# Patient Record
Sex: MALE | Race: WHITE | NOT HISPANIC OR LATINO | Employment: OTHER | URBAN - METROPOLITAN AREA
[De-identification: names, ages, dates, MRNs, and addresses within clinical notes are randomized per-mention and may not be internally consistent; named-entity substitution may affect disease eponyms.]

---

## 2023-03-06 DIAGNOSIS — F32.A DEPRESSION, UNSPECIFIED DEPRESSION TYPE: Primary | ICD-10-CM

## 2023-03-06 RX ORDER — SERTRALINE HYDROCHLORIDE 25 MG/1
25 TABLET, FILM COATED ORAL DAILY
Qty: 90 TABLET | Refills: 0 | Status: SHIPPED | OUTPATIENT
Start: 2023-03-06 | End: 2023-03-17 | Stop reason: SDUPTHER

## 2023-03-12 PROBLEM — I25.10 CAD (CORONARY ARTERY DISEASE): Status: ACTIVE | Noted: 2023-03-12

## 2023-03-12 PROBLEM — I50.9 CONGESTIVE HEART FAILURE (MULTI): Status: ACTIVE | Noted: 2023-03-12

## 2023-03-12 PROBLEM — C09.9: Status: ACTIVE | Noted: 2023-03-12

## 2023-03-12 PROBLEM — E87.1 HYPONATREMIA: Status: ACTIVE | Noted: 2023-03-12

## 2023-03-12 PROBLEM — J43.9 EMPHYSEMA, UNSPECIFIED (MULTI): Status: ACTIVE | Noted: 2023-03-12

## 2023-03-12 PROBLEM — E66.9 CLASS 1 OBESITY WITH BODY MASS INDEX (BMI) OF 30.0 TO 30.9 IN ADULT: Status: ACTIVE | Noted: 2023-03-12

## 2023-03-12 PROBLEM — H90.3 ASYMMETRIC SNHL (SENSORINEURAL HEARING LOSS): Status: ACTIVE | Noted: 2023-03-12

## 2023-03-12 PROBLEM — H61.23 BILATERAL IMPACTED CERUMEN: Status: ACTIVE | Noted: 2023-03-12

## 2023-03-12 PROBLEM — R04.0 EPISTAXIS: Status: ACTIVE | Noted: 2023-03-12

## 2023-03-12 PROBLEM — G47.30 APNEA, SLEEP: Status: ACTIVE | Noted: 2023-03-12

## 2023-03-12 PROBLEM — Z95.2 S/P TAVR (TRANSCATHETER AORTIC VALVE REPLACEMENT): Status: ACTIVE | Noted: 2023-03-12

## 2023-03-12 PROBLEM — Z95.0 CARDIAC PACEMAKER IN SITU: Status: ACTIVE | Noted: 2023-03-12

## 2023-03-12 PROBLEM — T50.905A MEDICATION INDUCED COAGULOPATHY (MULTI): Status: ACTIVE | Noted: 2023-03-12

## 2023-03-12 PROBLEM — C77.0 METASTASIS TO HEAD AND NECK LYMPH NODE (MULTI): Status: ACTIVE | Noted: 2023-03-12

## 2023-03-12 PROBLEM — E66.3 OVERWEIGHT WITH BODY MASS INDEX (BMI) OF 29 TO 29.9 IN ADULT: Status: ACTIVE | Noted: 2023-03-12

## 2023-03-12 PROBLEM — E66.811 CLASS 1 OBESITY WITH BODY MASS INDEX (BMI) OF 30.0 TO 30.9 IN ADULT: Status: ACTIVE | Noted: 2023-03-12

## 2023-03-12 PROBLEM — D68.9 MEDICATION INDUCED COAGULOPATHY (MULTI): Status: ACTIVE | Noted: 2023-03-12

## 2023-03-12 PROBLEM — R35.0 URINARY FREQUENCY: Status: ACTIVE | Noted: 2023-03-12

## 2023-03-12 PROBLEM — H90.3 SENSORINEURAL HEARING LOSS (SNHL) OF BOTH EARS: Status: ACTIVE | Noted: 2023-03-12

## 2023-03-12 PROBLEM — F41.9 ANXIETY: Status: ACTIVE | Noted: 2023-03-12

## 2023-03-12 PROBLEM — I10 HYPERTENSION: Status: ACTIVE | Noted: 2023-03-12

## 2023-03-12 PROBLEM — R06.02 SOB (SHORTNESS OF BREATH) ON EXERTION: Status: ACTIVE | Noted: 2023-03-12

## 2023-03-12 PROBLEM — D13.2 ADENOMA OF DUODENUM: Status: ACTIVE | Noted: 2023-03-12

## 2023-03-12 PROBLEM — R60.0 BILATERAL LEG EDEMA: Status: ACTIVE | Noted: 2023-03-12

## 2023-03-12 PROBLEM — R42 DIZZINESS AND GIDDINESS: Status: ACTIVE | Noted: 2023-03-12

## 2023-03-12 PROBLEM — E03.9 HYPOTHYROIDISM: Status: ACTIVE | Noted: 2023-03-12

## 2023-03-12 PROBLEM — D64.9 ANEMIA: Status: ACTIVE | Noted: 2023-03-12

## 2023-03-12 PROBLEM — I10 BENIGN ESSENTIAL HTN: Status: ACTIVE | Noted: 2023-03-12

## 2023-03-12 PROBLEM — I48.20 CHRONIC ATRIAL FIBRILLATION (MULTI): Status: ACTIVE | Noted: 2023-03-12

## 2023-03-12 RX ORDER — LOSARTAN POTASSIUM 25 MG/1
12.5 TABLET ORAL DAILY
COMMUNITY
End: 2023-10-19 | Stop reason: SDUPTHER

## 2023-03-12 RX ORDER — METOPROLOL SUCCINATE 25 MG/1
1 TABLET, EXTENDED RELEASE ORAL DAILY
COMMUNITY
Start: 2022-09-13 | End: 2023-10-09 | Stop reason: SDUPTHER

## 2023-03-12 RX ORDER — WARFARIN 7.5 MG/1
1 TABLET ORAL 2 TIMES WEEKLY
COMMUNITY
Start: 2022-02-14 | End: 2024-02-07

## 2023-03-12 RX ORDER — DAPAGLIFLOZIN 10 MG/1
10 TABLET, FILM COATED ORAL EVERY MORNING
COMMUNITY
End: 2024-01-13 | Stop reason: ALTCHOICE

## 2023-03-12 RX ORDER — ASPIRIN 81 MG/1
TABLET ORAL
COMMUNITY
End: 2024-02-08

## 2023-03-12 RX ORDER — WARFARIN SODIUM 5 MG/1
5 TABLET ORAL
COMMUNITY
Start: 2022-09-13 | End: 2024-01-13 | Stop reason: SDUPTHER

## 2023-03-12 RX ORDER — MECLIZINE HCL 12.5 MG 12.5 MG/1
12.5 TABLET ORAL 2 TIMES DAILY
COMMUNITY
End: 2023-10-19 | Stop reason: ALTCHOICE

## 2023-03-12 RX ORDER — LEVOTHYROXINE SODIUM 50 UG/1
50 TABLET ORAL DAILY
COMMUNITY
Start: 2022-07-26 | End: 2023-03-21 | Stop reason: SDUPTHER

## 2023-03-12 RX ORDER — ROSUVASTATIN CALCIUM 5 MG/1
1 TABLET, COATED ORAL NIGHTLY
COMMUNITY
Start: 2022-01-15 | End: 2023-11-14 | Stop reason: SDUPTHER

## 2023-03-15 LAB
ALANINE AMINOTRANSFERASE (SGPT) (U/L) IN SER/PLAS: 15 U/L (ref 10–52)
ALBUMIN (G/DL) IN SER/PLAS: 4.4 G/DL (ref 3.4–5)
ALKALINE PHOSPHATASE (U/L) IN SER/PLAS: 117 U/L (ref 33–136)
ANION GAP IN SER/PLAS: 14 MMOL/L (ref 10–20)
ASPARTATE AMINOTRANSFERASE (SGOT) (U/L) IN SER/PLAS: 25 U/L (ref 9–39)
BILIRUBIN TOTAL (MG/DL) IN SER/PLAS: 1 MG/DL (ref 0–1.2)
CALCIUM (MG/DL) IN SER/PLAS: 9.8 MG/DL (ref 8.6–10.6)
CARBON DIOXIDE, TOTAL (MMOL/L) IN SER/PLAS: 26 MMOL/L (ref 21–32)
CHLORIDE (MMOL/L) IN SER/PLAS: 101 MMOL/L (ref 98–107)
CHOLESTEROL (MG/DL) IN SER/PLAS: 136 MG/DL (ref 0–199)
CHOLESTEROL IN HDL (MG/DL) IN SER/PLAS: 56.6 MG/DL
CHOLESTEROL/HDL RATIO: 2.4
CREATININE (MG/DL) IN SER/PLAS: 1.24 MG/DL (ref 0.5–1.3)
ERYTHROCYTE DISTRIBUTION WIDTH (RATIO) BY AUTOMATED COUNT: 15.9 % (ref 11.5–14.5)
ERYTHROCYTE MEAN CORPUSCULAR HEMOGLOBIN CONCENTRATION (G/DL) BY AUTOMATED: 31.1 G/DL (ref 32–36)
ERYTHROCYTE MEAN CORPUSCULAR VOLUME (FL) BY AUTOMATED COUNT: 93 FL (ref 80–100)
ERYTHROCYTES (10*6/UL) IN BLOOD BY AUTOMATED COUNT: 4.83 X10E12/L (ref 4.5–5.9)
GFR MALE: 57 ML/MIN/1.73M2
GLUCOSE (MG/DL) IN SER/PLAS: 94 MG/DL (ref 74–99)
HEMATOCRIT (%) IN BLOOD BY AUTOMATED COUNT: 45 % (ref 41–52)
HEMOGLOBIN (G/DL) IN BLOOD: 14 G/DL (ref 13.5–17.5)
LDL: 62 MG/DL (ref 0–99)
LEUKOCYTES (10*3/UL) IN BLOOD BY AUTOMATED COUNT: 5.6 X10E9/L (ref 4.4–11.3)
NRBC (PER 100 WBCS) BY AUTOMATED COUNT: 0 /100 WBC (ref 0–0)
PLATELETS (10*3/UL) IN BLOOD AUTOMATED COUNT: 193 X10E9/L (ref 150–450)
POTASSIUM (MMOL/L) IN SER/PLAS: 4.4 MMOL/L (ref 3.5–5.3)
PROTEIN TOTAL: 7.7 G/DL (ref 6.4–8.2)
SODIUM (MMOL/L) IN SER/PLAS: 137 MMOL/L (ref 136–145)
THYROTROPIN (MIU/L) IN SER/PLAS BY DETECTION LIMIT <= 0.05 MIU/L: 5.49 MIU/L (ref 0.44–3.98)
TRIGLYCERIDE (MG/DL) IN SER/PLAS: 89 MG/DL (ref 0–149)
UREA NITROGEN (MG/DL) IN SER/PLAS: 27 MG/DL (ref 6–23)
VLDL: 18 MG/DL (ref 0–40)

## 2023-03-17 ENCOUNTER — OFFICE VISIT (OUTPATIENT)
Dept: PRIMARY CARE | Facility: CLINIC | Age: 88
End: 2023-03-17
Payer: MEDICARE

## 2023-03-17 VITALS
HEIGHT: 69 IN | DIASTOLIC BLOOD PRESSURE: 62 MMHG | BODY MASS INDEX: 28.88 KG/M2 | SYSTOLIC BLOOD PRESSURE: 134 MMHG | WEIGHT: 195 LBS

## 2023-03-17 DIAGNOSIS — E03.9 HYPOTHYROIDISM, UNSPECIFIED TYPE: ICD-10-CM

## 2023-03-17 DIAGNOSIS — I10 HYPERTENSION, UNSPECIFIED TYPE: ICD-10-CM

## 2023-03-17 DIAGNOSIS — F32.A DEPRESSION, UNSPECIFIED DEPRESSION TYPE: ICD-10-CM

## 2023-03-17 DIAGNOSIS — Z79.01 CURRENT USE OF LONG TERM ANTICOAGULATION: ICD-10-CM

## 2023-03-17 LAB
POC INR: 2.5 (ref 0.9–1.1)
POC PROTHROMBIN TIME: 30.2 (ref 9.3–12.5)

## 2023-03-17 PROCEDURE — 99214 OFFICE O/P EST MOD 30 MIN: CPT | Performed by: INTERNAL MEDICINE

## 2023-03-17 PROCEDURE — 85610 PROTHROMBIN TIME: CPT | Performed by: INTERNAL MEDICINE

## 2023-03-17 PROCEDURE — 3078F DIAST BP <80 MM HG: CPT | Performed by: INTERNAL MEDICINE

## 2023-03-17 PROCEDURE — 3075F SYST BP GE 130 - 139MM HG: CPT | Performed by: INTERNAL MEDICINE

## 2023-03-17 RX ORDER — LEVOTHYROXINE SODIUM 75 UG/1
75 TABLET ORAL DAILY
Qty: 90 TABLET | Refills: 0 | Status: CANCELLED | OUTPATIENT
Start: 2023-03-17

## 2023-03-17 RX ORDER — SERTRALINE HYDROCHLORIDE 25 MG/1
25 TABLET, FILM COATED ORAL DAILY
Qty: 90 TABLET | Refills: 0 | Status: SHIPPED | OUTPATIENT
Start: 2023-03-17 | End: 2023-11-14 | Stop reason: SDUPTHER

## 2023-03-17 NOTE — PROGRESS NOTES
Subjective   Patient ID: Gorge Larsen is a 87 y.o. male who presents for Follow-up and Med Refill.    HPI   Patient is here accompanied with daughter.  Daughter is concerned that patient is very anxious.  Anxiety was the cause for his drinking heavily.  He was taken off the Zoloft because of low sodium.  His sodium is now normal which probably was related to his heart condition.  She wants to give it a try again.  She wants his Coumadin checked.  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  He sits a lot and does not do much around    past recap  Patient complaining of chest congestion coughing up whitish-yellow phlegm   patient here for follow-up on blood work  He is hard of hearing  Overall feeling much better  Leg swelling is not there he is using compression stockings  He had 2D echo done    Patient is here for follow-up on blood work and ultrasound kidneys  He is feeling much better  He is not taking spironolactone  Taking losartan only every other day  His legs are not swelling up  He has more energy and not too lethargic lethargic    Patient here for follow-up on blood work   He urinates a lot goes frequently  He has history of prostate cancer s/p surgery  He does not drink water but drinks iced tea    last visit when I spoke to the daughter she took him to [place]. He was hospitalized for hyponatremia for few days then he was sent to [facility] rehab. There he had syncopal episode and transferred to [facility] emergency room  Patient was found to have reduced ejection fraction from 60 to 30%, severe aortic valve stenosis. He was transferred to [facility] where he had TAVR. Patient is doing much better since the  procedure leg swelling has gone down his more alert but still remains quite withdrawn. The daughter thinks that he is still very depressed  He is also hard of hearing and his hearing aids are not working well  He is also not able to see very well because of the cataracts      Patient is here for follow-up on lab  Daughter is still concerned as patient is very lethargic, not taking the medications as prescribed    here for flollow up   Patient is not taking some medications which made him feel bad  He quit taking Synthroid  He quit taking Cardizem  He is not taking Coreg  he feels better without those medications.   But daughter says that he is more tired he is sleeping all the time  He says he feels dizzy  He sleeps in a chair  His swelling in the legs is getting worse  He does not take Lasix every day  He is getting more short of breath recently      Patient is here for follow-up  He saw Dr. Mcdonald  He did proBNP which came back 1111  He had his Lasix 20 mg and potassium 10 mEq he is  He did blood work  He has colon polyps positive for tubular adenoma  He has history of GI bleed and required blood transfusions  For last few weeks he is noticing dark stools off and on and concern about GI bleed again      past recap  To establish PCP  Follow-up on hypertension high cholesterol coronary artery disease  Needs refills on medication  He was hospitalized in December 2 sodium was 116 his hydrochlorothiazide was discontinued he was very weak and confused and he went to the nursing home at [facility] now he is home  Now he is having a lot of swelling in the legs  He is sleeping and it recliner and not keeping his legs elevated    Past medical history: A. 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   Constitutional:       Appearance: Normal appearance.   HENT:      Head: Normocephalic and atraumatic.      Right Ear: Tympanic membrane, ear canal and external ear normal.      Left Ear: Tympanic membrane, ear canal and external ear normal.      Nose: Nose normal.      Mouth/Throat:      Pharynx: Oropharynx is clear.   Eyes:      Extraocular Movements: Extraocular movements intact.      Conjunctiva/sclera: Conjunctivae normal.      Pupils: Pupils are equal, round, and reactive to light.   Cardiovascular:      Rate and Rhythm: Normal rate and regular rhythm.      Pulses: Normal pulses.      Heart sounds: Normal heart sounds.   Pulmonary:      Effort: Pulmonary effort is normal.      Breath sounds: Normal breath sounds.   Abdominal:      General: Abdomen is flat. Bowel sounds are normal.      Palpations: Abdomen is soft.   Musculoskeletal:      Cervical back: Normal range of motion and neck supple.   Skin:     General: Skin is warm and dry.   Neurological:      General: No focal deficit present.      Mental Status: He is alert and oriented to person, place, and time.   Psychiatric:         Mood and Affect: Mood normal.         Assessment/Plan            10/7/22  Patient's hospital chart reviewed  Blood pressure stable  CBC CMP fasting with TSH ordered before next follow-up in a month  Syringing curettage done in right ear with BX removal with some improvement in hearing  Treat with neomycin eardrops  Advised to use mineral oil  Get ears cleaned  Advised to see the ENT to get new hearing aids  Encourage patient to go for cataract surgery  Poor vision " poor hearing could be contributing to his anxiety and depression  Had a long discussion with her wife but and what kind of liquids he can have  Patient has a follow-up with cardiologist later today  May need echocardiogram done sooner  Clinically is looking better  We will follow-up in a month      12/3./22  Patient clinically looks much better  He has acute bronchitis  Treat with Augmentin  Venous insufficiency doing much better  Blood work reviewed  Sodium normal  Anemia improving  Follow-up blood work in 3 months  Echocardiogram shows improved ejection fraction from 30 to 35% to 50 to 55%    1/12/23  Had a long discussion with the family and the patient regarding his anxiety and depression and choice of medication  Explained all SSRIs are risk for causing hyponatremia  Family does not want anything to make urine sodium go down as he gets very confused with low sodium  Patient is hard of hearing and has poor vision he does not move around much  May be he needs to change his lifestyle which will make him more happy and less anxious  Discussed BuSpar dizziness most likely related to his ear  Try meclizine as needed  Family does not want to use buspirone at this time as there is a small chance of hyponatremia related to it  That when I work with lifestyle modification to see if patient feels better if not then we will follow-up  Routine follow-up     3/17/23  INR 2.5 PTT 30.2  Continue current Coumadin  We will start Zoloft 25 mg a day  Blood pressure stable  Cholesterol very well controlled  Blood work looks pretty good  Congestive heart failure compensated no swelling of the leg  Hyponatremia resolved  He got new hearing aids  Follow-up blood work in 3 months

## 2023-03-21 DIAGNOSIS — E03.9 HYPOTHYROIDISM, UNSPECIFIED TYPE: ICD-10-CM

## 2023-03-22 RX ORDER — LEVOTHYROXINE SODIUM 50 UG/1
50 TABLET ORAL DAILY
Qty: 90 TABLET | Refills: 0 | Status: SHIPPED | OUTPATIENT
Start: 2023-03-22 | End: 2023-07-17 | Stop reason: SDUPTHER

## 2023-06-29 DIAGNOSIS — I10 BENIGN ESSENTIAL HTN: ICD-10-CM

## 2023-06-29 DIAGNOSIS — I10 HYPERTENSION, UNSPECIFIED TYPE: ICD-10-CM

## 2023-06-29 DIAGNOSIS — E03.9 HYPOTHYROIDISM, UNSPECIFIED TYPE: ICD-10-CM

## 2023-06-30 ENCOUNTER — LAB (OUTPATIENT)
Dept: LAB | Facility: LAB | Age: 88
End: 2023-06-30
Payer: MEDICARE

## 2023-06-30 DIAGNOSIS — I10 BENIGN ESSENTIAL HTN: ICD-10-CM

## 2023-06-30 DIAGNOSIS — I10 HYPERTENSION, UNSPECIFIED TYPE: ICD-10-CM

## 2023-06-30 DIAGNOSIS — E03.9 HYPOTHYROIDISM, UNSPECIFIED TYPE: ICD-10-CM

## 2023-06-30 LAB
ALANINE AMINOTRANSFERASE (SGPT) (U/L) IN SER/PLAS: 14 U/L (ref 10–52)
ALBUMIN (G/DL) IN SER/PLAS: 4.3 G/DL (ref 3.4–5)
ALKALINE PHOSPHATASE (U/L) IN SER/PLAS: 108 U/L (ref 33–136)
ANION GAP IN SER/PLAS: 14 MMOL/L (ref 10–20)
ASPARTATE AMINOTRANSFERASE (SGOT) (U/L) IN SER/PLAS: 23 U/L (ref 9–39)
BILIRUBIN TOTAL (MG/DL) IN SER/PLAS: 0.7 MG/DL (ref 0–1.2)
CALCIUM (MG/DL) IN SER/PLAS: 9.5 MG/DL (ref 8.6–10.6)
CARBON DIOXIDE, TOTAL (MMOL/L) IN SER/PLAS: 26 MMOL/L (ref 21–32)
CHLORIDE (MMOL/L) IN SER/PLAS: 100 MMOL/L (ref 98–107)
CHOLESTEROL (MG/DL) IN SER/PLAS: 131 MG/DL (ref 0–199)
CHOLESTEROL IN HDL (MG/DL) IN SER/PLAS: 53.1 MG/DL
CHOLESTEROL/HDL RATIO: 2.5
CREATININE (MG/DL) IN SER/PLAS: 1.16 MG/DL (ref 0.5–1.3)
ERYTHROCYTE DISTRIBUTION WIDTH (RATIO) BY AUTOMATED COUNT: 15.5 % (ref 11.5–14.5)
ERYTHROCYTE MEAN CORPUSCULAR HEMOGLOBIN CONCENTRATION (G/DL) BY AUTOMATED: 31.4 G/DL (ref 32–36)
ERYTHROCYTE MEAN CORPUSCULAR VOLUME (FL) BY AUTOMATED COUNT: 94 FL (ref 80–100)
ERYTHROCYTES (10*6/UL) IN BLOOD BY AUTOMATED COUNT: 4.4 X10E12/L (ref 4.5–5.9)
GFR MALE: 62 ML/MIN/1.73M2
GLUCOSE (MG/DL) IN SER/PLAS: 100 MG/DL (ref 74–99)
HEMATOCRIT (%) IN BLOOD BY AUTOMATED COUNT: 41.4 % (ref 41–52)
HEMOGLOBIN (G/DL) IN BLOOD: 13 G/DL (ref 13.5–17.5)
LDL: 63 MG/DL (ref 0–99)
LEUKOCYTES (10*3/UL) IN BLOOD BY AUTOMATED COUNT: 5.5 X10E9/L (ref 4.4–11.3)
NRBC (PER 100 WBCS) BY AUTOMATED COUNT: 0 /100 WBC (ref 0–0)
PLATELETS (10*3/UL) IN BLOOD AUTOMATED COUNT: 233 X10E9/L (ref 150–450)
POTASSIUM (MMOL/L) IN SER/PLAS: 4.3 MMOL/L (ref 3.5–5.3)
PROTEIN TOTAL: 7.6 G/DL (ref 6.4–8.2)
SODIUM (MMOL/L) IN SER/PLAS: 136 MMOL/L (ref 136–145)
THYROTROPIN (MIU/L) IN SER/PLAS BY DETECTION LIMIT <= 0.05 MIU/L: 3.86 MIU/L (ref 0.44–3.98)
TRIGLYCERIDE (MG/DL) IN SER/PLAS: 74 MG/DL (ref 0–149)
UREA NITROGEN (MG/DL) IN SER/PLAS: 27 MG/DL (ref 6–23)
VLDL: 15 MG/DL (ref 0–40)

## 2023-06-30 PROCEDURE — 84443 ASSAY THYROID STIM HORMONE: CPT

## 2023-06-30 PROCEDURE — 36415 COLL VENOUS BLD VENIPUNCTURE: CPT

## 2023-06-30 PROCEDURE — 80053 COMPREHEN METABOLIC PANEL: CPT

## 2023-06-30 PROCEDURE — 80061 LIPID PANEL: CPT

## 2023-06-30 PROCEDURE — 85027 COMPLETE CBC AUTOMATED: CPT

## 2023-07-15 ENCOUNTER — OFFICE VISIT (OUTPATIENT)
Dept: PRIMARY CARE | Facility: CLINIC | Age: 88
End: 2023-07-15
Payer: MEDICARE

## 2023-07-15 VITALS
HEIGHT: 68 IN | DIASTOLIC BLOOD PRESSURE: 64 MMHG | BODY MASS INDEX: 30.77 KG/M2 | WEIGHT: 203 LBS | SYSTOLIC BLOOD PRESSURE: 132 MMHG

## 2023-07-15 DIAGNOSIS — E03.8 OTHER SPECIFIED HYPOTHYROIDISM: ICD-10-CM

## 2023-07-15 DIAGNOSIS — E78.5 DYSLIPIDEMIA: ICD-10-CM

## 2023-07-15 DIAGNOSIS — I10 BENIGN ESSENTIAL HTN: ICD-10-CM

## 2023-07-15 DIAGNOSIS — I48.20 CHRONIC ATRIAL FIBRILLATION (MULTI): Primary | ICD-10-CM

## 2023-07-15 DIAGNOSIS — F41.9 ANXIETY: ICD-10-CM

## 2023-07-15 PROBLEM — R42 DIZZINESS: Status: ACTIVE | Noted: 2021-11-26

## 2023-07-15 PROBLEM — I35.0 AORTIC VALVE STENOSIS: Status: ACTIVE | Noted: 2021-11-26

## 2023-07-15 PROBLEM — C77.0: Status: ACTIVE | Noted: 2021-11-26

## 2023-07-15 PROBLEM — E66.9 OBESITY, CLASS II, BMI 35-39.9: Status: ACTIVE | Noted: 2021-11-27

## 2023-07-15 PROBLEM — I48.92 ATRIAL FLUTTER (MULTI): Status: ACTIVE | Noted: 2021-11-26

## 2023-07-15 PROBLEM — Z86.79 HISTORY OF HYPERTENSION: Status: ACTIVE | Noted: 2021-11-26

## 2023-07-15 PROBLEM — N17.9 ACUTE RENAL FAILURE (CMS-HCC): Status: ACTIVE | Noted: 2023-07-15

## 2023-07-15 PROBLEM — J20.9 ACUTE BRONCHITIS: Status: ACTIVE | Noted: 2023-07-15

## 2023-07-15 PROBLEM — Q24.5 CORONARY ARTERY ABNORMALITY (HHS-HCC): Status: ACTIVE | Noted: 2021-11-26

## 2023-07-15 PROBLEM — H90.3 SENSORINEURAL HEARING LOSS, BILATERAL: Status: ACTIVE | Noted: 2023-07-15

## 2023-07-15 PROBLEM — N18.30 STAGE 3 CHRONIC KIDNEY DISEASE (MULTI): Status: ACTIVE | Noted: 2021-11-26

## 2023-07-15 PROBLEM — E44.1 MALNUTRITION OF MILD DEGREE (MULTI): Status: ACTIVE | Noted: 2022-08-24

## 2023-07-15 PROBLEM — E66.812 OBESITY, CLASS II, BMI 35-39.9: Status: ACTIVE | Noted: 2021-11-27

## 2023-07-15 PROBLEM — C18.9 CARCINOMA OF COLON (MULTI): Status: ACTIVE | Noted: 2021-11-26

## 2023-07-15 PROBLEM — I44.2 COMPLETE ATRIOVENTRICULAR BLOCK (MULTI): Status: ACTIVE | Noted: 2021-11-26

## 2023-07-15 PROBLEM — Z85.818 HISTORY OF MALIGNANT NEOPLASM OF TONSIL: Status: ACTIVE | Noted: 2021-11-26

## 2023-07-15 PROCEDURE — 1126F AMNT PAIN NOTED NONE PRSNT: CPT | Performed by: INTERNAL MEDICINE

## 2023-07-15 PROCEDURE — 1036F TOBACCO NON-USER: CPT | Performed by: INTERNAL MEDICINE

## 2023-07-15 PROCEDURE — 3075F SYST BP GE 130 - 139MM HG: CPT | Performed by: INTERNAL MEDICINE

## 2023-07-15 PROCEDURE — 1159F MED LIST DOCD IN RCRD: CPT | Performed by: INTERNAL MEDICINE

## 2023-07-15 PROCEDURE — 99214 OFFICE O/P EST MOD 30 MIN: CPT | Performed by: INTERNAL MEDICINE

## 2023-07-15 PROCEDURE — 3078F DIAST BP <80 MM HG: CPT | Performed by: INTERNAL MEDICINE

## 2023-07-15 NOTE — PROGRESS NOTES
Subjective   Patient ID: Gorge Larsen is a 87 y.o. male who presents for Follow-up.    HPI   Patient is accompanied with the daughter and wife  He is doing much better on Zoloft  Did blood work  Patient checks Coumadin at home but does not know who is monitoring his Coumadin  He has gained some weight  Follow-up on hypertension high cholesterol congestive heart failure     patient is here accompanied with daughter.  Daughter is concerned that patient is very anxious.  Anxiety was the cause for his drinking heavily.  He was taken off the Zoloft because of low sodium.  His sodium is now normal which probably was related to his heart condition.  She wants to give it a try again.  She wants his Coumadin checked.  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  He sits a lot and does not do much around     past recap  Patient complaining of chest congestion coughing up whitish-yellow phlegm   patient here for follow-up on blood work  He is hard of hearing  Overall feeling much better  Leg swelling is not there he is using compression stockings  He had 2D echo done     Patient is here for follow-up on blood work and ultrasound kidneys  He is feeling much better  He is not taking spironolactone  Taking losartan only every other day  His legs are not swelling up  He has more energy and not too lethargic lethargic     Patient here for follow-up on blood work   He urinates a lot goes frequently  He has history of prostate cancer s/p surgery  He does not drink water but drinks iced tea     last visit when I spoke to the daughter she took him to [place]. He was hospitalized for hyponatremia for few days then he was sent to [facility]  rehab. There he had syncopal episode and transferred to [facility] emergency room  Patient was found to have reduced ejection fraction from 60 to 30%, severe aortic valve stenosis. He was transferred to [facility] where he had TAVR. Patient is doing much better since the procedure leg swelling has gone down his more alert but still remains quite withdrawn. The daughter thinks that he is still very depressed  He is also hard of hearing and his hearing aids are not working well  He is also not able to see very well because of the cataracts        Patient is here for follow-up on lab  Daughter is still concerned as patient is very lethargic, not taking the medications as prescribed     here for flollow up   Patient is not taking some medications which made him feel bad  He quit taking Synthroid  He quit taking Cardizem  He is not taking Coreg  he feels better without those medications.   But daughter says that he is more tired he is sleeping all the time  He says he feels dizzy  He sleeps in a chair  His swelling in the legs is getting worse  He does not take Lasix every day  He is getting more short of breath recently        Patient is here for follow-up  He saw Dr. Mcdonald  He did proBNP which came back 1111  He had his Lasix 20 mg and potassium 10 mEq he is  He did blood work  He has colon polyps positive for tubular adenoma  He has history of GI bleed and required blood transfusions  For last few weeks he is noticing dark stools off and on and concern about GI bleed again        past recap  To establish PCP  Follow-up on hypertension high cholesterol coronary artery disease  Needs refills on medication  He was hospitalized in December 2 sodium was 116 his hydrochlorothiazide was discontinued he was very weak and confused and he went to the nursing home at [facility] now he is home  Now he is having a lot of swelling in the legs  He is sleeping and it recliner and not keeping his legs elevated     Past medical  "history: A. 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   Constitutional:       Appearance: Normal appearance.   HENT:      Head: Normocephalic and atraumatic.      Right Ear: Tympanic membrane, ear canal and external ear normal.      Left Ear: Tympanic membrane, ear canal and external ear normal.      Nose: Nose normal.      Mouth/Throat:      Pharynx: Oropharynx is clear.   Eyes:      Extraocular Movements: Extraocular movements intact.      Conjunctiva/sclera: Conjunctivae normal.      Pupils: Pupils are equal, round, and reactive to light.   Cardiovascular:      Rate and Rhythm: Normal rate and regular rhythm.      Pulses: Normal pulses.      Heart sounds: Normal heart sounds.   Pulmonary:      Effort: Pulmonary effort is normal.      Breath sounds: Normal breath sounds.   Abdominal:      General: Abdomen is flat. Bowel sounds are normal.      Palpations: Abdomen is soft.   Musculoskeletal:      Cervical back: Normal range of motion and neck supple.   Skin:     General: Skin is warm and dry.   Neurological:      General: No focal deficit present.      Mental Status: He is alert and oriented to person, place, and time.   Psychiatric:         Mood and Affect: Mood normal.       Assessment/Plan   Problem List Items Addressed This Visit          Cardiac and Vasculature    Benign essential HTN    Relevant Orders    CBC    " Comprehensive Metabolic Panel    Lipid Panel    Thyroid Stimulating Hormone    Chronic atrial fibrillation (CMS/HCC) - Primary    Dyslipidemia       Endocrine/Metabolic    Hypothyroidism    Relevant Orders    CBC    Comprehensive Metabolic Panel    Lipid Panel    Thyroid Stimulating Hormone       Mental Health    Anxiety     10/7/22  Patient's hospital chart reviewed  Blood pressure stable  CBC CMP fasting with TSH ordered before next follow-up in a month  Syringing curettage done in right ear with BX removal with some improvement in hearing  Treat with neomycin eardrops  Advised to use mineral oil  Get ears cleaned  Advised to see the ENT to get new hearing aids  Encourage patient to go for cataract surgery  Poor vision poor hearing could be contributing to his anxiety and depression  Had a long discussion with her wife but and what kind of liquids he can have  Patient has a follow-up with cardiologist later today  May need echocardiogram done sooner  Clinically is looking better  We will follow-up in a month        12/3./22  Patient clinically looks much better  He has acute bronchitis  Treat with Augmentin  Venous insufficiency doing much better  Blood work reviewed  Sodium normal  Anemia improving  Follow-up blood work in 3 months  Echocardiogram shows improved ejection fraction from 30 to 35% to 50 to 55%     1/12/23  Had a long discussion with the family and the patient regarding his anxiety and depression and choice of medication  Explained all SSRIs are risk for causing hyponatremia  Family does not want anything to make urine sodium go down as he gets very confused with low sodium  Patient is hard of hearing and has poor vision he does not move around much  May be he needs to change his lifestyle which will make him more happy and less anxious  Discussed BuSpar dizziness most likely related to his ear  Try meclizine as needed  Family does not want to use buspirone at this time as there is a small chance of  hyponatremia related to it  That when I work with lifestyle modification to see if patient feels better if not then we will follow-up  Routine follow-up      3/17/23  INR 2.5 PTT 30.2  Continue current Coumadin  We will start Zoloft 25 mg a day  Blood pressure stable  Cholesterol very well controlled  Blood work looks pretty good  Congestive heart failure compensated no swelling of the leg  Hyponatremia resolved  He got new hearing aids  Follow-up blood work in 3 months    7/15/2023  Advised weight call Dr. Mcdonald office to make sure they are monitoring the Coumadin  Continue Zoloft patient is tolerating  Sodium is in range  Kidney function stable  Cholesterol well controlled  CHF compensated  Follow-up blood work in 6 months

## 2023-07-17 DIAGNOSIS — E03.9 HYPOTHYROIDISM, UNSPECIFIED TYPE: ICD-10-CM

## 2023-07-17 RX ORDER — LEVOTHYROXINE SODIUM 50 UG/1
50 TABLET ORAL DAILY
Qty: 90 TABLET | Refills: 0 | Status: SHIPPED | OUTPATIENT
Start: 2023-07-17 | End: 2023-11-06 | Stop reason: SDUPTHER

## 2023-09-03 PROBLEM — R01.1 HEART MURMUR: Status: ACTIVE | Noted: 2023-09-03

## 2023-09-03 PROBLEM — I47.29 NONSUSTAINED PAROXYSMAL VENTRICULAR TACHYCARDIA (MULTI): Status: ACTIVE | Noted: 2023-09-03

## 2023-09-03 PROBLEM — E78.5 HYPERLIPIDEMIA, ACQUIRED: Status: ACTIVE | Noted: 2023-09-03

## 2023-09-03 PROBLEM — I35.9 AORTIC VALVE DISORDER: Status: ACTIVE | Noted: 2023-09-03

## 2023-09-03 PROBLEM — I42.9 CARDIOMYOPATHY (MULTI): Status: ACTIVE | Noted: 2023-09-03

## 2023-09-03 PROBLEM — K62.5 RECTAL HEMORRHAGE: Status: ACTIVE | Noted: 2023-09-03

## 2023-09-03 PROBLEM — E87.6 HYPOKALEMIA: Status: ACTIVE | Noted: 2023-09-03

## 2023-09-03 PROBLEM — K92.2 GASTROINTESTINAL HEMORRHAGE: Status: ACTIVE | Noted: 2023-09-03

## 2023-09-03 PROBLEM — D50.9 IRON DEFICIENCY ANEMIA: Status: ACTIVE | Noted: 2023-09-03

## 2023-09-03 PROBLEM — G92.8 TOXIC METABOLIC ENCEPHALOPATHY: Status: ACTIVE | Noted: 2023-09-03

## 2023-09-03 PROBLEM — R47.1 DYSARTHRIA: Status: ACTIVE | Noted: 2023-09-03

## 2023-09-03 RX ORDER — OMEPRAZOLE 20 MG/1
20 CAPSULE, DELAYED RELEASE ORAL DAILY
COMMUNITY
End: 2023-10-19 | Stop reason: WASHOUT

## 2023-09-15 ENCOUNTER — HOSPITAL ENCOUNTER (OUTPATIENT)
Dept: DATA CONVERSION | Facility: HOSPITAL | Age: 88
Discharge: HOME | End: 2023-09-15
Payer: MEDICARE

## 2023-09-15 DIAGNOSIS — S00.512A ABRASION OF ORAL CAVITY, INITIAL ENCOUNTER: ICD-10-CM

## 2023-09-15 DIAGNOSIS — S09.90XA UNSPECIFIED INJURY OF HEAD, INITIAL ENCOUNTER: ICD-10-CM

## 2023-09-15 DIAGNOSIS — Z85.21 PERSONAL HISTORY OF MALIGNANT NEOPLASM OF LARYNX: ICD-10-CM

## 2023-09-15 DIAGNOSIS — R04.2 HEMOPTYSIS: ICD-10-CM

## 2023-09-15 DIAGNOSIS — K13.70 UNSPECIFIED LESIONS OF ORAL MUCOSA: ICD-10-CM

## 2023-09-15 DIAGNOSIS — R53.1 WEAKNESS: ICD-10-CM
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
## 2023-09-15 DIAGNOSIS — Z91.040 LATEX ALLERGY STATUS: ICD-10-CM

## 2023-09-15 DIAGNOSIS — Z79.01 LONG TERM (CURRENT) USE OF ANTICOAGULANTS: ICD-10-CM

## 2023-09-15 DIAGNOSIS — Z87.891 PERSONAL HISTORY OF NICOTINE DEPENDENCE: ICD-10-CM

## 2023-09-15 DIAGNOSIS — R42 DIZZINESS AND GIDDINESS: ICD-10-CM

## 2023-09-15 LAB
ABO + RH BLD: NORMAL
ALBUMIN SERPL-MCNC: 4.2 GM/DL (ref 3.5–5)
ALBUMIN/GLOB SERPL: 1.2 RATIO (ref 1.5–3)
ALP BLD-CCNC: 119 U/L (ref 35–125)
ALT SERPL-CCNC: 18 U/L (ref 5–40)
ANION GAP SERPL CALCULATED.3IONS-SCNC: 12 MMOL/L (ref 0–19)
ANTICOAGULANT: ABNORMAL
ANTICOAGULANT: ABNORMAL
APTT PPP: 37.2 SEC (ref 22–32.5)
AST SERPL-CCNC: 28 U/L (ref 5–40)
BASOPHILS # BLD AUTO: 0.14 K/UL (ref 0–0.22)
BASOPHILS NFR BLD AUTO: 2.3 % (ref 0–1)
BILIRUB SERPL-MCNC: 0.4 MG/DL (ref 0.1–1.2)
BLD GP AB SCN SERPL QL: NEGATIVE
BLD PROD TYP BPU: NORMAL
BLOOD BANK COMMENT: NORMAL
BUN SERPL-MCNC: 30 MG/DL (ref 8–25)
BUN/CREAT SERPL: 25 RATIO (ref 8–21)
CALCIUM SERPL-MCNC: 9.5 MG/DL (ref 8.5–10.4)
CHLORIDE SERPL-SCNC: 100 MMOL/L (ref 97–107)
CO2 SERPL-SCNC: 23 MMOL/L (ref 24–31)
CREAT SERPL-MCNC: 1.2 MG/DL (ref 0.4–1.6)
DEPRECATED RDW RBC AUTO: 51.6 FL (ref 37–54)
DIFFERENTIAL METHOD BLD: ABNORMAL
EOSINOPHIL # BLD AUTO: 0.64 K/UL (ref 0–0.45)
EOSINOPHIL NFR BLD: 10.7 % (ref 0–3)
ERYTHROCYTE [DISTWIDTH] IN BLOOD BY AUTOMATED COUNT: 15.9 % (ref 11.7–15)
GFR SERPL CREATININE-BSD FRML MDRD: 59 ML/MIN/1.73 M2
GLOBULIN SER-MCNC: 3.4 G/DL (ref 1.9–3.7)
GLUCOSE SERPL-MCNC: 121 MG/DL (ref 65–99)
HCT VFR BLD AUTO: 41.6 % (ref 41–50)
HGB BLD-MCNC: 13.7 GM/DL (ref 13.5–16.5)
HX OF BLOOD TRANSFUSION: NORMAL
IMM GRANULOCYTES # BLD AUTO: 0.02 K/UL (ref 0–0.1)
INR PPP: 2.2 (ref 0.86–1.16)
LYMPHOCYTES # BLD AUTO: 0.88 K/UL (ref 1.2–3.2)
LYMPHOCYTES NFR BLD MANUAL: 14.8 % (ref 20–40)
MCH RBC QN AUTO: 29.6 PG (ref 26–34)
MCHC RBC AUTO-ENTMCNC: 32.9 % (ref 31–37)
MCV RBC AUTO: 89.8 FL (ref 80–100)
MONOCYTES # BLD AUTO: 0.68 K/UL (ref 0–0.8)
MONOCYTES NFR BLD MANUAL: 11.4 % (ref 0–8)
NEUTROPHILS # BLD AUTO: 3.6 K/UL
NEUTROPHILS # BLD AUTO: 3.6 K/UL (ref 1.8–7.7)
NEUTROPHILS.IMMATURE NFR BLD: 0.3 % (ref 0–1)
NEUTS SEG NFR BLD: 60.5 % (ref 50–70)
NRBC BLD-RTO: 0 /100 WBC
NUM BPU REQUESTED: 0
PLATELET # BLD AUTO: 195 K/UL (ref 150–450)
PMV BLD AUTO: 9.8 CU (ref 7–12.6)
POTASSIUM SERPL-SCNC: 3.9 MMOL/L (ref 3.4–5.1)
PROT SERPL-MCNC: 7.6 G/DL (ref 5.9–7.9)
PROTHROMBIN TIME: 22.5 SEC (ref 9.3–12.7)
RBC # BLD AUTO: 4.63 M/UL (ref 4.5–5.5)
SODIUM SERPL-SCNC: 134 MMOL/L (ref 133–145)
SPECIMEN EXP DATE BLD: NORMAL
TEST ORDERED: NORMAL
TRANSF BAND NUM PATIENT: NORMAL
WBC # BLD AUTO: 6 K/UL (ref 4.5–11)

## 2023-09-19 PROBLEM — E66.2 CLASS 1 OBESITY WITH ALVEOLAR HYPOVENTILATION AND BODY MASS INDEX (BMI) OF 30.0 TO 30.9 IN ADULT (MULTI): Status: ACTIVE | Noted: 2023-09-19

## 2023-09-19 PROBLEM — Z79.01 ANTICOAGULATED ON WARFARIN: Status: ACTIVE | Noted: 2023-09-19

## 2023-09-19 PROBLEM — E66.811 CLASS 1 OBESITY WITH ALVEOLAR HYPOVENTILATION AND BODY MASS INDEX (BMI) OF 30.0 TO 30.9 IN ADULT: Status: ACTIVE | Noted: 2023-09-19

## 2023-09-19 PROBLEM — C77.0 METASTASIS TO HEAD AND NECK LYMPH NODE (MULTI): Status: ACTIVE | Noted: 2023-09-19

## 2023-09-19 RX ORDER — SODIUM FLUORIDE 6.1 MG/ML
GEL, DENTIFRICE DENTAL
COMMUNITY
Start: 2023-08-05 | End: 2023-10-19 | Stop reason: WASHOUT

## 2023-10-09 ENCOUNTER — TELEPHONE (OUTPATIENT)
Dept: CARDIOLOGY | Facility: CLINIC | Age: 88
End: 2023-10-09
Payer: MEDICARE

## 2023-10-09 DIAGNOSIS — I10 ESSENTIAL HYPERTENSION: Primary | ICD-10-CM

## 2023-10-09 RX ORDER — METOPROLOL SUCCINATE 25 MG/1
25 TABLET, EXTENDED RELEASE ORAL DAILY
Qty: 90 TABLET | Refills: 0 | Status: SHIPPED | OUTPATIENT
Start: 2023-10-09 | End: 2024-01-13 | Stop reason: SDUPTHER

## 2023-10-19 ENCOUNTER — HOSPITAL ENCOUNTER (OUTPATIENT)
Dept: CARDIOLOGY | Facility: CLINIC | Age: 88
Discharge: HOME | End: 2023-10-19
Payer: MEDICARE

## 2023-10-19 ENCOUNTER — OFFICE VISIT (OUTPATIENT)
Dept: CARDIOLOGY | Facility: CLINIC | Age: 88
End: 2023-10-19
Payer: MEDICARE

## 2023-10-19 VITALS
BODY MASS INDEX: 30.66 KG/M2 | DIASTOLIC BLOOD PRESSURE: 72 MMHG | HEART RATE: 74 BPM | HEIGHT: 69 IN | WEIGHT: 207 LBS | OXYGEN SATURATION: 98 % | RESPIRATION RATE: 16 BRPM | SYSTOLIC BLOOD PRESSURE: 148 MMHG

## 2023-10-19 DIAGNOSIS — I10 HYPERTENSION, UNSPECIFIED TYPE: Primary | ICD-10-CM

## 2023-10-19 DIAGNOSIS — I25.10 CORONARY ARTERY DISEASE DUE TO CALCIFIED CORONARY LESION: ICD-10-CM

## 2023-10-19 DIAGNOSIS — I25.84 CORONARY ARTERY DISEASE DUE TO CALCIFIED CORONARY LESION: ICD-10-CM

## 2023-10-19 DIAGNOSIS — I25.10 ATHEROSCLEROTIC HEART DISEASE OF NATIVE CORONARY ARTERY WITHOUT ANGINA PECTORIS: ICD-10-CM

## 2023-10-19 PROCEDURE — 2500000004 HC RX 250 GENERAL PHARMACY W/ HCPCS (ALT 636 FOR OP/ED): Performed by: INTERNAL MEDICINE

## 2023-10-19 PROCEDURE — 3077F SYST BP >= 140 MM HG: CPT | Performed by: INTERNAL MEDICINE

## 2023-10-19 PROCEDURE — 93306 TTE W/DOPPLER COMPLETE: CPT | Performed by: INTERNAL MEDICINE

## 2023-10-19 PROCEDURE — 99214 OFFICE O/P EST MOD 30 MIN: CPT | Performed by: INTERNAL MEDICINE

## 2023-10-19 PROCEDURE — 1126F AMNT PAIN NOTED NONE PRSNT: CPT | Performed by: INTERNAL MEDICINE

## 2023-10-19 PROCEDURE — 93306 TTE W/DOPPLER COMPLETE: CPT

## 2023-10-19 PROCEDURE — 1036F TOBACCO NON-USER: CPT | Performed by: INTERNAL MEDICINE

## 2023-10-19 PROCEDURE — 3078F DIAST BP <80 MM HG: CPT | Performed by: INTERNAL MEDICINE

## 2023-10-19 PROCEDURE — 99214 OFFICE O/P EST MOD 30 MIN: CPT | Mod: 25 | Performed by: INTERNAL MEDICINE

## 2023-10-19 PROCEDURE — 1159F MED LIST DOCD IN RCRD: CPT | Performed by: INTERNAL MEDICINE

## 2023-10-19 RX ORDER — LOSARTAN POTASSIUM 25 MG/1
25 TABLET ORAL DAILY
Qty: 90 TABLET | Refills: 3 | Status: SHIPPED | OUTPATIENT
Start: 2023-10-19 | End: 2024-01-25 | Stop reason: HOSPADM

## 2023-10-19 RX ADMIN — PERFLUTREN 2 ML: 6.52 INJECTION, SUSPENSION INTRAVENOUS at 15:56

## 2023-10-19 ASSESSMENT — PATIENT HEALTH QUESTIONNAIRE - PHQ9
2. FEELING DOWN, DEPRESSED OR HOPELESS: NOT AT ALL
SUM OF ALL RESPONSES TO PHQ9 QUESTIONS 1 AND 2: 0
1. LITTLE INTEREST OR PLEASURE IN DOING THINGS: NOT AT ALL

## 2023-10-19 NOTE — PROGRESS NOTES
Subjective   Gorge Larsen is a 87 y.o. male.    Chief Complaint:   · Anticoagulated on warfarin (V58.61) (Z79.01)   · Benign essential HTN (401.1) (I10)   · CAD (coronary artery disease) (414.00) (I25.10)    HPI  This is an 86 y/o male here today for a six month Cardiology follow up visit. He had a TAVR on September 9, 2022. An Echocardiogram was done earlier today reviewed results- see report. He denies chest pain, sob, heart palpitations or pedal edema.     Review of Systems   All other systems reviewed and are negative.      Objective   Physical Exam  Patient is an ill-appearing 86 y/o female in no distress.   JVP not elevated. Carotid impulses are 2+ without overlying bruit.   Chest exhibits fair to good air movement with completely clear breath sounds.   The cardiac rhythm is regular with no premature beats. Normal S1 and widely split S2.    Abdomen is soft and benign without focal tenderness.   No peripheral edema. The pedal pulses are intact.  All other systems have been reviewed and are negative for complaints    Lab Review:   Lab on 06/30/2023   Component Date Value    WBC 06/30/2023 5.5     nRBC 06/30/2023 0.0     RBC 06/30/2023 4.40 (L)     Hemoglobin 06/30/2023 13.0 (L)     Hematocrit 06/30/2023 41.4     MCV 06/30/2023 94     MCHC 06/30/2023 31.4 (L)     Platelets 06/30/2023 233     RDW 06/30/2023 15.5 (H)     Glucose 06/30/2023 100 (H)     Sodium 06/30/2023 136     Potassium 06/30/2023 4.3     Chloride 06/30/2023 100     Bicarbonate 06/30/2023 26     Anion Gap 06/30/2023 14     Urea Nitrogen 06/30/2023 27 (H)     Creatinine 06/30/2023 1.16     GFR MALE 06/30/2023 62     Calcium 06/30/2023 9.5     Albumin 06/30/2023 4.3     Alkaline Phosphatase 06/30/2023 108     Total Protein 06/30/2023 7.6     AST 06/30/2023 23     Total Bilirubin 06/30/2023 0.7     ALT (SGPT) 06/30/2023 14     Cholesterol 06/30/2023 131     HDL 06/30/2023 53.1     Cholesterol/HDL Ratio 06/30/2023 2.5     LDL 06/30/2023 63      VLDL 06/30/2023 15     Triglycerides 06/30/2023 74     TSH 06/30/2023 3.86        Assessment/Plan   Impressions   1. 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 Patient admitted to [facility] from [place] 08/31/2022 with complaints of worsening encephalopathy from SNF with shortness of breath found to have newly reduced EF, 60 to 30%, of unknown etiology and worsening aortic stenosis. Echo done October 2022 showed an EF of 35%, LAD, mild to moderate MR, moderate TR, TAVR. Patient had repeat echo November 2022 showing an EF of 50 to 55%, ad, RA mild to moderately dilated, mild to moderate MR, mildly elevated RVSP, moderate TR, TAVR, PASP 44 mmHg.     2. CAD. S/P CABG 2002.     3. TAVR. Initial reticence patient had TAVR completed September 9, 2022 without complication. Echo done status post TAVR showed EF 30 to 35%.     4. Hypertension.     5. Hyperlipidemia.      6. Afib. On warfarin due to GI bleeding on Xarelto.      7. Pacemaker. Complete AV block. 2015 [employer].      8. Emphysema.      9. Hypothyroidism.      10. History of COVID-19 infection.     11. History of CABG x3.

## 2023-10-23 LAB — EJECTION FRACTION APICAL 4 CHAMBER: 46.7

## 2023-11-06 DIAGNOSIS — E03.9 HYPOTHYROIDISM, UNSPECIFIED TYPE: ICD-10-CM

## 2023-11-06 RX ORDER — LEVOTHYROXINE SODIUM 50 UG/1
50 TABLET ORAL DAILY
Qty: 90 TABLET | Refills: 0 | Status: SHIPPED | OUTPATIENT
Start: 2023-11-06 | End: 2024-01-13 | Stop reason: SDUPTHER

## 2023-11-13 DIAGNOSIS — E78.5 DYSLIPIDEMIA: ICD-10-CM

## 2023-11-13 DIAGNOSIS — F32.A DEPRESSION, UNSPECIFIED DEPRESSION TYPE: ICD-10-CM

## 2023-11-14 DIAGNOSIS — E78.5 DYSLIPIDEMIA: ICD-10-CM

## 2023-11-14 DIAGNOSIS — F32.A DEPRESSION, UNSPECIFIED DEPRESSION TYPE: ICD-10-CM

## 2023-11-14 RX ORDER — SERTRALINE HYDROCHLORIDE 25 MG/1
25 TABLET, FILM COATED ORAL DAILY
Qty: 90 TABLET | Refills: 0 | Status: SHIPPED | OUTPATIENT
Start: 2023-11-14 | End: 2023-11-14 | Stop reason: SDUPTHER

## 2023-11-14 RX ORDER — SERTRALINE HYDROCHLORIDE 25 MG/1
25 TABLET, FILM COATED ORAL DAILY
Qty: 90 TABLET | Refills: 0 | Status: SHIPPED | OUTPATIENT
Start: 2023-11-14 | End: 2024-01-13 | Stop reason: SDUPTHER

## 2023-11-14 RX ORDER — ROSUVASTATIN CALCIUM 5 MG/1
5 TABLET, COATED ORAL NIGHTLY
Qty: 90 TABLET | Refills: 0 | Status: SHIPPED | OUTPATIENT
Start: 2023-11-14 | End: 2023-11-14 | Stop reason: SDUPTHER

## 2023-11-14 RX ORDER — ROSUVASTATIN CALCIUM 5 MG/1
5 TABLET, COATED ORAL NIGHTLY
Qty: 90 TABLET | Refills: 0 | Status: SHIPPED | OUTPATIENT
Start: 2023-11-14 | End: 2024-01-13 | Stop reason: SDUPTHER

## 2023-11-20 ENCOUNTER — HOSPITAL ENCOUNTER (OUTPATIENT)
Dept: CARDIOLOGY | Facility: CLINIC | Age: 88
Discharge: HOME | End: 2023-11-20
Payer: MEDICARE

## 2023-12-12 ENCOUNTER — HOSPITAL ENCOUNTER (OUTPATIENT)
Dept: CARDIOLOGY | Facility: CLINIC | Age: 88
Discharge: HOME | End: 2023-12-12
Payer: MEDICARE

## 2023-12-12 DIAGNOSIS — I44.2 CHB (COMPLETE HEART BLOCK) (MULTI): ICD-10-CM

## 2023-12-12 DIAGNOSIS — Z95.0 CARDIAC PACEMAKER: ICD-10-CM

## 2023-12-12 DIAGNOSIS — Z95.0 CARDIAC PACEMAKER: Primary | ICD-10-CM

## 2023-12-12 DIAGNOSIS — I48.0 PAF (PAROXYSMAL ATRIAL FIBRILLATION) (MULTI): ICD-10-CM

## 2023-12-12 PROCEDURE — 93280 PM DEVICE PROGR EVAL DUAL: CPT

## 2023-12-12 PROCEDURE — 93290 INTERROG DEV EVAL ICPMS IP: CPT | Performed by: INTERNAL MEDICINE

## 2023-12-12 PROCEDURE — 93280 PM DEVICE PROGR EVAL DUAL: CPT | Performed by: INTERNAL MEDICINE

## 2023-12-18 ENCOUNTER — HOSPITAL ENCOUNTER (OUTPATIENT)
Dept: CARDIOLOGY | Facility: CLINIC | Age: 88
Discharge: HOME | End: 2023-12-18
Payer: MEDICARE

## 2023-12-18 DIAGNOSIS — I48.91 ATRIAL FIBRILLATION, UNSPECIFIED TYPE (MULTI): ICD-10-CM

## 2024-01-08 ENCOUNTER — LAB (OUTPATIENT)
Dept: LAB | Facility: LAB | Age: 89
End: 2024-01-08
Payer: MEDICARE

## 2024-01-08 DIAGNOSIS — E03.8 OTHER SPECIFIED HYPOTHYROIDISM: ICD-10-CM

## 2024-01-08 DIAGNOSIS — I10 BENIGN ESSENTIAL HTN: ICD-10-CM

## 2024-01-08 LAB
ALBUMIN SERPL BCP-MCNC: 4.2 G/DL (ref 3.4–5)
ALP SERPL-CCNC: 99 U/L (ref 33–136)
ALT SERPL W P-5'-P-CCNC: 11 U/L (ref 10–52)
ANION GAP SERPL CALC-SCNC: 14 MMOL/L (ref 10–20)
AST SERPL W P-5'-P-CCNC: 22 U/L (ref 9–39)
BILIRUB SERPL-MCNC: 0.8 MG/DL (ref 0–1.2)
BUN SERPL-MCNC: 22 MG/DL (ref 6–23)
CALCIUM SERPL-MCNC: 9.3 MG/DL (ref 8.6–10.6)
CHLORIDE SERPL-SCNC: 99 MMOL/L (ref 98–107)
CHOLEST SERPL-MCNC: 98 MG/DL (ref 0–199)
CHOLESTEROL/HDL RATIO: 2.3
CO2 SERPL-SCNC: 27 MMOL/L (ref 21–32)
CREAT SERPL-MCNC: 1.1 MG/DL (ref 0.5–1.3)
EGFRCR SERPLBLD CKD-EPI 2021: 65 ML/MIN/1.73M*2
ERYTHROCYTE [DISTWIDTH] IN BLOOD BY AUTOMATED COUNT: 15.3 % (ref 11.5–14.5)
GLUCOSE SERPL-MCNC: 126 MG/DL (ref 74–99)
HCT VFR BLD AUTO: 38.3 % (ref 41–52)
HDLC SERPL-MCNC: 43.1 MG/DL
HGB BLD-MCNC: 11.9 G/DL (ref 13.5–17.5)
LDLC SERPL CALC-MCNC: 43 MG/DL
MCH RBC QN AUTO: 28.4 PG (ref 26–34)
MCHC RBC AUTO-ENTMCNC: 31.1 G/DL (ref 32–36)
MCV RBC AUTO: 91 FL (ref 80–100)
NON HDL CHOLESTEROL: 55 MG/DL (ref 0–149)
NRBC BLD-RTO: 0 /100 WBCS (ref 0–0)
PLATELET # BLD AUTO: 255 X10*3/UL (ref 150–450)
POTASSIUM SERPL-SCNC: 4.7 MMOL/L (ref 3.5–5.3)
PROT SERPL-MCNC: 7.3 G/DL (ref 6.4–8.2)
RBC # BLD AUTO: 4.19 X10*6/UL (ref 4.5–5.9)
SODIUM SERPL-SCNC: 135 MMOL/L (ref 136–145)
TRIGL SERPL-MCNC: 62 MG/DL (ref 0–149)
TSH SERPL-ACNC: 6.34 MIU/L (ref 0.44–3.98)
VLDL: 12 MG/DL (ref 0–40)
WBC # BLD AUTO: 5.4 X10*3/UL (ref 4.4–11.3)

## 2024-01-08 PROCEDURE — 84443 ASSAY THYROID STIM HORMONE: CPT

## 2024-01-08 PROCEDURE — 36415 COLL VENOUS BLD VENIPUNCTURE: CPT

## 2024-01-08 PROCEDURE — 80061 LIPID PANEL: CPT

## 2024-01-08 PROCEDURE — 85027 COMPLETE CBC AUTOMATED: CPT

## 2024-01-08 PROCEDURE — 80053 COMPREHEN METABOLIC PANEL: CPT

## 2024-01-13 ENCOUNTER — OFFICE VISIT (OUTPATIENT)
Dept: PRIMARY CARE | Facility: CLINIC | Age: 89
End: 2024-01-13
Payer: MEDICARE

## 2024-01-13 VITALS
WEIGHT: 217 LBS | HEIGHT: 69 IN | SYSTOLIC BLOOD PRESSURE: 130 MMHG | BODY MASS INDEX: 32.14 KG/M2 | DIASTOLIC BLOOD PRESSURE: 80 MMHG

## 2024-01-13 DIAGNOSIS — I50.9 CONGESTIVE HEART FAILURE, UNSPECIFIED HF CHRONICITY, UNSPECIFIED HEART FAILURE TYPE (MULTI): ICD-10-CM

## 2024-01-13 DIAGNOSIS — I10 ESSENTIAL HYPERTENSION: ICD-10-CM

## 2024-01-13 DIAGNOSIS — D64.9 ANEMIA, UNSPECIFIED TYPE: ICD-10-CM

## 2024-01-13 DIAGNOSIS — R04.0 EPISTAXIS: Primary | ICD-10-CM

## 2024-01-13 DIAGNOSIS — E78.5 DYSLIPIDEMIA: ICD-10-CM

## 2024-01-13 DIAGNOSIS — E03.9 HYPOTHYROIDISM, UNSPECIFIED TYPE: ICD-10-CM

## 2024-01-13 DIAGNOSIS — F32.A DEPRESSION, UNSPECIFIED DEPRESSION TYPE: ICD-10-CM

## 2024-01-13 PROCEDURE — 1036F TOBACCO NON-USER: CPT | Performed by: INTERNAL MEDICINE

## 2024-01-13 PROCEDURE — 3079F DIAST BP 80-89 MM HG: CPT | Performed by: INTERNAL MEDICINE

## 2024-01-13 PROCEDURE — 3075F SYST BP GE 130 - 139MM HG: CPT | Performed by: INTERNAL MEDICINE

## 2024-01-13 PROCEDURE — 1126F AMNT PAIN NOTED NONE PRSNT: CPT | Performed by: INTERNAL MEDICINE

## 2024-01-13 PROCEDURE — 99214 OFFICE O/P EST MOD 30 MIN: CPT | Performed by: INTERNAL MEDICINE

## 2024-01-13 RX ORDER — LEVOTHYROXINE SODIUM 50 UG/1
75 TABLET ORAL DAILY
Qty: 135 TABLET | Refills: 0 | Status: SHIPPED | OUTPATIENT
Start: 2024-01-13 | End: 2024-04-18

## 2024-01-13 RX ORDER — METOPROLOL SUCCINATE 25 MG/1
25 TABLET, EXTENDED RELEASE ORAL DAILY
Qty: 90 TABLET | Refills: 0 | Status: SHIPPED | OUTPATIENT
Start: 2024-01-13 | End: 2024-01-25 | Stop reason: HOSPADM

## 2024-01-13 RX ORDER — ROSUVASTATIN CALCIUM 5 MG/1
5 TABLET, COATED ORAL NIGHTLY
Qty: 90 TABLET | Refills: 0 | Status: SHIPPED | OUTPATIENT
Start: 2024-01-13 | End: 2024-05-21 | Stop reason: SDUPTHER

## 2024-01-13 RX ORDER — WARFARIN SODIUM 5 MG/1
5 TABLET ORAL
Qty: 90 TABLET | Refills: 0 | Status: SHIPPED | OUTPATIENT
Start: 2024-01-13 | End: 2024-02-02 | Stop reason: SDUPTHER

## 2024-01-13 RX ORDER — SERTRALINE HYDROCHLORIDE 25 MG/1
25 TABLET, FILM COATED ORAL DAILY
Qty: 90 TABLET | Refills: 0 | Status: SHIPPED | OUTPATIENT
Start: 2024-01-13 | End: 2024-01-25 | Stop reason: HOSPADM

## 2024-01-13 NOTE — PROGRESS NOTES
Subjective   Patient ID: Gorge Larsen is a 88 y.o. male who presents for Follow-up and Med Refill.    HPI   Patient is here for follow-up  Did blood work  Having a lot of nosebleeds recently.  He is on Coumadin    Past recap   patient is accompanied with the daughter and wife  He is doing much better on Zoloft  Did blood work  Patient checks Coumadin at home but does not know who is monitoring his Coumadin  He has gained some weight  Follow-up on hypertension high cholesterol congestive heart failure     patient is here accompanied with daughter.  Daughter is concerned that patient is very anxious.  Anxiety was the cause for his drinking heavily.  He was taken off the Zoloft because of low sodium.  His sodium is now normal which probably was related to his heart condition.  She wants to give it a try again.  She wants his Coumadin checked.  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  He sits a lot and does not do much around     past recap  Patient complaining of chest congestion coughing up whitish-yellow phlegm   patient here for follow-up on blood work  He is hard of hearing  Overall feeling much better  Leg swelling is not there he is using compression stockings  He had 2D echo done     Patient is here for follow-up on blood work and ultrasound kidneys  He is feeling much better  He is not taking spironolactone  Taking losartan only every other day  His legs are not swelling up  He has more energy and not too lethargic lethargic     Patient here for follow-up on blood work   He urinates a lot goes frequently  He has history of prostate cancer s/p surgery  He does not drink water but drinks iced tea     last visit when  I spoke to the daughter she took him to [place]. He was hospitalized for hyponatremia for few days then he was sent to [facility] rehab. There he had syncopal episode and transferred to [facility] emergency room  Patient was found to have reduced ejection fraction from 60 to 30%, severe aortic valve stenosis. He was transferred to [facility] where he had TAVR. Patient is doing much better since the procedure leg swelling has gone down his more alert but still remains quite withdrawn. The daughter thinks that he is still very depressed  He is also hard of hearing and his hearing aids are not working well  He is also not able to see very well because of the cataracts        Patient is here for follow-up on lab  Daughter is still concerned as patient is very lethargic, not taking the medications as prescribed     here for flollow up   Patient is not taking some medications which made him feel bad  He quit taking Synthroid  He quit taking Cardizem  He is not taking Coreg  he feels better without those medications.   But daughter says that he is more tired he is sleeping all the time  He says he feels dizzy  He sleeps in a chair  His swelling in the legs is getting worse  He does not take Lasix every day  He is getting more short of breath recently        Patient is here for follow-up  He saw Dr. Mcdonald  He did proBNP which came back 1111  He had his Lasix 20 mg and potassium 10 mEq he is  He did blood work  He has colon polyps positive for tubular adenoma  He has history of GI bleed and required blood transfusions  For last few weeks he is noticing dark stools off and on and concern about GI bleed again        past recap  To establish PCP  Follow-up on hypertension high cholesterol coronary artery disease  Needs refills on medication  He was hospitalized in December 2 sodium was 116 his hydrochlorothiazide was discontinued he was very weak and confused and he went to the nursing home at [facility] now he is  "home  Now he is having a lot of swelling in the legs  He is sleeping and it recliner and not keeping his legs elevated     Past medical history: A. 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   Constitutional:       Appearance: Normal appearance.   HENT:      Head: Normocephalic and atraumatic.      Right Ear: Tympanic membrane, ear canal and external ear normal.      Left Ear: Tympanic membrane, ear canal and external ear normal.      Nose: Nose normal.      Mouth/Throat:      Pharynx: Oropharynx is clear.   Eyes:      Extraocular Movements: Extraocular movements intact.      Conjunctiva/sclera: Conjunctivae normal.      Pupils: Pupils are equal, round, and reactive to light.   Cardiovascular:      Rate and Rhythm: Normal rate and regular rhythm.      Pulses: Normal pulses.      Heart sounds: Normal heart sounds.   Pulmonary:      Effort: Pulmonary effort is normal.      Breath sounds: Normal breath sounds.   Abdominal:      General: Abdomen is flat. Bowel sounds are normal.      Palpations: Abdomen is soft.   Musculoskeletal:      Cervical back: Normal range of motion and neck supple.   Skin:     General: Skin is warm and dry.   Neurological:      General: No focal deficit present.      Mental Status: He is alert and oriented to person, place, and time.   Psychiatric:         Mood and Affect: Mood normal.       Assessment/Plan "   Problem List Items Addressed This Visit          Cardiac and Vasculature    Congestive heart failure (CMS/HCC)    Relevant Orders    CBC    Comprehensive Metabolic Panel    Lipid Panel    Thyroid Stimulating Hormone    Hypertension    Relevant Orders    CBC    Comprehensive Metabolic Panel    Lipid Panel    Thyroid Stimulating Hormone    Dyslipidemia    Relevant Orders    CBC    Comprehensive Metabolic Panel    Lipid Panel    Thyroid Stimulating Hormone       Endocrine/Metabolic    Hypothyroidism    Relevant Orders    CBC    Comprehensive Metabolic Panel    Lipid Panel    Thyroid Stimulating Hormone     Other Visit Diagnoses       Essential hypertension        Relevant Orders    CBC    Comprehensive Metabolic Panel    Lipid Panel    Thyroid Stimulating Hormone    Depression, unspecified depression type        Relevant Orders    CBC    Comprehensive Metabolic Panel    Lipid Panel    Thyroid Stimulating Hormone          10/7/22  Patient's hospital chart reviewed  Blood pressure stable  CBC CMP fasting with TSH ordered before next follow-up in a month  Syringing curettage done in right ear with BX removal with some improvement in hearing  Treat with neomycin eardrops  Advised to use mineral oil  Get ears cleaned  Advised to see the ENT to get new hearing aids  Encourage patient to go for cataract surgery  Poor vision poor hearing could be contributing to his anxiety and depression  Had a long discussion with her wife but and what kind of liquids he can have  Patient has a follow-up with cardiologist later today  May need echocardiogram done sooner  Clinically is looking better  We will follow-up in a month        12/3./22  Patient clinically looks much better  He has acute bronchitis  Treat with Augmentin  Venous insufficiency doing much better  Blood work reviewed  Sodium normal  Anemia improving  Follow-up blood work in 3 months  Echocardiogram shows improved ejection fraction from 30 to 35% to 50 to 55%      1/12/23  Had a long discussion with the family and the patient regarding his anxiety and depression and choice of medication  Explained all SSRIs are risk for causing hyponatremia  Family does not want anything to make urine sodium go down as he gets very confused with low sodium  Patient is hard of hearing and has poor vision he does not move around much  May be he needs to change his lifestyle which will make him more happy and less anxious  Discussed BuSpar dizziness most likely related to his ear  Try meclizine as needed  Family does not want to use buspirone at this time as there is a small chance of hyponatremia related to it  That when I work with lifestyle modification to see if patient feels better if not then we will follow-up  Routine follow-up      3/17/23  INR 2.5 PTT 30.2  Continue current Coumadin  We will start Zoloft 25 mg a day  Blood pressure stable  Cholesterol very well controlled  Blood work looks pretty good  Congestive heart failure compensated no swelling of the leg  Hyponatremia resolved  He got new hearing aids  Follow-up blood work in 3 months    7/15/2023  Advised weight call Dr. Mcdonald office to make sure they are monitoring the Coumadin  Continue Zoloft patient is tolerating  Sodium is in range  Kidney function stable  Cholesterol well controlled  CHF compensated  Follow-up blood work in 6 months    1/13/2024  Blood work reviewed  TSH 6.34  Increase levothyroxine 75 mcg  Blood sugars elevated  Discussed diet exercise and risk for diabetes  Mild anemia still persists most likely related to epistaxis and patient is on Coumadin  Take iron supplement  Use saline nasal spray  Blood pressure stable  Cholesterol very well-controlled  Follow-up in 3 months

## 2024-01-22 ENCOUNTER — HOSPITAL ENCOUNTER (INPATIENT)
Facility: HOSPITAL | Age: 89
LOS: 1 days | Discharge: HOME | DRG: 291 | End: 2024-01-25
Attending: EMERGENCY MEDICINE | Admitting: INTERNAL MEDICINE
Payer: MEDICARE

## 2024-01-22 ENCOUNTER — APPOINTMENT (OUTPATIENT)
Dept: RADIOLOGY | Facility: HOSPITAL | Age: 89
DRG: 291 | End: 2024-01-22
Payer: MEDICARE

## 2024-01-22 ENCOUNTER — APPOINTMENT (OUTPATIENT)
Dept: CARDIOLOGY | Facility: HOSPITAL | Age: 89
DRG: 291 | End: 2024-01-22
Payer: MEDICARE

## 2024-01-22 DIAGNOSIS — I50.9 CONGESTIVE HEART FAILURE, UNSPECIFIED HF CHRONICITY, UNSPECIFIED HEART FAILURE TYPE (MULTI): ICD-10-CM

## 2024-01-22 DIAGNOSIS — U07.1 COVID-19: ICD-10-CM

## 2024-01-22 DIAGNOSIS — R06.00 DYSPNEA, UNSPECIFIED TYPE: ICD-10-CM

## 2024-01-22 DIAGNOSIS — I10 DIASTOLIC HYPERTENSION: ICD-10-CM

## 2024-01-22 DIAGNOSIS — R53.1 GENERALIZED WEAKNESS: Primary | ICD-10-CM

## 2024-01-22 PROBLEM — R68.89 GENERAL SYMPTOM: Status: ACTIVE | Noted: 2024-01-22

## 2024-01-22 LAB
ALBUMIN SERPL-MCNC: 3.7 G/DL (ref 3.5–5)
ALP BLD-CCNC: 113 U/L (ref 35–125)
ALT SERPL-CCNC: 10 U/L (ref 5–40)
ANION GAP SERPL CALC-SCNC: 14 MMOL/L
AST SERPL-CCNC: 21 U/L (ref 5–40)
BASOPHILS # BLD AUTO: 0.08 X10*3/UL (ref 0–0.1)
BASOPHILS NFR BLD AUTO: 1.1 %
BILIRUB SERPL-MCNC: 1 MG/DL (ref 0.1–1.2)
BUN SERPL-MCNC: 21 MG/DL (ref 8–25)
CALCIUM SERPL-MCNC: 8.8 MG/DL (ref 8.5–10.4)
CHLORIDE SERPL-SCNC: 94 MMOL/L (ref 97–107)
CO2 SERPL-SCNC: 20 MMOL/L (ref 24–31)
CREAT SERPL-MCNC: 1.1 MG/DL (ref 0.4–1.6)
EGFRCR SERPLBLD CKD-EPI 2021: 65 ML/MIN/1.73M*2
EOSINOPHIL # BLD AUTO: 0.1 X10*3/UL (ref 0–0.4)
EOSINOPHIL NFR BLD AUTO: 1.4 %
ERYTHROCYTE [DISTWIDTH] IN BLOOD BY AUTOMATED COUNT: 16 % (ref 11.5–14.5)
FLUAV RNA RESP QL NAA+PROBE: NOT DETECTED
FLUBV RNA RESP QL NAA+PROBE: NOT DETECTED
GLUCOSE SERPL-MCNC: 128 MG/DL (ref 65–99)
HCT VFR BLD AUTO: 33.3 % (ref 41–52)
HGB BLD-MCNC: 10.7 G/DL (ref 13.5–17.5)
IMM GRANULOCYTES # BLD AUTO: 0.04 X10*3/UL (ref 0–0.5)
IMM GRANULOCYTES NFR BLD AUTO: 0.6 % (ref 0–0.9)
INR PPP: 2.8 (ref 0.9–1.2)
LYMPHOCYTES # BLD AUTO: 0.35 X10*3/UL (ref 0.8–3)
LYMPHOCYTES NFR BLD AUTO: 4.9 %
MCH RBC QN AUTO: 28.7 PG (ref 26–34)
MCHC RBC AUTO-ENTMCNC: 32.1 G/DL (ref 32–36)
MCV RBC AUTO: 89 FL (ref 80–100)
MONOCYTES # BLD AUTO: 0.95 X10*3/UL (ref 0.05–0.8)
MONOCYTES NFR BLD AUTO: 13.2 %
NEUTROPHILS # BLD AUTO: 5.68 X10*3/UL (ref 1.6–5.5)
NEUTROPHILS NFR BLD AUTO: 78.8 %
NRBC BLD-RTO: 0 /100 WBCS (ref 0–0)
NT-PROBNP SERPL-MCNC: 7972 PG/ML (ref 0–852)
PLATELET # BLD AUTO: 230 X10*3/UL (ref 150–450)
POTASSIUM SERPL-SCNC: 4.1 MMOL/L (ref 3.4–5.1)
PROT SERPL-MCNC: 7.3 G/DL (ref 5.9–7.9)
PROTHROMBIN TIME: 27.1 SECONDS (ref 9.3–12.7)
RBC # BLD AUTO: 3.73 X10*6/UL (ref 4.5–5.9)
SARS-COV-2 RNA RESP QL NAA+PROBE: DETECTED
SODIUM SERPL-SCNC: 128 MMOL/L (ref 133–145)
TROPONIN T SERPL-MCNC: 23 NG/L
TROPONIN T SERPL-MCNC: 23 NG/L
TROPONIN T SERPL-MCNC: 27 NG/L
WBC # BLD AUTO: 7.2 X10*3/UL (ref 4.4–11.3)

## 2024-01-22 PROCEDURE — 2500000004 HC RX 250 GENERAL PHARMACY W/ HCPCS (ALT 636 FOR OP/ED): Performed by: INTERNAL MEDICINE

## 2024-01-22 PROCEDURE — 2500000004 HC RX 250 GENERAL PHARMACY W/ HCPCS (ALT 636 FOR OP/ED): Performed by: EMERGENCY MEDICINE

## 2024-01-22 PROCEDURE — 96374 THER/PROPH/DIAG INJ IV PUSH: CPT

## 2024-01-22 PROCEDURE — 99285 EMERGENCY DEPT VISIT HI MDM: CPT | Performed by: EMERGENCY MEDICINE

## 2024-01-22 PROCEDURE — 99222 1ST HOSP IP/OBS MODERATE 55: CPT | Performed by: INTERNAL MEDICINE

## 2024-01-22 PROCEDURE — G0378 HOSPITAL OBSERVATION PER HR: HCPCS

## 2024-01-22 PROCEDURE — 87636 SARSCOV2 & INF A&B AMP PRB: CPT | Performed by: EMERGENCY MEDICINE

## 2024-01-22 PROCEDURE — 2500000001 HC RX 250 WO HCPCS SELF ADMINISTERED DRUGS (ALT 637 FOR MEDICARE OP): Performed by: INTERNAL MEDICINE

## 2024-01-22 PROCEDURE — 85610 PROTHROMBIN TIME: CPT | Performed by: EMERGENCY MEDICINE

## 2024-01-22 PROCEDURE — 84484 ASSAY OF TROPONIN QUANT: CPT | Performed by: EMERGENCY MEDICINE

## 2024-01-22 PROCEDURE — 2500000001 HC RX 250 WO HCPCS SELF ADMINISTERED DRUGS (ALT 637 FOR MEDICARE OP): Performed by: EMERGENCY MEDICINE

## 2024-01-22 PROCEDURE — 83880 ASSAY OF NATRIURETIC PEPTIDE: CPT | Performed by: EMERGENCY MEDICINE

## 2024-01-22 PROCEDURE — 36415 COLL VENOUS BLD VENIPUNCTURE: CPT | Performed by: EMERGENCY MEDICINE

## 2024-01-22 PROCEDURE — 80053 COMPREHEN METABOLIC PANEL: CPT | Performed by: EMERGENCY MEDICINE

## 2024-01-22 PROCEDURE — 93005 ELECTROCARDIOGRAM TRACING: CPT

## 2024-01-22 PROCEDURE — 93970 EXTREMITY STUDY: CPT

## 2024-01-22 PROCEDURE — 71045 X-RAY EXAM CHEST 1 VIEW: CPT

## 2024-01-22 PROCEDURE — 85025 COMPLETE CBC W/AUTO DIFF WBC: CPT | Performed by: EMERGENCY MEDICINE

## 2024-01-22 RX ORDER — ASPIRIN 81 MG/1
81 TABLET ORAL DAILY
Status: DISCONTINUED | OUTPATIENT
Start: 2024-01-22 | End: 2024-01-25 | Stop reason: HOSPADM

## 2024-01-22 RX ORDER — ONDANSETRON 4 MG/1
4 TABLET, ORALLY DISINTEGRATING ORAL EVERY 8 HOURS PRN
Status: DISCONTINUED | OUTPATIENT
Start: 2024-01-22 | End: 2024-01-25 | Stop reason: HOSPADM

## 2024-01-22 RX ORDER — ASPIRIN 325 MG
325 TABLET ORAL ONCE
Status: COMPLETED | OUTPATIENT
Start: 2024-01-22 | End: 2024-01-22

## 2024-01-22 RX ORDER — FUROSEMIDE 10 MG/ML
20 INJECTION INTRAMUSCULAR; INTRAVENOUS 2 TIMES DAILY
Status: DISCONTINUED | OUTPATIENT
Start: 2024-01-22 | End: 2024-01-23

## 2024-01-22 RX ORDER — PANTOPRAZOLE SODIUM 40 MG/10ML
40 INJECTION, POWDER, LYOPHILIZED, FOR SOLUTION INTRAVENOUS
Status: DISCONTINUED | OUTPATIENT
Start: 2024-01-23 | End: 2024-01-25 | Stop reason: HOSPADM

## 2024-01-22 RX ORDER — FUROSEMIDE 10 MG/ML
40 INJECTION INTRAMUSCULAR; INTRAVENOUS ONCE
Status: COMPLETED | OUTPATIENT
Start: 2024-01-22 | End: 2024-01-22

## 2024-01-22 RX ORDER — GUAIFENESIN 600 MG/1
600 TABLET, EXTENDED RELEASE ORAL EVERY 12 HOURS PRN
Status: DISCONTINUED | OUTPATIENT
Start: 2024-01-22 | End: 2024-01-25 | Stop reason: HOSPADM

## 2024-01-22 RX ORDER — ACETAMINOPHEN 650 MG/1
650 SUPPOSITORY RECTAL EVERY 4 HOURS PRN
Status: DISCONTINUED | OUTPATIENT
Start: 2024-01-22 | End: 2024-01-25 | Stop reason: HOSPADM

## 2024-01-22 RX ORDER — LEVOTHYROXINE SODIUM 75 UG/1
75 TABLET ORAL DAILY
Status: DISCONTINUED | OUTPATIENT
Start: 2024-01-22 | End: 2024-01-25 | Stop reason: HOSPADM

## 2024-01-22 RX ORDER — DOCUSATE SODIUM 100 MG/1
100 CAPSULE, LIQUID FILLED ORAL 2 TIMES DAILY
Status: DISCONTINUED | OUTPATIENT
Start: 2024-01-22 | End: 2024-01-25 | Stop reason: HOSPADM

## 2024-01-22 RX ORDER — POLYETHYLENE GLYCOL 3350 17 G/17G
17 POWDER, FOR SOLUTION ORAL DAILY
Status: DISCONTINUED | OUTPATIENT
Start: 2024-01-22 | End: 2024-01-25 | Stop reason: HOSPADM

## 2024-01-22 RX ORDER — ONDANSETRON HYDROCHLORIDE 2 MG/ML
4 INJECTION, SOLUTION INTRAVENOUS EVERY 8 HOURS PRN
Status: DISCONTINUED | OUTPATIENT
Start: 2024-01-22 | End: 2024-01-25 | Stop reason: HOSPADM

## 2024-01-22 RX ORDER — WARFARIN SODIUM 5 MG/1
5 TABLET ORAL DAILY
Status: DISCONTINUED | OUTPATIENT
Start: 2024-01-22 | End: 2024-01-25 | Stop reason: HOSPADM

## 2024-01-22 RX ORDER — TALC
3 POWDER (GRAM) TOPICAL DAILY
Status: DISCONTINUED | OUTPATIENT
Start: 2024-01-22 | End: 2024-01-25 | Stop reason: HOSPADM

## 2024-01-22 RX ORDER — ROSUVASTATIN CALCIUM 10 MG/1
5 TABLET, COATED ORAL NIGHTLY
Status: DISCONTINUED | OUTPATIENT
Start: 2024-01-22 | End: 2024-01-25 | Stop reason: HOSPADM

## 2024-01-22 RX ORDER — METOPROLOL SUCCINATE 25 MG/1
25 TABLET, EXTENDED RELEASE ORAL DAILY
Status: DISCONTINUED | OUTPATIENT
Start: 2024-01-22 | End: 2024-01-23

## 2024-01-22 RX ORDER — PANTOPRAZOLE SODIUM 40 MG/1
40 TABLET, DELAYED RELEASE ORAL
Status: DISCONTINUED | OUTPATIENT
Start: 2024-01-23 | End: 2024-01-25 | Stop reason: HOSPADM

## 2024-01-22 RX ORDER — ACETAMINOPHEN 325 MG/1
650 TABLET ORAL EVERY 4 HOURS PRN
Status: DISCONTINUED | OUTPATIENT
Start: 2024-01-22 | End: 2024-01-25 | Stop reason: HOSPADM

## 2024-01-22 RX ORDER — ACETAMINOPHEN 160 MG/5ML
650 SOLUTION ORAL EVERY 4 HOURS PRN
Status: DISCONTINUED | OUTPATIENT
Start: 2024-01-22 | End: 2024-01-25 | Stop reason: HOSPADM

## 2024-01-22 RX ORDER — LOSARTAN POTASSIUM 25 MG/1
25 TABLET ORAL DAILY
Status: DISCONTINUED | OUTPATIENT
Start: 2024-01-22 | End: 2024-01-23

## 2024-01-22 RX ORDER — GUAIFENESIN/DEXTROMETHORPHAN 100-10MG/5
5 SYRUP ORAL EVERY 4 HOURS PRN
Status: DISCONTINUED | OUTPATIENT
Start: 2024-01-22 | End: 2024-01-25 | Stop reason: HOSPADM

## 2024-01-22 RX ORDER — SERTRALINE HYDROCHLORIDE 25 MG/1
25 TABLET, FILM COATED ORAL DAILY
Status: DISCONTINUED | OUTPATIENT
Start: 2024-01-22 | End: 2024-01-25 | Stop reason: HOSPADM

## 2024-01-22 RX ADMIN — Medication 3 MG: at 20:28

## 2024-01-22 RX ADMIN — ROSUVASTATIN CALCIUM 5 MG: 10 TABLET, COATED ORAL at 20:28

## 2024-01-22 RX ADMIN — FUROSEMIDE 40 MG: 10 INJECTION, SOLUTION INTRAMUSCULAR; INTRAVENOUS at 14:50

## 2024-01-22 RX ADMIN — DOCUSATE SODIUM 100 MG: 100 CAPSULE, LIQUID FILLED ORAL at 20:28

## 2024-01-22 RX ADMIN — ASPIRIN 325 MG ORAL TABLET 325 MG: 325 PILL ORAL at 14:43

## 2024-01-22 SDOH — SOCIAL STABILITY: SOCIAL INSECURITY: HAVE YOU HAD THOUGHTS OF HARMING ANYONE ELSE?: NO

## 2024-01-22 SDOH — SOCIAL STABILITY: SOCIAL INSECURITY: ARE THERE ANY APPARENT SIGNS OF INJURIES/BEHAVIORS THAT COULD BE RELATED TO ABUSE/NEGLECT?: NO

## 2024-01-22 SDOH — SOCIAL STABILITY: SOCIAL INSECURITY: DOES ANYONE TRY TO KEEP YOU FROM HAVING/CONTACTING OTHER FRIENDS OR DOING THINGS OUTSIDE YOUR HOME?: NO

## 2024-01-22 SDOH — SOCIAL STABILITY: SOCIAL INSECURITY: DO YOU FEEL UNSAFE GOING BACK TO THE PLACE WHERE YOU ARE LIVING?: NO

## 2024-01-22 SDOH — SOCIAL STABILITY: SOCIAL INSECURITY: HAS ANYONE EVER THREATENED TO HURT YOUR FAMILY OR YOUR PETS?: NO

## 2024-01-22 SDOH — SOCIAL STABILITY: SOCIAL INSECURITY: ABUSE: ADULT

## 2024-01-22 SDOH — SOCIAL STABILITY: SOCIAL INSECURITY: ARE YOU OR HAVE YOU BEEN THREATENED OR ABUSED PHYSICALLY, EMOTIONALLY, OR SEXUALLY BY ANYONE?: NO

## 2024-01-22 SDOH — SOCIAL STABILITY: SOCIAL INSECURITY: DO YOU FEEL ANYONE HAS EXPLOITED OR TAKEN ADVANTAGE OF YOU FINANCIALLY OR OF YOUR PERSONAL PROPERTY?: NO

## 2024-01-22 ASSESSMENT — LIFESTYLE VARIABLES
AUDIT-C TOTAL SCORE: 0
SKIP TO QUESTIONS 9-10: 1
AUDIT-C TOTAL SCORE: 0
HOW OFTEN DO YOU HAVE A DRINK CONTAINING ALCOHOL: NEVER
PRESCIPTION_ABUSE_PAST_12_MONTHS: NO
HOW MANY STANDARD DRINKS CONTAINING ALCOHOL DO YOU HAVE ON A TYPICAL DAY: PATIENT DOES NOT DRINK
HOW OFTEN DO YOU HAVE 6 OR MORE DRINKS ON ONE OCCASION: NEVER
SUBSTANCE_ABUSE_PAST_12_MONTHS: NO

## 2024-01-22 ASSESSMENT — ACTIVITIES OF DAILY LIVING (ADL)
JUDGMENT_ADEQUATE_SAFELY_COMPLETE_DAILY_ACTIVITIES: YES
ASSISTIVE_DEVICE: WALKER
GROOMING: INDEPENDENT
DRESSING YOURSELF: INDEPENDENT
LACK_OF_TRANSPORTATION: NO
ADEQUATE_TO_COMPLETE_ADL: YES
PATIENT'S MEMORY ADEQUATE TO SAFELY COMPLETE DAILY ACTIVITIES?: YES
FEEDING YOURSELF: INDEPENDENT
TOILETING: INDEPENDENT
WALKS IN HOME: INDEPENDENT
HEARING - RIGHT EAR: FUNCTIONAL
BATHING: INDEPENDENT
HEARING - LEFT EAR: FUNCTIONAL

## 2024-01-22 ASSESSMENT — ENCOUNTER SYMPTOMS
SINUS PRESSURE: 0
POLYDIPSIA: 0
SLEEP DISTURBANCE: 0
APNEA: 0
ABDOMINAL DISTENTION: 0
WEAKNESS: 0
FREQUENCY: 0
HEMATURIA: 0
FEVER: 0
EYE DISCHARGE: 0
SINUS PAIN: 0
SPEECH DIFFICULTY: 0
STRIDOR: 0
BRUISES/BLEEDS EASILY: 0
NAUSEA: 0
PALPITATIONS: 0
DECREASED CONCENTRATION: 0
DIAPHORESIS: 0
SEIZURES: 0
VOMITING: 0
AGITATION: 0
HALLUCINATIONS: 0
EYE REDNESS: 0
BACK PAIN: 0
PHOTOPHOBIA: 0
NUMBNESS: 0
CHILLS: 0
EYE ITCHING: 0
ABDOMINAL PAIN: 0
APPETITE CHANGE: 0
BLOOD IN STOOL: 0
ANAL BLEEDING: 0
DYSURIA: 0
CHEST TIGHTNESS: 0
FATIGUE: 0
ACTIVITY CHANGE: 0
COUGH: 1
CONSTIPATION: 0
NERVOUS/ANXIOUS: 0
COLOR CHANGE: 0
DIFFICULTY URINATING: 0
DIARRHEA: 0
POLYPHAGIA: 0
RECTAL PAIN: 0
SORE THROAT: 0
UNEXPECTED WEIGHT CHANGE: 0
FACIAL ASYMMETRY: 0
TROUBLE SWALLOWING: 0
NECK STIFFNESS: 0
SHORTNESS OF BREATH: 1
TREMORS: 0
LIGHT-HEADEDNESS: 0
RHINORRHEA: 0
JOINT SWELLING: 0
MYALGIAS: 0
DIZZINESS: 0
FLANK PAIN: 0
CONFUSION: 0
HEADACHES: 0
FACIAL SWELLING: 0
NECK PAIN: 0
ADENOPATHY: 0
VOICE CHANGE: 0
EYE PAIN: 0
CHOKING: 0
DYSPHORIC MOOD: 0
HYPERACTIVE: 0
ARTHRALGIAS: 0
WOUND: 0
WHEEZING: 0

## 2024-01-22 ASSESSMENT — COGNITIVE AND FUNCTIONAL STATUS - GENERAL
DRESSING REGULAR UPPER BODY CLOTHING: A LITTLE
MOVING TO AND FROM BED TO CHAIR: A LITTLE
STANDING UP FROM CHAIR USING ARMS: A LITTLE
CLIMB 3 TO 5 STEPS WITH RAILING: A LITTLE
MOBILITY SCORE: 20
DRESSING REGULAR LOWER BODY CLOTHING: A LITTLE
DAILY ACTIVITIY SCORE: 20
PATIENT BASELINE BEDBOUND: NO
TOILETING: A LITTLE
HELP NEEDED FOR BATHING: A LITTLE
WALKING IN HOSPITAL ROOM: A LITTLE

## 2024-01-22 ASSESSMENT — PAIN SCALES - GENERAL
PAINLEVEL_OUTOF10: 0 - NO PAIN
PAINLEVEL_OUTOF10: 0 - NO PAIN

## 2024-01-22 ASSESSMENT — PAIN - FUNCTIONAL ASSESSMENT
PAIN_FUNCTIONAL_ASSESSMENT: 0-10
PAIN_FUNCTIONAL_ASSESSMENT: 0-10

## 2024-01-22 ASSESSMENT — COLUMBIA-SUICIDE SEVERITY RATING SCALE - C-SSRS
2. HAVE YOU ACTUALLY HAD ANY THOUGHTS OF KILLING YOURSELF?: NO
6. HAVE YOU EVER DONE ANYTHING, STARTED TO DO ANYTHING, OR PREPARED TO DO ANYTHING TO END YOUR LIFE?: NO
1. IN THE PAST MONTH, HAVE YOU WISHED YOU WERE DEAD OR WISHED YOU COULD GO TO SLEEP AND NOT WAKE UP?: NO

## 2024-01-22 ASSESSMENT — PATIENT HEALTH QUESTIONNAIRE - PHQ9
SUM OF ALL RESPONSES TO PHQ9 QUESTIONS 1 & 2: 0
1. LITTLE INTEREST OR PLEASURE IN DOING THINGS: NOT AT ALL
2. FEELING DOWN, DEPRESSED OR HOPELESS: NOT AT ALL

## 2024-01-22 NOTE — PROGRESS NOTES
"Attestation/Supervisory note for SHAWN Lamb      The patient is an 88-year-old male presenting to the emergency department for evaluation of dyspnea, dyspnea on exertion and lightheadedness.  He also reports some generalized malaise and fatigue.  He states he has these as chronic symptoms.  He states that sometimes his medications \"mess with him\".  He states that all of his symptoms are chronic and perhaps they are slightly worse in the past several months.  He states he is not exactly sure why he came to the emergency room today but he does not advise us to get old.  He denies any headache or visual changes.  He denies any focal weakness or numbness.  No difficulty swallowing or speaking.  No fever or chills.  No cough or congestion.  No chest pain.  No neck or back pain.  No abdominal pain.  No nausea or vomiting.  No diarrhea or constipation.  No urinary complaints.  He believes that he does take Coumadin daily.  He does not know exactly why he is on the Coumadin.  All pertinent positives and negatives are recorded above.  All other systems reviewed and otherwise negative.  Vital signs with diastolic hypertension but otherwise within normal limits.  Physical exam with a well-nourished well-developed male in no acute distress.  HEENT exam with dry mucous membranes but otherwise unremarkable.  He has no evidence of airway compromise or respiratory distress.  Abdominal exam is benign.  He does have bilateral lower extremity pitting edema.  He has no gross motor, neurologic or vascular deficits on exam.  NIH stroke scale score of 0.      The patient's daughter did arrive to the emergency room and reported that she was concerned about increasing swelling in his legs, right greater than left, over the last several days.      EKG with a ventricular paced rhythm at 70 bpm, normal axis, normal voltage, normal ST segment, normal T waves      Oral aspirin ordered.      Diagnostic labs with a therapeutic INR, mild electrolyte " imbalance, mild anemia, elevated BNP, and positive COVID-19 test but otherwise unremarkable.      Initial Troponin T 23. Repeat trop T pending at the time of admission    XR chest 1 view   Final Result   Mild-moderate cardiomegaly. Congestive changes without overt   pulmonary edema. Possible small pleural effusions. No acute   consolidation.        Signed by: Solomon Tuttle 1/22/2024 2:36 PM   Dictation workstation:   [id number]      Vascular US lower extremity venous duplex bilateral   Final Result   No evidence for DVT in the bilateral lower extremities.        MACRO:   None        Signed by: Solomon Tuttle 1/22/2024 2:33 PM   Dictation workstation:   [id number]         The patient does not have any evidence of ischemia on EKG.  The cardiac enzymes are mildly elevated.  The repeat troponin T was pending at the time of admission.  The patient does have evidence of an elevated BNP on diagnostic labs and small pleural effusions but no evidence of overt pulmonary edema on chest x-ray.  No evidence of pneumonia on chest x-ray.  No evidence of DVT on the lower extremity ultrasounds.      The patient was admitted for further management of his current symptoms.  It may be that his shortness of breath is due to his COVID-19 infection.  Rx given for lasix      Impression/diagnosis  Dyspnea, dyspnea on exertion  Lightheadedness  3.  Bilateral lower extremity edema  4.  Diastolic hypertension    I personally saw the patient and made/approve the management plan and take responsibility for the patient management.      I independently interpreted the following study (S): EKG and diagnostic labs      I personally discussed the patient's management with the patient's primary care physician, the patient and the family the patient      I reviewed the results of the diagnostic labs and diagnostic imaging.  Formal radiology read was completed by the radiologist.      Africa Dumont MD

## 2024-01-22 NOTE — ED PROVIDER NOTES
HPI   Chief Complaint   Patient presents with    Weakness, Gen     Increased weakness for 2 months, dizzy and sob x2 years. Denies chest pain and fever. On coumadin for afib, RLE edema        Patient is an 88-year-old male presents emergency department for evaluation of generalized weakness and shortness of breath.  Patient states that his symptoms have been gradually worsening over the last several months.  He states that he is some chronic shortness of breath and swelling of the legs.  He states that he just feels weak and fatigued which has been ongoing over the last several months as well.  He does follow with a cardiologist Dr. Clarke notes a history of heart failure along with blood thinner use including Coumadin.  He has no specific complaints at this time other than some worsening dyspnea on exertion.  He does note that his wife recently came down with viral illness.  He does note that he has a mild cough, but is relatively chronic for him as well.  He has some intermittent lightheadedness which he states has been relatively chronic for him the last Andreina months as well.  He denies any chest pain, nausea, vomiting, abdominal pain, fevers, chills, focal numbness, weakness.      History provided by:  Patient   used: No                        Florentino Coma Scale Score: 15                  Patient History   Past Medical History:   Diagnosis Date    Atherosclerotic heart disease of native coronary artery with unstable angina pectoris (CMS/HCC)     Coronary artery disease with unstable angina pectoris, unspecified vessel or lesion type, unspecified whether native or transplanted heart    Essential (primary) hypertension 11/05/2022    Hypertension, unspecified type    Personal history of diseases of the blood and blood-forming organs and certain disorders involving the immune mechanism     History of bleeding disorder    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, oral cavity,  and pharynx     History of malignant neoplasm of tonsil    Personal history of malignant neoplasm of prostate     History of malignant neoplasm of prostate    Personal history of other malignant neoplasm of large intestine     History of malignant neoplasm of colon     Past Surgical History:   Procedure Laterality Date    OTHER SURGICAL HISTORY  03/22/2021    Colonoscopy    OTHER SURGICAL HISTORY  03/22/2021    Colon surgery    OTHER SURGICAL HISTORY  03/22/2021    Pacemaker insertion    OTHER SURGICAL HISTORY  03/22/2021    Coronary artery bypass graft    OTHER SURGICAL HISTORY  01/05/2023    Throat surgery     Family History   Problem Relation Name Age of Onset    Heart attack Mother      Heart disease Mother      Other (Car accident) Father       Social History     Tobacco Use    Smoking status: Never    Smokeless tobacco: Never   Substance Use Topics    Alcohol use: Never    Drug use: Never       Physical Exam   ED Triage Vitals [01/22/24 1317]   Temp Heart Rate Respirations BP   36.8 °C (98.2 °F) 71 20 143/86      Pulse Ox Temp Source Heart Rate Source Patient Position   95 % Oral Monitor --      BP Location FiO2 (%)     -- --       Physical Exam  Constitutional:       Appearance: Normal appearance.   HENT:      Head: Normocephalic and atraumatic.      Mouth/Throat:      Mouth: Mucous membranes are moist.   Eyes:      Extraocular Movements: Extraocular movements intact.      Pupils: Pupils are equal, round, and reactive to light.   Cardiovascular:      Rate and Rhythm: Normal rate and regular rhythm.   Pulmonary:      Effort: Pulmonary effort is normal.      Breath sounds: Normal breath sounds.   Abdominal:      General: Abdomen is flat.      Palpations: Abdomen is soft.      Tenderness: There is no abdominal tenderness.   Musculoskeletal:         General: Normal range of motion.   Skin:     General: Skin is warm and dry.      Comments: Bilateral lower extremity edema 1+ pitting.   Neurological:      General:  No focal deficit present.      Mental Status: He is alert and oriented to person, place, and time.         ED Course & MDM   Diagnoses as of 01/22/24 1444   Generalized weakness   COVID-19   Congestive heart failure, unspecified HF chronicity, unspecified heart failure type (CMS/HCC)   Dyspnea, unspecified type   Diastolic hypertension       Medical Decision Making  Patient is an 88-year-old male presents emergency department for evaluation of generalized weakness and fatigue along with shortness of breath.    EKG was interpreted by attending physician and reviewed by me.    Lab work done today included CMP, CBC, troponins, proBNP, PT/INR, urinalysis, and flu/COVID swabs.  Lab work with initial troponin of 23, mildly elevated proBNP, mild hyponatremia, COVID-positive, mild anemia.    Scans done today were interpreted/confirmed by radiologist and also interpreted by me which included x-ray and bilateral ultrasound venous duplex.  Chest x-ray shows mild to moderate cardiomegaly with congestive changes without overt pulmonary edema and possible small pleural effusions with no acute consolidation.  Vascular ultrasound shows no evidence for DVT in bilateral lower extremities.    Medications given at today's visit include PO aspirin, IV Lasix    I saw this patient in conjunction with Dr. Dumont.  Patient has evidence of acute exacerbation of heart failure along with COVID-19 test being positive likely contributing to his symptoms today.  Given his worsening dyspnea on exertion and weakness along with congestive changes and, patient be admitted for further management for acute heart failure exacerbation and dyspnea on exertion.  Ultrasound showed no evidence of DVT and I suspect patient's swelling likely related to mild fluid overload in setting of acute on chronic heart failure. he was given dose of Lasix.  I did speak with Dr. Antoine who was agreeable to admission of patient for further management.  Patient agreeable to  admission for continued management for exacerbation of heart failure.    The patient/family was counseled on clinical impression, expectations, and plan along with recommendations to admission.  All questions were answered and involved parties were understanding and agreeable to course of treatment.  Case was discussed with admitting physician and any consultants. Bed type, ED treatment and further ED workup decided by joint decision making with admitting team and any consultants. Patient stable for admission per my assessment and further management of patient will be deferred to the inpatient setting.    ** Disclaimer:  Parts of this document were written utilizing a voice to text dictation software.  Note may contain minor transcription or typographical errors that were inadvertently transcribed by the computer software.        Procedure  Procedures     Carly Lamb PA-C  01/22/24 1443

## 2024-01-22 NOTE — ED NOTES
Abnormal Labs Reviewed   N-TERMINAL PROBNP - Abnormal; Notable for the following components:       Result Value    PROBNP 7,972 (*)     All other components within normal limits    Narrative:     Reference ranges are based on clinical submission data. These ranges represent the 95th percentile of normal cut-off points. As NT Pro- BNP values approach 1000 pg/ml, clinical symptoms are more likely associated with CHF.   CBC WITH AUTO DIFFERENTIAL - Abnormal; Notable for the following components:    RBC 3.73 (*)     Hemoglobin 10.7 (*)     Hematocrit 33.3 (*)     RDW 16.0 (*)     Neutrophils Absolute 5.68 (*)     Lymphocytes Absolute 0.35 (*)     Monocytes Absolute 0.95 (*)     All other components within normal limits   COMPREHENSIVE METABOLIC PANEL - Abnormal; Notable for the following components:    Glucose 128 (*)     Sodium 128 (*)     Chloride 94 (*)     Bicarbonate 20 (*)     All other components within normal limits   SARS-COV-2 AND INFLUENZA A/B PCR - Abnormal; Notable for the following components:    Coronavirus 2019, PCR Detected (*)     All other components within normal limits    Narrative:     This assay has received FDA Emergency Use Authorization (EUA) and  is only authorized for the duration of time that circumstances exist to justify the authorization of the emergency use of in vitro diagnostic tests for the detection of SARS-CoV-2 virus and/or diagnosis of COVID-19 infection under section 564(b)(1) of the Act, 21 U.S.C. 360bbb-3(b)(1). Testing for SARS-CoV-2 is only recommended for patients who meet current clinical and/or epidemiological criteria as defined by federal, state, or local public health directives. This assay is an in vitro diagnostic nucleic acid amplification test for the qualitative detection of SARS-CoV-2, Influenza A, and Influenza B from nasopharyngeal specimens and has been validated for use at [facility]. Negative results do not preclude COVID-19 infections  or Influenza A/B infections, and should not be used as the sole basis for diagnosis, treatment, or other management decisions. If Influenza A/B and RSV PCR results are negative, testing for Parainfluenza virus, Adenovirus and Metapneumovirus is routinely performed for [facility] pediatric oncology and intensive care inpatients, and is available on other patients by placing an add-on request.    PROTIME-INR - Abnormal; Notable for the following components:    Protime 27.1 (*)     INR 2.8 (*)     All other components within normal limits    Narrative:     INR Therapeutic Range: 2.0-3.5   SERIAL TROPONIN, INITIAL ([facility]) - Abnormal; Notable for the following components:    Troponin T, High Sensitivity 23 (*)     All other components within normal limits    Primary RN notified of pt abnormal troponin value     Terry Cook RN  01/22/24 7789

## 2024-01-23 LAB
ALBUMIN SERPL-MCNC: 3.6 G/DL (ref 3.5–5)
ALP BLD-CCNC: 113 U/L (ref 35–125)
ALT SERPL-CCNC: 9 U/L (ref 5–40)
ANION GAP SERPL CALC-SCNC: 13 MMOL/L
AST SERPL-CCNC: 21 U/L (ref 5–40)
ATRIAL RATE: 65 BPM
BILIRUB SERPL-MCNC: 0.9 MG/DL (ref 0.1–1.2)
BUN SERPL-MCNC: 23 MG/DL (ref 8–25)
CALCIUM SERPL-MCNC: 8.8 MG/DL (ref 8.5–10.4)
CHLORIDE SERPL-SCNC: 95 MMOL/L (ref 97–107)
CO2 SERPL-SCNC: 21 MMOL/L (ref 24–31)
CREAT SERPL-MCNC: 1.1 MG/DL (ref 0.4–1.6)
EGFRCR SERPLBLD CKD-EPI 2021: 65 ML/MIN/1.73M*2
ERYTHROCYTE [DISTWIDTH] IN BLOOD BY AUTOMATED COUNT: 16.1 % (ref 11.5–14.5)
GLUCOSE SERPL-MCNC: 100 MG/DL (ref 65–99)
HCT VFR BLD AUTO: 33.4 % (ref 41–52)
HGB BLD-MCNC: 10.8 G/DL (ref 13.5–17.5)
INR PPP: 3 (ref 0.9–1.2)
INR PPP: 3 (ref 0.9–1.2)
MCH RBC QN AUTO: 29 PG (ref 26–34)
MCHC RBC AUTO-ENTMCNC: 32.3 G/DL (ref 32–36)
MCV RBC AUTO: 90 FL (ref 80–100)
NRBC BLD-RTO: 0 /100 WBCS (ref 0–0)
PLATELET # BLD AUTO: 223 X10*3/UL (ref 150–450)
POTASSIUM SERPL-SCNC: 3.6 MMOL/L (ref 3.4–5.1)
PROT SERPL-MCNC: 7.2 G/DL (ref 5.9–7.9)
PROTHROMBIN TIME: 28.6 SECONDS (ref 9.3–12.7)
PROTHROMBIN TIME: 29.3 SECONDS (ref 9.3–12.7)
Q ONSET: 183 MS
QRS COUNT: 12 BEATS
QRS DURATION: 220 MS
QT INTERVAL: 504 MS
QTC CALCULATION(BAZETT): 544 MS
QTC FREDERICIA: 530 MS
R AXIS: -65 DEGREES
RBC # BLD AUTO: 3.72 X10*6/UL (ref 4.5–5.9)
SODIUM SERPL-SCNC: 129 MMOL/L (ref 133–145)
T AXIS: 109 DEGREES
T OFFSET: 435 MS
VENTRICULAR RATE: 70 BPM
WBC # BLD AUTO: 7.4 X10*3/UL (ref 4.4–11.3)

## 2024-01-23 PROCEDURE — 99233 SBSQ HOSP IP/OBS HIGH 50: CPT | Performed by: INTERNAL MEDICINE

## 2024-01-23 PROCEDURE — 97166 OT EVAL MOD COMPLEX 45 MIN: CPT | Mod: GO

## 2024-01-23 PROCEDURE — 85610 PROTHROMBIN TIME: CPT | Performed by: INTERNAL MEDICINE

## 2024-01-23 PROCEDURE — C9113 INJ PANTOPRAZOLE SODIUM, VIA: HCPCS | Performed by: INTERNAL MEDICINE

## 2024-01-23 PROCEDURE — 87040 BLOOD CULTURE FOR BACTERIA: CPT | Mod: TRILAB | Performed by: NURSE PRACTITIONER

## 2024-01-23 PROCEDURE — 99222 1ST HOSP IP/OBS MODERATE 55: CPT | Performed by: INTERNAL MEDICINE

## 2024-01-23 PROCEDURE — 85027 COMPLETE CBC AUTOMATED: CPT | Performed by: INTERNAL MEDICINE

## 2024-01-23 PROCEDURE — 36415 COLL VENOUS BLD VENIPUNCTURE: CPT | Performed by: INTERNAL MEDICINE

## 2024-01-23 PROCEDURE — 2500000004 HC RX 250 GENERAL PHARMACY W/ HCPCS (ALT 636 FOR OP/ED): Performed by: INTERNAL MEDICINE

## 2024-01-23 PROCEDURE — 2500000001 HC RX 250 WO HCPCS SELF ADMINISTERED DRUGS (ALT 637 FOR MEDICARE OP): Performed by: INTERNAL MEDICINE

## 2024-01-23 PROCEDURE — 97530 THERAPEUTIC ACTIVITIES: CPT | Mod: GP

## 2024-01-23 PROCEDURE — 97161 PT EVAL LOW COMPLEX 20 MIN: CPT | Mod: GP

## 2024-01-23 PROCEDURE — 80053 COMPREHEN METABOLIC PANEL: CPT | Performed by: INTERNAL MEDICINE

## 2024-01-23 PROCEDURE — G0378 HOSPITAL OBSERVATION PER HR: HCPCS

## 2024-01-23 RX ORDER — FUROSEMIDE 40 MG/1
40 TABLET ORAL
Status: DISCONTINUED | OUTPATIENT
Start: 2024-01-23 | End: 2024-01-24

## 2024-01-23 RX ORDER — CARVEDILOL 6.25 MG/1
6.25 TABLET ORAL
Status: DISCONTINUED | OUTPATIENT
Start: 2024-01-23 | End: 2024-01-24

## 2024-01-23 RX ORDER — LOSARTAN POTASSIUM 25 MG/1
25 TABLET ORAL 2 TIMES DAILY
Status: DISCONTINUED | OUTPATIENT
Start: 2024-01-23 | End: 2024-01-24

## 2024-01-23 RX ADMIN — ROSUVASTATIN CALCIUM 5 MG: 10 TABLET, COATED ORAL at 20:27

## 2024-01-23 RX ADMIN — DOCUSATE SODIUM 100 MG: 100 CAPSULE, LIQUID FILLED ORAL at 09:05

## 2024-01-23 RX ADMIN — Medication 3 MG: at 20:26

## 2024-01-23 RX ADMIN — METOPROLOL SUCCINATE 25 MG: 25 TABLET, EXTENDED RELEASE ORAL at 09:05

## 2024-01-23 RX ADMIN — PANTOPRAZOLE SODIUM 40 MG: 40 INJECTION, POWDER, FOR SOLUTION INTRAVENOUS at 06:06

## 2024-01-23 RX ADMIN — ASPIRIN 81 MG: 81 TABLET, COATED ORAL at 09:05

## 2024-01-23 RX ADMIN — WARFARIN SODIUM 5 MG: 5 TABLET ORAL at 17:05

## 2024-01-23 RX ADMIN — CARVEDILOL 6.25 MG: 6.25 TABLET, FILM COATED ORAL at 16:39

## 2024-01-23 RX ADMIN — FUROSEMIDE 20 MG: 10 INJECTION, SOLUTION INTRAMUSCULAR; INTRAVENOUS at 06:06

## 2024-01-23 RX ADMIN — LOSARTAN POTASSIUM 25 MG: 25 TABLET, FILM COATED ORAL at 20:27

## 2024-01-23 RX ADMIN — LEVOTHYROXINE SODIUM 75 MCG: 0.07 TABLET ORAL at 09:05

## 2024-01-23 RX ADMIN — POLYETHYLENE GLYCOL 3350 17 G: 17 POWDER, FOR SOLUTION ORAL at 09:05

## 2024-01-23 RX ADMIN — FUROSEMIDE 40 MG: 40 TABLET ORAL at 16:39

## 2024-01-23 RX ADMIN — LOSARTAN POTASSIUM 25 MG: 25 TABLET, FILM COATED ORAL at 09:05

## 2024-01-23 RX ADMIN — DOCUSATE SODIUM 100 MG: 100 CAPSULE, LIQUID FILLED ORAL at 20:27

## 2024-01-23 ASSESSMENT — COGNITIVE AND FUNCTIONAL STATUS - GENERAL
DRESSING REGULAR UPPER BODY CLOTHING: A LITTLE
DRESSING REGULAR LOWER BODY CLOTHING: A LITTLE
HELP NEEDED FOR BATHING: A LITTLE
MOBILITY SCORE: 18
MOVING TO AND FROM BED TO CHAIR: A LITTLE
MOVING TO AND FROM BED TO CHAIR: A LITTLE
WALKING IN HOSPITAL ROOM: A LITTLE
TURNING FROM BACK TO SIDE WHILE IN FLAT BAD: A LITTLE
STANDING UP FROM CHAIR USING ARMS: A LITTLE
TOILETING: A LITTLE
CLIMB 3 TO 5 STEPS WITH RAILING: A LITTLE
MOVING FROM LYING ON BACK TO SITTING ON SIDE OF FLAT BED WITH BEDRAILS: A LITTLE
TURNING FROM BACK TO SIDE WHILE IN FLAT BAD: A LITTLE
MOVING FROM LYING ON BACK TO SITTING ON SIDE OF FLAT BED WITH BEDRAILS: A LITTLE
STANDING UP FROM CHAIR USING ARMS: A LITTLE
DRESSING REGULAR UPPER BODY CLOTHING: A LITTLE
MOBILITY SCORE: 18
PERSONAL GROOMING: A LITTLE
HELP NEEDED FOR BATHING: A LITTLE
CLIMB 3 TO 5 STEPS WITH RAILING: A LITTLE
TOILETING: A LITTLE
PERSONAL GROOMING: A LITTLE
WALKING IN HOSPITAL ROOM: A LITTLE
DAILY ACTIVITIY SCORE: 19
DRESSING REGULAR LOWER BODY CLOTHING: A LITTLE
DAILY ACTIVITIY SCORE: 19

## 2024-01-23 ASSESSMENT — ENCOUNTER SYMPTOMS
GASTROINTESTINAL NEGATIVE: 1
CONSTITUTIONAL NEGATIVE: 1
ENDOCRINE NEGATIVE: 1
MUSCULOSKELETAL NEGATIVE: 1
COUGH: 1
WEAKNESS: 1
CARDIOVASCULAR NEGATIVE: 1
EYES NEGATIVE: 1
PSYCHIATRIC NEGATIVE: 1

## 2024-01-23 ASSESSMENT — PAIN SCALES - GENERAL
PAINLEVEL_OUTOF10: 0 - NO PAIN

## 2024-01-23 ASSESSMENT — ACTIVITIES OF DAILY LIVING (ADL)
ADL_ASSISTANCE: INDEPENDENT
LACK_OF_TRANSPORTATION: NO
ADL_ASSISTANCE: INDEPENDENT
BATHING_ASSISTANCE: MINIMAL

## 2024-01-23 ASSESSMENT — PAIN - FUNCTIONAL ASSESSMENT
PAIN_FUNCTIONAL_ASSESSMENT: 0-10
PAIN_FUNCTIONAL_ASSESSMENT: 0-10

## 2024-01-23 NOTE — CONSULTS
Consults  History Of Present Illness:    Gorge Larsen is a 88 y.o. male presenting with     1. 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 Patient admitted to [facility] from [place] 08/31/2022 with complaints of worsening encephalopathy from SNF with shortness of breath found to have newly reduced EF, 60 to 30%, of unknown etiology and worsening aortic stenosis. Echo done October 2022 showed an EF of 35%, LAD, mild to moderate MR, moderate TR, TAVR. Patient had repeat echo November 2022 showing an EF of 50 to 55%, ad, RA mild to moderately dilated, mild to moderate MR, mildly elevated RVSP, moderate TR, TAVR, PASP 44 mmHg.     2. CAD. S/P CABG x 3 2002.     3.  Aortic valvular stenosis status post TAVR 8/2022. Initial reticence patient had TAVR completed September 9, 2022 without complication. Echo done status post TAVR showed EF 30 to 35%.     4. Hypertension.     5. Hyperlipidemia.      6. Afib. On warfarin due to GI bleeding on Xarelto.      7. Pacemaker. Complete AV block. 2015 [employer].      8. Emphysema.      9. Hypothyroidism.      10. History of COVID-19 infection.     11. History of CABG x3.     The patient is an 88-year-old white male with an extensive past history outlined above including coronary artery disease with remote CABG x 3 in 2002.  He does have a history of atrial fibrillation initially treated with Coumadin and then later switched to Xarelto due to GI bleeding.  He also has a history of complete AV clarence heart block and underwent a permanent pacemaker insertion in 2015 with a [employer] pulse generator.  He does have history of colon cancer status post partial colectomy.  He has also had a history of laryngeal carcinoma with radiation therapy plus prostate cancer.  Other issues have included hypothyroidism SIADH chronic kidney disease and aortic valvular stenosis.  This patient had been sent from his skilled nursing facility to [facility] on  8/31/2022 with's encephalopathy.  He was identified as having critical aortic valvular stenosis low-flow low gradient with a significant reduction in his LV ejection fraction from 60 to 30%.  He underwent an uncomplicated transcatheter aortic valvular replacement at [facility] with a 34 mm evolute fracture on 9/9/2022.  He has most recent follow-up echocardiogram on 10/19/2023 demonstrated a persistent reduction in the LV ejection fraction to 35% with abnormal septal motion due to previous thoracotomy and RV pacemaker activity with moderate hypokinesis of the cyst and dyssynchrony of the anteroseptal wall.  There was moderate 2+ mitral valve regurgitation moderate left atrial enlargement there was moderately severe 3+ tricuspid valve regurgitation with moderate right atrial enlargement.  There was severe pulmonary hypertension estimated PA systolic pressure 65 mmHg.  The transcatheter aortic valve replacement was functioning appropriately with a minimal 12 mmHg gradient.  The patient did have pacemaker interrogation performed on 12/12/2023 demonstrating less than 3 months until the need for explantation.  The patient demonstrates 100% atrial pacing 98% ventricular pacing no atrial arrhythmias or atrial fibrillation documented.    The patient presented to the [facility] emergency room yesterday for generalized weakness.  He had evidently been diagnosed with COVID several days prior to that and had been experiencing further progressive generalized malaise and weakness but not significant shortness of breath.  The patient's lab work on presentation included a nasal swab which actually was negative for influenza AB and coronavirus.  proBNP was 7972.  CBC included hematocrit 33.3.  The comprehensive metabolic panel was relatively unremarkable creatinine 1.1.  INR was therapeutic at 2.8.  High-sensitivity troponins were 23, 23, 27.  Chest x-ray showed mild to moderate cardiomegaly congestive changes  without overt pulmonary edema small pleural effusions.  Ultrasound of the lower extremities negative for DVT.  Infectious disease has been consulted for possible need for remdesivir.       Last Recorded Vitals:  Vitals:    01/22/24 1737 01/23/24 0027 01/23/24 0439 01/23/24 0829   BP: 149/64 139/68 158/67 137/68   BP Location: Left arm Left arm Left arm Left arm   Patient Position: Lying Lying Lying Sitting   Pulse: 73 70 70 70   Resp: 20 18 18 18   Temp: 36.4 °C (97.5 °F) 36.5 °C (97.7 °F) 36.7 °C (98.1 °F) 36.8 °C (98.2 °F)   TempSrc: Oral Oral Oral Oral   SpO2: 96% 95% 92% 98%   Weight:       Height:           Last Labs:  CBC - 1/23/2024:  4:20 AM  7.4 10.8 223    33.4      CMP - 1/23/2024:  4:20 AM  8.8 7.2 21 --- 0.9   _ 3.6 9 113      PTT - 9/15/2023: 12:52 AM  3.0   28.6 37.2     Troponin I   Date/Time Value Ref Range Status   09/03/2022 02:21 AM 34 0 - 53 ng/L Final     Comment:     .  Less than 99th percentile of normal range cutoff-  Female and children under 18 years old <35 ng/L; Male <54 ng/L: Negative  Repeat testing should be performed if clinically indicated.   .  Female and children under 18 years old  ng/L; Male  ng/L:  Consistent with possible cardiac damage and possible increased clinical   risk. Serial measurements may help to assess extent of myocardial damage.   .  >120 ng/L: Consistent with cardiac damage, increased clinical risk and  myocardial infarction. Serial measurements may help assess extent of   myocardial damage.   .   NOTE: Children less than 1 year old may have higher baseline troponin   levels and results should be interpreted in conjunction with the overall   clinical context.  .  NOTE: Troponin I testing is performed using a different   testing methodology at [facility] than at other   [facility]. Direct result comparisons should only   be made within the same method.     08/08/2022 04:51 PM 21 (H) 0 - 20 ng/L Final     Comment:     .  Less than  99th percentile of normal range cutoff-  Female and children under 18 years old <14 ng/L; Male <21 ng/L: Negative  Repeat testing should be performed if clinically indicated.   .  Female and children under 18 years old 14-50 ng/L; Male 21-50 ng/L:  Consistent with possible cardiac damage and possible increased clinical   risk. Serial measurements may help to assess extent of myocardial damage.   .  >50 ng/L: Consistent with cardiac damage, increased clinical risk and  myocardial infarction. Serial measurements may help assess extent of   myocardial damage.   .   NOTE: Children less than 1 year old may have higher baseline troponin   levels and results should be interpreted in conjunction with the overall   clinical context.   .  NOTE: Troponin I testing is performed using a different   testing methodology at [facility] than at other   [facility]. Direct result comparisons should only   be made within the same method.     08/08/2022 03:37 PM 24 (H) 0 - 20 ng/L Final     Comment:     .  Less than 99th percentile of normal range cutoff-  Female and children under 18 years old <14 ng/L; Male <21 ng/L: Negative  Repeat testing should be performed if clinically indicated.   .  Female and children under 18 years old 14-50 ng/L; Male 21-50 ng/L:  Consistent with possible cardiac damage and possible increased clinical   risk. Serial measurements may help to assess extent of myocardial damage.   .  >50 ng/L: Consistent with cardiac damage, increased clinical risk and  myocardial infarction. Serial measurements may help assess extent of   myocardial damage.   .   NOTE: Children less than 1 year old may have higher baseline troponin   levels and results should be interpreted in conjunction with the overall   clinical context.   .  NOTE: Troponin I testing is performed using a different   testing methodology at [facility] than at other   [facility]. Direct result comparisons should only    be made within the same method.       BNP   Date/Time Value Ref Range Status   09/03/2022 02:21  (H) 0 - 99 pg/mL Final     Comment:     .  <100 pg/mL - Heart failure unlikely  100-299 pg/mL - Intermediate probability of acute heart  .               failure exacerbation. Correlate with clinical  .               context and patient history.    >=300 pg/mL - Heart Failure likely. Correlate with clinical  .               context and patient history.   Biotin interference may cause falsely decreased results.   Patients taking a Biotin dose of up to 5 mg/day should   refrain from taking Biotin for 24 hours before sample   collection. Providers may contact their local laboratory   for further information.     08/08/2022 03:37  (H) 0 - 99 pg/mL Final     Comment:     .  <100 pg/mL - Heart failure unlikely  100-299 pg/mL - Intermediate probability of acute heart  .               failure exacerbation. Correlate with clinical  .               context and patient history.    >=300 pg/mL - Heart Failure likely. Correlate with clinical  .               context and patient history.  BNP testing is performed using different testing   methodology at [facility] than at other   [facility]. Direct result comparisons should   only be made within the same method.       Hemoglobin A1C   Date/Time Value Ref Range Status   12/11/2020 11:24 AM 5.3 4.0 - 6.0 % Final     Comment:     Hemoglobin A1C levels are related to mean blood glucose during the   preceding 2-3 months. The relationship table below may be used as a   general guide. Each 1% increase in HGB A1C is a reflection of an   increase in mean glucose of approximately 30 mg/dl.   Reference: Diabetes Care, volume 29, supplement 1 Jan. 2006                        HGB A1C ................. Approx. Mean Glucose   _______________________________________________   6%   ...............................  120 mg/dl   7%   ...............................  150  mg/dl   8%   ...............................  180 mg/dl   9%   ...............................  210 mg/dl   10%  ...............................  240 mg/dl  Performed at [address] [address]     07/15/2019 09:39 AM 5.9 4.0 - 6.0 % Final     Comment:     Hemoglobin A1C levels are related to mean blood glucose during the   preceding 2-3 months. The relationship table below may be used as a   general guide. Each 1% increase in HGB A1C is a reflection of an   increase in mean glucose of approximately 30 mg/dl.   Reference: Diabetes Care, volume 29, supplement 1 Jan. 2006                        HGB A1C ................. Approx. Mean Glucose   _______________________________________________   6%   ...............................  120 mg/dl   7%   ...............................  150 mg/dl   8%   ...............................  180 mg/dl   9%   ...............................  210 mg/dl   10%  ...............................  240 mg/dl  Performed at [address] [address]       LDL Calculated   Date/Time Value Ref Range Status   01/08/2024 08:52 AM 43 <=99 mg/dL Final     Comment:                                 Near   Borderline      AGE      Desirable  Optimal    High     High     Very High     0-19 Y     0 - 109     ---    110-129   >/= 130     ----    20-24 Y     0 - 119     ---    120-159   >/= 160     ----      >24 Y     0 -  99   100-129  130-159   160-189     >/=190     10/13/2022 05:28 AM 58 (L) 65 - 130 MG/DL Final   09/01/2022 04:42 AM 90 65 - 130 MG/DL Final   07/22/2022 09:36 AM 98 65 - 130 MG/DL Final     VLDL   Date/Time Value Ref Range Status   01/08/2024 08:52 AM 12 0 - 40 mg/dL Final   06/30/2023 08:49 AM 15 0 - 40 mg/dL Final   03/15/2023 08:57 AM 18 0 - 40 mg/dL Final   11/30/2022 09:20 AM 12 0 - 40 mg/dL Final      Last I/O:  I/O last 3 completed shifts:  In: - (0 mL/kg)   Out: 750 (7.8 mL/kg) [Urine:750 (0.2 mL/kg/hr)]  Weight: 96.2 kg     Past  "Cardiology Tests (Last 3 Years):  EKG:  ECG 12 lead 01/22/2024 (Preliminary)    Echo:  Transthoracic Echo (TTE) Complete 10/19/2023    Ejection Fractions:  No results found for: \"EF\"  Cath:  No results found for this or any previous visit from the past 1095 days.    Stress Test:  No results found for this or any previous visit from the past 1095 days.    Cardiac Imaging:  No results found for this or any previous visit from the past 1095 days.      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    Past Surgical History:  He has a past surgical history that includes Other surgical history (03/22/2021); Other surgical history (03/22/2021); Other surgical history (03/22/2021); Other surgical history (03/22/2021); and Other surgical history (01/05/2023).      Social History:  He reports that he has never smoked. He has never used smokeless tobacco. He reports that he does not drink alcohol and does not use drugs.    Family History:  Family History   Problem Relation Name Age of Onset    Heart attack Mother      Heart disease Mother      Other (Car accident) Father          Allergies:  Codeine; Latex, natural rubber; and Lisinopril    Inpatient Medications:  Scheduled medications   Medication Dose Route Frequency    aspirin  81 mg oral Daily    docusate sodium  100 mg oral BID    furosemide  20 mg intravenous BID    levothyroxine  75 mcg oral Daily    losartan  25 mg oral Daily    melatonin  3 mg oral Daily    metoprolol succinate XL  25 mg oral " Daily    pantoprazole  40 mg oral Daily before breakfast    Or    pantoprazole  40 mg intravenous Daily before breakfast    polyethylene glycol  17 g oral Daily    rosuvastatin  5 mg oral Nightly    [Held by provider] sertraline  25 mg oral Daily    warfarin  5 mg oral Daily     PRN medications   Medication    acetaminophen    Or    acetaminophen    Or    acetaminophen    benzocaine-menthol    dextromethorphan-guaifenesin    guaiFENesin    ondansetron ODT    Or    ondansetron     Continuous Medications   Medication Dose Last Rate     Outpatient Medications:  Current Outpatient Medications   Medication Instructions    aspirin 81 mg EC tablet oral    levothyroxine (SYNTHROID, LEVOXYL) 75 mcg, oral, Daily    losartan (COZAAR) 25 mg, oral, Daily    metoprolol succinate XL (TOPROL-XL) 25 mg, oral, Daily    rosuvastatin (CRESTOR) 5 mg, oral, Nightly    sertraline (ZOLOFT) 25 mg, oral, Daily    warfarin (COUMADIN) 7.5 mg, oral, 2 times weekly, Tuesday and Thursday    warfarin (COUMADIN) 5 mg, oral, 4 times weekly, Monday, Wednesday, Saturday and Sunday       Physical Exam:  The patient is an elderly white male sitting in bedside chair awake alert conversant not overtly short of breath on room air  JVP is not elevated carotid and pulses 2+  Chest fair air movement breath sounds are actually clear  The cardiac rhythm is regular no premature beat S1-S2 normal no gallop or rub minimal systolic murmur  Abdomen is soft and benign  There is 2+ lower extremity edema.     Assessment/Plan   The patient is an 88-year-old white male who does have a history of CAD with remote CABG x 3 in 2002.  He also has a history of severe aortic valvular stenosis that required a transcatheter aortic valve replacement on 9/9/2022.  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  He also has by recent echo 2+ mitral valve regurgitation 3+ tricuspid valve regurgitation and moderately severe to severe  pulmonary hypertension with an estimated PA systolic pressure 65 mmHg.  He is currently admitted with a complaint of increasing weakness after being diagnosed with COVID although his nasal swab currently is negative.  Due to his peripheral edema pulmonary hypertension and LV dysfunction will modify current therapy.  He had not been on Lasix previously and it will be started at 40 mg twice daily and then later most likely reduced to 40 mg daily.  He is already on Farxiga 10 mg daily which will be kept unchanged.  Will increase losartan to 25 mg twice daily and further up titrate if tolerated by blood pressure.  He will be switched from metoprolol to carvedilol 6.25 mg twice daily with plans to uptitrate as well.  Fortunately on device interrogation he does remain in AV paced rhythm.  His pulse generator evidently requires replacement within 3 months and given his need to ventricular paced 98% of the time and in view of his LV dysfunction upgrade of the device to a BiV ICD may be indicated.  Will consult electrophysiology.      Code Status:  Full Code    I spent 30 minutes in the professional and overall care of this patient.        Jeremy Clarke MD

## 2024-01-23 NOTE — CARE PLAN
Problem: Balance  Goal: dynamic  Description: Pt will perform Tinetti assessment and score >/= 24/28, resulting in a low falls risk   Outcome: Progressing     Problem: Mobility  Goal: LTG - Patient will navigate 4-6 steps with rails/device  Outcome: Progressing  Goal: ambulation  Description: Pt will amb > 150  ft with wheeled walker and mod independence   Outcome: Progressing     Problem: Safety  Goal: LTG - Patient will utilize safety techniques  Outcome: Progressing

## 2024-01-23 NOTE — PROGRESS NOTES
Occupational Therapy    Evaluation    Patient Name: Gorge Larsen  MRN: [id number]  Today's Date: 1/23/2024  Time Calculation  Start Time: 0845  Stop Time: 0911  Time Calculation (min): 26 min    Assessment  IP OT Assessment  OT Assessment: Pt is 89 y/o male admitted for COVID 19. Pt presents w/ decreased ADL skills/ functional mobility, decreased activity tolerance. Recommend OT services toa ddress abovoe deficits.  Prognosis: Good  Barriers to Discharge: None  Evaluation/Treatment Tolerance: Patient limited by fatigue  End of Session Communication: Bedside nurse  End of Session Patient Position: Up in chair  Plan:  Treatment Interventions: ADL retraining, Functional transfer training, Endurance training, Patient/family training  OT Frequency: 3 times per week  OT Discharge Recommendations: Low intensity level of continued care, 24 hr supervision due to cognition  Equipment Recommended upon Discharge: Wheeled walker  OT - OK to Discharge: Yes    Subjective   Current Problem:  1. Generalized weakness        2. COVID-19        3. Congestive heart failure, unspecified HF chronicity, unspecified heart failure type (CMS/HCC)        4. Dyspnea, unspecified type        5. Diastolic hypertension          General:  General  Reason for Referral: decreased ADL's  Referred By: Dr. Antoine  Past Medical History Relevant to Rehab: Afib, pacer, hearing loss, meniere's, CABG x3, hypothyroid,SIADH  Prior to Session Communication: Bedside nurse  Patient Position Received: Up in chair  Preferred Learning Style: verbal  General Comment: Cleared to see, ptagreeable to therapy  Precautions:  Medical Precautions: Infection precautions  Precautions Comment: COVID +  Vital Signs:     Pain:  Pain Assessment  Pain Assessment: 0-10  Pain Score: 0 - No pain    Objective   Cognition:  Overall Cognitive Status: Within Functional Limits           Home Living:  Type of Home: Condo  Lives With: Spouse (ex wife)  Home Adaptive Equipment: Walker  rolling or standard, Cane  Home Layout: One level  Home Access: Level entry  Bathroom Equipment: Grab bars in shower, Shower chair with back   Prior Function:  Level of [place]: Independent with ADLs and functional transfers  Receives Help From: Family  ADL Assistance: Independent  Homemaking Assistance:  (Ex wife does IADL's)  Ambulatory Assistance: Independent  Vocational: Retired  Hand Dominance: Right  Prior Function Comments: Pt reports he ambulates in home w/o AD however reports that he reaches for walls,doorframes etc. does report using walker when out in the community.  IADL History:  Homemaking Responsibilities:  (Ex wife does IADL's)  ADL:  Eating Assistance: Not performed  Grooming Assistance: Stand by  Grooming Deficit: Setup (to wash face, comb hair)  Bathing Assistance: Minimal  Bathing Deficit: Buttocks, Left lower leg including foot, Right lower leg including foot, Steadying (per clinical judgement)  UE Dressing Assistance: Minimal (to don/ doff gown)  UE Dressing Deficit: Thread RUE, Thread LUE, Pull around back  LE Dressing Assistance: Minimal  LE Dressing Deficit: Pull up over hips, Steadying (per clinical judgement)  Toileting Assistance with Device: Not performed  Functional Assistance: Not performed  Activity Tolerance:  Endurance: Decreased tolerance for upright activites  Bed Mobility/Transfers: Transfers  Transfer: Yes  Transfer 1  Transfer From 1: Sit to  Transfer to 1: Stand, Chair with arms  Technique 1: Sit to stand  Transfer Device 1: Walker  Transfer Level of Assistance 1: Minimum assistance  Trials/Comments 1: Verbal cues for hand placement  Transfers 2  Transfer From 2: Stand to  Transfer to 2: Sit, Chair with arms  Technique 2: Stand to sit  Transfer Device 2: Walker  Transfer Level of Assistance 2: Minimum assistance, Minimal verbal cues  Trials/Comments 2: Verbal cues for keeping within frame of walker and keeping walker w/ him when he gets to chair.      Ambulation/Gait  Training:  Ambulation/Gait Training  Ambulation/Gait Training Performed: Yes  Ambulation/Gait Training 1  Surface 1: Level tile  Device 1: Rolling walker  Assistance 1: Minimum assistance  Comments/Distance (ft) 1: Pt ambulated from chair to door and back to chair w/ min. assist and FWW. Verbal cues to stay w/i frame of walker, tactile cues for walker management.  Modalities:     IADL's:   Homemaking Responsibilities:  (Ex wife does IADL's)  Vision: Vision - Basic Assessment  Current Vision: Wears glasses all the time  Sensation:  Light Touch: No apparent deficits  Strength:  Strength Comments: 4/5 (BUE)  Perception:  Inattention/Neglect: Appears intact  Coordination:  Movements are Fluid and Coordinated: Yes   Hand Function:  Hand Function  Gross Grasp: Functional  Coordination: Functional  Extremities: RUE   RUE : Within Functional Limits and LUE   LUE: Within Functional Limits      Outcome Measures: [facility] Daily Activity  Putting on and taking off regular lower body clothing: A little  Bathing (including washing, rinsing, drying): A little  Putting on and taking off regular upper body clothing: A little  Toileting, which includes using toilet, bedpan or urinal: A little  Taking care of personal grooming such as brushing teeth: A little  Eating Meals: None  Daily Activity - Total Score: 19      Education Documentation  ADL Training, taught by Kristen Sue OT at 1/23/2024 10:55 AM.  Learner: Patient  Readiness: Acceptance  Method: Explanation  Response: Demonstrated Understanding, Needs Reinforcement    Education Comments  No comments found.      Goals:   Encounter Problems       Encounter Problems (Active)       OT Goals       ADL's (Progressing)       Start:  01/23/24    Expected End:  02/06/24       Pt will complete ADL tasks w/ close supervision w/ Ae as needed for safety and increased independence in self care tasks.         Functional transfers (Progressing)       Start:  01/23/24    Expected End:   02/06/24       Pt will demon. Bed, chair and toilet transfers w/ MOD I w/ LRD for safety and incresed independence in functional transfers.         Functional endurance (Progressing)       Start:  01/23/24    Expected End:  02/06/24       Pt will demon. BUE exercises to improve functional endurance for self care tasks and functional transfers.

## 2024-01-23 NOTE — CONSULTS
"Nutrition Assessement Note    Nutrition Assessment    Reason for Assessment: Dietitian discretion (CHF dx)    Reason for Hospital Admission:  Gorge Larsen is a 88 y.o. male who is admitted for increased weakness, covid, acute CHF. Currently in isolation. Spoke with pt over the phone - voiced no concerns for RD. States he does not add salt and cooks for himself at home.     Nutrition History:  Food and Nutrient History: reports normal intake recently. eating breakfast at this time. good appetite per pt.  Energy Intake: Fair 50-75 %    Anthropometrics:  Ht: 175.3 cm (5' 9\"), Wt: 96.2 kg (212 lb 1.3 oz), BMI: 31.3  IBW/kg (Dietitian Calculated): 72.73 kg  Percent of IBW: 133 %  Adjusted Body Weight (kg): 78.64 kg    Weight Change:  Daily Weight  01/22/24 : 96.2 kg (212 lb 1.3 oz)  01/13/24 : 98.4 kg (217 lb)  10/19/23 : 93.9 kg (207 lb)  07/15/23 : 92.1 kg (203 lb)  05/30/23 : 93.4 kg (206 lb)  04/20/23 : 88.5 kg (195 lb)  03/17/23 : 88.5 kg (195 lb)  01/12/23 : 87.1 kg (192 lb)  01/05/23 : 83.5 kg (184 lb)  12/15/22 : 84.4 kg (186 lb)     Weight History / % Weight Change: denies wt changes. reports usual wt 200#. wt differences likely r/t fluid.  Significant Weight Gain: Fluid related    Nutrition Focused Physical Exam Findings: defer: covid  Edema  Edema: +4 severe  Edema Location: BLE    Nutrition Significant Labs:  Lab Results   Component Value Date    WBC 7.4 01/23/2024    HGB 10.8 (L) 01/23/2024    HCT 33.4 (L) 01/23/2024     01/23/2024    CHOL 98 01/08/2024    TRIG 62 01/08/2024    HDL 43.1 01/08/2024    ALT 9 01/23/2024    AST 21 01/23/2024     (L) 01/23/2024    K 3.6 01/23/2024    CL 95 (L) 01/23/2024    CREATININE 1.10 01/23/2024    BUN 23 01/23/2024    CO2 21 (L) 01/23/2024    TSH 6.34 (H) 01/08/2024    PSA 0.1 07/22/2022    INR 3.0 (H) 01/23/2024    HGBA1C 5.3 12/11/2020       Current Facility-Administered Medications:     acetaminophen (Tylenol) tablet 650 mg, 650 mg, oral, q4h PRN **OR** " acetaminophen (Tylenol) oral liquid 650 mg, 650 mg, oral, q4h PRN **OR** acetaminophen (Tylenol) suppository 650 mg, 650 mg, rectal, q4h PRN, Natasha Antoine MD    aspirin EC tablet 81 mg, 81 mg, oral, Daily, Natasha Antoine MD, 81 mg at 01/23/24 0905    benzocaine-menthol (Cepastat Sore Throat) 15-3.6 mg lozenge 1 lozenge, 1 lozenge, Mouth/Throat, q2h PRN, Natasha Antoine MD    carvedilol (Coreg) tablet 6.25 mg, 6.25 mg, oral, BID with meals, Jeremy Clarke MD    dextromethorphan-guaifenesin (Robitussin DM)  mg/5 mL oral liquid 5 mL, 5 mL, oral, q4h PRN, Natasha Antoine MD    docusate sodium (Colace) capsule 100 mg, 100 mg, oral, BID, Natasha Antoine MD, 100 mg at 01/23/24 0905    furosemide (Lasix) tablet 40 mg, 40 mg, oral, BID with meals, Jeremy Clarke MD    guaiFENesin (Mucinex) 12 hr tablet 600 mg, 600 mg, oral, q12h PRN, Natasha Antoine MD    levothyroxine (Synthroid, Levoxyl) tablet 75 mcg, 75 mcg, oral, Daily, Natasha Antoine MD, 75 mcg at 01/23/24 0905    losartan (Cozaar) tablet 25 mg, 25 mg, oral, BID, Jeremy Clarke MD    melatonin tablet 3 mg, 3 mg, oral, Daily, Natasha Antoine MD, 3 mg at 01/22/24 2028    ondansetron ODT (Zofran-ODT) disintegrating tablet 4 mg, 4 mg, oral, q8h PRN **OR** ondansetron (Zofran) injection 4 mg, 4 mg, intravenous, q8h PRN, Natasha Antoine MD    pantoprazole (ProtoNix) EC tablet 40 mg, 40 mg, oral, Daily before breakfast **OR** pantoprazole (ProtoNix) injection 40 mg, 40 mg, intravenous, Daily before breakfast, Natasha Antoine MD, 40 mg at 01/23/24 0606    polyethylene glycol (Glycolax, Miralax) packet 17 g, 17 g, oral, Daily, Natasha Antoine MD, 17 g at 01/23/24 0905    rosuvastatin (Crestor) tablet 5 mg, 5 mg, oral, Nightly, Natasha Antoine MD, 5 mg at 01/22/24 2028    [Held by provider] sertraline (Zoloft) tablet 25 mg, 25 mg, oral, Daily, Natasha Antoine MD    warfarin (Coumadin) tablet 5 mg, 5 mg, oral, Daily, Natasha Antoine MD    Dietary Orders (From admission, onward)       Start     Ordered     01/22/24 1804  Adult diet 2-3 grams sodium  Diet effective now        Question:  Diet type  Answer:  2-3 grams sodium    01/22/24 1805                  Estimated Needs:   Estimated Energy Needs  Total Energy Estimated Needs (kCal): 1966 kCal  Total Estimated Energy Need per Day (kCal/kg): 25 kCal/kg  Method for Estimating Needs: ABW    Estimated Protein Needs  Total Protein Estimated Needs (g): 94 g  Total Protein Estimated Needs (g/kg): 1.2 g/kg  Method for Estimating Needs: ABW    Estimated Fluid Needs  Method for Estimating Needs: fluid per MD        Nutrition Diagnosis   Nutrition Diagnosis:  Malnutrition Diagnosis  Patient has Malnutrition Diagnosis: No    Nutrition Diagnosis  Patient has Nutrition Diagnosis: Yes  Diagnosis Status (1): New  Nutrition Diagnosis 1: Increased nutrient needs  Related to (1): increased demand for nutrients  As Evidenced by (1): conditions associated with dx       Nutrition Interventions/Recommendations   Nutrition Interventions and Recommendations:    Nutrition Prescription:  Individualized Nutrition Prescription Provided for : 1966 kcals and 94g protein to be provided via diet    Nutrition Interventions:   Food and/or Nutrient Delivery Interventions  Interventions: Meals and snacks  Meals and Snacks: [place]-modified diet  Goal: provide as ordered  Additional Interventions: discussed limiting Na+ intake    Education Documentation  Diet - Low Sodium, taught by Jolanta Velasquez, RD, LD at 1/23/2024 10:09 AM.  Learner: Patient  Readiness: Acceptance  Method: Explanation  Response: Verbalizes Understanding             Nutrition Monitoring and Evaluation   Monitoring/Evaluation:   Food/Nutrient Related History Monitoring  Monitoring and Evaluation Plan: Energy intake  Energy Intake: Estimated energy intake  Criteria: pt to consume >/= 75% estimated needs  Additional Plans: pt able to plan meals within prescribed guidelines       Time Spent/Follow-up:   Follow Up  Time Spent (min): 25  minutes  Last Date of Nutrition Visit: 01/23/24  Nutrition Follow-Up Needed?: 5-7 days  Follow up Comment: 1/29/24

## 2024-01-23 NOTE — PROGRESS NOTES
"Gorge Larsen is a 88 y.o. male on day 0 of admission presenting with General symptom.    Subjective   Patient is feeling better than yesterday.  Leg swelling has gone down. C/o change in meds        Objective     Physical Exam  Vitals reviewed.   Constitutional:       Appearance: Normal appearance.   HENT:      Head: Normocephalic and atraumatic.      Right Ear: Tympanic membrane, ear canal and external ear normal.      Left Ear: Tympanic membrane, ear canal and external ear normal.      Nose: Nose normal.      Mouth/Throat:      Pharynx: Oropharynx is clear.   Eyes:      Extraocular Movements: Extraocular movements intact.      Conjunctiva/sclera: Conjunctivae normal.      Pupils: Pupils are equal, round, and reactive to light.   Cardiovascular:      Rate and Rhythm: Normal rate and regular rhythm.      Pulses: Normal pulses.      Heart sounds: Normal heart sounds.   Pulmonary:      Effort: Pulmonary effort is normal.      Breath sounds: Normal breath sounds.   Abdominal:      General: Abdomen is flat. Bowel sounds are normal.      Palpations: Abdomen is soft.   Musculoskeletal:      Cervical back: Normal range of motion and neck supple.   Skin:     General: Skin is warm and dry.   Neurological:      General: No focal deficit present.      Mental Status: He is alert and oriented to person, place, and time.   Psychiatric:         Mood and Affect: Mood normal.         Last Recorded Vitals  Blood pressure 160/79, pulse 69, temperature 36.9 °C (98.4 °F), temperature source Oral, resp. rate 17, height 1.753 m (5' 9\"), weight 96.2 kg (212 lb 1.3 oz), SpO2 98 %.  Intake/Output last 3 Shifts:  I/O last 3 completed shifts:  In: - (0 mL/kg)   Out: 750 (7.8 mL/kg) [Urine:750 (0.2 mL/kg/hr)]  Weight: 96.2 kg     Relevant Results              Scheduled medications  aspirin, 81 mg, oral, Daily  carvedilol, 6.25 mg, oral, BID with meals  docusate sodium, 100 mg, oral, BID  furosemide, 40 mg, oral, BID with " meals  levothyroxine, 75 mcg, oral, Daily  losartan, 25 mg, oral, BID  melatonin, 3 mg, oral, Daily  pantoprazole, 40 mg, oral, Daily before breakfast   Or  pantoprazole, 40 mg, intravenous, Daily before breakfast  polyethylene glycol, 17 g, oral, Daily  rosuvastatin, 5 mg, oral, Nightly  [Held by provider] sertraline, 25 mg, oral, Daily  warfarin, 5 mg, oral, Daily      Continuous medications     PRN medications  PRN medications: acetaminophen **OR** acetaminophen **OR** acetaminophen, benzocaine-menthol, dextromethorphan-guaifenesin, guaiFENesin, ondansetron ODT **OR** ondansetron  Results for orders placed or performed during the hospital encounter of 01/22/24 (from the past 24 hour(s))   Serial Troponin, 6 Hour ([facility])   Result Value Ref Range    Troponin T, High Sensitivity 27 (HH) <=14 ng/L   Protime-INR   Result Value Ref Range    Protime 29.3 (H) 9.3 - 12.7 seconds    INR 3.0 (H) 0.9 - 1.2   CBC   Result Value Ref Range    WBC 7.4 4.4 - 11.3 x10*3/uL    nRBC 0.0 0.0 - 0.0 /100 WBCs    RBC 3.72 (L) 4.50 - 5.90 x10*6/uL    Hemoglobin 10.8 (L) 13.5 - 17.5 g/dL    Hematocrit 33.4 (L) 41.0 - 52.0 %    MCV 90 80 - 100 fL    MCH 29.0 26.0 - 34.0 pg    MCHC 32.3 32.0 - 36.0 g/dL    RDW 16.1 (H) 11.5 - 14.5 %    Platelets 223 150 - 450 x10*3/uL   Comprehensive metabolic panel   Result Value Ref Range    Glucose 100 (H) 65 - 99 mg/dL    Sodium 129 (L) 133 - 145 mmol/L    Potassium 3.6 3.4 - 5.1 mmol/L    Chloride 95 (L) 97 - 107 mmol/L    Bicarbonate 21 (L) 24 - 31 mmol/L    Urea Nitrogen 23 8 - 25 mg/dL    Creatinine 1.10 0.40 - 1.60 mg/dL    eGFR 65 >60 mL/min/1.73m*2    Calcium 8.8 8.5 - 10.4 mg/dL    Albumin 3.6 3.5 - 5.0 g/dL    Alkaline Phosphatase 113 35 - 125 U/L    Total Protein 7.2 5.9 - 7.9 g/dL    AST 21 5 - 40 U/L    Bilirubin, Total 0.9 0.1 - 1.2 mg/dL    ALT 9 5 - 40 U/L    Anion Gap 13 <=19 mmol/L   Protime-INR   Result Value Ref Range    Protime 28.6 (H) 9.3 - 12.7 seconds    INR 3.0 (H) 0.9 - 1.2      ECG 12 lead    Result Date: 1/23/2024   Poor data quality, interpretation may be adversely affected Ventricular-paced rhythm Abnormal ECG When compared with ECG of 06-OCT-2022 16:59, No significant change was found Confirmed by Tay Cr ([id number]) on 1/23/2024 1:28:19 PM    XR chest 1 view    Result Date: 1/22/2024  Interpreted By:  Solomon Tuttle, STUDY: XR CHEST 1 VIEW; 1/22/2024 2:21 pm   INDICATION: Signs/Symptoms:dyspnea.   COMPARISON: 09/15/2023   ACCESSION NUMBER(S): [id number]   ORDERING CLINICIAN: ALBAN OCAMPO   TECHNIQUE: 1 view of the chest was performed.   FINDINGS: Left chest wall pacemaker and leads appear intact. The lungs are adequately inflated. No acute consolidation. No pneumothorax. Mild blunting of the right costophrenic angle may reflect a small effusion. There may be a minimal left pleural effusion as well. Cardiomediastinal silhouette is mild-to-moderately enlarged.. Median sternotomy wires are intact.       Mild-moderate cardiomegaly. Congestive changes without overt pulmonary edema. Possible small pleural effusions. No acute consolidation.   Signed by: Solomon Tuttle 1/22/2024 2:36 PM Dictation workstation:   [id number]    Vascular US lower extremity venous duplex bilateral    Result Date: 1/22/2024  Interpreted By:  Solomon Tuttle, STUDY: VASC US LOWER EXTREMITY VENOUS DUPLEX BILATERAL;  1/22/2024 2:11 pm   INDICATION: Signs/Symptoms:atraumatic erythema/edema in the bilateral lower extremities, concern for DVT   COMPARISON: None.   ACCESSION NUMBER(S): [id number]   ORDERING CLINICIAN: ALBAN OCAMPO   TECHNIQUE: Vascular ultrasound of the bilateral lower extremities was performed. Real-time compression views as well as Gray scale, color Doppler and spectral Doppler waveform analysis was performed.   FINDINGS: Evaluation of the visualized portions of the bilateral common femoral vein, proximal, mid, and distal femoral vein, and popliteal vein were performed.   No evidence for  DVT in the bilateral lower extremities.       No evidence for DVT in the bilateral lower extremities.   MACRO: None   Signed by: Solomon Tuttle 1/22/2024 2:33 PM Dictation workstation:   [id number]                  Assessment/Plan   Principal Problem:    General symptom  Active Problems:    Anxiety    Apnea, sleep    Benign essential HTN    Chronic atrial fibrillation (CMS/HCC)    Congestive heart failure (CMS/HCC)    Hyponatremia    Hypothyroidism    S/P TAVR (transcatheter aortic valve replacement)    Dyslipidemia    Cardiomyopathy (CMS/HCC)    COVID-19    Explained needs to take meds diff   Covid did offset the balance  We will DC the Zoloft because of low sodium  Continue Lasix  Cardiology input appreciated  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      Natasha Antoine MD

## 2024-01-23 NOTE — PROGRESS NOTES
Physical Therapy    Physical Therapy Evaluation    Patient Name: Gorge Larsen  MRN: [id number]  Today's Date: 1/23/2024   Time Calculation  Start Time: 0753  Stop Time: 0819  Time Calculation (min): 26 min    Assessment/Plan   PT Assessment  PT Assessment Results: Decreased strength, Decreased endurance, Impaired balance, Decreased mobility, Impaired judgement, Decreased safety awareness  Rehab Prognosis: Good  Medical Staff Made Aware: Yes  End of Session Communication: Bedside nurse  Assessment Comment: 87 y/o male who presents as a falls risk due to impaired balnace, decreased strength and decreased safety awareness. Would benefit from use of wheeled walker for mobiliity and cont PT services to maximzie indep mobility.  End of Session Patient Position: Up in chair, Alarm off, not on at start of session (per RN, pt refusing alarms. RN aware pt is up in chair.)  IP OR SWING BED PT PLAN  Inpatient or Swing Bed: Inpatient  PT Plan  Treatment/Interventions: Bed mobility, Transfer training, Gait training, Stair training, Balance training, Strengthening, Endurance training, Therapeutic exercise, Therapeutic activity  PT Plan: Skilled PT  PT Frequency: 3 times per week  PT Discharge Recommendations: Low intensity level of continued care, 24 hr supervision due to cognition  Equipment Recommended upon Discharge: Wheeled walker  PT Recommended Transfer Status: Assist x1, Assistive device  PT - OK to Discharge: Yes      Subjective   General Visit Information:  General  Reason for Referral: impaired mobility  Past Medical History Relevant to Rehab: 87 y/o male who presented with generalized weakness. + Covid. PMH including A-fib, pacer, hearing loss, meniere's hypothyroid, SIADH.  Prior to Session Communication: Bedside nurse  Patient Position Received: Up in bathroom  Preferred Learning Style: verbal  General Comment: Pt ambulating out of bathroom, independently, upon PT arrival. Pt agreeable to participate. Pt reported,  "\"He is refusing to have the bed alarm on,\" therefore has been up ad delphine within the room.  Home Living:  Home Living  Type of Home: [place]  Lives With: Spouse (ex wife)  Home Adaptive Equipment: Walker rolling or standard, Cane  Home Layout: One level  Home Access: Level entry  Bathroom Equipment: Grab bars in shower, Shower chair with back  Home Living Comments: reportedly has attached garage into single level living area.  Prior Level of Function:  Prior Function Per Pt/Caregiver Report  Level of [place]: Independent with ADLs and functional transfers  Receives Help From: Family  ADL Assistance: Independent  Homemaking Assistance:  (Ex wife does IADL's)  Ambulatory Assistance: Independent  Vocational: Retired  Hand Dominance: Right  Prior Function Comments: Pt reports he ambulates in home w/o AD  and \"furniture walks\" wihtin the home. Uses wheeled walker in community.  Precautions:  Precautions  Medical Precautions: Fall precautions, Infection precautions  Precautions Comment: covid iso  Vital Signs:       Objective   Pain:  Pain Assessment  Pain Score: 0 - No pain  Cognition:  Cognition  Overall Cognitive Status:  (a/o x 3. decreased insight of deficits, decreased safety awareness. able to follow commands appropriately with repetition due to [place] and easily distracted.)  Insight: Mild  Impulsive: Mildly    General Assessments:       Activity Tolerance  Endurance: Decreased tolerance for upright activites    Sensation  Light Touch: No apparent deficits    Strength  Strength Comments: B LEs > 3/5 observed       Postural Control  Posture Comment: flexed posture    Static Sitting Balance  Static Sitting-Level of Assistance: Distant supervision  Dynamic Sitting Balance  Dynamic Sitting-Comments: reportedly has been completing toileting tasks, seated on toilet, independently.    Static Standing Balance  Static Standing-Level of Assistance: Close supervision  Static Standing-Comment/Number of Minutes: with and without " UE support  Dynamic Standing Balance  Dynamic Standing-Comments: unsteady without AD; reaching for external support of bedframe and walls. More steady with wheeled walker.  Functional Assessments:       Transfer 1  Technique 1: Sit to stand, Stand to sit  Transfer Device 1: Walker  Transfer Level of Assistance 1: Close supervision  Trials/Comments 1: cueing for safety    Ambulation/Gait Training  Ambulation/Gait Training Performed: Yes  Ambulation/Gait Training 1  Surface 1: Level tile  Device 1: No device, Rolling walker  Assistance 1: Contact guard  Quality of Gait 1:  (decreased helen, minimal foot clearance, shuffle-like gait. When ambulating without AD; reaching for external support with UEs.)  Comments/Distance (ft) 1: about 15 ft without AD. about 20 ft with wheeled walker  Extremity/Trunk Assessments:  RLE   RLE : Within Functional Limits  LLE   LLE : Within Functional Limits    Treatment    After ambulating around room without assistive device. Instructed pt to amb with use of wheeled walker. Instructed pt on proper use and importance to utilize. Amb about 15-20 ft with walker, supervision to CGA for safety. Performed x5 sit to stand transfers with emphasis on not using UEs for LE strengthening activity. Performed in 24 seconds with increased difficulty to complete.      Outcome Measures:  [facility] Basic Mobility  Turning from your back to your side while in a flat bed without using bedrails: A little  Moving from lying on your back to sitting on the side of a flat bed without using bedrails: A little  Moving to and from bed to chair (including a wheelchair): A little  Standing up from a chair using your arms (e.g. wheelchair or bedside chair): A little  To walk in hospital room: A little  Climbing 3-5 steps with railing: A little  Basic Mobility - Total Score: 18    Encounter Problems       Encounter Problems (Active)       Balance       dynamic (Progressing)       Start:  01/23/24    Expected End:   02/06/24       Pt will perform Tinetti assessment and score >/= 24/28, resulting in a low falls risk             Mobility       LTG - Patient will navigate 4-6 steps with rails/device (Progressing)       Start:  01/23/24    Expected End:  02/06/24            ambulation (Progressing)       Start:  01/23/24    Expected End:  02/06/24       Pt will amb > 150  ft with wheeled walker and mod independence             Pain - Adult          Safety       LTG - Patient will utilize safety techniques (Progressing)       Start:  01/23/24    Expected End:  02/06/24                   Education Documentation  Precautions, taught by Kasia Leblanc, PT at 1/23/2024 11:25 AM.  Learner: Patient  Readiness: Acceptance  Method: Explanation  Response: Needs Reinforcement    Mobility Training, taught by Kasia Leblanc, PT at 1/23/2024 11:25 AM.  Learner: Patient  Readiness: Acceptance  Method: Explanation  Response: Needs Reinforcement    Education Comments  No comments found.

## 2024-01-23 NOTE — CARE PLAN
The patient's goals for the shift include      The clinical goals for the shift include safety    Over the shift, the patient did not make progress toward the following goals. Barriers to progression include Recommendations to address these barriers include

## 2024-01-23 NOTE — CONSULTS
Inpatient consult to Infectious Diseases  Consult performed by: Linn Alvarez, APRN-CNP  Consult ordered by: Natasha Antoine MD  Reason for consult: covid          Primary MD: Natasha Antoine MD        History Of Present Illness  Gorge Larsen is a 88 y.o. male with past medical history of Ménière's disease, atrial fibrillation, complete heart block, status post pacemaker.  He presented to the emergency room with generalized weakness and malaise.  He tested positive for coronavirus on 1/22/2024.  He reports some shortness of breath.  He is on room air with Normal oxygenation.  He reports occasional nonproductive cough.  He denies fever chills nausea vomiting or diarrhea.   He is afebrile.  His daughter is at the bedside.  Chest xray shows Mild-moderate cardiomegaly. Congestive changes without overt pulmonary edema. Possible small pleural effusions. No acute consolidation.   He was admitted for further evaluation and management.        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    Surgical History  He has a past surgical history that includes Other surgical history (03/22/2021); Other surgical history (03/22/2021); Other surgical history (03/22/2021); Other surgical history (03/22/2021); and Other surgical history (01/05/2023).     Social History     Occupational History    Not on file   Tobacco Use    Smoking status: Never    Smokeless tobacco: Never   Substance and Sexual Activity    Alcohol use: Never    Drug use: Never    Sexual  activity: Not on file     Travel History   Travel since 12/23/23    No documented travel since 12/23/23              Family History  Family History   Problem Relation Name Age of Onset    Heart attack Mother      Heart disease Mother      Other (Car accident) Father       Allergies  Codeine; Latex, natural rubber; and Lisinopril     Immunization History   Administered Date(s) Administered    Moderna SARS-CoV-2 Vaccination 01/22/2021, 02/19/2021, 12/08/2021    Pneumococcal conjugate vaccine, 13-valent (PREVNAR 13) 09/13/2019     Medications  Home medications:  Medications Prior to Admission   Medication Sig Dispense Refill Last Dose    aspirin 81 mg EC tablet Take by mouth.       levothyroxine (Synthroid, Levoxyl) 50 mcg tablet Take 1.5 tablets (75 mcg) by mouth once daily. 135 tablet 0     losartan (Cozaar) 25 mg tablet Take 1 tablet (25 mg) by mouth once daily. 90 tablet 3     metoprolol succinate XL (Toprol-XL) 25 mg 24 hr tablet Take 1 tablet (25 mg) by mouth once daily. 90 tablet 0     rosuvastatin (Crestor) 5 mg tablet Take 1 tablet (5 mg) by mouth once daily at bedtime. 90 tablet 0     sertraline (Zoloft) 25 mg tablet Take 1 tablet (25 mg) by mouth once daily. 90 tablet 0     warfarin (Coumadin) 5 mg tablet Take 1 tablet (5 mg) by mouth 4 times a week. Monday, Wednesday, Saturday and Sunday 90 tablet 0     warfarin (Coumadin) 7.5 mg tablet Take 1 tablet (7.5 mg) by mouth 2 times a week. Tuesday and Thursday        Current medications:  Scheduled medications  aspirin, 81 mg, oral, Daily  carvedilol, 6.25 mg, oral, BID with meals  docusate sodium, 100 mg, oral, BID  furosemide, 40 mg, oral, BID with meals  levothyroxine, 75 mcg, oral, Daily  losartan, 25 mg, oral, BID  melatonin, 3 mg, oral, Daily  pantoprazole, 40 mg, oral, Daily before breakfast   Or  pantoprazole, 40 mg, intravenous, Daily before breakfast  polyethylene glycol, 17 g, oral, Daily  rosuvastatin, 5 mg, oral, Nightly  [Held by provider]  sertraline, 25 mg, oral, Daily  warfarin, 5 mg, oral, Daily      Continuous medications     PRN medications  PRN medications: acetaminophen **OR** acetaminophen **OR** acetaminophen, benzocaine-menthol, dextromethorphan-guaifenesin, guaiFENesin, ondansetron ODT **OR** ondansetron    Review of Systems   Constitutional: Negative.    HENT: Negative.     Eyes: Negative.    Respiratory:  Positive for cough.    Cardiovascular: Negative.    Gastrointestinal: Negative.    Endocrine: Negative.    Genitourinary: Negative.    Musculoskeletal: Negative.    Skin: Negative.    Neurological:  Positive for weakness.   Psychiatric/Behavioral: Negative.          Objective  Range of Vitals (last 24 hours)  Heart Rate:  [70-73]   Temp:  [36.4 °C (97.5 °F)-36.8 °C (98.3 °F)]   Resp:  [18-20]   BP: (137-158)/(64-69)   SpO2:  [92 %-98 %]   Daily Weight  01/22/24 : 96.2 kg (212 lb 1.3 oz)    Body mass index is 31.32 kg/m².     Physical Exam  Constitutional:       Appearance: Normal appearance.   HENT:      Head: Normocephalic and atraumatic.      Nose: Nose normal.      Mouth/Throat:      Mouth: Mucous membranes are moist.      Pharynx: Oropharynx is clear.   Eyes:      Extraocular Movements: Extraocular movements intact.      Conjunctiva/sclera: Conjunctivae normal.   Cardiovascular:      Rate and Rhythm: Normal rate and regular rhythm.      Heart sounds: Murmur heard.   Pulmonary:      Effort: Pulmonary effort is normal.      Breath sounds: Normal breath sounds.   Abdominal:      General: Bowel sounds are normal.      Palpations: Abdomen is soft.   Musculoskeletal:         General: Normal range of motion.      Cervical back: Normal range of motion and neck supple.      Right lower leg: Edema present.      Left lower leg: Edema present.   Skin:     General: Skin is warm and dry.   Neurological:      General: No focal deficit present.      Mental Status: He is alert and oriented to person, place, and time. Mental status is at baseline.  "  Psychiatric:         Mood and Affect: Mood normal.         Behavior: Behavior normal.          Relevant Results  Outside Hospital Results  No  Labs  Results from last 72 hours   Lab Units 01/23/24  0420 01/22/24  1332   WBC AUTO x10*3/uL 7.4 7.2   HEMOGLOBIN g/dL 10.8* 10.7*   HEMATOCRIT % 33.4* 33.3*   PLATELETS AUTO x10*3/uL 223 230   NEUTROS PCT AUTO %  --  78.8   LYMPHS PCT AUTO %  --  4.9   MONOS PCT AUTO %  --  13.2   EOS PCT AUTO %  --  1.4     Results from last 72 hours   Lab Units 01/23/24  0420 01/22/24  1332   SODIUM mmol/L 129* 128*   POTASSIUM mmol/L 3.6 4.1   CHLORIDE mmol/L 95* 94*   CO2 mmol/L 21* 20*   BUN mg/dL 23 21   CREATININE mg/dL 1.10 1.10   GLUCOSE mg/dL 100* 128*   CALCIUM mg/dL 8.8 8.8   ANION GAP mmol/L 13 14   EGFR mL/min/1.73m*2 65 65     Results from last 72 hours   Lab Units 01/23/24  0420 01/22/24  1332   ALK PHOS U/L 113 113   BILIRUBIN TOTAL mg/dL 0.9 1.0   PROTEIN TOTAL g/dL 7.2 7.3   ALT U/L 9 10   AST U/L 21 21   ALBUMIN g/dL 3.6 3.7     Estimated Creatinine Clearance: 55.2 mL/min (by C-G formula based on SCr of 1.1 mg/dL).  No results found for: \"CRP\", \"SEDRATE\"  No results found for: \"HIV1X2\", \"HIVCONF\", \"ZPRLCW0MT\"  No results found for: \"HEPCABINIT\", \"HEPCAB\", \"HCVPCRQUANT\"  Microbiology  No results found for the last 90 days.  Reviewed   Imaging  ECG 12 lead    Result Date: 1/23/2024   Poor data quality, interpretation may be adversely affected Ventricular-paced rhythm Abnormal ECG When compared with ECG of 06-OCT-2022 16:59, No significant change was found Confirmed by Tay Cr ([id number]) on 1/23/2024 1:28:19 PM    XR chest 1 view    Result Date: 1/22/2024  Interpreted By:  Solomon Tuttle, STUDY: XR CHEST 1 VIEW; 1/22/2024 2:21 pm   INDICATION: Signs/Symptoms:dyspnea.   COMPARISON: 09/15/2023   ACCESSION NUMBER(S): [id number]   ORDERING CLINICIAN: ALBAN OCAMPO   TECHNIQUE: 1 view of the chest was performed.   FINDINGS: Left chest wall pacemaker and leads appear " intact. The lungs are adequately inflated. No acute consolidation. No pneumothorax. Mild blunting of the right costophrenic angle may reflect a small effusion. There may be a minimal left pleural effusion as well. Cardiomediastinal silhouette is mild-to-moderately enlarged.. Median sternotomy wires are intact.       Mild-moderate cardiomegaly. Congestive changes without overt pulmonary edema. Possible small pleural effusions. No acute consolidation.   Signed by: Solomon Tuttle 1/22/2024 2:36 PM Dictation workstation:   [id number]    Vascular US lower extremity venous duplex bilateral    Result Date: 1/22/2024  Interpreted By:  Solomon Tuttle, STUDY: [facility] US LOWER EXTREMITY VENOUS DUPLEX BILATERAL;  1/22/2024 2:11 pm   INDICATION: Signs/Symptoms:atraumatic erythema/edema in the bilateral lower extremities, concern for DVT   COMPARISON: None.   ACCESSION NUMBER(S): [id number]   ORDERING CLINICIAN: ALBAN OCAMPO   TECHNIQUE: Vascular ultrasound of the bilateral lower extremities was performed. Real-time compression views as well as Gray scale, color Doppler and spectral Doppler waveform analysis was performed.   FINDINGS: Evaluation of the visualized portions of the bilateral common femoral vein, proximal, mid, and distal femoral vein, and popliteal vein were performed.   No evidence for DVT in the bilateral lower extremities.       No evidence for DVT in the bilateral lower extremities.   MACRO: None   Signed by: Solomon Tuttle 1/22/2024 2:33 PM Dictation workstation:   [id number]      Assessment/Plan   Coronavirus-positive PCR 1/22/2024  Complete AV block, s/p pacemaker    Discussed Remdesivir with patient and patients daughter-declined  Monitor oxygenation  Supportive care  Monitor temperature and wbc  Blood culture x2  Cardiology follow-up  Droplet plus precautions    Linn Alvarez, APRN-CNP

## 2024-01-23 NOTE — H&P
History Of Present Illness  Gorge Larsen is a 88 y.o. male presenting with generalized weakness.  Patient says that he got diagnosed with COVID over the weekend.  He was doing good but he just kept feeling weak he had just generalized malaise came to the emergency room today and tested positive for COVID again.  Feels short of breath has some cough no fever just feels malaise body ache.  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, oral cavity, and pharynx     History of malignant neoplasm of tonsil    Personal history of malignant neoplasm of prostate     History of malignant neoplasm of prostate    Personal history of other malignant neoplasm of large intestine     History of malignant neoplasm of colon   Atrial fibrillation, pacemaker, Ménière's disease, hearing loss, CABG x 3 vessels, colon cancer s/p partial colectomy, 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 He has never used smokeless tobacco. He reports that he does not drink alcohol and does not use drugs.  Quit smoking, quit heavy drinking, lives with his wife  Family History  Family History   Problem Relation Name Age of Onset    Heart attack Mother      Heart disease Mother      Other (Car accident) Father     Mother with coronary artery disease father  at 52 of MVA     Allergies  Codeine; Latex, natural rubber; and Lisinopril    Review of Systems   Constitutional:  Negative for activity change, appetite change, chills, diaphoresis, fatigue, fever and unexpected weight change.   HENT:  Negative for congestion, dental problem, drooling, ear discharge, ear pain, facial swelling, hearing loss, mouth sores, nosebleeds, postnasal drip, rhinorrhea, sinus pressure, sinus pain, sneezing, sore throat, tinnitus, trouble swallowing and voice change.    Eyes:  Negative for photophobia, pain, discharge, redness, itching and visual disturbance.   Respiratory:  Positive for cough and shortness of breath. Negative for apnea, choking, chest tightness, wheezing and stridor.    Cardiovascular:  Positive for leg swelling. Negative for chest pain and palpitations.   Gastrointestinal:  Negative for abdominal distention, abdominal pain, anal bleeding, blood in stool, constipation, diarrhea, nausea, rectal pain and vomiting.   Endocrine: Negative for cold intolerance, heat intolerance, polydipsia, polyphagia and polyuria.   Genitourinary:  Negative for decreased urine volume, difficulty urinating, dysuria, enuresis, flank pain, frequency, genital sores, hematuria and urgency.   Musculoskeletal:  Negative for arthralgias, back pain, gait problem, joint swelling, myalgias, neck pain and neck stiffness.   Skin:  Negative for color change, pallor, rash and wound.   Allergic/Immunologic: Negative for environmental allergies,  food allergies and immunocompromised state.   Neurological:  Negative for dizziness, tremors, seizures, syncope, facial asymmetry, speech difficulty, weakness, light-headedness, numbness and headaches.   Hematological:  Negative for adenopathy. Does not bruise/bleed easily.   Psychiatric/Behavioral:  Negative for agitation, behavioral problems, confusion, decreased concentration, dysphoric mood, hallucinations, self-injury, sleep disturbance and suicidal ideas. The patient is not nervous/anxious and is not hyperactive.         Physical Exam  Vitals reviewed.   Constitutional:       Appearance: Normal appearance.   HENT:      Head: Normocephalic and atraumatic.      Right Ear: Tympanic membrane, ear canal and external ear normal.      Left Ear: Tympanic membrane, ear canal and external ear normal.      Nose: Nose normal.      Mouth/Throat:      Pharynx: Oropharynx is clear.   Eyes:      Extraocular Movements: Extraocular movements intact.      Conjunctiva/sclera: Conjunctivae normal.      Pupils: Pupils are equal, round, and reactive to light.   Cardiovascular:      Rate and Rhythm: Normal rate and regular rhythm.      Pulses: Normal pulses.      Heart sounds: Normal heart sounds.   Pulmonary:      Effort: Pulmonary effort is normal.      Breath sounds: Rhonchi present.      Comments: Diminished air entry at bases  Abdominal:      General: Abdomen is flat. Bowel sounds are normal.      Palpations: Abdomen is soft.   Musculoskeletal:      Cervical back: Normal range of motion and neck supple.      Right lower leg: Edema present.      Left lower leg: Edema present.   Skin:     General: Skin is warm and dry.   Neurological:      General: No focal deficit present.      Mental Status: He is alert and oriented to person, place, and time.   Psychiatric:         Mood and Affect: Mood normal.          Last Recorded Vitals  Blood pressure 149/64, pulse 73, temperature 36.4 °C (97.5 °F), temperature source Oral, resp. rate 20,  "height 1.753 m (5' 9\"), weight 96.2 kg (212 lb 1.3 oz), SpO2 96 %.    Relevant Results        Scheduled medications  aspirin, 81 mg, oral, Daily  docusate sodium, 100 mg, oral, BID  levothyroxine, 75 mcg, oral, Daily  losartan, 25 mg, oral, Daily  melatonin, 3 mg, oral, Daily  metoprolol succinate XL, 25 mg, oral, Daily  [START ON 1/23/2024] pantoprazole, 40 mg, oral, Daily before breakfast   Or  [START ON 1/23/2024] pantoprazole, 40 mg, intravenous, Daily before breakfast  polyethylene glycol, 17 g, oral, Daily  rosuvastatin, 5 mg, oral, Nightly  sertraline, 25 mg, oral, Daily  warfarin, 5 mg, oral, Daily      Continuous medications     PRN medications  PRN medications: acetaminophen **OR** acetaminophen **OR** acetaminophen, benzocaine-menthol, dextromethorphan-guaifenesin, guaiFENesin, ondansetron ODT **OR** ondansetron  Results for orders placed or performed during the hospital encounter of 01/22/24 (from the past 24 hour(s))   ECG 12 lead   Result Value Ref Range    Ventricular Rate 70 BPM    Atrial Rate 65 BPM    QRS Duration 220 ms    QT Interval 504 ms    QTC Calculation(Bazett) 544 ms    R Axis -65 degrees    T Axis 109 degrees    QRS Count 12 beats    Q Onset 183 ms    T Offset 435 ms    QTC Fredericia 530 ms   Sars-CoV-2 and Influenza A/B PCR   Result Value Ref Range    Flu A Result Not Detected Not Detected    Flu B Result Not Detected Not Detected    Coronavirus 2019, PCR Detected (A) Not Detected   NT Pro-BNP   Result Value Ref Range    PROBNP 7,972 (H) 0 - 852 pg/mL   CBC and Auto Differential   Result Value Ref Range    WBC 7.2 4.4 - 11.3 x10*3/uL    nRBC 0.0 0.0 - 0.0 /100 WBCs    RBC 3.73 (L) 4.50 - 5.90 x10*6/uL    Hemoglobin 10.7 (L) 13.5 - 17.5 g/dL    Hematocrit 33.3 (L) 41.0 - 52.0 %    MCV 89 80 - 100 fL    MCH 28.7 26.0 - 34.0 pg    MCHC 32.1 32.0 - 36.0 g/dL    RDW 16.0 (H) 11.5 - 14.5 %    Platelets 230 150 - 450 x10*3/uL    Neutrophils % 78.8 40.0 - 80.0 %    Immature Granulocytes %, " Automated 0.6 0.0 - 0.9 %    Lymphocytes % 4.9 13.0 - 44.0 %    Monocytes % 13.2 2.0 - 10.0 %    Eosinophils % 1.4 0.0 - 6.0 %    Basophils % 1.1 0.0 - 2.0 %    Neutrophils Absolute 5.68 (H) 1.60 - 5.50 x10*3/uL    Immature Granulocytes Absolute, Automated 0.04 0.00 - 0.50 x10*3/uL    Lymphocytes Absolute 0.35 (L) 0.80 - 3.00 x10*3/uL    Monocytes Absolute 0.95 (H) 0.05 - 0.80 x10*3/uL    Eosinophils Absolute 0.10 0.00 - 0.40 x10*3/uL    Basophils Absolute 0.08 0.00 - 0.10 x10*3/uL   Comprehensive metabolic panel   Result Value Ref Range    Glucose 128 (H) 65 - 99 mg/dL    Sodium 128 (L) 133 - 145 mmol/L    Potassium 4.1 3.4 - 5.1 mmol/L    Chloride 94 (L) 97 - 107 mmol/L    Bicarbonate 20 (L) 24 - 31 mmol/L    Urea Nitrogen 21 8 - 25 mg/dL    Creatinine 1.10 0.40 - 1.60 mg/dL    eGFR 65 >60 mL/min/1.73m*2    Calcium 8.8 8.5 - 10.4 mg/dL    Albumin 3.7 3.5 - 5.0 g/dL    Alkaline Phosphatase 113 35 - 125 U/L    Total Protein 7.3 5.9 - 7.9 g/dL    AST 21 5 - 40 U/L    Bilirubin, Total 1.0 0.1 - 1.2 mg/dL    ALT 10 5 - 40 U/L    Anion Gap 14 <=19 mmol/L   Protime-INR   Result Value Ref Range    Protime 27.1 (H) 9.3 - 12.7 seconds    INR 2.8 (H) 0.9 - 1.2   Serial Troponin, Initial ([facility])   Result Value Ref Range    Troponin T, High Sensitivity 23 (HH) <=14 ng/L   Serial Troponin, 2 Hour ([facility])   Result Value Ref Range    Troponin T, High Sensitivity 23 (HH) <=14 ng/L   Serial Troponin, 6 Hour ([facility])   Result Value Ref Range    Troponin T, High Sensitivity 27 (HH) <=14 ng/L     XR chest 1 view    Result Date: 1/22/2024  Interpreted By:  Solomon Tuttle, STUDY: XR CHEST 1 VIEW; 1/22/2024 2:21 pm   INDICATION: Signs/Symptoms:dyspnea.   COMPARISON: 09/15/2023   ACCESSION NUMBER(S): [id number]   ORDERING CLINICIAN: ALBAN OCAMPO   TECHNIQUE: 1 view of the chest was performed.   FINDINGS: Left chest wall pacemaker and leads appear intact. The lungs are adequately inflated. No acute consolidation. No pneumothorax. Mild  blunting of the right costophrenic angle may reflect a small effusion. There may be a minimal left pleural effusion as well. Cardiomediastinal silhouette is mild-to-moderately enlarged.. Median sternotomy wires are intact.       Mild-moderate cardiomegaly. Congestive changes without overt pulmonary edema. Possible small pleural effusions. No acute consolidation.   Signed by: Solomon Tuttle 1/22/2024 2:36 PM Dictation workstation:   [id number]    Vascular US lower extremity venous duplex bilateral    Result Date: 1/22/2024  Interpreted By:  Solomon Tuttle, STUDY: [facility] US LOWER EXTREMITY VENOUS DUPLEX BILATERAL;  1/22/2024 2:11 pm   INDICATION: Signs/Symptoms:atraumatic erythema/edema in the bilateral lower extremities, concern for DVT   COMPARISON: None.   ACCESSION NUMBER(S): [id number]   ORDERING CLINICIAN: ALBAN OCAMPO   TECHNIQUE: Vascular ultrasound of the bilateral lower extremities was performed. Real-time compression views as well as Gray scale, color Doppler and spectral Doppler waveform analysis was performed.   FINDINGS: Evaluation of the visualized portions of the bilateral common femoral vein, proximal, mid, and distal femoral vein, and popliteal vein were performed.   No evidence for DVT in the bilateral lower extremities.       No evidence for DVT in the bilateral lower extremities.   MACRO: None   Signed by: Solomon Tuttle 1/22/2024 2:33 PM Dictation workstation:   [id number]    ECG 12 lead    Result Date: 1/22/2024   Poor data quality, interpretation may be adversely affected Ventricular-paced rhythm Abnormal ECG When compared with ECG of 06-OCT-2022 16:59, No significant change was found        Assessment/Plan   Principal Problem:    General symptom  Active Problems:    Anxiety    Apnea, sleep    Benign essential HTN    Chronic atrial fibrillation (CMS/HCC)    Congestive heart failure (CMS/HCC)    Hyponatremia    Hypothyroidism    S/P TAVR (transcatheter aortic valve replacement)     Dyslipidemia    Cardiomyopathy (CMS/HCC)    COVID-19      Will consult ID to see if patient will benefit from remdesivir  Clinically he does not have much respiratory symptoms just generalized weakness  Will consult cardiology regarding congestive heart failure with increased swelling in the legs and congestion seen on chest x-ray  Patient's sodium was low again.  He had low sodium in the past SSRIs were discontinued.  But once his congestive heart failure was better controlled family decided to go back on Zoloft as patient was struggling with anxiety and depression.  Until today his sodium has been stable  Will put patient on fluid restriction  Malaise could be just related to COVID  Continue isolation  Continue other home medications  DVT prophylaxis  See orders for details     I spent  minutes in the professional and overall care of this patient.      Natasha Antoine MD

## 2024-01-23 NOTE — PROGRESS NOTES
01/23/24 1517   Discharge Planning   Support Systems Spouse/significant other   Assistance Needed Walker at home   Type of Residence Private residence   Home or Post Acute Services Post acute facilities (Rehab/SNF/etc)   Type of Post Acute Facility Services Rehab   Does the patient need discharge transport arranged? Yes   RoundTrip coordination needed? Yes   Has discharge transport been arranged? No   Financial Resource Strain   How hard is it for you to pay for the very basics like food, housing, medical care, and heating? Not hard   Housing Stability   In the last 12 months, was there a time when you were not able to pay the mortgage or rent on time? N   In the last 12 months, how many places have you lived? 1   In the last 12 months, was there a time when you did not have a steady place to sleep or slept in a shelter (including now)? N   Transportation Needs   In the past 12 months, has lack of transportation kept you from medical appointments or from getting medications? no   In the past 12 months, has lack of transportation kept you from meetings, work, or from getting things needed for daily living? No

## 2024-01-23 NOTE — PROGRESS NOTES
"Gorge Larsen is a 88 y.o. male on day 0 of admission presenting with General symptom.    Subjective   Pt's PTOT recommendation is low intensity rehab post discharge. Pt lives with his spouse, and utilizes a walker to assist with ambulating. TCSW will inquire with pt if he is agreeable to in home PTOT. If pt is agreeable, Dr Antoine will order internal or external HC orders.        Objective     Physical Exam    Last Recorded Vitals  Blood pressure 137/68, pulse 70, temperature 36.8 °C (98.2 °F), temperature source Oral, resp. rate 18, height 1.753 m (5' 9\"), weight 96.2 kg (212 lb 1.3 oz), SpO2 98 %.  Intake/Output last 3 Shifts:  I/O last 3 completed shifts:  In: - (0 mL/kg)   Out: 750 (7.8 mL/kg) [Urine:750 (0.2 mL/kg/hr)]  Weight: 96.2 kg     Relevant Results                             Assessment/Plan   Principal Problem:    General symptom  Active Problems:    Anxiety    Apnea, sleep    Benign essential HTN    Chronic atrial fibrillation (CMS/HCC)    Congestive heart failure (CMS/HCC)    Hyponatremia    Hypothyroidism    S/P TAVR (transcatheter aortic valve replacement)    Dyslipidemia    Cardiomyopathy (CMS/HCC)    COVID-19               MAGY Rosa      "

## 2024-01-24 ENCOUNTER — APPOINTMENT (OUTPATIENT)
Dept: CARDIOLOGY | Facility: HOSPITAL | Age: 89
DRG: 291 | End: 2024-01-24
Payer: MEDICARE

## 2024-01-24 PROBLEM — R53.1 GENERALIZED WEAKNESS: Status: ACTIVE | Noted: 2024-01-24

## 2024-01-24 LAB
ANION GAP SERPL CALC-SCNC: 10 MMOL/L
BUN SERPL-MCNC: 23 MG/DL (ref 8–25)
CALCIUM SERPL-MCNC: 8.5 MG/DL (ref 8.5–10.4)
CHLORIDE SERPL-SCNC: 96 MMOL/L (ref 97–107)
CO2 SERPL-SCNC: 25 MMOL/L (ref 24–31)
CREAT SERPL-MCNC: 1.1 MG/DL (ref 0.4–1.6)
EGFRCR SERPLBLD CKD-EPI 2021: 65 ML/MIN/1.73M*2
ERYTHROCYTE [DISTWIDTH] IN BLOOD BY AUTOMATED COUNT: 15.9 % (ref 11.5–14.5)
GLUCOSE SERPL-MCNC: 99 MG/DL (ref 65–99)
HCT VFR BLD AUTO: 33.2 % (ref 41–52)
HGB BLD-MCNC: 10.6 G/DL (ref 13.5–17.5)
INR PPP: 3.1 (ref 0.9–1.2)
MCH RBC QN AUTO: 28.5 PG (ref 26–34)
MCHC RBC AUTO-ENTMCNC: 31.9 G/DL (ref 32–36)
MCV RBC AUTO: 89 FL (ref 80–100)
NRBC BLD-RTO: 0 /100 WBCS (ref 0–0)
PLATELET # BLD AUTO: 229 X10*3/UL (ref 150–450)
POTASSIUM SERPL-SCNC: 3.2 MMOL/L (ref 3.4–5.1)
PROTHROMBIN TIME: 30 SECONDS (ref 9.3–12.7)
RBC # BLD AUTO: 3.72 X10*6/UL (ref 4.5–5.9)
SODIUM SERPL-SCNC: 131 MMOL/L (ref 133–145)
WBC # BLD AUTO: 6.1 X10*3/UL (ref 4.4–11.3)

## 2024-01-24 PROCEDURE — 99232 SBSQ HOSP IP/OBS MODERATE 35: CPT | Performed by: INTERNAL MEDICINE

## 2024-01-24 PROCEDURE — 85027 COMPLETE CBC AUTOMATED: CPT | Performed by: INTERNAL MEDICINE

## 2024-01-24 PROCEDURE — 80048 BASIC METABOLIC PNL TOTAL CA: CPT | Performed by: INTERNAL MEDICINE

## 2024-01-24 PROCEDURE — 36415 COLL VENOUS BLD VENIPUNCTURE: CPT | Performed by: INTERNAL MEDICINE

## 2024-01-24 PROCEDURE — 99221 1ST HOSP IP/OBS SF/LOW 40: CPT

## 2024-01-24 PROCEDURE — 97110 THERAPEUTIC EXERCISES: CPT | Mod: GP,CQ

## 2024-01-24 PROCEDURE — 97116 GAIT TRAINING THERAPY: CPT | Mod: GP,CQ

## 2024-01-24 PROCEDURE — 93005 ELECTROCARDIOGRAM TRACING: CPT

## 2024-01-24 PROCEDURE — 2500000004 HC RX 250 GENERAL PHARMACY W/ HCPCS (ALT 636 FOR OP/ED): Performed by: INTERNAL MEDICINE

## 2024-01-24 PROCEDURE — 1200000002 HC GENERAL ROOM WITH TELEMETRY DAILY

## 2024-01-24 PROCEDURE — 2500000001 HC RX 250 WO HCPCS SELF ADMINISTERED DRUGS (ALT 637 FOR MEDICARE OP): Performed by: INTERNAL MEDICINE

## 2024-01-24 PROCEDURE — 85610 PROTHROMBIN TIME: CPT | Performed by: INTERNAL MEDICINE

## 2024-01-24 RX ORDER — LOSARTAN POTASSIUM 50 MG/1
50 TABLET ORAL 2 TIMES DAILY
Status: DISCONTINUED | OUTPATIENT
Start: 2024-01-24 | End: 2024-01-25 | Stop reason: HOSPADM

## 2024-01-24 RX ORDER — POTASSIUM CHLORIDE 20 MEQ/1
40 TABLET, EXTENDED RELEASE ORAL ONCE
Status: DISCONTINUED | OUTPATIENT
Start: 2024-01-24 | End: 2024-01-24

## 2024-01-24 RX ORDER — FUROSEMIDE 40 MG/1
40 TABLET ORAL
Status: DISCONTINUED | OUTPATIENT
Start: 2024-01-24 | End: 2024-01-25

## 2024-01-24 RX ORDER — CARVEDILOL 12.5 MG/1
12.5 TABLET ORAL
Status: DISCONTINUED | OUTPATIENT
Start: 2024-01-24 | End: 2024-01-25 | Stop reason: HOSPADM

## 2024-01-24 RX ORDER — POTASSIUM CHLORIDE 1.5 G/1.58G
20 POWDER, FOR SOLUTION ORAL 2 TIMES DAILY
Status: DISCONTINUED | OUTPATIENT
Start: 2024-01-24 | End: 2024-01-25

## 2024-01-24 RX ADMIN — CARVEDILOL 12.5 MG: 12.5 TABLET, FILM COATED ORAL at 11:42

## 2024-01-24 RX ADMIN — FUROSEMIDE 40 MG: 40 TABLET ORAL at 18:20

## 2024-01-24 RX ADMIN — DOCUSATE SODIUM 100 MG: 100 CAPSULE, LIQUID FILLED ORAL at 20:43

## 2024-01-24 RX ADMIN — Medication 3 MG: at 20:43

## 2024-01-24 RX ADMIN — LEVOTHYROXINE SODIUM 75 MCG: 0.07 TABLET ORAL at 09:59

## 2024-01-24 RX ADMIN — FUROSEMIDE 40 MG: 40 TABLET ORAL at 10:03

## 2024-01-24 RX ADMIN — ASPIRIN 81 MG: 81 TABLET, COATED ORAL at 10:00

## 2024-01-24 RX ADMIN — PANTOPRAZOLE SODIUM 40 MG: 40 TABLET, DELAYED RELEASE ORAL at 06:26

## 2024-01-24 RX ADMIN — POLYETHYLENE GLYCOL 3350 17 G: 17 POWDER, FOR SOLUTION ORAL at 09:59

## 2024-01-24 RX ADMIN — ROSUVASTATIN CALCIUM 5 MG: 10 TABLET, COATED ORAL at 20:43

## 2024-01-24 RX ADMIN — CARVEDILOL 12.5 MG: 12.5 TABLET, FILM COATED ORAL at 18:20

## 2024-01-24 RX ADMIN — DOCUSATE SODIUM 100 MG: 100 CAPSULE, LIQUID FILLED ORAL at 09:58

## 2024-01-24 RX ADMIN — POTASSIUM CHLORIDE 20 MEQ: 1.5 FOR SOLUTION ORAL at 10:00

## 2024-01-24 RX ADMIN — LOSARTAN POTASSIUM 50 MG: 50 TABLET, FILM COATED ORAL at 09:59

## 2024-01-24 RX ADMIN — POTASSIUM CHLORIDE 20 MEQ: 1.5 FOR SOLUTION ORAL at 20:43

## 2024-01-24 RX ADMIN — LOSARTAN POTASSIUM 50 MG: 50 TABLET, FILM COATED ORAL at 20:43

## 2024-01-24 ASSESSMENT — COGNITIVE AND FUNCTIONAL STATUS - GENERAL
WALKING IN HOSPITAL ROOM: A LITTLE
MOBILITY SCORE: 18
MOVING TO AND FROM BED TO CHAIR: A LITTLE
WALKING IN HOSPITAL ROOM: A LITTLE
TOILETING: A LITTLE
TURNING FROM BACK TO SIDE WHILE IN FLAT BAD: A LITTLE
DRESSING REGULAR UPPER BODY CLOTHING: A LITTLE
DAILY ACTIVITIY SCORE: 19
CLIMB 3 TO 5 STEPS WITH RAILING: A LITTLE
TURNING FROM BACK TO SIDE WHILE IN FLAT BAD: A LITTLE
HELP NEEDED FOR BATHING: A LITTLE
PERSONAL GROOMING: A LITTLE
MOVING FROM LYING ON BACK TO SITTING ON SIDE OF FLAT BED WITH BEDRAILS: A LITTLE
STANDING UP FROM CHAIR USING ARMS: A LITTLE
STANDING UP FROM CHAIR USING ARMS: A LITTLE
MOVING FROM LYING ON BACK TO SITTING ON SIDE OF FLAT BED WITH BEDRAILS: A LITTLE
MOBILITY SCORE: 18
DRESSING REGULAR LOWER BODY CLOTHING: A LITTLE
CLIMB 3 TO 5 STEPS WITH RAILING: A LITTLE
MOVING TO AND FROM BED TO CHAIR: A LITTLE

## 2024-01-24 ASSESSMENT — PAIN - FUNCTIONAL ASSESSMENT
PAIN_FUNCTIONAL_ASSESSMENT: 0-10
PAIN_FUNCTIONAL_ASSESSMENT: 0-10

## 2024-01-24 ASSESSMENT — PAIN SCALES - GENERAL
PAINLEVEL_OUTOF10: 0 - NO PAIN
PAINLEVEL_OUTOF10: 0 - NO PAIN

## 2024-01-24 ASSESSMENT — ENCOUNTER SYMPTOMS: WEAKNESS: 1

## 2024-01-24 NOTE — CARE PLAN
The patient's goals for the shift include      The clinical goals for the shift include safety      Problem: Chronic Conditions and Co-morbidities  Goal: Patient's chronic conditions and co-morbidity symptoms are monitored and maintained or improved  Outcome: Progressing     Problem: Safety - Adult  Goal: Free from fall injury  Outcome: Progressing     Problem: Pain - Adult  Goal: Verbalizes/displays adequate comfort level or baseline comfort level  Outcome: Progressing

## 2024-01-24 NOTE — PROGRESS NOTES
Gorge Larsen is a 88 y.o. male on day 0 of admission presenting with General symptom.    Subjective   Interval History:   Room not entered-limit exposure  Patient discussed with nurse  No significant interval events  Remains on room air  Patient observed through glass window-resting comfortably    Review of Systems    Objective   Range of Vitals (last 24 hours)  Heart Rate:  [69-90]   Temp:  [36.9 °C (98.4 °F)-37 °C (98.6 °F)]   Resp:  [17-18]   BP: (135-160)/(59-79)   SpO2:  [95 %-98 %]   Daily Weight  01/22/24 : 96.2 kg (212 lb 1.3 oz)    Body mass index is 31.32 kg/m².    Physical Exam  Resting comfortably    Antibiotics  aspirin tablet 325 mg  furosemide (Lasix) injection 40 mg  aspirin EC tablet 81 mg  levothyroxine (Synthroid, Levoxyl) tablet 75 mcg  losartan (Cozaar) tablet 25 mg  metoprolol succinate XL (Toprol-XL) 24 hr tablet 25 mg  rosuvastatin (Crestor) tablet 5 mg  sertraline (Zoloft) tablet 25 mg  warfarin (Coumadin) tablet 5 mg  acetaminophen (Tylenol) tablet 650 mg  acetaminophen (Tylenol) oral liquid 650 mg  acetaminophen (Tylenol) suppository 650 mg  melatonin tablet 3 mg  polyethylene glycol (Glycolax, Miralax) packet 17 g  docusate sodium (Colace) capsule 100 mg  benzocaine-menthol (Cepastat Sore Throat) 15-3.6 mg lozenge 1 lozenge  dextromethorphan-guaifenesin (Robitussin DM)  mg/5 mL oral liquid 5 mL  guaiFENesin (Mucinex) 12 hr tablet 600 mg  ondansetron ODT (Zofran-ODT) disintegrating tablet 4 mg  ondansetron (Zofran) injection 4 mg  pantoprazole (ProtoNix) EC tablet 40 mg  pantoprazole (ProtoNix) injection 40 mg  furosemide (Lasix) injection 20 mg  losartan (Cozaar) tablet 25 mg  furosemide (Lasix) tablet 40 mg  carvedilol (Coreg) tablet 6.25 mg  potassium chloride CR (Klor-Con M20) ER tablet 40 mEq  carvedilol (Coreg) tablet 12.5 mg  losartan (Cozaar) tablet 50 mg  furosemide (Lasix) tablet 40 mg  potassium chloride (Klor-Con) packet 20 mEq      Relevant Results  Labs  Results  "from last 72 hours   Lab Units 01/24/24  0421 01/23/24  0420 01/22/24  1332   WBC AUTO x10*3/uL 6.1 7.4 7.2   HEMOGLOBIN g/dL 10.6* 10.8* 10.7*   HEMATOCRIT % 33.2* 33.4* 33.3*   PLATELETS AUTO x10*3/uL 229 223 230   NEUTROS PCT AUTO %  --   --  78.8   LYMPHS PCT AUTO %  --   --  4.9   MONOS PCT AUTO %  --   --  13.2   EOS PCT AUTO %  --   --  1.4     Results from last 72 hours   Lab Units 01/24/24  0421 01/23/24  0420 01/22/24  1332   SODIUM mmol/L 131* 129* 128*   POTASSIUM mmol/L 3.2* 3.6 4.1   CHLORIDE mmol/L 96* 95* 94*   CO2 mmol/L 25 21* 20*   BUN mg/dL 23 23 21   CREATININE mg/dL 1.10 1.10 1.10   GLUCOSE mg/dL 99 100* 128*   CALCIUM mg/dL 8.5 8.8 8.8   ANION GAP mmol/L 10 13 14   EGFR mL/min/1.73m*2 65 65 65     Results from last 72 hours   Lab Units 01/23/24  0420 01/22/24  1332   ALK PHOS U/L 113 113   BILIRUBIN TOTAL mg/dL 0.9 1.0   PROTEIN TOTAL g/dL 7.2 7.3   ALT U/L 9 10   AST U/L 21 21   ALBUMIN g/dL 3.6 3.7     Estimated Creatinine Clearance: 55.2 mL/min (by C-G formula based on SCr of 1.1 mg/dL).  No results found for: \"CRP\"  Microbiology    Imaging  ECG 12 lead    Result Date: 1/23/2024   Poor data quality, interpretation may be adversely affected Ventricular-paced rhythm Abnormal ECG When compared with ECG of 06-OCT-2022 16:59, No significant change was found Confirmed by Tay Cr ([id number]) on 1/23/2024 1:28:19 PM    XR chest 1 view    Result Date: 1/22/2024  Interpreted By:  Solomon Tuttle, STUDY: XR CHEST 1 VIEW; 1/22/2024 2:21 pm   INDICATION: Signs/Symptoms:dyspnea.   COMPARISON: 09/15/2023   ACCESSION NUMBER(S): [id number]   ORDERING CLINICIAN: ALBAN OCAMPO   TECHNIQUE: 1 view of the chest was performed.   FINDINGS: Left chest wall pacemaker and leads appear intact. The lungs are adequately inflated. No acute consolidation. No pneumothorax. Mild blunting of the right costophrenic angle may reflect a small effusion. There may be a minimal left pleural effusion as well. Cardiomediastinal " silhouette is mild-to-moderately enlarged.. Median sternotomy wires are intact.       Mild-moderate cardiomegaly. Congestive changes without overt pulmonary edema. Possible small pleural effusions. No acute consolidation.   Signed by: Solomon Tuttle 1/22/2024 2:36 PM Dictation workstation:   [id number]    Vascular US lower extremity venous duplex bilateral    Result Date: 1/22/2024  Interpreted By:  Solomon Tuttle, STUDY: [facility] US LOWER EXTREMITY VENOUS DUPLEX BILATERAL;  1/22/2024 2:11 pm   INDICATION: Signs/Symptoms:atraumatic erythema/edema in the bilateral lower extremities, concern for DVT   COMPARISON: None.   ACCESSION NUMBER(S): [id number]   ORDERING CLINICIAN: ALBAN OCAMPO   TECHNIQUE: Vascular ultrasound of the bilateral lower extremities was performed. Real-time compression views as well as Gray scale, color Doppler and spectral Doppler waveform analysis was performed.   FINDINGS: Evaluation of the visualized portions of the bilateral common femoral vein, proximal, mid, and distal femoral vein, and popliteal vein were performed.   No evidence for DVT in the bilateral lower extremities.       No evidence for DVT in the bilateral lower extremities.   MACRO: None   Signed by: Solomon Tuttle 1/22/2024 2:33 PM Dictation workstation:   [id number]     Assessment/Plan   Coronavirus-positive PCR 1/22/2024  Complete AV block, s/p pacemaker     Discussed Remdesivir with patient and patient's daughter-declined  Monitor oxygenation  Supportive care  Monitor temperature and wbc  Follow-up blood cultures  Cardiology follow-up  Droplet plus precautions      Jeff Russell MD

## 2024-01-24 NOTE — PROGRESS NOTES
"Gorge Larsen is a 88 y.o. male on day 0 of admission presenting with General symptom.    Subjective   Pt is likely to discharge home tomorrow with [facility].        Objective     Physical Exam    Last Recorded Vitals  Blood pressure 155/60, pulse 90, temperature 37 °C (98.6 °F), temperature source Oral, resp. rate 18, height 1.753 m (5' 9\"), weight 96.2 kg (212 lb 1.3 oz), SpO2 95 %.  Intake/Output last 3 Shifts:  I/O last 3 completed shifts:  In: 1250 (13 mL/kg) [P.O.:1250]  Out: 750 (7.8 mL/kg) [Urine:750 (0.2 mL/kg/hr)]  Weight: 96.2 kg     Relevant Results                             Assessment/Plan   Principal Problem:    General symptom  Active Problems:    Anxiety    Apnea, sleep    Benign essential HTN    Chronic atrial fibrillation (CMS/HCC)    Congestive heart failure (CMS/HCC)    Hyponatremia    Hypothyroidism    S/P TAVR (transcatheter aortic valve replacement)    Dyslipidemia    Cardiomyopathy (CMS/HCC)    COVID-19    Generalized weakness               Kal Vu, CASIMIROW      "

## 2024-01-24 NOTE — CONSULTS
Inpatient consult to Cardiology  Consult performed by: NIKOLAY Bill-CNP  Consult ordered by: Jeremy Clarke MD  Reason for consult: Upgrade to BiV ICD      History Of Present Illness:    Gorge Larsen is a 88 y.o. 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  Patient presented to the emergency department with dyspnea and generalized weakness.  Patient was found to be COVID-positive.  proBNP was found to be elevated at 7972.  Chest x-ray revealed mild-moderate cardiomegaly. Congestive changes without overt pulmonary edema. Possible small pleural effusions. No acute consolidation.  Patient was found to have increased lower extremity edema, ultrasound of lower extremities negative for DVT.  Infectious disease was consulted for possible remdesivir however, patient declined.  Oral Lasix was initiated with significant improvement in lower extremity edema.  Patient is nearing the elective replacement indication window with his pacemaker.  Most recent echocardiogram revealed a severely decreased left ventricular systolic function.  Patient does RV paced 98% of the time. Electrophysiology has been consulted for possible upgrade to a biventricular implantable cardiac defibrillator.     Last Recorded Vitals:  Vitals:    01/23/24 0829 01/23/24 1500 01/23/24 2008 01/24/24 0736   BP: 137/68 160/79 135/59 155/60   BP Location: Left arm Right arm Right arm Left arm   Patient Position: Sitting Sitting Lying Sitting   Pulse: 70 69 69 90   Resp: 18 17 17 18   Temp: 36.8 °C (98.2 °F) 36.9 °C (98.4 °F) 37 °C (98.6 °F) 37 °C (98.6 °F)   TempSrc: Oral Oral Oral Oral   SpO2: 98% 98% 96% 95%   Weight:       Height:           Last Labs:  CBC - 1/24/2024:  4:21 AM  6.1 10.6 229    33.2       CMP - 1/24/2024:  4:21 AM  8.5 7.2 21 --- 0.9   _ 3.6 9 113      PTT - 9/15/2023: 12:52 AM  3.1   30.0 37.2     Troponin I   Date/Time Value Ref Range Status   09/03/2022 02:21 AM 34 0 - 53 ng/L Final     Comment:     .  Less than 99th percentile of normal range cutoff-  Female and children under 18 years old <35 ng/L; Male <54 ng/L: Negative  Repeat testing should be performed if clinically indicated.   .  Female and children under 18 years old  ng/L; Male  ng/L:  Consistent with possible cardiac damage and possible increased clinical   risk. Serial measurements may help to assess extent of myocardial damage.   .  >120 ng/L: Consistent with cardiac damage, increased clinical risk and  myocardial infarction. Serial measurements may help assess extent of   myocardial damage.   .   NOTE: Children less than 1 year old may have higher baseline troponin   levels and results should be interpreted in conjunction with the overall   clinical context.  .  NOTE: Troponin I testing is performed using a different   testing methodology at [facility] than at other   [facility]. Direct result comparisons should only   be made within the same method.     08/08/2022 04:51 PM 21 (H) 0 - 20 ng/L Final     Comment:     .  Less than 99th percentile of normal range cutoff-  Female and children under 18 years old <14 ng/L; Male <21 ng/L: Negative  Repeat testing should be performed if clinically indicated.   .  Female and children under 18 years old 14-50 ng/L; Male 21-50 ng/L:  Consistent with possible cardiac damage and possible increased clinical   risk. Serial measurements may help to assess extent of myocardial damage.   .  >50 ng/L: Consistent with cardiac damage, increased clinical risk and  myocardial infarction. Serial measurements may help assess extent of   myocardial damage.   .   NOTE: Children less than 1 year old may have higher baseline troponin   levels and results should be interpreted  in conjunction with the overall   clinical context.   .  NOTE: Troponin I testing is performed using a different   testing methodology at [facility] than at other   system hospitals. Direct result comparisons should only   be made within the same method.     08/08/2022 03:37 PM 24 (H) 0 - 20 ng/L Final     Comment:     .  Less than 99th percentile of normal range cutoff-  Female and children under 18 years old <14 ng/L; Male <21 ng/L: Negative  Repeat testing should be performed if clinically indicated.   .  Female and children under 18 years old 14-50 ng/L; Male 21-50 ng/L:  Consistent with possible cardiac damage and possible increased clinical   risk. Serial measurements may help to assess extent of myocardial damage.   .  >50 ng/L: Consistent with cardiac damage, increased clinical risk and  myocardial infarction. Serial measurements may help assess extent of   myocardial damage.   .   NOTE: Children less than 1 year old may have higher baseline troponin   levels and results should be interpreted in conjunction with the overall   clinical context.   .  NOTE: Troponin I testing is performed using a different   testing methodology at [facility] than at other   [facility] hospitals. Direct result comparisons should only   be made within the same method.       BNP   Date/Time Value Ref Range Status   09/03/2022 02:21  (H) 0 - 99 pg/mL Final     Comment:     .  <100 pg/mL - Heart failure unlikely  100-299 pg/mL - Intermediate probability of acute heart  .               failure exacerbation. Correlate with clinical  .               context and patient history.    >=300 pg/mL - Heart Failure likely. Correlate with clinical  .               context and patient history.   Biotin interference may cause falsely decreased results.   Patients taking a Biotin dose of up to 5 mg/day should   refrain from taking Biotin for 24 hours before sample   collection. Providers may contact their local  laboratory   for further information.     08/08/2022 03:37  (H) 0 - 99 pg/mL Final     Comment:     .  <100 pg/mL - Heart failure unlikely  100-299 pg/mL - Intermediate probability of acute heart  .               failure exacerbation. Correlate with clinical  .               context and patient history.    >=300 pg/mL - Heart Failure likely. Correlate with clinical  .               context and patient history.  BNP testing is performed using different testing   methodology at [facility] than at other   [facility] hospitals. Direct result comparisons should   only be made within the same method.       Hemoglobin A1C   Date/Time Value Ref Range Status   12/11/2020 11:24 AM 5.3 4.0 - 6.0 % Final     Comment:     Hemoglobin A1C levels are related to mean blood glucose during the   preceding 2-3 months. The relationship table below may be used as a   general guide. Each 1% increase in HGB A1C is a reflection of an   increase in mean glucose of approximately 30 mg/dl.   Reference: Diabetes Care, volume 29, supplement 1 Jan. 2006                        HGB A1C ................. Approx. Mean Glucose   _______________________________________________   6%   ...............................  120 mg/dl   7%   ...............................  150 mg/dl   8%   ...............................  180 mg/dl   9%   ...............................  210 mg/dl   10%  ...............................  240 mg/dl  Performed at [address] [address]     07/15/2019 09:39 AM 5.9 4.0 - 6.0 % Final     Comment:     Hemoglobin A1C levels are related to mean blood glucose during the   preceding 2-3 months. The relationship table below may be used as a   general guide. Each 1% increase in HGB A1C is a reflection of an   increase in mean glucose of approximately 30 mg/dl.   Reference: Diabetes Care, volume 29, supplement 1 Jan. 2006                        HGB A1C ................. Approx. Mean Glucose    _______________________________________________   6%   ...............................  120 mg/dl   7%   ...............................  150 mg/dl   8%   ...............................  180 mg/dl   9%   ...............................  210 mg/dl   10%  ...............................  240 mg/dl  Performed at [address] [address]       LDL Calculated   Date/Time Value Ref Range Status   01/08/2024 08:52 AM 43 <=99 mg/dL Final     Comment:                                 Near   Borderline      AGE      Desirable  Optimal    High     High     Very High     0-19 Y     0 - 109     ---    110-129   >/= 130     ----    20-24 Y     0 - 119     ---    120-159   >/= 160     ----      >24 Y     0 -  99   100-129  130-159   160-189     >/=190     10/13/2022 05:28 AM 58 (L) 65 - 130 MG/DL Final   09/01/2022 04:42 AM 90 65 - 130 MG/DL Final   07/22/2022 09:36 AM 98 65 - 130 MG/DL Final     VLDL   Date/Time Value Ref Range Status   01/08/2024 08:52 AM 12 0 - 40 mg/dL Final   06/30/2023 08:49 AM 15 0 - 40 mg/dL Final   03/15/2023 08:57 AM 18 0 - 40 mg/dL Final   11/30/2022 09:20 AM 12 0 - 40 mg/dL Final      Last I/O:  I/O last 3 completed shifts:  In: 1250 (13 mL/kg) [P.O.:1250]  Out: 750 (7.8 mL/kg) [Urine:750 (0.2 mL/kg/hr)]  Weight: 96.2 kg     Past Cardiology Tests (Last 3 Years):  EKG:  ECG 12 lead 01/22/2024    Echo:  Transthoracic Echo (TTE) Complete 10/19/2023  Results for orders placed during the hospital encounter of 10/19/23    Transthoracic Echo (TTE) Complete    [facility], [address] [place] [address]  Tel [phone] and Fax [phone]    TRANSTHORACIC ECHOCARDIOGRAM REPORT      Patient Name:      JOSIE Haider Physician:    [id number] Jeremy Clarke MD  Study Date:        10/19/2023           Ordering Provider:    [id number] MARITZA TREVIÑO  MRN/PID:           [id number]             Fellow:  Accession#:        [id number]         Nurse:                 Carly Mckoy RN  Date of Birth/Age: 1935 / 87      Sonographer:          Lois ramírez                                      [facility]  Gender:            M                    Additional Staff:  Height:            172.72 cm            Admit Date:  Weight:            92.08 kg             Admission Status:     Outpatient  BSA:               2.06 m2              Encounter#:           [id number]  Department Location:  [facility]  Blood Pressure: 132 /64 mmHg    Study Type:    TRANSTHORACIC ECHO (TTE) COMPLETE  Diagnosis/ICD: Atherosclerotic heart disease of native coronary artery without  angina pectoris-I25.10  Indication:    CAD  CPT Code:      Echo Complete w Full Doppler-86859    Patient History:  Valve Disorders:   Aortic Valve Replacement.  Pacer/Defib:       Permanent pacemaker  Pertinent History: HTN, A-Fib, CHF, Cardiomyopathy, Dyspnea and s/p TAVR 34mm.    Study Detail: The following Echo studies were performed: 2D, M-Mode, Doppler and  color flow. Definity used as a contrast agent for endocardial  border definition. Total contrast used for this procedure was 2.0  mL via IV push.      PHYSICIAN INTERPRETATION:  Left Ventricle: The left ventricular systolic function is moderately to severely decreased, with an estimated ejection fraction of 35%. There is global hypokinesis of the left ventricle with minor regional variations. The left ventricular cavity size is upper limits of normal. There is mild concentric left ventricular hypertrophy. Abnormal (paradoxical) septal motion, consistent with RV pacemaker. Spectral Doppler shows a normal pattern of left ventricular diastolic filling. There is moderate hypokinesis and dyssynchrony of the anteroseptal wall.  Left Atrium: The left atrium is moderately dilated.  Right Ventricle: The right ventricle is normal in size. There is normal right ventriclar wall thickness. There is normal right ventricular global systolic function.  Right Atrium: The right  atrium is moderately dilated.  Aortic Valve: The aortic valve appears abnormal. There is transcatheter aortic valve replacement. There is no evidence of aortic valve regurgitation. The peak instantaneous gradient of the aortic valve is 12.1 mmHg. The mean gradient of the aortic valve is 7.0 mmHg.  Mitral Valve: The mitral valve is normal in structure. There is normal mitral valve leaflet mobility. There is mild mitral annular calcification. There is moderate mitral valve regurgitation which is centrally directed.  Tricuspid Valve: The tricuspid valve is structurally normal. There is normal tricuspid valve leaflet mobility. There is moderate to severe tricuspid regurgitation. The tricuspid valve regurgitant jet is centrally directed. The Doppler estimated RVSP is severely elevated at 64.6 mmHg.  Pulmonic Valve: The pulmonic valve is structurally normal. There is trace pulmonic valve regurgitation.  Pericardium: There is no pericardial effusion noted.  Aorta: The aortic root is normal. The Asc Ao is 3.20 cm. There is no dilatation of the aortic arch. There is no dilatation of the ascending aorta. There is no dilatation of the aortic root.  Pulmonary Artery: The pulmonary artery is normal in size. The tricuspid regurgitant velocity is 3.76 m/s, and with an estimated right atrial pressure of 8 mmHg, the estimated pulmonary artery pressure is normal with the RVSP at 64.6 mmHg. The estimated PASP is 65 mmHg.  Systemic Veins: The inferior vena cava appears mildly dilated. There is IVC inspiratory collapse greater than 50%.      CONCLUSIONS:  1. Left ventricular systolic function is moderately to severely decreased with a 35% estimated ejection fraction.  2. Abnormal septal motion consistent with RV pacemaker.  3. There is moderate hypokinesis and dyssynchrony of the anteroseptal wall.  4. The left atrium is moderately dilated.  5. The right atrium is moderately dilated.  6. Moderate mitral valve regurgitation.  7. Moderate  to severe tricuspid regurgitation visualized.  8. Severely elevated right ventricular systolic pressure.  9. Aortic valve appears abnormal.  10. There is a transcatheter aortic valve replacement.  11. The estimated PASP is 65 mmHg.  12. The peak instantaneous gradient of the aortic valve is 12.1 mmHg.  13. There is global hypokinesis of the left ventricle with minor regional variations.    QUANTITATIVE DATA SUMMARY:  2D MEASUREMENTS:  Normal Ranges:  LAs:           5.30 cm    (2.7-4.0cm)  IVSd:          1.60 cm    (0.6-1.1cm)  LVPWd:         1.30 cm    (0.6-1.1cm)  LVIDd:         4.80 cm    (3.9-5.9cm)  LVIDs:         3.50 cm  LV Mass Index: 140.2 g/m2  LV % FS        27.1 %    LA VOLUME:  Normal Ranges:  LA Vol A4C:        100.1 ml   (22+/-6mL/m2)  LA Vol A2C:        129.1 ml  LA Vol BP:         115.8 ml  LA Vol Index A4C:  48.7ml/m2  LA Vol Index A2C:  62.8 ml/m2  LA Vol Index BP:   56.3 ml/m2  LA Area A4C:       29.6 cm2  LA Area A2C:       33.0 cm2  LA Major Axis A4C: 7.4 cm  LA Major Axis A2C: 7.2 cm  LA Volume Index:   53.9 ml/m2  LA Vol A4C:        96.0 ml  LA Vol A2C:        124.0 ml    M-MODE MEASUREMENTS:  Normal Ranges:  Ao Root: 3.50 cm (2.0-3.7cm)    AORTA MEASUREMENTS:  Normal Ranges:  Asc Ao, d: 3.20 cm (2.1-3.4cm)    LV SYSTOLIC FUNCTION BY 2D PLANIMETRY (MOD):  Normal Ranges:  EF-A4C View: 46.7 % (>=55%)  EF-A2C View: 50.8 %  EF-Biplane:  47.7 %    LV DIASTOLIC FUNCTION:  Normal Ranges:  MV lateral e' 0.07 m/s  MV medial e'  0.05 m/s    MITRAL VALVE:  Normal Ranges:  MV Vmax:    1.25 m/s (<=1.3m/s)  MV peak P.2 mmHg (<5mmHg)  MV mean P.0 mmHg (<2mmHg)    MITRAL INSUFFICIENCY:  Normal Ranges:  PISA Radius:  0.4 cm  MR VTI:       196.00 cm  MR Vmax:      566.00 cm/s  MR Alias José Miguel: 38.5 cm/s  MR Volume:    10.26 ml  MR Flow Rt:   29.63 ml/s  MR EROA:      0.05 cm2    AORTIC VALVE:  Normal Ranges:  AoV Vmax:                1.74 m/s  (<=1.7m/s)  AoV Peak P.1 mmHg (<20mmHg)  AoV  "Mean P.0 mmHg  (1.7-11.5mmHg)  LVOT Max José Miguel:            1.02 m/s  (<=1.1m/s)  AoV VTI:                 40.10 cm  (18-25cm)  LVOT VTI:                23.10 cm  LVOT Diameter:           2.20 cm   (1.8-2.4cm)  AoV Area, VTI:           2.19 cm2  (2.5-5.5cm2)  AoV Area,Vmax:           2.23 cm2  (2.5-4.5cm2)  AoV Dimensionless Index: 0.58      RIGHT VENTRICLE:  RV Basal 4.38 cm  RV Mid   2.88 cm  RV Major 7.3 cm  TAPSE:   12.9 mm  RV s'    0.06 m/s    TRICUSPID VALVE/RVSP:  Normal Ranges:  Peak TR Velocity: 3.76 m/s  Est. RA Pressure: 8 mmHg  RV Syst Pressure: 64.6 mmHg (< 30mmHg)  IVC Diam:         2.43 cm    PULMONIC VALVE:  Normal Ranges:  PV Max José Miguel: 0.8 m/s  (0.6-0.9m/s)  PV Max P.4 mmHg      [id number] Jeremy Clarke MD  Electronically signed on 10/23/2023 at 8:43:29 PM        ** Final **   Ejection Fractions:  No results found for: \"EF\"  Cath:  No results found for this or any previous visit from the past 1095 days.    Stress Test:  No results found for this or any previous visit from the past 1095 days.    Cardiac Imaging:  No results found for this or any previous visit from the past 1095 days.      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    Past Surgical History:  He has a past surgical history that includes Other surgical history (2021); Other surgical history (2021); Other surgical history (2021); Other surgical history (2021); and Other surgical history (2023).      Social " History:  He reports that he has never smoked. He has never used smokeless tobacco. He reports that he does not drink alcohol and does not use drugs.    Family History:  Family History   Problem Relation Name Age of Onset    Heart attack Mother      Heart disease Mother      Other (Car accident) Father          Allergies:  Codeine; Latex, natural rubber; and Lisinopril    Inpatient Medications:  Scheduled medications   Medication Dose Route Frequency    aspirin  81 mg oral Daily    carvedilol  12.5 mg oral BID with meals    docusate sodium  100 mg oral BID    furosemide  40 mg oral BID with meals    levothyroxine  75 mcg oral Daily    losartan  50 mg oral BID    melatonin  3 mg oral Daily    pantoprazole  40 mg oral Daily before breakfast    Or    pantoprazole  40 mg intravenous Daily before breakfast    polyethylene glycol  17 g oral Daily    potassium chloride  20 mEq oral BID    rosuvastatin  5 mg oral Nightly    [Held by provider] sertraline  25 mg oral Daily    warfarin  5 mg oral Daily     PRN medications   Medication    acetaminophen    Or    acetaminophen    Or    acetaminophen    benzocaine-menthol    dextromethorphan-guaifenesin    guaiFENesin    ondansetron ODT    Or    ondansetron     Continuous Medications   Medication Dose Last Rate     Outpatient Medications:  Current Outpatient Medications   Medication Instructions    aspirin 81 mg EC tablet oral    levothyroxine (SYNTHROID, LEVOXYL) 75 mcg, oral, Daily    losartan (COZAAR) 25 mg, oral, Daily    metoprolol succinate XL (TOPROL-XL) 25 mg, oral, Daily    rosuvastatin (CRESTOR) 5 mg, oral, Nightly    sertraline (ZOLOFT) 25 mg, oral, Daily    warfarin (COUMADIN) 7.5 mg, oral, 2 times weekly, Tuesday and Thursday    warfarin (COUMADIN) 5 mg, oral, 4 times weekly, Monday, Wednesday, Saturday and Sunday       Physical Exam:  Physical Exam  Vitals and nursing note reviewed.   Constitutional:       Appearance: Normal appearance.   Cardiovascular:      Rate  and Rhythm: Normal rate and regular rhythm.      Pulses: Normal pulses.      Heart sounds: Murmur heard.   Pulmonary:      Effort: Pulmonary effort is normal.      Breath sounds: Normal breath sounds.   Abdominal:      General: Bowel sounds are normal.      Palpations: Abdomen is soft.   Musculoskeletal:         General: Normal range of motion.      Right lower le+ Pitting Edema present.      Left lower le+ Pitting Edema present.   Skin:     General: Skin is warm and dry.   Neurological:      General: No focal deficit present.      Mental Status: He is alert and oriented to person, place, and time.      Review of Systems   Cardiovascular:  Positive for leg swelling.   Neurological:  Positive for weakness.   All other systems reviewed and are negative.        Assessment/Plan   Pacemaker in situ  Ischemic cardiomyopathy  Permanent atrial fibrillation  Atherosclerotic heart disease  Complete atrioventricular block    Peripheral IV 24 20 G Right Antecubital (Active)   Site Assessment Clean;Dry;Intact 24 2310   Dressing Status Clean;Dry 24 2310   Number of days: 2     : Most recent echocardiogram from 2023 revealed a severely decreased left ventricular systolic function at 35%, abnormal septal motion consistent with RV pacemaker, moderate hypokinesis and dyssynchrony of the anteroseptal wall, biatrial moderate dilation, moderate mitral valve regurgitation, moderate to severe tricuspid valve regurgitation.  Patient has been on guideline directed medical therapy and is being titrated up per cardiology.  RV pacing 98%.  Patient will undoubtedly qualify for upgrade to a biventricular ICD.  Patient states he would like to proceed with this procedure.  Patient would like to come in to office to schedule outpatient upgrade to BiV ICD.  Discussed with patient the possibility of venous occlusion and inability to obtain access.  Patient to follow-up outpatient in 2 weeks to further discuss and  schedule upgrade to BiV ICD.  Appointment scheduled for February 6 at 1045.    Discussed in detail with Dr. Mcdonald     Code Status:  Full Code    Yasmeen Keller, APRN-CNP

## 2024-01-24 NOTE — NURSING NOTE
Assumed care f pt. Pt resting in bed with no needs expressed at this time. Pt is refusing bed alarm. Educated on importance of  calling with risk of falling. Callbell and personal items within reach.

## 2024-01-24 NOTE — PROGRESS NOTES
Gorge Larsen is a 88 y.o. male on day 0 of admission presenting with General symptom.      Subjective   Feeling better.  Leg swelling is gone       Objective     Last Recorded Vitals  /60 (BP Location: Left arm, Patient Position: Sitting)   Pulse 90   Temp 37 °C (98.6 °F) (Oral)   Resp 18   Wt 96.2 kg (212 lb 1.3 oz)   SpO2 95%   Intake/Output last 3 Shifts:    Intake/Output Summary (Last 24 hours) at 1/24/2024 1535  Last data filed at 1/24/2024 0736  Gross per 24 hour   Intake 1150 ml   Output 0 ml   Net 1150 ml       Admission Weight  Weight: 96.2 kg (212 lb 1.3 oz) (01/22/24 1318)    Daily Weight  01/22/24 : 96.2 kg (212 lb 1.3 oz)    Image Results      Physical Exam  Vitals reviewed.   Constitutional:       Appearance: Normal appearance.   HENT:      Head: Normocephalic and atraumatic.      Right Ear: Tympanic membrane, ear canal and external ear normal.      Left Ear: Tympanic membrane, ear canal and external ear normal.      Nose: Nose normal.      Mouth/Throat:      Pharynx: Oropharynx is clear.   Eyes:      Extraocular Movements: Extraocular movements intact.      Conjunctiva/sclera: Conjunctivae normal.      Pupils: Pupils are equal, round, and reactive to light.   Cardiovascular:      Rate and Rhythm: Normal rate and regular rhythm.      Pulses: Normal pulses.      Heart sounds: Normal heart sounds.   Pulmonary:      Effort: Pulmonary effort is normal.      Breath sounds: Normal breath sounds.   Abdominal:      General: Abdomen is flat. Bowel sounds are normal.      Palpations: Abdomen is soft.   Musculoskeletal:      Cervical back: Normal range of motion and neck supple.   Skin:     General: Skin is warm and dry.   Neurological:      General: No focal deficit present.      Mental Status: He is alert and oriented to person, place, and time.   Psychiatric:         Mood and Affect: Mood normal.         Relevant Results             Scheduled medications  aspirin, 81 mg, oral,  Daily  carvedilol, 12.5 mg, oral, BID with meals  docusate sodium, 100 mg, oral, BID  furosemide, 40 mg, oral, BID with meals  levothyroxine, 75 mcg, oral, Daily  losartan, 50 mg, oral, BID  melatonin, 3 mg, oral, Daily  pantoprazole, 40 mg, oral, Daily before breakfast   Or  pantoprazole, 40 mg, intravenous, Daily before breakfast  polyethylene glycol, 17 g, oral, Daily  potassium chloride, 20 mEq, oral, BID  rosuvastatin, 5 mg, oral, Nightly  [Held by provider] sertraline, 25 mg, oral, Daily  warfarin, 5 mg, oral, Daily      Continuous medications     PRN medications  PRN medications: acetaminophen **OR** acetaminophen **OR** acetaminophen, benzocaine-menthol, dextromethorphan-guaifenesin, guaiFENesin, ondansetron ODT **OR** ondansetron  Results for orders placed or performed during the hospital encounter of 01/22/24 (from the past 24 hour(s))   Protime-INR   Result Value Ref Range    Protime 30.0 (H) 9.3 - 12.7 seconds    INR 3.1 (H) 0.9 - 1.2   Basic Metabolic Panel   Result Value Ref Range    Glucose 99 65 - 99 mg/dL    Sodium 131 (L) 133 - 145 mmol/L    Potassium 3.2 (L) 3.4 - 5.1 mmol/L    Chloride 96 (L) 97 - 107 mmol/L    Bicarbonate 25 24 - 31 mmol/L    Urea Nitrogen 23 8 - 25 mg/dL    Creatinine 1.10 0.40 - 1.60 mg/dL    eGFR 65 >60 mL/min/1.73m*2    Calcium 8.5 8.5 - 10.4 mg/dL    Anion Gap 10 <=19 mmol/L   CBC   Result Value Ref Range    WBC 6.1 4.4 - 11.3 x10*3/uL    nRBC 0.0 0.0 - 0.0 /100 WBCs    RBC 3.72 (L) 4.50 - 5.90 x10*6/uL    Hemoglobin 10.6 (L) 13.5 - 17.5 g/dL    Hematocrit 33.2 (L) 41.0 - 52.0 %    MCV 89 80 - 100 fL    MCH 28.5 26.0 - 34.0 pg    MCHC 31.9 (L) 32.0 - 36.0 g/dL    RDW 15.9 (H) 11.5 - 14.5 %    Platelets 229 150 - 450 x10*3/uL       Assessment/Plan                  Principal Problem:    General symptom  Active Problems:    Anxiety    Apnea, sleep    Benign essential HTN    Chronic atrial fibrillation (CMS/HCC)    Congestive heart failure (CMS/HCC)    Hyponatremia     Hypothyroidism    S/P TAVR (transcatheter aortic valve replacement)    Dyslipidemia    Cardiomyopathy (CMS/HCC)    COVID-19    Generalized weakness    Responding very well to the IV Lasix  Potassium is low replace  Sodium is improving  Will EPS consult noted  Plan to upgrade ICD outpatient  Improving from COVID clinically  Blood pressure stable  Hopefully plan discharge tomorrow              Natasha Antoine MD

## 2024-01-24 NOTE — PROGRESS NOTES
Subjective Data:      Overnight Events:         Objective Data:  Last Recorded Vitals:  Vitals:    01/23/24 0829 01/23/24 1500 01/23/24 2008 01/24/24 0736   BP: 137/68 160/79 135/59 155/60   BP Location: Left arm Right arm Right arm Left arm   Patient Position: Sitting Sitting Lying Sitting   Pulse: 70 69 69 90   Resp: 18 17 17 18   Temp: 36.8 °C (98.2 °F) 36.9 °C (98.4 °F) 37 °C (98.6 °F) 37 °C (98.6 °F)   TempSrc: Oral Oral Oral Oral   SpO2: 98% 98% 96% 95%   Weight:       Height:           Last Labs:  CBC - 1/24/2024:  4:21 AM  6.1 10.6 229    33.2      CMP - 1/24/2024:  4:21 AM  8.5 7.2 21 --- 0.9   _ 3.6 9 113      PTT - 9/15/2023: 12:52 AM  3.1   30.0 37.2     TROPHS   Date/Time Value Ref Range Status   09/03/2022 02:21 AM 34 0 - 53 ng/L Final     Comment:     .  Less than 99th percentile of normal range cutoff-  Female and children under 18 years old <35 ng/L; Male <54 ng/L: Negative  Repeat testing should be performed if clinically indicated.   .  Female and children under 18 years old  ng/L; Male  ng/L:  Consistent with possible cardiac damage and possible increased clinical   risk. Serial measurements may help to assess extent of myocardial damage.   .  >120 ng/L: Consistent with cardiac damage, increased clinical risk and  myocardial infarction. Serial measurements may help assess extent of   myocardial damage.   .   NOTE: Children less than 1 year old may have higher baseline troponin   levels and results should be interpreted in conjunction with the overall   clinical context.  .  NOTE: Troponin I testing is performed using a different   testing methodology at [facility] than at other   [facility] hospitals. Direct result comparisons should only   be made within the same method.     08/08/2022 04:51 PM 21 0 - 20 ng/L Final     Comment:     .  Less than 99th percentile of normal range cutoff-  Female and children under 18 years old <14 ng/L; Male <21 ng/L: Negative  Repeat testing  should be performed if clinically indicated.   .  Female and children under 18 years old 14-50 ng/L; Male 21-50 ng/L:  Consistent with possible cardiac damage and possible increased clinical   risk. Serial measurements may help to assess extent of myocardial damage.   .  >50 ng/L: Consistent with cardiac damage, increased clinical risk and  myocardial infarction. Serial measurements may help assess extent of   myocardial damage.   .   NOTE: Children less than 1 year old may have higher baseline troponin   levels and results should be interpreted in conjunction with the overall   clinical context.   .  NOTE: Troponin I testing is performed using a different   testing methodology at [facility] than at other   [facility] hospitals. Direct result comparisons should only   be made within the same method.     08/08/2022 03:37 PM 24 0 - 20 ng/L Final     Comment:     .  Less than 99th percentile of normal range cutoff-  Female and children under 18 years old <14 ng/L; Male <21 ng/L: Negative  Repeat testing should be performed if clinically indicated.   .  Female and children under 18 years old 14-50 ng/L; Male 21-50 ng/L:  Consistent with possible cardiac damage and possible increased clinical   risk. Serial measurements may help to assess extent of myocardial damage.   .  >50 ng/L: Consistent with cardiac damage, increased clinical risk and  myocardial infarction. Serial measurements may help assess extent of   myocardial damage.   .   NOTE: Children less than 1 year old may have higher baseline troponin   levels and results should be interpreted in conjunction with the overall   clinical context.   .  NOTE: Troponin I testing is performed using a different   testing methodology at [facility] than at other   [facility]. Direct result comparisons should only   be made within the same method.       BNP   Date/Time Value Ref Range Status   09/03/2022 02:21  0 - 99 pg/mL Final     Comment:      .  <100 pg/mL - Heart failure unlikely  100-299 pg/mL - Intermediate probability of acute heart  .               failure exacerbation. Correlate with clinical  .               context and patient history.    >=300 pg/mL - Heart Failure likely. Correlate with clinical  .               context and patient history.   Biotin interference may cause falsely decreased results.   Patients taking a Biotin dose of up to 5 mg/day should   refrain from taking Biotin for 24 hours before sample   collection. Providers may contact their local laboratory   for further information.     08/08/2022 03:37  0 - 99 pg/mL Final     Comment:     .  <100 pg/mL - Heart failure unlikely  100-299 pg/mL - Intermediate probability of acute heart  .               failure exacerbation. Correlate with clinical  .               context and patient history.    >=300 pg/mL - Heart Failure likely. Correlate with clinical  .               context and patient history.  BNP testing is performed using different testing   methodology at [facility] than at other   [facility]. Direct result comparisons should   only be made within the same method.       HGBA1C   Date/Time Value Ref Range Status   12/11/2020 11:24 AM 5.3 4.0 - 6.0 % Final     Comment:     Hemoglobin A1C levels are related to mean blood glucose during the   preceding 2-3 months. The relationship table below may be used as a   general guide. Each 1% increase in HGB A1C is a reflection of an   increase in mean glucose of approximately 30 mg/dl.   Reference: Diabetes Care, volume 29, supplement 1 Jan. 2006                        HGB A1C ................. Approx. Mean Glucose   _______________________________________________   6%   ...............................  120 mg/dl   7%   ...............................  150 mg/dl   8%   ...............................  180 mg/dl   9%   ...............................  210 mg/dl   10%  ...............................  240  mg/dl  Performed at [address] [address]     07/15/2019 09:39 AM 5.9 4.0 - 6.0 % Final     Comment:     Hemoglobin A1C levels are related to mean blood glucose during the   preceding 2-3 months. The relationship table below may be used as a   general guide. Each 1% increase in HGB A1C is a reflection of an   increase in mean glucose of approximately 30 mg/dl.   Reference: Diabetes Care, volume 29, supplement 1 Jan. 2006                        HGB A1C ................. Approx. Mean Glucose   _______________________________________________   6%   ...............................  120 mg/dl   7%   ...............................  150 mg/dl   8%   ...............................  180 mg/dl   9%   ...............................  210 mg/dl   10%  ...............................  240 mg/dl  Performed at [address] [address]       LDLCALC   Date/Time Value Ref Range Status   01/08/2024 08:52 AM 43 <=99 mg/dL Final     Comment:                                 Near   Borderline      AGE      Desirable  Optimal    High     High     Very High     0-19 Y     0 - 109     ---    110-129   >/= 130     ----    20-24 Y     0 - 119     ---    120-159   >/= 160     ----      >24 Y     0 -  99   100-129  130-159   160-189     >/=190     10/13/2022 05:28 AM 58 65 - 130 MG/DL Final   09/01/2022 04:42 AM 90 65 - 130 MG/DL Final   07/22/2022 09:36 AM 98 65 - 130 MG/DL Final     VLDL   Date/Time Value Ref Range Status   01/08/2024 08:52 AM 12 0 - 40 mg/dL Final   06/30/2023 08:49 AM 15 0 - 40 mg/dL Final   03/15/2023 08:57 AM 18 0 - 40 mg/dL Final   11/30/2022 09:20 AM 12 0 - 40 mg/dL Final      Last I/O:  I/O last 3 completed shifts:  In: 1250 (13 mL/kg) [P.O.:1250]  Out: 750 (7.8 mL/kg) [Urine:750 (0.2 mL/kg/hr)]  Weight: 96.2 kg     Past Cardiology Tests (Last 3 Years):  EKG:  ECG 12 lead 01/22/2024    Echo:  Transthoracic Echo (TTE) Complete 10/19/2023    Ejection Fractions:  No results  "found for: \"EF\"  Cath:  No results found for this or any previous visit from the past 1095 days.    Stress Test:  No results found for this or any previous visit from the past 1095 days.    Cardiac Imaging:  No results found for this or any previous visit from the past 1095 days.      Inpatient Medications:  Scheduled medications   Medication Dose Route Frequency    aspirin  81 mg oral Daily    carvedilol  6.25 mg oral BID with meals    docusate sodium  100 mg oral BID    furosemide  40 mg oral BID with meals    levothyroxine  75 mcg oral Daily    losartan  25 mg oral BID    melatonin  3 mg oral Daily    pantoprazole  40 mg oral Daily before breakfast    Or    pantoprazole  40 mg intravenous Daily before breakfast    polyethylene glycol  17 g oral Daily    potassium chloride CR  40 mEq oral Once    rosuvastatin  5 mg oral Nightly    [Held by provider] sertraline  25 mg oral Daily    warfarin  5 mg oral Daily     PRN medications   Medication    acetaminophen    Or    acetaminophen    Or    acetaminophen    benzocaine-menthol    dextromethorphan-guaifenesin    guaiFENesin    ondansetron ODT    Or    ondansetron     Continuous Medications   Medication Dose Last Rate       Physical Exam:       Assessment/Plan     Peripheral IV 01/22/24 20 G Right Antecubital (Active)   Site Assessment Clean;Dry;Intact 01/23/24 2310   Dressing Status Clean;Dry 01/23/24 2310   Number of days: 2       Code Status:  Full Code    I spent 20 minutes in the professional and overall care of this patient.        Jeremy Clarke MD  "

## 2024-01-24 NOTE — PROGRESS NOTES
Subjective Data:      Overnight Events:       Objective Data:  Last Recorded Vitals:  Vitals:    01/23/24 0829 01/23/24 1500 01/23/24 2008 01/24/24 0736   BP: 137/68 160/79 135/59 155/60   BP Location: Left arm Right arm Right arm Left arm   Patient Position: Sitting Sitting Lying Sitting   Pulse: 70 69 69 90   Resp: 18 17 17 18   Temp: 36.8 °C (98.2 °F) 36.9 °C (98.4 °F) 37 °C (98.6 °F) 37 °C (98.6 °F)   TempSrc: Oral Oral Oral Oral   SpO2: 98% 98% 96% 95%   Weight:       Height:           Last Labs:  CBC - 1/24/2024:  4:21 AM  6.1 10.6 229    33.2      CMP - 1/24/2024:  4:21 AM  8.5 7.2 21 --- 0.9   _ 3.6 9 113      PTT - 9/15/2023: 12:52 AM  3.1   30.0 37.2     TROPHS   Date/Time Value Ref Range Status   09/03/2022 02:21 AM 34 0 - 53 ng/L Final     Comment:     .  Less than 99th percentile of normal range cutoff-  Female and children under 18 years old <35 ng/L; Male <54 ng/L: Negative  Repeat testing should be performed if clinically indicated.   .  Female and children under 18 years old  ng/L; Male  ng/L:  Consistent with possible cardiac damage and possible increased clinical   risk. Serial measurements may help to assess extent of myocardial damage.   .  >120 ng/L: Consistent with cardiac damage, increased clinical risk and  myocardial infarction. Serial measurements may help assess extent of   myocardial damage.   .   NOTE: Children less than 1 year old may have higher baseline troponin   levels and results should be interpreted in conjunction with the overall   clinical context.  .  NOTE: Troponin I testing is performed using a different   testing methodology at [facility] than at other   [facility] hospitals. Direct result comparisons should only   be made within the same method.     08/08/2022 04:51 PM 21 0 - 20 ng/L Final     Comment:     .  Less than 99th percentile of normal range cutoff-  Female and children under 18 years old <14 ng/L; Male <21 ng/L: Negative  Repeat testing  should be performed if clinically indicated.   .  Female and children under 18 years old 14-50 ng/L; Male 21-50 ng/L:  Consistent with possible cardiac damage and possible increased clinical   risk. Serial measurements may help to assess extent of myocardial damage.   .  >50 ng/L: Consistent with cardiac damage, increased clinical risk and  myocardial infarction. Serial measurements may help assess extent of   myocardial damage.   .   NOTE: Children less than 1 year old may have higher baseline troponin   levels and results should be interpreted in conjunction with the overall   clinical context.   .  NOTE: Troponin I testing is performed using a different   testing methodology at [facility] than at other   [facility] hospitals. Direct result comparisons should only   be made within the same method.     08/08/2022 03:37 PM 24 0 - 20 ng/L Final     Comment:     .  Less than 99th percentile of normal range cutoff-  Female and children under 18 years old <14 ng/L; Male <21 ng/L: Negative  Repeat testing should be performed if clinically indicated.   .  Female and children under 18 years old 14-50 ng/L; Male 21-50 ng/L:  Consistent with possible cardiac damage and possible increased clinical   risk. Serial measurements may help to assess extent of myocardial damage.   .  >50 ng/L: Consistent with cardiac damage, increased clinical risk and  myocardial infarction. Serial measurements may help assess extent of   myocardial damage.   .   NOTE: Children less than 1 year old may have higher baseline troponin   levels and results should be interpreted in conjunction with the overall   clinical context.   .  NOTE: Troponin I testing is performed using a different   testing methodology at [facility] than at other   [facility]. Direct result comparisons should only   be made within the same method.       BNP   Date/Time Value Ref Range Status   09/03/2022 02:21  0 - 99 pg/mL Final     Comment:      .  <100 pg/mL - Heart failure unlikely  100-299 pg/mL - Intermediate probability of acute heart  .               failure exacerbation. Correlate with clinical  .               context and patient history.    >=300 pg/mL - Heart Failure likely. Correlate with clinical  .               context and patient history.   Biotin interference may cause falsely decreased results.   Patients taking a Biotin dose of up to 5 mg/day should   refrain from taking Biotin for 24 hours before sample   collection. Providers may contact their local laboratory   for further information.     08/08/2022 03:37  0 - 99 pg/mL Final     Comment:     .  <100 pg/mL - Heart failure unlikely  100-299 pg/mL - Intermediate probability of acute heart  .               failure exacerbation. Correlate with clinical  .               context and patient history.    >=300 pg/mL - Heart Failure likely. Correlate with clinical  .               context and patient history.  BNP testing is performed using different testing   methodology at [facility] than at other   [facility]. Direct result comparisons should   only be made within the same method.       HGBA1C   Date/Time Value Ref Range Status   12/11/2020 11:24 AM 5.3 4.0 - 6.0 % Final     Comment:     Hemoglobin A1C levels are related to mean blood glucose during the   preceding 2-3 months. The relationship table below may be used as a   general guide. Each 1% increase in HGB A1C is a reflection of an   increase in mean glucose of approximately 30 mg/dl.   Reference: Diabetes Care, volume 29, supplement 1 Jan. 2006                        HGB A1C ................. Approx. Mean Glucose   _______________________________________________   6%   ...............................  120 mg/dl   7%   ...............................  150 mg/dl   8%   ...............................  180 mg/dl   9%   ...............................  210 mg/dl   10%  ...............................  240  mg/dl  Performed at [address] [address]     07/15/2019 09:39 AM 5.9 4.0 - 6.0 % Final     Comment:     Hemoglobin A1C levels are related to mean blood glucose during the   preceding 2-3 months. The relationship table below may be used as a   general guide. Each 1% increase in HGB A1C is a reflection of an   increase in mean glucose of approximately 30 mg/dl.   Reference: Diabetes Care, volume 29, supplement 1 Jan. 2006                        HGB A1C ................. Approx. Mean Glucose   _______________________________________________   6%   ...............................  120 mg/dl   7%   ...............................  150 mg/dl   8%   ...............................  180 mg/dl   9%   ...............................  210 mg/dl   10%  ...............................  240 mg/dl  Performed at [address] [address]       LDLCALC   Date/Time Value Ref Range Status   01/08/2024 08:52 AM 43 <=99 mg/dL Final     Comment:                                 Near   Borderline      AGE      Desirable  Optimal    High     High     Very High     0-19 Y     0 - 109     ---    110-129   >/= 130     ----    20-24 Y     0 - 119     ---    120-159   >/= 160     ----      >24 Y     0 -  99   100-129  130-159   160-189     >/=190     10/13/2022 05:28 AM 58 65 - 130 MG/DL Final   09/01/2022 04:42 AM 90 65 - 130 MG/DL Final   07/22/2022 09:36 AM 98 65 - 130 MG/DL Final     VLDL   Date/Time Value Ref Range Status   01/08/2024 08:52 AM 12 0 - 40 mg/dL Final   06/30/2023 08:49 AM 15 0 - 40 mg/dL Final   03/15/2023 08:57 AM 18 0 - 40 mg/dL Final   11/30/2022 09:20 AM 12 0 - 40 mg/dL Final      Last I/O:  I/O last 3 completed shifts:  In: 1250 (13 mL/kg) [P.O.:1250]  Out: 750 (7.8 mL/kg) [Urine:750 (0.2 mL/kg/hr)]  Weight: 96.2 kg     Past Cardiology Tests (Last 3 Years):  EKG:  ECG 12 lead 01/22/2024    Echo:  Transthoracic Echo (TTE) Complete 10/19/2023    Ejection Fractions:  No results  "found for: \"EF\"  Cath:  No results found for this or any previous visit from the past 1095 days.    Stress Test:  No results found for this or any previous visit from the past 1095 days.    Cardiac Imaging:  No results found for this or any previous visit from the past 1095 days.      Inpatient Medications:  Scheduled medications   Medication Dose Route Frequency    aspirin  81 mg oral Daily    carvedilol  6.25 mg oral BID with meals    docusate sodium  100 mg oral BID    furosemide  40 mg oral BID with meals    levothyroxine  75 mcg oral Daily    losartan  25 mg oral BID    melatonin  3 mg oral Daily    pantoprazole  40 mg oral Daily before breakfast    Or    pantoprazole  40 mg intravenous Daily before breakfast    polyethylene glycol  17 g oral Daily    potassium chloride CR  40 mEq oral Once    rosuvastatin  5 mg oral Nightly    [Held by provider] sertraline  25 mg oral Daily    warfarin  5 mg oral Daily     PRN medications   Medication    acetaminophen    Or    acetaminophen    Or    acetaminophen    benzocaine-menthol    dextromethorphan-guaifenesin    guaiFENesin    ondansetron ODT    Or    ondansetron     Continuous Medications   Medication Dose Last Rate       Physical Exam:       Assessment/Plan     1/23: The patient is an 88-year-old white male who does have a history of CAD with remote CABG x 3 in 2002.  He also has a history of severe aortic valvular stenosis that required a transcatheter aortic valve replacement on 9/9/2022.  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  He also has by recent echo 2+ mitral valve regurgitation 3+ tricuspid valve regurgitation and moderately severe to severe pulmonary hypertension with an estimated PA systolic pressure 65 mmHg.  He is currently admitted with a complaint of increasing weakness after being diagnosed with COVID although his nasal swab currently is negative.  Due to his peripheral edema pulmonary hypertension " and LV dysfunction will modify current therapy.  He had not been on Lasix previously and it will be started at 40 mg twice daily and then later most likely reduced to 40 mg daily.  He is already on Farxiga 10 mg daily which will be kept unchanged.  Will increase losartan to 25 mg twice daily and further up titrate if tolerated by blood pressure.  He will be switched from metoprolol to carvedilol 6.25 mg twice daily with plans to uptitrate as well.  Fortunately on device interrogation he does remain in AV paced rhythm.  His pulse generator evidently requires replacement within 3 months and given his need to ventricular paced 98% of the time and in view of his LV dysfunction upgrade of the device to a BiV ICD may be indicated.  Will consult electrophysiology.     1/24: Patient resting comfortably not short of breath.  The patient's inputs and outputs were not accurately recorded with no urinary output recorded.  He was started on IV Lasix 40 mg twice daily yesterday and his lower extremity edema has significantly improved.  Systolic blood pressures have been ranging between 135-160 mmHg and will uptitrate his losartan to 50 mg twice daily carvedilol 12.5 mg twice daily.  Renal parameters notable for creatinine 1.1 potassium 3.2 which will supplement.  INR is 3.1.  CBC includes a stable hemoglobin 10.6 WBC 6100.  Electrophysiology aware and will be seeing patient.  His original plan was to have the pulse generator replaced in either 3 or 4/2024 as an outpatient.  Would also need to assess for possible upgrade to an ICD with BiV pacing.    Peripheral IV 01/22/24 20 G Right Antecubital (Active)   Site Assessment Clean;Dry;Intact 01/23/24 2310   Dressing Status Clean;Dry 01/23/24 2310   Number of days: 2       Code Status:  Full Code    I spent 20 minutes in the professional and overall care of this patient.        Jeremy Clarke MD

## 2024-01-24 NOTE — PROGRESS NOTES
Physical Therapy    Physical Therapy Treatment    Patient Name: Gorge Larsen  MRN: [id number]  Today's Date: 1/24/2024  Time Calculation  Start Time: 1453  Stop Time: 1517  Time Calculation (min): 24 min       Assessment/Plan   PT Assessment  PT Assessment Results: Decreased strength, Decreased endurance, Impaired balance, Decreased mobility, Impaired judgement, Decreased safety awareness  Rehab Prognosis: Good  End of Session Communication: Bedside nurse  End of Session Patient Position: Bed, 3 rail up  PT Plan  Inpatient/Swing Bed or Outpatient: Inpatient  PT Plan  Treatment/Interventions: Bed mobility, Transfer training, Gait training, Therapeutic exercise  PT Plan: Skilled PT  PT Frequency: 3 times per week  PT Discharge Recommendations: Low intensity level of continued care, 24 hr supervision due to cognition  Equipment Recommended upon Discharge: Wheeled walker  PT Recommended Transfer Status: Assist x1  PT - OK to Discharge: Yes      General Visit Information:   PT  Visit  PT Received On: 01/24/24  General  Family/Caregiver Present: Yes  Caregiver Feedback: Spouse present.  Patient Position Received: Bed, 3 rail up  Preferred Learning Style: verbal (Pt very [place])  General Comment: Cleared per nurse to see pt for therapy. Pt agreeable but needed a little encouragement.    Subjective   Precautions:  Precautions  Medical Precautions: Infection precautions  Vital Signs:       Objective   Pain:  Pain Assessment  Pain Assessment: 0-10  Pain Score: 0 - No pain  Cognition:  Cognition  Overall Cognitive Status: Within Functional Limits  Postural Control:     Extremity/Trunk Assessments:    Activity Tolerance:     Treatments:  Therapeutic Exercise  Therapeutic Exercise Performed: Yes  Therapeutic Exercise Activity 1: Pt performed seated bilat LE (at edge of bed) ankle pumps/heel raises, LAQ, hip flexion, abdulkadir hip adduction and resisted hip abduction 2x10 reps each.    Bed Mobility  Bed Mobility: Yes  Bed Mobility  1  Bed Mobility 1: Supine to sitting  Level of Assistance 1: Minimum assistance  Bed Mobility Comments 1: Pt able to bring bilat LE's off bed, needed min assist with trunk up.  Bed Mobility 2  Bed Mobility  2: Sitting to supine  Level of Assistance 2: Close supervision  Bed Mobility Comments 2: Pt struggled a bit with bilat LE's onto bed but able to complete without assist.    Ambulation/Gait Training  Ambulation/Gait Training Performed: Yes  Ambulation/Gait Training 1  Surface 1: Level tile  Device 1: Rolling walker  Assistance 1: Contact guard  Comments/Distance (ft) 1: Pt ambulated with RW ~40 x4 making laps around room, CGA.  Transfers  Transfer: Yes  Transfer 1  Transfer From 1: Bed to  Transfer to 1: Stand  Technique 1: Sit to stand  Transfer Level of Assistance 1: Close supervision  Transfers 2  Transfer From 2: Stand to  Transfer to 2: Sit, Bed  Technique 2: Stand to sit  Transfer Level of Assistance 2: Close supervision    Outcome Measures:  [facility] Basic Mobility  Turning from your back to your side while in a flat bed without using bedrails: A little  Moving from lying on your back to sitting on the side of a flat bed without using bedrails: A little  Moving to and from bed to chair (including a wheelchair): A little  Standing up from a chair using your arms (e.g. wheelchair or bedside chair): A little  To walk in hospital room: A little  Climbing 3-5 steps with railing: A little  Basic Mobility - Total Score: 18    Education Documentation  Handouts, taught by Jacqui Irby PTA at 1/24/2024  3:32 PM.  Learner: Patient  Readiness: Acceptance  Method: Explanation  Response: Verbalizes Understanding, Needs Reinforcement    Precautions, taught by Jacqui Irby PTA at 1/24/2024  3:32 PM.  Learner: Patient  Readiness: Acceptance  Method: Explanation  Response: Verbalizes Understanding, Needs Reinforcement    Body Mechanics, taught by Jacqui Irby PTA at 1/24/2024  3:32 PM.  Learner: Patient  Readiness:  Acceptance  Method: Explanation  Response: Verbalizes Understanding, Needs Reinforcement    Home Exercise Program, taught by Jacqui Irby PTA at 1/24/2024  3:32 PM.  Learner: Patient  Readiness: Acceptance  Method: Explanation  Response: Verbalizes Understanding, Needs Reinforcement    Mobility Training, taught by Jacqui Irby PTA at 1/24/2024  3:32 PM.  Learner: Patient  Readiness: Acceptance  Method: Explanation  Response: Verbalizes Understanding, Needs Reinforcement    Education Comments  No comments found.        OP EDUCATION:       Encounter Problems       Encounter Problems (Active)       Balance       dynamic (Progressing)       Start:  01/23/24    Expected End:  02/06/24       Pt will perform Tinetti assessment and score >/= 24/28, resulting in a low falls risk             Mobility       LTG - Patient will navigate 4-6 steps with rails/device (Progressing)       Start:  01/23/24    Expected End:  02/06/24            ambulation (Progressing)       Start:  01/23/24    Expected End:  02/06/24       Pt will amb > 150  ft with wheeled walker and mod independence             Pain - Adult          Safety       LTG - Patient will utilize safety techniques (Progressing)       Start:  01/23/24    Expected End:  02/06/24

## 2024-01-25 VITALS
HEART RATE: 74 BPM | RESPIRATION RATE: 18 BRPM | HEIGHT: 69 IN | BODY MASS INDEX: 31.41 KG/M2 | TEMPERATURE: 98.1 F | WEIGHT: 212.08 LBS | DIASTOLIC BLOOD PRESSURE: 60 MMHG | OXYGEN SATURATION: 99 % | SYSTOLIC BLOOD PRESSURE: 118 MMHG

## 2024-01-25 PROBLEM — I50.23 ACUTE ON CHRONIC SYSTOLIC HEART FAILURE (MULTI): Status: ACTIVE | Noted: 2024-01-25

## 2024-01-25 LAB
ANION GAP SERPL CALC-SCNC: 10 MMOL/L
BUN SERPL-MCNC: 24 MG/DL (ref 8–25)
CALCIUM SERPL-MCNC: 8.7 MG/DL (ref 8.5–10.4)
CHLORIDE SERPL-SCNC: 97 MMOL/L (ref 97–107)
CO2 SERPL-SCNC: 25 MMOL/L (ref 24–31)
CREAT SERPL-MCNC: 1.2 MG/DL (ref 0.4–1.6)
EGFRCR SERPLBLD CKD-EPI 2021: 59 ML/MIN/1.73M*2
ERYTHROCYTE [DISTWIDTH] IN BLOOD BY AUTOMATED COUNT: 16 % (ref 11.5–14.5)
GLUCOSE SERPL-MCNC: 104 MG/DL (ref 65–99)
HCT VFR BLD AUTO: 34.6 % (ref 41–52)
HGB BLD-MCNC: 10.9 G/DL (ref 13.5–17.5)
MCH RBC QN AUTO: 28.7 PG (ref 26–34)
MCHC RBC AUTO-ENTMCNC: 31.5 G/DL (ref 32–36)
MCV RBC AUTO: 91 FL (ref 80–100)
NRBC BLD-RTO: 0 /100 WBCS (ref 0–0)
PLATELET # BLD AUTO: 228 X10*3/UL (ref 150–450)
POTASSIUM SERPL-SCNC: 3.4 MMOL/L (ref 3.4–5.1)
RBC # BLD AUTO: 3.8 X10*6/UL (ref 4.5–5.9)
SODIUM SERPL-SCNC: 132 MMOL/L (ref 133–145)
WBC # BLD AUTO: 5 X10*3/UL (ref 4.4–11.3)

## 2024-01-25 PROCEDURE — C9113 INJ PANTOPRAZOLE SODIUM, VIA: HCPCS | Performed by: INTERNAL MEDICINE

## 2024-01-25 PROCEDURE — 85027 COMPLETE CBC AUTOMATED: CPT | Performed by: INTERNAL MEDICINE

## 2024-01-25 PROCEDURE — 97535 SELF CARE MNGMENT TRAINING: CPT | Mod: GO

## 2024-01-25 PROCEDURE — 2500000001 HC RX 250 WO HCPCS SELF ADMINISTERED DRUGS (ALT 637 FOR MEDICARE OP): Performed by: INTERNAL MEDICINE

## 2024-01-25 PROCEDURE — 80048 BASIC METABOLIC PNL TOTAL CA: CPT | Performed by: INTERNAL MEDICINE

## 2024-01-25 PROCEDURE — 2500000004 HC RX 250 GENERAL PHARMACY W/ HCPCS (ALT 636 FOR OP/ED): Performed by: INTERNAL MEDICINE

## 2024-01-25 PROCEDURE — 97110 THERAPEUTIC EXERCISES: CPT | Mod: GO

## 2024-01-25 PROCEDURE — 99232 SBSQ HOSP IP/OBS MODERATE 35: CPT | Performed by: INTERNAL MEDICINE

## 2024-01-25 PROCEDURE — 99239 HOSP IP/OBS DSCHRG MGMT >30: CPT | Performed by: INTERNAL MEDICINE

## 2024-01-25 PROCEDURE — 36415 COLL VENOUS BLD VENIPUNCTURE: CPT | Performed by: INTERNAL MEDICINE

## 2024-01-25 RX ORDER — DAPAGLIFLOZIN 10 MG/1
10 TABLET, FILM COATED ORAL DAILY
Qty: 30 TABLET | Refills: 0 | Status: SHIPPED | OUTPATIENT
Start: 2024-01-26 | End: 2024-02-21 | Stop reason: ALTCHOICE

## 2024-01-25 RX ORDER — CARVEDILOL 12.5 MG/1
12.5 TABLET ORAL
Qty: 60 TABLET | Refills: 0 | Status: ON HOLD | OUTPATIENT
Start: 2024-01-25 | End: 2024-02-20 | Stop reason: SDUPTHER

## 2024-01-25 RX ORDER — LOSARTAN POTASSIUM 50 MG/1
50 TABLET ORAL 2 TIMES DAILY
Qty: 30 TABLET | Refills: 1 | Status: SHIPPED | OUTPATIENT
Start: 2024-01-25 | End: 2024-02-20 | Stop reason: HOSPADM

## 2024-01-25 RX ORDER — POTASSIUM CHLORIDE 1.5 G/1.58G
20 POWDER, FOR SOLUTION ORAL DAILY
Status: DISCONTINUED | OUTPATIENT
Start: 2024-01-26 | End: 2024-01-25 | Stop reason: HOSPADM

## 2024-01-25 RX ORDER — FUROSEMIDE 40 MG/1
40 TABLET ORAL DAILY
Qty: 30 TABLET | Refills: 0 | Status: SHIPPED | OUTPATIENT
Start: 2024-01-26 | End: 2024-02-21 | Stop reason: SDUPTHER

## 2024-01-25 RX ORDER — POTASSIUM CHLORIDE 1.5 G/1.58G
20 POWDER, FOR SOLUTION ORAL DAILY
Qty: 30 PACKET | Refills: 0 | Status: SHIPPED | OUTPATIENT
Start: 2024-01-26 | End: 2024-02-27 | Stop reason: SDUPTHER

## 2024-01-25 RX ORDER — FUROSEMIDE 40 MG/1
40 TABLET ORAL DAILY
Status: DISCONTINUED | OUTPATIENT
Start: 2024-01-26 | End: 2024-01-25 | Stop reason: HOSPADM

## 2024-01-25 RX ORDER — DAPAGLIFLOZIN 10 MG/1
10 TABLET, FILM COATED ORAL DAILY
Status: DISCONTINUED | OUTPATIENT
Start: 2024-01-25 | End: 2024-01-25 | Stop reason: HOSPADM

## 2024-01-25 RX ADMIN — DOCUSATE SODIUM 100 MG: 100 CAPSULE, LIQUID FILLED ORAL at 09:48

## 2024-01-25 RX ADMIN — LEVOTHYROXINE SODIUM 75 MCG: 0.07 TABLET ORAL at 09:48

## 2024-01-25 RX ADMIN — DAPAGLIFLOZIN 10 MG: 10 TABLET, FILM COATED ORAL at 14:15

## 2024-01-25 RX ADMIN — LOSARTAN POTASSIUM 50 MG: 50 TABLET, FILM COATED ORAL at 09:48

## 2024-01-25 RX ADMIN — FUROSEMIDE 40 MG: 40 TABLET ORAL at 09:48

## 2024-01-25 RX ADMIN — CARVEDILOL 12.5 MG: 12.5 TABLET, FILM COATED ORAL at 09:48

## 2024-01-25 RX ADMIN — PANTOPRAZOLE SODIUM 40 MG: 40 INJECTION, POWDER, FOR SOLUTION INTRAVENOUS at 06:38

## 2024-01-25 RX ADMIN — POLYETHYLENE GLYCOL 3350 17 G: 17 POWDER, FOR SOLUTION ORAL at 09:48

## 2024-01-25 RX ADMIN — POTASSIUM CHLORIDE 20 MEQ: 1.5 FOR SOLUTION ORAL at 09:48

## 2024-01-25 RX ADMIN — ASPIRIN 81 MG: 81 TABLET, COATED ORAL at 09:47

## 2024-01-25 ASSESSMENT — ACTIVITIES OF DAILY LIVING (ADL)
BATHING_WHERE_ASSESSED: OTHER (COMMENT)
BATHING_LEVEL_OF_ASSISTANCE: CLOSE SUPERVISION
HOME_MANAGEMENT_TIME_ENTRY: 20

## 2024-01-25 ASSESSMENT — COGNITIVE AND FUNCTIONAL STATUS - GENERAL
TOILETING: A LITTLE
DRESSING REGULAR LOWER BODY CLOTHING: A LITTLE
DAILY ACTIVITIY SCORE: 19
DRESSING REGULAR UPPER BODY CLOTHING: A LITTLE
HELP NEEDED FOR BATHING: A LITTLE
PERSONAL GROOMING: A LITTLE

## 2024-01-25 ASSESSMENT — PAIN SCALES - GENERAL: PAINLEVEL_OUTOF10: 0 - NO PAIN

## 2024-01-25 ASSESSMENT — PAIN - FUNCTIONAL ASSESSMENT: PAIN_FUNCTIONAL_ASSESSMENT: 0-10

## 2024-01-25 NOTE — PROGRESS NOTES
Occupational Therapy    Occupational Therapy Treatment    Name: Gorge Larsen  MRN: [id number]  : 1935  Date: 24  Time Calculation  Start Time: 1333  Stop Time: 1403  Time Calculation (min): 30 min    Assessment:  OT Assessment: Pt making good progress toward goals. Good motivation today w/ particiaption.  End of Session Communication: Bedside nurse  End of Session Patient Position: Up in chair (Spouse present)  Plan:  Treatment Interventions: ADL retraining, Functional transfer training, Endurance training, Patient/family training  OT Frequency: 3 times per week  OT Discharge Recommendations: Low intensity level of continued care, 24 hr supervision due to cognition  Equipment Recommended upon Discharge: Wheeled walker  OT - OK to Discharge: Yes    Subjective   Previous Visit Info:  OT Last Visit  OT Received On: 24  General:  General  Family/Caregiver Present: Yes  Caregiver Feedback: Ex spouse present  Prior to Session Communication: Bedside nurse  Patient Position Received: Bed, 3 rail up  Preferred Learning Style: verbal  General Comment: cleared by nursing to see, pt agreeable to therapy  Precautions:  Medical Precautions: Infection precautions  Precautions Comment: COVID +  Vitals:     Pain Assessment:        Objective   Activities of Daily Living: Grooming  Grooming Level of Assistance: Setup (to wash face, brush teeth, comb hair)  Grooming Where Assessed: Chair    UE Bathing  UE Bathing Level of Assistance: Setup (to bathe chest, arms, abdomen)  UE Bathing Where Assessed: Other (Comment) (chair)    LE Bathing  LE Bathing Level of Assistance: Close supervision (to bathe lower legs, upper legs)  LE Bathing Where Assessed: Other (Comment) (chair)    UE Dressing  UE Dressing Level of Assistance: Setup (to don/ doff gown)  UE Dressing Where Assessed: Chair    Functional Standing Tolerance:     Bed Mobility/Transfers: Bed Mobility  Bed Mobility: Yes  Bed Mobility 1  Bed Mobility 1: Supine to  sitting  Level of Assistance 1: Minimum assistance  Bed Mobility Comments 1: For trunk up, pt able to move BLE 's out of bed    Transfers  Transfer: Yes  Transfer 1  Transfer From 1: Bed to  Transfer to 1: Stand  Technique 1: Sit to stand  Transfer Device 1: Walker  Transfer Level of Assistance 1: Close supervision  Transfers 2  Transfer From 2: Stand to  Transfer to 2: Sit, Chair with arms  Technique 2: Stand to sit  Transfer Device 2: Walker  Transfer Level of Assistance 2: Close supervision  Trials/Comments 2: Verbal cues to keep walker w/ pt, verbal cues for hand placement  IADL's:   Communication:     Splinting:     Therapy/Activity: Therapeutic Exercise  Therapeutic Exercise Performed: Yes  Therapeutic Exercise Activity 1: x15 (B shld flex/ext)  Therapeutic Exercise Activity 2: x15 (B horiz. shld flex/ext)  Therapeutic Exercise Activity 3: x15 (B elbow flex/ext)  Therapeutic Exercise Activity 4: x15 (B forearm sup/ pron)  Therapeutic Exercise Activity 5: x15 (B wrist flex/ext)  Sensory:     Cognitive Skill Development:     Vision:     Strength:     Other Activity:             Outcome Measures:  [facility] Daily Activity  Putting on and taking off regular lower body clothing: A little  Bathing (including washing, rinsing, drying): A little  Putting on and taking off regular upper body clothing: A little  Toileting, which includes using toilet, bedpan or urinal: A little  Taking care of personal grooming such as brushing teeth: A little  Eating Meals: None  Daily Activity - Total Score: 19        Education Documentation  Home Exercise Program, taught by Kristen Sue OT at 1/25/2024  2:55 PM.  Learner: Patient  Readiness: Acceptance  Method: Explanation  Response: Demonstrated Understanding, Needs Reinforcement    ADL Training, taught by Kristen Sue OT at 1/25/2024  2:54 PM.  Learner: Patient  Readiness: Acceptance  Method: Explanation  Response: Demonstrated Understanding, Needs Reinforcement    Education  Comments  No comments found.      Goals:  Encounter Problems       Encounter Problems (Active)       Balance       dynamic (Progressing)       Start:  01/23/24    Expected End:  02/06/24       Pt will perform Tinetti assessment and score >/= 24/28, resulting in a low falls risk             Mobility       LTG - Patient will navigate 4-6 steps with rails/device (Progressing)       Start:  01/23/24    Expected End:  02/06/24            ambulation (Progressing)       Start:  01/23/24    Expected End:  02/06/24       Pt will amb > 150  ft with wheeled walker and mod independence             OT Goals       ADL's (Progressing)       Start:  01/23/24    Expected End:  02/06/24       Pt will complete ADL tasks w/ close supervision w/ Ae as needed for safety and increased independence in self care tasks.         Functional transfers (Progressing)       Start:  01/23/24    Expected End:  02/06/24       Pt will demon. Bed, chair and toilet transfers w/ MOD I w/ LRD for safety and incresed independence in functional transfers.         Functional endurance (Progressing)       Start:  01/23/24    Expected End:  02/06/24       Pt will demon. BUE exercises to improve functional endurance for self care tasks and functional transfers.            Safety       LTG - Patient will utilize safety techniques (Progressing)       Start:  01/23/24    Expected End:  02/06/24

## 2024-01-25 NOTE — CARE PLAN
The patient's goals for the shift include      The clinical goals for the shift include safety    Problem: Fall/Injury  Goal: Not fall by end of shift  Outcome: Progressing     Problem: Safety - Adult  Goal: Free from fall injury  Outcome: Progressing

## 2024-01-25 NOTE — PROGRESS NOTES
Gorge Larsen is a 88 y.o. male on day 1 of admission presenting with General symptom.    Subjective   Interval History:   Room not entered-limit exposure  Patient discussed with nursing  Observed through glass window-siting up in chair  No significant interval events  Remains on room air        Objective   Range of Vitals (last 24 hours)  Heart Rate:  [70-74]   Temp:  [36.6 °C (97.9 °F)-36.7 °C (98.1 °F)]   Resp:  [18-20]   BP: (107-118)/(50-60)   SpO2:  [97 %-99 %]   Daily Weight  01/22/24 : 96.2 kg (212 lb 1.3 oz)    Body mass index is 31.32 kg/m².    Physical Exam  Resting comfortably    Antibiotics  aspirin tablet 325 mg  furosemide (Lasix) injection 40 mg  aspirin EC tablet 81 mg  levothyroxine (Synthroid, Levoxyl) tablet 75 mcg  losartan (Cozaar) tablet 25 mg  metoprolol succinate XL (Toprol-XL) 24 hr tablet 25 mg  rosuvastatin (Crestor) tablet 5 mg  sertraline (Zoloft) tablet 25 mg  warfarin (Coumadin) tablet 5 mg  acetaminophen (Tylenol) tablet 650 mg  acetaminophen (Tylenol) oral liquid 650 mg  acetaminophen (Tylenol) suppository 650 mg  melatonin tablet 3 mg  polyethylene glycol (Glycolax, Miralax) packet 17 g  docusate sodium (Colace) capsule 100 mg  benzocaine-menthol (Cepastat Sore Throat) 15-3.6 mg lozenge 1 lozenge  dextromethorphan-guaifenesin (Robitussin DM)  mg/5 mL oral liquid 5 mL  guaiFENesin (Mucinex) 12 hr tablet 600 mg  ondansetron ODT (Zofran-ODT) disintegrating tablet 4 mg  ondansetron (Zofran) injection 4 mg  pantoprazole (ProtoNix) EC tablet 40 mg  pantoprazole (ProtoNix) injection 40 mg  furosemide (Lasix) injection 20 mg  losartan (Cozaar) tablet 25 mg  furosemide (Lasix) tablet 40 mg  carvedilol (Coreg) tablet 6.25 mg  potassium chloride CR (Klor-Con M20) ER tablet 40 mEq  carvedilol (Coreg) tablet 12.5 mg  losartan (Cozaar) tablet 50 mg  furosemide (Lasix) tablet 40 mg  potassium chloride (Klor-Con) packet 20 mEq      Relevant Results  Labs  Results from last 72 hours   Lab  "Units 01/25/24  0430 01/24/24  0421 01/23/24  0420   WBC AUTO x10*3/uL 5.0 6.1 7.4   HEMOGLOBIN g/dL 10.9* 10.6* 10.8*   HEMATOCRIT % 34.6* 33.2* 33.4*   PLATELETS AUTO x10*3/uL 228 229 223       Results from last 72 hours   Lab Units 01/25/24  0430 01/24/24  0421 01/23/24 0420   SODIUM mmol/L 132* 131* 129*   POTASSIUM mmol/L 3.4 3.2* 3.6   CHLORIDE mmol/L 97 96* 95*   CO2 mmol/L 25 25 21*   BUN mg/dL 24 23 23   CREATININE mg/dL 1.20 1.10 1.10   GLUCOSE mg/dL 104* 99 100*   CALCIUM mg/dL 8.7 8.5 8.8   ANION GAP mmol/L 10 10 13   EGFR mL/min/1.73m*2 59* 65 65       Results from last 72 hours   Lab Units 01/23/24 0420   ALK PHOS U/L 113   BILIRUBIN TOTAL mg/dL 0.9   PROTEIN TOTAL g/dL 7.2   ALT U/L 9   AST U/L 21   ALBUMIN g/dL 3.6       Estimated Creatinine Clearance: 50.6 mL/min (by C-G formula based on SCr of 1.2 mg/dL).  No results found for: \"CRP\"  Microbiology    Imaging  ECG 12 lead    Result Date: 1/23/2024   Poor data quality, interpretation may be adversely affected Ventricular-paced rhythm Abnormal ECG When compared with ECG of 06-OCT-2022 16:59, No significant change was found Confirmed by Tay Cr ([id number]) on 1/23/2024 1:28:19 PM    XR chest 1 view    Result Date: 1/22/2024  Interpreted By:  Solomon Tuttle, STUDY: XR CHEST 1 VIEW; 1/22/2024 2:21 pm   INDICATION: Signs/Symptoms:dyspnea.   COMPARISON: 09/15/2023   ACCESSION NUMBER(S): [id number]   ORDERING CLINICIAN: ALBAN OCAMPO   TECHNIQUE: 1 view of the chest was performed.   FINDINGS: Left chest wall pacemaker and leads appear intact. The lungs are adequately inflated. No acute consolidation. No pneumothorax. Mild blunting of the right costophrenic angle may reflect a small effusion. There may be a minimal left pleural effusion as well. Cardiomediastinal silhouette is mild-to-moderately enlarged.. Median sternotomy wires are intact.       Mild-moderate cardiomegaly. Congestive changes without overt pulmonary edema. Possible small pleural " effusions. No acute consolidation.   Signed by: Solomon Tuttle 1/22/2024 2:36 PM Dictation workstation:   [id number]    Vascular US lower extremity venous duplex bilateral    Result Date: 1/22/2024  Interpreted By:  Solomon Tuttle, STUDY: [facility] US LOWER EXTREMITY VENOUS DUPLEX BILATERAL;  1/22/2024 2:11 pm   INDICATION: Signs/Symptoms:atraumatic erythema/edema in the bilateral lower extremities, concern for DVT   COMPARISON: None.   ACCESSION NUMBER(S): [id number]   ORDERING CLINICIAN: ALBAN OCAMPO   TECHNIQUE: Vascular ultrasound of the bilateral lower extremities was performed. Real-time compression views as well as Gray scale, color Doppler and spectral Doppler waveform analysis was performed.   FINDINGS: Evaluation of the visualized portions of the bilateral common femoral vein, proximal, mid, and distal femoral vein, and popliteal vein were performed.   No evidence for DVT in the bilateral lower extremities.       No evidence for DVT in the bilateral lower extremities.   MACRO: None   Signed by: Solomon Tuttle 1/22/2024 2:33 PM Dictation workstation:   [id number]     Assessment/Plan   Coronavirus-positive PCR 1/22/2024  Complete AV block, s/p pacemaker     Discussed Remdesivir with patient and patient's daughter-declined  Monitor oxygenation  Supportive care  Monitor temperature and wbc  Follow-up blood cultures  Cardiology follow-up  Droplet plus precautions      NIKOLAY Joiner-CNP

## 2024-01-25 NOTE — PROGRESS NOTES
"Gorge Larsen is a 88 y.o. male on day 1 of admission presenting with General symptom.    Subjective   Patient is feeling a lot better.  Breathing is better.  Leg swelling is gone       Objective     Physical Exam  Vitals reviewed.   Constitutional:       Appearance: Normal appearance.   HENT:      Head: Normocephalic and atraumatic.      Right Ear: Tympanic membrane, ear canal and external ear normal.      Left Ear: Tympanic membrane, ear canal and external ear normal.      Nose: Nose normal.      Mouth/Throat:      Pharynx: Oropharynx is clear.   Eyes:      Extraocular Movements: Extraocular movements intact.      Conjunctiva/sclera: Conjunctivae normal.      Pupils: Pupils are equal, round, and reactive to light.   Cardiovascular:      Rate and Rhythm: Normal rate and regular rhythm.      Pulses: Normal pulses.      Heart sounds: Normal heart sounds.   Pulmonary:      Effort: Pulmonary effort is normal.      Breath sounds: Normal breath sounds.   Abdominal:      General: Abdomen is flat. Bowel sounds are normal.      Palpations: Abdomen is soft.   Musculoskeletal:      Cervical back: Normal range of motion and neck supple.   Skin:     General: Skin is warm and dry.   Neurological:      General: No focal deficit present.      Mental Status: He is alert and oriented to person, place, and time.   Psychiatric:         Mood and Affect: Mood normal.         Last Recorded Vitals  Blood pressure 118/60, pulse 74, temperature 36.7 °C (98.1 °F), temperature source Oral, resp. rate 18, height 1.753 m (5' 9\"), weight 96.2 kg (212 lb 1.3 oz), SpO2 99 %.  Intake/Output last 3 Shifts:  I/O last 3 completed shifts:  In: 900 (9.4 mL/kg) [P.O.:900]  Out: 500 (5.2 mL/kg) [Urine:500 (0.1 mL/kg/hr)]  Weight: 96.2 kg     Relevant Results              Scheduled medications  aspirin, 81 mg, oral, Daily  carvedilol, 12.5 mg, oral, BID with meals  dapagliflozin propanediol, 10 mg, oral, Daily  docusate sodium, 100 mg, oral, " BID  [START ON 1/26/2024] furosemide, 40 mg, oral, Daily  levothyroxine, 75 mcg, oral, Daily  losartan, 50 mg, oral, BID  melatonin, 3 mg, oral, Daily  pantoprazole, 40 mg, oral, Daily before breakfast   Or  pantoprazole, 40 mg, intravenous, Daily before breakfast  polyethylene glycol, 17 g, oral, Daily  [START ON 1/26/2024] potassium chloride, 20 mEq, oral, Daily  rosuvastatin, 5 mg, oral, Nightly  [Held by provider] sertraline, 25 mg, oral, Daily  warfarin, 5 mg, oral, Daily      Continuous medications     PRN medications  PRN medications: acetaminophen **OR** acetaminophen **OR** acetaminophen, benzocaine-menthol, dextromethorphan-guaifenesin, guaiFENesin, ondansetron ODT **OR** ondansetron  Results for orders placed or performed during the hospital encounter of 01/22/24 (from the past 24 hour(s))   Basic Metabolic Panel   Result Value Ref Range    Glucose 104 (H) 65 - 99 mg/dL    Sodium 132 (L) 133 - 145 mmol/L    Potassium 3.4 3.4 - 5.1 mmol/L    Chloride 97 97 - 107 mmol/L    Bicarbonate 25 24 - 31 mmol/L    Urea Nitrogen 24 8 - 25 mg/dL    Creatinine 1.20 0.40 - 1.60 mg/dL    eGFR 59 (L) >60 mL/min/1.73m*2    Calcium 8.7 8.5 - 10.4 mg/dL    Anion Gap 10 <=19 mmol/L   CBC   Result Value Ref Range    WBC 5.0 4.4 - 11.3 x10*3/uL    nRBC 0.0 0.0 - 0.0 /100 WBCs    RBC 3.80 (L) 4.50 - 5.90 x10*6/uL    Hemoglobin 10.9 (L) 13.5 - 17.5 g/dL    Hematocrit 34.6 (L) 41.0 - 52.0 %    MCV 91 80 - 100 fL    MCH 28.7 26.0 - 34.0 pg    MCHC 31.5 (L) 32.0 - 36.0 g/dL    RDW 16.0 (H) 11.5 - 14.5 %    Platelets 228 150 - 450 x10*3/uL                    Assessment/Plan   Principal Problem:    General symptom  Active Problems:    Anxiety    Apnea, sleep    Benign essential HTN    Chronic atrial fibrillation (CMS/HCC)    Congestive heart failure (CMS/HCC)    Hyponatremia    Hypothyroidism    S/P TAVR (transcatheter aortic valve replacement)    Dyslipidemia    Cardiomyopathy (CMS/HCC)    COVID-19    Generalized weakness    Acute on  chronic systolic heart failure (CMS/[facility])    CHF is better compensated  EPS wants to update dual-chamber permanent pacemaker to biventricular ICD given his reduced ejection fraction  Recovering well from COVID  Sodium is improving  Will DC his SSRI  DC home      informed me that patient needs home care.  Home care orders added to the discharge  I spent  minutes in the professional and overall care of this patient.      Natasha Antoine MD

## 2024-01-25 NOTE — PROGRESS NOTES
Subjective Data:  The patient remains clinically improved denies shortness of breath     Overnight Events:    As discussed below     Objective Data:  Last Recorded Vitals:  Vitals:    01/24/24 0736 01/24/24 1549 01/24/24 2040 01/25/24 0700   BP: 155/60 142/66 107/50 118/60   BP Location: Left arm Left arm Left arm Left arm   Patient Position: Sitting Lying Lying Sitting   Pulse: 90 71 70 74   Resp: 18 20 20 18   Temp: 37 °C (98.6 °F) 36.6 °C (97.9 °F) 36.6 °C (97.9 °F) 36.7 °C (98.1 °F)   TempSrc: Oral Oral Oral Oral   SpO2: 95% 97% 97% 99%   Weight:       Height:           Last Labs:  CBC - 1/25/2024:  4:30 AM  5.0 10.9 228    34.6      CMP - 1/25/2024:  4:30 AM  8.7 7.2 21 --- 0.9   _ 3.6 9 113      PTT - 9/15/2023: 12:52 AM  3.1   30.0 37.2     TROPHS   Date/Time Value Ref Range Status   09/03/2022 02:21 AM 34 0 - 53 ng/L Final     Comment:     .  Less than 99th percentile of normal range cutoff-  Female and children under 18 years old <35 ng/L; Male <54 ng/L: Negative  Repeat testing should be performed if clinically indicated.   .  Female and children under 18 years old  ng/L; Male  ng/L:  Consistent with possible cardiac damage and possible increased clinical   risk. Serial measurements may help to assess extent of myocardial damage.   .  >120 ng/L: Consistent with cardiac damage, increased clinical risk and  myocardial infarction. Serial measurements may help assess extent of   myocardial damage.   .   NOTE: Children less than 1 year old may have higher baseline troponin   levels and results should be interpreted in conjunction with the overall   clinical context.  .  NOTE: Troponin I testing is performed using a different   testing methodology at [facility] than at other   [facility] hospitals. Direct result comparisons should only   be made within the same method.     08/08/2022 04:51 PM 21 0 - 20 ng/L Final     Comment:     .  Less than 99th percentile of normal range cutoff-  Female  and children under 18 years old <14 ng/L; Male <21 ng/L: Negative  Repeat testing should be performed if clinically indicated.   .  Female and children under 18 years old 14-50 ng/L; Male 21-50 ng/L:  Consistent with possible cardiac damage and possible increased clinical   risk. Serial measurements may help to assess extent of myocardial damage.   .  >50 ng/L: Consistent with cardiac damage, increased clinical risk and  myocardial infarction. Serial measurements may help assess extent of   myocardial damage.   .   NOTE: Children less than 1 year old may have higher baseline troponin   levels and results should be interpreted in conjunction with the overall   clinical context.   .  NOTE: Troponin I testing is performed using a different   testing methodology at [facility] than at other   [facility] hospitals. Direct result comparisons should only   be made within the same method.     08/08/2022 03:37 PM 24 0 - 20 ng/L Final     Comment:     .  Less than 99th percentile of normal range cutoff-  Female and children under 18 years old <14 ng/L; Male <21 ng/L: Negative  Repeat testing should be performed if clinically indicated.   .  Female and children under 18 years old 14-50 ng/L; Male 21-50 ng/L:  Consistent with possible cardiac damage and possible increased clinical   risk. Serial measurements may help to assess extent of myocardial damage.   .  >50 ng/L: Consistent with cardiac damage, increased clinical risk and  myocardial infarction. Serial measurements may help assess extent of   myocardial damage.   .   NOTE: Children less than 1 year old may have higher baseline troponin   levels and results should be interpreted in conjunction with the overall   clinical context.   .  NOTE: Troponin I testing is performed using a different   testing methodology at [facility] than at other   [facility] hospitals. Direct result comparisons should only   be made within the same method.       BNP    Date/Time Value Ref Range Status   09/03/2022 02:21  0 - 99 pg/mL Final     Comment:     .  <100 pg/mL - Heart failure unlikely  100-299 pg/mL - Intermediate probability of acute heart  .               failure exacerbation. Correlate with clinical  .               context and patient history.    >=300 pg/mL - Heart Failure likely. Correlate with clinical  .               context and patient history.   Biotin interference may cause falsely decreased results.   Patients taking a Biotin dose of up to 5 mg/day should   refrain from taking Biotin for 24 hours before sample   collection. Providers may contact their local laboratory   for further information.     08/08/2022 03:37  0 - 99 pg/mL Final     Comment:     .  <100 pg/mL - Heart failure unlikely  100-299 pg/mL - Intermediate probability of acute heart  .               failure exacerbation. Correlate with clinical  .               context and patient history.    >=300 pg/mL - Heart Failure likely. Correlate with clinical  .               context and patient history.  BNP testing is performed using different testing   methodology at [facility] than at other   [facility]. Direct result comparisons should   only be made within the same method.       HGBA1C   Date/Time Value Ref Range Status   12/11/2020 11:24 AM 5.3 4.0 - 6.0 % Final     Comment:     Hemoglobin A1C levels are related to mean blood glucose during the   preceding 2-3 months. The relationship table below may be used as a   general guide. Each 1% increase in HGB A1C is a reflection of an   increase in mean glucose of approximately 30 mg/dl.   Reference: Diabetes Care, volume 29, supplement 1 Jan. 2006                        HGB A1C ................. Approx. Mean Glucose   _______________________________________________   6%   ...............................  120 mg/dl   7%   ...............................  150 mg/dl   8%   ...............................  180 mg/dl   9%    ...............................  210 mg/dl   10%  ...............................  240 mg/dl  Performed at [address] [address]     07/15/2019 09:39 AM 5.9 4.0 - 6.0 % Final     Comment:     Hemoglobin A1C levels are related to mean blood glucose during the   preceding 2-3 months. The relationship table below may be used as a   general guide. Each 1% increase in HGB A1C is a reflection of an   increase in mean glucose of approximately 30 mg/dl.   Reference: Diabetes Care, volume 29, supplement 1 Jan. 2006                        HGB A1C ................. Approx. Mean Glucose   _______________________________________________   6%   ...............................  120 mg/dl   7%   ...............................  150 mg/dl   8%   ...............................  180 mg/dl   9%   ...............................  210 mg/dl   10%  ...............................  240 mg/dl  Performed at [address] [address]       LDLCALC   Date/Time Value Ref Range Status   01/08/2024 08:52 AM 43 <=99 mg/dL Final     Comment:                                 Near   Borderline      AGE      Desirable  Optimal    High     High     Very High     0-19 Y     0 - 109     ---    110-129   >/= 130     ----    20-24 Y     0 - 119     ---    120-159   >/= 160     ----      >24 Y     0 -  99   100-129  130-159   160-189     >/=190     10/13/2022 05:28 AM 58 65 - 130 MG/DL Final   09/01/2022 04:42 AM 90 65 - 130 MG/DL Final   07/22/2022 09:36 AM 98 65 - 130 MG/DL Final     VLDL   Date/Time Value Ref Range Status   01/08/2024 08:52 AM 12 0 - 40 mg/dL Final   06/30/2023 08:49 AM 15 0 - 40 mg/dL Final   03/15/2023 08:57 AM 18 0 - 40 mg/dL Final   11/30/2022 09:20 AM 12 0 - 40 mg/dL Final      Last I/O:  I/O last 3 completed shifts:  In: 900 (9.4 mL/kg) [P.O.:900]  Out: 500 (5.2 mL/kg) [Urine:500 (0.1 mL/kg/hr)]  Weight: 96.2 kg     Past Cardiology Tests (Last 3 Years):  EKG:  ECG 12 lead  "01/22/2024    Echo:  Transthoracic Echo (TTE) Complete 10/19/2023    Ejection Fractions:  No results found for: \"EF\"  Cath:  No results found for this or any previous visit from the past 1095 days.    Stress Test:  No results found for this or any previous visit from the past 1095 days.    Cardiac Imaging:  No results found for this or any previous visit from the past 1095 days.      Inpatient Medications:  Scheduled medications   Medication Dose Route Frequency    aspirin  81 mg oral Daily    carvedilol  12.5 mg oral BID with meals    docusate sodium  100 mg oral BID    furosemide  40 mg oral BID with meals    levothyroxine  75 mcg oral Daily    losartan  50 mg oral BID    melatonin  3 mg oral Daily    pantoprazole  40 mg oral Daily before breakfast    Or    pantoprazole  40 mg intravenous Daily before breakfast    polyethylene glycol  17 g oral Daily    potassium chloride  20 mEq oral BID    rosuvastatin  5 mg oral Nightly    [Held by provider] sertraline  25 mg oral Daily    warfarin  5 mg oral Daily     PRN medications   Medication    acetaminophen    Or    acetaminophen    Or    acetaminophen    benzocaine-menthol    dextromethorphan-guaifenesin    guaiFENesin    ondansetron ODT    Or    ondansetron     Continuous Medications   Medication Dose Last Rate       Physical Exam:       Assessment/Plan     1/23: The patient is an 88-year-old white male who does have a history of CAD with remote CABG x 3 in 2002.  He also has a history of severe aortic valvular stenosis that required a transcatheter aortic valve replacement on 9/9/2022.  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  He also has by recent echo 2+ mitral valve regurgitation 3+ tricuspid valve regurgitation and moderately severe to severe pulmonary hypertension with an estimated PA systolic pressure 65 mmHg.  He is currently admitted with a complaint of increasing weakness after being diagnosed with COVID " although his nasal swab currently is negative.  Due to his peripheral edema pulmonary hypertension and LV dysfunction will modify current therapy.  He had not been on Lasix previously and it will be started at 40 mg twice daily and then later most likely reduced to 40 mg daily.  He is already on Farxiga 10 mg daily which will be kept unchanged.  Will increase losartan to 25 mg twice daily and further up titrate if tolerated by blood pressure.  He will be switched from metoprolol to carvedilol 6.25 mg twice daily with plans to uptitrate as well.  Fortunately on device interrogation he does remain in AV paced rhythm.  His pulse generator evidently requires replacement within 3 months and given his need to ventricular paced 98% of the time and in view of his LV dysfunction upgrade of the device to a BiV ICD may be indicated.  Will consult electrophysiology.      1/24: Patient resting comfortably not short of breath.  The patient's inputs and outputs were not accurately recorded with no urinary output recorded.  He was started on IV Lasix 40 mg twice daily yesterday and his lower extremity edema has significantly improved.  Systolic blood pressures have been ranging between 135-160 mmHg and will uptitrate his losartan to 50 mg twice daily carvedilol 12.5 mg twice daily.  Renal parameters notable for creatinine 1.1 potassium 3.2 which will supplement.  INR is 3.1.  CBC includes a stable hemoglobin 10.6 WBC 6100.  Electrophysiology aware and will be seeing patient.  His original plan was to have the pulse generator replaced in either 3 or 4/2024 as an outpatient.  Would also need to assess for possible upgrade to an ICD with BiV pacing.    1/25: Patient feeling fairly well anxious to go home.  He was seen by electrophysiology who has confirmed the need for upgrade of his dual-chamber permanent pacemaker to a biventricular ICD given his reduced LV ejection fraction of 35% and need for continuous ventricular pacing.  This  will be performed as an outpatient.  The patient is tolerating adjusted therapy for his acute on chronic systolic CHF.  He is currently on losartan 50 mg twice daily carvedilol 12.5 mg twice daily which will be kept unchanged in addition to the Farxiga 10 mg daily.  The patient does have an office visit scheduled for 2/6/2024 and will recheck his electrolyte panel at that time.  Electrolyte panel from earlier today includes creatinine 1.2 potassium 3.4.  Will reduce the dosage of the patient's Lasix and potassium supplement from twice daily to daily.    Peripheral IV 01/22/24 20 G Right Antecubital (Active)   Site Assessment Clean;Dry;Intact 01/25/24 0000   Dressing Status Dry;Clean 01/25/24 0000   Number of days: 3       Code Status:  Full Code    I spent  minutes in the professional and overall care of this patient.        Jeremy Clarke MD

## 2024-01-25 NOTE — PROGRESS NOTES
"Gorge Larsen is a 88 y.o. male on day 1 of admission presenting with General symptom.    Subjective   TCSW spoke with pt's DIANAA and daughter Mahogany, to discuss HHC post discharge. Mahogany states pt has had HHC in the past and would like to have HHC again. TCSW sent a message to Dr Antoine requesting internal HC orders for  HHC.        Objective     Physical Exam    Last Recorded Vitals  Blood pressure 118/60, pulse 74, temperature 36.7 °C (98.1 °F), temperature source Oral, resp. rate 18, height 1.753 m (5' 9\"), weight 96.2 kg (212 lb 1.3 oz), SpO2 99 %.  Intake/Output last 3 Shifts:  I/O last 3 completed shifts:  In: 900 (9.4 mL/kg) [P.O.:900]  Out: 500 (5.2 mL/kg) [Urine:500 (0.1 mL/kg/hr)]  Weight: 96.2 kg     Relevant Results                             Assessment/Plan   Principal Problem:    General symptom  Active Problems:    Anxiety    Apnea, sleep    Benign essential HTN    Chronic atrial fibrillation (CMS/HCC)    Congestive heart failure (CMS/HCC)    Hyponatremia    Hypothyroidism    S/P TAVR (transcatheter aortic valve replacement)    Dyslipidemia    Cardiomyopathy (CMS/HCC)    COVID-19    Generalized weakness    Acute on chronic systolic heart failure (CMS/HCC)               MAGY Rosa      "

## 2024-01-25 NOTE — DISCHARGE SUMMARY
Discharge Diagnosis  General symptom    Issues Requiring Follow-Up  Congestive heart failure  Pacemaker   COVID  Coumadin anticoagulation    Test Results Pending At Discharge  Pending Labs       Order Current Status    Blood Culture Preliminary result    Blood Culture Preliminary result            Hospital Course   Gorge Larsen is a 88 y.o. male presenting with generalized weakness.  Patient says that he got diagnosed with COVID over the weekend.  He was doing good but he just kept feeling weak he had just generalized malaise came to the emergency room today and tested positive for COVID again.  Feels short of breath has some cough no fever just feels malaise body ache.    Patient refused treatment with remdesivir.  Respiratory status stable stable  Patient was hyponatremic and volume overloaded.  Chest x-ray showed congestive heart failure BNP came hide  Treated with Lasix  Cardiology consulted ejection fraction 30% this which cardiac medications and patient started on maintenance Lasix  EPS cardiologist consulted and they do recommend upgrading to biventricular pacemaker as patient is already scheduled for battery change  By discharge patient was very well compensated with CHF breathing was better leg swelling was gone.  Discharged home in stable condition    Pertinent Physical Exam At Time of Discharge  Physical Exam    Home Medications     Medication List      START taking these medications     carvedilol 12.5 mg tablet; Commonly known as: Coreg; Take 1 tablet (12.5   mg) by mouth 2 times a day with meals.   dapagliflozin propanediol 10 mg; Commonly known as: Farxiga; Take 1   tablet (10 mg) by mouth once daily. Do not start before January 26, 2024.;   Start taking on: January 26, 2024   furosemide 40 mg tablet; Commonly known as: Lasix; Take 1 tablet (40 mg)   by mouth once daily. Do not start before January 26, 2024.; Start taking   on: January 26, 2024   potassium chloride 20 mEq packet; Commonly known  as: Klor-Con; Take 20   mEq by mouth once daily. Do not start before January 26, 2024.; Start   taking on: January 26, 2024     CHANGE how you take these medications     losartan 50 mg tablet; Commonly known as: Cozaar; Take 1 tablet (50 mg)   by mouth 2 times a day.; What changed: medication strength, how much to   take, when to take this     CONTINUE taking these medications     aspirin 81 mg EC tablet   levothyroxine 50 mcg tablet; Commonly known as: Synthroid, Levoxyl; Take   1.5 tablets (75 mcg) by mouth once daily.   rosuvastatin 5 mg tablet; Commonly known as: Crestor; Take 1 tablet (5   mg) by mouth once daily at bedtime.   * warfarin 7.5 mg tablet; Commonly known as: Coumadin   * warfarin 5 mg tablet; Commonly known as: Coumadin; Take 1 tablet (5   mg) by mouth 4 times a week. Monday, Wednesday, Saturday and Sunday  * This list has 2 medication(s) that are the same as other medications   prescribed for you. Read the directions carefully, and ask your doctor or   other care provider to review them with you.     STOP taking these medications     metoprolol succinate XL 25 mg 24 hr tablet; Commonly known as: Toprol-XL   sertraline 25 mg tablet; Commonly known as: Zoloft       Outpatient Follow-Up  Future Appointments   Date Time Provider Department Center   2/6/2024 10:45 AM [id number] [id number] [facility]   2/22/2024  2:30 PM Jeremy Clarke MD [id number] [facility]   4/13/2024 11:30 AM Natasha Antoine MD [id number] Russel Antoine MD

## 2024-01-26 ENCOUNTER — HOME CARE VISIT (OUTPATIENT)
Dept: HOME HEALTH SERVICES | Facility: HOME HEALTH | Age: 89
End: 2024-01-26
Payer: MEDICARE

## 2024-01-26 ENCOUNTER — DOCUMENTATION (OUTPATIENT)
Dept: HOME HEALTH SERVICES | Facility: HOME HEALTH | Age: 89
End: 2024-01-26
Payer: MEDICARE

## 2024-01-26 ENCOUNTER — HOME HEALTH ADMISSION (OUTPATIENT)
Dept: HOME HEALTH SERVICES | Facility: HOME HEALTH | Age: 89
End: 2024-01-26
Payer: MEDICARE

## 2024-01-26 VITALS
HEART RATE: 70 BPM | BODY MASS INDEX: 29.94 KG/M2 | OXYGEN SATURATION: 97 % | WEIGHT: 202.13 LBS | SYSTOLIC BLOOD PRESSURE: 104 MMHG | TEMPERATURE: 97.5 F | HEIGHT: 69 IN | RESPIRATION RATE: 20 BRPM | DIASTOLIC BLOOD PRESSURE: 68 MMHG

## 2024-01-26 PROCEDURE — 0023 HH SOC

## 2024-01-26 PROCEDURE — G0299 HHS/HOSPICE OF RN EA 15 MIN: HCPCS

## 2024-01-26 ASSESSMENT — LIFESTYLE VARIABLES: SMOKING_STATUS: 0

## 2024-01-26 ASSESSMENT — ACTIVITIES OF DAILY LIVING (ADL)
TRANSPORTATION: DEPENDENT
BATHING_CURRENT_FUNCTION: CONTACT GUARD ASSIST
OASIS_M1830: 03
GROOMING ASSESSED: 1
PHYSICAL TRANSFERS ASSESSED: 1
USING THE TELPHONE: NEEDS ASSISTANCE
TOILETING: STAND BY ASSIST
ORAL_CARE_ASSESSED: 1
SHOPPING: DEPENDENT
AMBULATION ASSISTANCE: STAND BY ASSIST
ORAL_CARE_CURRENT_FUNCTION: NEEDS ASSISTANCE
FEEDING ASSESSED: 1
GROOMING_CURRENT_FUNCTION: STAND BY ASSIST
TOILETING: 1
PREPARING MEALS: DEPENDENT
LAUNDRY ASSESSED: 1
DRESSING_UB_CURRENT_FUNCTION: STAND BY ASSIST
TELEPHONE USE ASSESSED: 1
ENTERING_EXITING_HOME: MODERATE ASSIST
AMBULATION ASSISTANCE: 1
LIGHT HOUSEKEEPING: DEPENDENT
BATHING ASSESSED: 1
TRANSPORTATION ASSESSED: 1
SHOPPING ASSESSED: 1
HOUSEKEEPING ASSESSED: 1
LAUNDRY: DEPENDENT
CURRENT_FUNCTION: STAND BY ASSIST
DRESSING_LB_CURRENT_FUNCTION: MINIMUM ASSIST
FEEDING: SUPERVISION

## 2024-01-26 ASSESSMENT — ENCOUNTER SYMPTOMS
PAIN LOCATION: BACK
FORGETFULNESS: 1
MUSCLE WEAKNESS: 1
DYSPNEA ACTIVITY LEVEL: AFTER AMBULATING LESS THAN 10 FT
PAIN LOCATION - PAIN FREQUENCY: FREQUENT
COUGH: 1
STOOL FREQUENCY: LESS THAN DAILY
LIMITED RANGE OF MOTION: 1
OCCASIONAL FEELINGS OF UNSTEADINESS: 1
PAIN SEVERITY GOAL: 0/10
COUGH CHARACTERISTICS: PRODUCTIVE
DESCRIPTION OF MEMORY LOSS: IMMEDIATE
AGITATION: 1
LOWER EXTREMITY EDEMA: 1
SUBJECTIVE PAIN PROGRESSION: UNCHANGED
PAIN LOCATION - PAIN SEVERITY: 10/10
FATIGUES EASILY: 1
PAIN LOCATION - RELIEVING FACTORS: REST SITTING
APPETITE LEVEL: FAIR
SHORTNESS OF BREATH: 1
BLURRED VISION: 1
DESCRIPTION OF MEMORY LOSS: LONG TERM
PAIN: 1
DYSPNEA ON EXERTION: 1
PAIN LOCATION - PAIN DURATION: VARIES
HIGHEST PAIN SEVERITY IN PAST 24 HOURS: 10/10
FATIGUE: 1
LOWEST PAIN SEVERITY IN PAST 24 HOURS: 0/10
PERSON REPORTING PAIN: PATIENT
TREMORS: 1
LAST BOWEL MOVEMENT: 66864
DRY SKIN: 1
CONSTIPATION: 1
CHANGE IN APPETITE: DECREASED
DIZZINESS: 1
TROUBLE SWALLOWING: 1
DESCRIPTION OF MEMORY LOSS: SHORT TERM

## 2024-01-26 NOTE — Clinical Note
FYI Information from start of Home Care: Family reports Dr Mcdonald is managing pt's Coumadin &  INRs, there a miss of meds on Thursday and  then Friday when it should have been held it was given. Ex-Wife could not obtain INR this morning. Had her try and helped to obtain a level   2.2 was obtained.  She has a phone sofiya that logs results to Dr Mcdonald for the INRs. Thank you

## 2024-01-26 NOTE — HH CARE COORDINATION
Home Care received a Referral for Nursing, Physical Therapy, and Occupational Therapy. We have processed the referral for a Start of Care on 01/27.     If you have any questions or concerns, please feel free to contact us at [phone]. Follow the prompts, enter your five digit zip code, and you will be directed to your care team on EAST 1.

## 2024-01-27 LAB
BACTERIA BLD CULT: NORMAL
BACTERIA BLD CULT: NORMAL

## 2024-01-27 NOTE — HOME HEALTH
"Pt lives with first ex-wife for past 11 years. Second ex-wife he  and she passed away but pt has adult children. Pt has significant hx for colon cancer and 18 inches were resected, Emphasema, Afib and CHF. Pt's meds are managed by ex-wife. who has an sofiya that goes to Dr Mcdonald for INR result ( today 2.2 during visit). She did make Warfarin administration mistake and pt did forget yesturday's dose. She verbalized her understanding of the correct dosing.Pt was admited with COVID now causing him weakness and dyspnea.   During end of visit pt expressed there were two hospital employees out to get him, \"they were\". Pt spoke of not having bed changed and cold food.  Ex wife noted she is aware he has thoughts like this. She expressed if it gets to be too much, he would have to leave. She notes he has money, instructed her he could to go to an assisted living if needed if she cannot care for him.   Pt declines using his walker but used walls and furniture to walk.    Pt c/o constipation and family says he does have prune juice, offered he may need it daily."

## 2024-01-30 ENCOUNTER — HOME CARE VISIT (OUTPATIENT)
Dept: HOME HEALTH SERVICES | Facility: HOME HEALTH | Age: 89
End: 2024-01-30
Payer: MEDICARE

## 2024-01-30 VITALS
RESPIRATION RATE: 20 BRPM | DIASTOLIC BLOOD PRESSURE: 62 MMHG | HEART RATE: 87 BPM | SYSTOLIC BLOOD PRESSURE: 110 MMHG | TEMPERATURE: 98.7 F | OXYGEN SATURATION: 96 %

## 2024-01-30 PROCEDURE — G0300 HHS/HOSPICE OF LPN EA 15 MIN: HCPCS

## 2024-01-30 ASSESSMENT — PAIN SCALES - PAIN ASSESSMENT IN ADVANCED DEMENTIA (PAINAD)
NEGVOCALIZATION: 0 - NONE.
NEGVOCALIZATION: 0
FACIALEXPRESSION: 0
TOTALSCORE: 0
FACIALEXPRESSION: 0 - SMILING OR INEXPRESSIVE.
CONSOLABILITY: 0
BODYLANGUAGE: 0 - RELAXED.
BREATHING: 0
BODYLANGUAGE: 0
CONSOLABILITY: 0 - NO NEED TO CONSOLE.

## 2024-01-30 ASSESSMENT — ENCOUNTER SYMPTOMS
LAST BOWEL MOVEMENT: 66869
CHANGE IN APPETITE: UNCHANGED
DENIES PAIN: 1
APPETITE LEVEL: GOOD

## 2024-02-01 ENCOUNTER — HOME CARE VISIT (OUTPATIENT)
Dept: HOME HEALTH SERVICES | Facility: HOME HEALTH | Age: 89
End: 2024-02-01
Payer: MEDICARE

## 2024-02-01 VITALS
TEMPERATURE: 97.4 F | DIASTOLIC BLOOD PRESSURE: 72 MMHG | RESPIRATION RATE: 20 BRPM | SYSTOLIC BLOOD PRESSURE: 122 MMHG | HEART RATE: 76 BPM | OXYGEN SATURATION: 96 %

## 2024-02-01 PROCEDURE — G0151 HHCP-SERV OF PT,EA 15 MIN: HCPCS

## 2024-02-01 ASSESSMENT — ENCOUNTER SYMPTOMS
PERSON REPORTING PAIN: PATIENT
OCCASIONAL FEELINGS OF UNSTEADINESS: 1
DENIES PAIN: 1

## 2024-02-02 ENCOUNTER — HOME CARE VISIT (OUTPATIENT)
Dept: HOME HEALTH SERVICES | Facility: HOME HEALTH | Age: 89
End: 2024-02-02
Payer: MEDICARE

## 2024-02-02 VITALS
TEMPERATURE: 97.8 F | DIASTOLIC BLOOD PRESSURE: 52 MMHG | HEART RATE: 73 BPM | SYSTOLIC BLOOD PRESSURE: 100 MMHG | RESPIRATION RATE: 18 BRPM | OXYGEN SATURATION: 100 %

## 2024-02-02 DIAGNOSIS — I50.9 CONGESTIVE HEART FAILURE, UNSPECIFIED HF CHRONICITY, UNSPECIFIED HEART FAILURE TYPE (MULTI): ICD-10-CM

## 2024-02-02 DIAGNOSIS — I50.23 ACUTE ON CHRONIC SYSTOLIC HEART FAILURE (MULTI): Primary | ICD-10-CM

## 2024-02-02 DIAGNOSIS — I48.0 PAF (PAROXYSMAL ATRIAL FIBRILLATION) (MULTI): ICD-10-CM

## 2024-02-02 PROCEDURE — G0300 HHS/HOSPICE OF LPN EA 15 MIN: HCPCS

## 2024-02-02 RX ORDER — WARFARIN SODIUM 5 MG/1
5 TABLET ORAL
Qty: 90 TABLET | Refills: 1 | Status: SHIPPED | OUTPATIENT
Start: 2024-02-03 | End: 2024-03-14 | Stop reason: SDUPTHER

## 2024-02-02 SDOH — HEALTH STABILITY: PHYSICAL HEALTH
EXERCISE COMMENTS: PT INSTRUCTED PATIENT IN SEATED EXERCISES IN ORDER TO IMPROVE LE STRENGTH AND FUNCTIONAL PERFORMANCE, 1X10:  - KNEE EXTENSION  - KNEE FLEXION  - HIP FLEXION  - HIP ABDUCTION/ADDUCTION  - PF/DF

## 2024-02-02 ASSESSMENT — ENCOUNTER SYMPTOMS
LAST BOWEL MOVEMENT: 66872
APPETITE LEVEL: GOOD
CHANGE IN APPETITE: UNCHANGED
DENIES PAIN: 1
FATIGUES EASILY: 1

## 2024-02-05 DIAGNOSIS — I48.0 PAF (PAROXYSMAL ATRIAL FIBRILLATION) (MULTI): ICD-10-CM

## 2024-02-05 DIAGNOSIS — I44.2 CHB (COMPLETE HEART BLOCK) (MULTI): ICD-10-CM

## 2024-02-05 DIAGNOSIS — Z95.0 CARDIAC PACEMAKER: Primary | ICD-10-CM

## 2024-02-06 ENCOUNTER — HOME CARE VISIT (OUTPATIENT)
Dept: HOME HEALTH SERVICES | Facility: HOME HEALTH | Age: 89
End: 2024-02-06
Payer: MEDICARE

## 2024-02-06 ENCOUNTER — APPOINTMENT (OUTPATIENT)
Dept: CARDIOLOGY | Facility: CLINIC | Age: 89
End: 2024-02-06
Payer: MEDICARE

## 2024-02-06 ENCOUNTER — LAB (OUTPATIENT)
Dept: LAB | Facility: LAB | Age: 89
End: 2024-02-06
Payer: MEDICARE

## 2024-02-06 ENCOUNTER — APPOINTMENT (OUTPATIENT)
Dept: LAB | Facility: LAB | Age: 89
End: 2024-02-06
Payer: MEDICARE

## 2024-02-06 ENCOUNTER — HOSPITAL ENCOUNTER (OUTPATIENT)
Dept: CARDIOLOGY | Facility: CLINIC | Age: 89
Discharge: HOME | End: 2024-02-06
Payer: MEDICARE

## 2024-02-06 ENCOUNTER — OFFICE VISIT (OUTPATIENT)
Dept: CARDIOLOGY | Facility: CLINIC | Age: 89
End: 2024-02-06
Payer: MEDICARE

## 2024-02-06 VITALS
OXYGEN SATURATION: 98 % | RESPIRATION RATE: 20 BRPM | TEMPERATURE: 97.4 F | HEART RATE: 94 BPM | DIASTOLIC BLOOD PRESSURE: 56 MMHG | SYSTOLIC BLOOD PRESSURE: 130 MMHG

## 2024-02-06 DIAGNOSIS — I10 ESSENTIAL HYPERTENSION: ICD-10-CM

## 2024-02-06 DIAGNOSIS — I50.23 ACUTE ON CHRONIC SYSTOLIC CONGESTIVE HEART FAILURE (MULTI): ICD-10-CM

## 2024-02-06 DIAGNOSIS — I25.84 CORONARY ARTERY DISEASE DUE TO CALCIFIED CORONARY LESION: ICD-10-CM

## 2024-02-06 DIAGNOSIS — I25.5 ISCHEMIC CARDIOMYOPATHY: ICD-10-CM

## 2024-02-06 DIAGNOSIS — I25.10 CORONARY ARTERY DISEASE DUE TO CALCIFIED CORONARY LESION: ICD-10-CM

## 2024-02-06 DIAGNOSIS — I44.2 CHB (COMPLETE HEART BLOCK) (MULTI): ICD-10-CM

## 2024-02-06 DIAGNOSIS — I42.9 CARDIOMYOPATHY, UNSPECIFIED TYPE (MULTI): Primary | ICD-10-CM

## 2024-02-06 DIAGNOSIS — Z95.0 CARDIAC PACEMAKER: ICD-10-CM

## 2024-02-06 DIAGNOSIS — Z95.0 CARDIAC PACEMAKER: Primary | ICD-10-CM

## 2024-02-06 DIAGNOSIS — I48.0 PAF (PAROXYSMAL ATRIAL FIBRILLATION) (MULTI): ICD-10-CM

## 2024-02-06 DIAGNOSIS — I44.2 ATRIOVENTRICULAR BLOCK, COMPLETE (MULTI): ICD-10-CM

## 2024-02-06 DIAGNOSIS — Z95.0 PACEMAKER: ICD-10-CM

## 2024-02-06 DIAGNOSIS — I50.41 ACUTE COMBINED SYSTOLIC (CONGESTIVE) AND DIASTOLIC (CONGESTIVE) HEART FAILURE (MULTI): ICD-10-CM

## 2024-02-06 DIAGNOSIS — Z45.010 PACEMAKER AT END OF BATTERY LIFE: ICD-10-CM

## 2024-02-06 LAB — BNP SERPL-MCNC: 249 PG/ML (ref 0–99)

## 2024-02-06 PROCEDURE — 80048 BASIC METABOLIC PNL TOTAL CA: CPT

## 2024-02-06 PROCEDURE — 93280 PM DEVICE PROGR EVAL DUAL: CPT

## 2024-02-06 PROCEDURE — 93290 INTERROG DEV EVAL ICPMS IP: CPT | Performed by: INTERNAL MEDICINE

## 2024-02-06 PROCEDURE — 1126F AMNT PAIN NOTED NONE PRSNT: CPT | Performed by: INTERNAL MEDICINE

## 2024-02-06 PROCEDURE — 36415 COLL VENOUS BLD VENIPUNCTURE: CPT

## 2024-02-06 PROCEDURE — 1111F DSCHRG MED/CURRENT MED MERGE: CPT | Performed by: INTERNAL MEDICINE

## 2024-02-06 PROCEDURE — 85027 COMPLETE CBC AUTOMATED: CPT

## 2024-02-06 PROCEDURE — 83880 ASSAY OF NATRIURETIC PEPTIDE: CPT

## 2024-02-06 PROCEDURE — 93280 PM DEVICE PROGR EVAL DUAL: CPT | Performed by: INTERNAL MEDICINE

## 2024-02-06 PROCEDURE — 85610 PROTHROMBIN TIME: CPT

## 2024-02-06 PROCEDURE — 99214 OFFICE O/P EST MOD 30 MIN: CPT | Performed by: INTERNAL MEDICINE

## 2024-02-06 PROCEDURE — G0151 HHCP-SERV OF PT,EA 15 MIN: HCPCS

## 2024-02-06 PROCEDURE — 1036F TOBACCO NON-USER: CPT | Performed by: INTERNAL MEDICINE

## 2024-02-06 PROCEDURE — 1157F ADVNC CARE PLAN IN RCRD: CPT | Performed by: INTERNAL MEDICINE

## 2024-02-06 SDOH — ECONOMIC STABILITY: HOUSING INSECURITY: HOME SAFETY: HE CHAIR IN ORDER TO ENSURE PATIENT SAFETY AS HE WAS SLEEPING WITH HIS LIFT CHAIR RAISED WHEN PT ARRIVED.

## 2024-02-06 SDOH — ECONOMIC STABILITY: HOUSING INSECURITY
HOME SAFETY: PATIENT HAS MULTIPLE RUGS IN HIS HOME, AREA RUGS, THROW RUGS AND HALLWAY RUGS.  PT EDUCATED PATIENT AND SPOUSE ON HAZARDS OF RUGS, SPECIFICALLY WHEN USING A WALKER.  PT ALSO EDUCATED PATIENT ON IMPORTANCE OF LOWERING HIS LIFT CHAIR AFTER SITTING IN T

## 2024-02-06 SDOH — HEALTH STABILITY: PHYSICAL HEALTH
EXERCISE COMMENTS: PT INSTRUCTED PATIENT IN SEATED EXERCISES IN ORDER TO IMPROVE LE STRENGTH AND FUNCTIONAL PERFORMANCE, 1X10:  - KNEE EXTENSION  - HIP FLEXION    PATIENT PERFORMED WELL WITH VERBAL CUES BUT WAS UNABLE TO TOLERATE MORE DUE TO DIZZINESS.  BP WNL.

## 2024-02-06 ASSESSMENT — ENCOUNTER SYMPTOMS
PERSON REPORTING PAIN: PATIENT
PAIN: 1
OCCASIONAL FEELINGS OF UNSTEADINESS: 0
DEPRESSION: 0
LOSS OF SENSATION IN FEET: 0

## 2024-02-06 ASSESSMENT — COLUMBIA-SUICIDE SEVERITY RATING SCALE - C-SSRS
2. HAVE YOU ACTUALLY HAD ANY THOUGHTS OF KILLING YOURSELF?: NO
1. IN THE PAST MONTH, HAVE YOU WISHED YOU WERE DEAD OR WISHED YOU COULD GO TO SLEEP AND NOT WAKE UP?: NO
6. HAVE YOU EVER DONE ANYTHING, STARTED TO DO ANYTHING, OR PREPARED TO DO ANYTHING TO END YOUR LIFE?: NO

## 2024-02-06 NOTE — H&P (VIEW-ONLY)
No chief complaint on file.           The patient is a 88 y.o. 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  He was recent hospitalized with COVID and importantly over the past several months his ejection fraction has declined rate is in the 35% range.  We discussed upgrading his device to a biventricular ICD or at the very least a biventricular pacemaker in the event that his vascular access is limited.  He is here to discuss this further.  He is completely pacer dependent.         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    Cardiac pacemaker in situ 03/12/2023    Chronic atrial fibrillation (CMS/HCC) 03/12/2023    Congestive heart failure (CMS/HCC) 03/12/2023    Emphysema, unspecified (CMS/HCC) 03/12/2023    Epistaxis 03/12/2023    Hypertension 03/12/2023    Hyponatremia 03/12/2023    Hypothyroidism 03/12/2023    Medication induced coagulopathy (CMS/HCC) 03/12/2023    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    Sensorineural hearing loss (SNHL) of both ears 03/12/2023    SOB (shortness of breath) on exertion 03/12/2023    Urinary frequency 03/12/2023    Asymmetric SNHL (sensorineural hearing loss) 03/12/2023    Bilateral impacted cerumen 03/12/2023    Obesity, Class II, BMI 35-39.9 11/27/2021    Dizziness 11/26/2021    Overweight with body mass index (BMI) of 29 to 29.9 in adult 03/12/2023    S/P TAVR (transcatheter aortic valve replacement) 03/12/2023    Acute bronchitis  07/15/2023    Acute renal failure (CMS/HCC) 07/15/2023    Aortic valve stenosis 11/26/2021    Atrial flutter (CMS/HCC) 11/26/2021    Carcinoma of colon (CMS/HCC) 11/26/2021    Complete atrioventricular block (CMS/HCC) 11/26/2021    Dyslipidemia 11/26/2021    Elevated prostate specific antigen (PSA) 02/17/2011    History of hypertension 11/26/2021    History of malignant neoplasm of tonsil 11/26/2021    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    Malnutrition of mild degree (CMS/HCC) 08/24/2022    Stage 3 chronic kidney disease (CMS/HCC) 11/26/2021    Sensorineural hearing loss, bilateral 07/15/2023    Cardiomyopathy (CMS/HCC) 09/03/2023    Dysarthria 09/03/2023    Gastrointestinal hemorrhage 09/03/2023    Heart murmur 09/03/2023    Hyperlipidemia, acquired 09/03/2023    Hypokalemia 09/03/2023    Nonsustained paroxysmal ventricular tachycardia (CMS/HCC) 09/03/2023    Rectal hemorrhage 09/03/2023    Toxic metabolic encephalopathy 09/03/2023    Anticoagulated on warfarin 09/19/2023    Class 1 obesity with alveolar hypoventilation and body mass index (BMI) of 30.0 to 30.9 in adult (CMS/HCC) 09/19/2023    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    General symptom 01/22/2024    COVID-19 01/22/2024    Generalized weakness 01/24/2024    Acute on chronic systolic heart failure (CMS/HCC) 01/25/2024     Resolved Ambulatory Problems     Diagnosis Date Noted    No Resolved Ambulatory Problems     Past Medical History:   Diagnosis Date    Atherosclerotic heart disease of native coronary artery with unstable angina pectoris (CMS/HCC)     Essential (primary) hypertension 11/05/2022    Personal history of diseases of the blood and blood-forming organs and certain disorders involving the immune mechanism     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, oral cavity, and pharynx     Personal history of malignant neoplasm of prostate     Personal history of other malignant neoplasm of large  intestine         Review of Systems   All other systems reviewed and are negative.       Objective     There were no vitals filed for this visit.     Vitals and nursing note reviewed.   Constitutional:       Appearance: Healthy appearance.   HENT:    Mouth/Throat:      Pharynx: Oropharynx is clear.   Pulmonary:      Effort: Pulmonary effort is normal.      Breath sounds: Normal breath sounds.   Cardiovascular:      PMI at left midclavicular line. Normal rate. Regular rhythm. Normal S1. Normal S2.       Murmurs: There is a grade 1/6 holosystolic murmur.      No gallop.  No click. No rub.   Pulses:     Intact distal pulses.   Edema:     Peripheral edema absent.   Abdominal:      General: Bowel sounds are normal.   Musculoskeletal:      Cervical back: Normal range of motion. Skin:     General: Skin is warm and dry.   Neurological:      General: No focal deficit present.      Mental Status: Alert and oriented to person, place and time.            Lab Review:   Admission on 01/22/2024, Discharged on 01/25/2024   Component Date Value    PROBNP 01/22/2024 7,972 (H)     Ventricular Rate 01/22/2024 70     Atrial Rate 01/22/2024 65     QRS Duration 01/22/2024 220     QT Interval 01/22/2024 504     QTC Calculation(Bazett) 01/22/2024 544     R Axis 01/22/2024 -65     T Eagar 01/22/2024 109     QRS Count 01/22/2024 12     Q Onset 01/22/2024 183     T Offset 01/22/2024 435     QTC Fredericia 01/22/2024 530     WBC 01/22/2024 7.2     nRBC 01/22/2024 0.0     RBC 01/22/2024 3.73 (L)     Hemoglobin 01/22/2024 10.7 (L)     Hematocrit 01/22/2024 33.3 (L)     MCV 01/22/2024 89     MCH 01/22/2024 28.7     MCHC 01/22/2024 32.1     RDW 01/22/2024 16.0 (H)     Platelets 01/22/2024 230     Neutrophils % 01/22/2024 78.8     Immature Granulocytes %,* 01/22/2024 0.6     Lymphocytes % 01/22/2024 4.9     Monocytes % 01/22/2024 13.2     Eosinophils % 01/22/2024 1.4     Basophils % 01/22/2024 1.1     Neutrophils Absolute 01/22/2024 5.68 (H)      Immature Granulocytes Ab* 01/22/2024 0.04     Lymphocytes Absolute 01/22/2024 0.35 (L)     Monocytes Absolute 01/22/2024 0.95 (H)     Eosinophils Absolute 01/22/2024 0.10     Basophils Absolute 01/22/2024 0.08     Glucose 01/22/2024 128 (H)     Sodium 01/22/2024 128 (L)     Potassium 01/22/2024 4.1     Chloride 01/22/2024 94 (L)     Bicarbonate 01/22/2024 20 (L)     Urea Nitrogen 01/22/2024 21     Creatinine 01/22/2024 1.10     eGFR 01/22/2024 65     Calcium 01/22/2024 8.8     Albumin 01/22/2024 3.7     Alkaline Phosphatase 01/22/2024 113     Total Protein 01/22/2024 7.3     AST 01/22/2024 21     Bilirubin, Total 01/22/2024 1.0     ALT 01/22/2024 10     Anion Gap 01/22/2024 14     Flu A Result 01/22/2024 Not Detected     Flu B Result 01/22/2024 Not Detected     Coronavirus 2019, PCR 01/22/2024 Detected (A)     Protime 01/22/2024 27.1 (H)     INR 01/22/2024 2.8 (H)     Troponin T, High Sensiti* 01/22/2024 23 (HH)     Troponin T, High Sensiti* 01/22/2024 23 (HH)     Troponin T, High Sensiti* 01/22/2024 27 (HH)     WBC 01/23/2024 7.4     nRBC 01/23/2024 0.0     RBC 01/23/2024 3.72 (L)     Hemoglobin 01/23/2024 10.8 (L)     Hematocrit 01/23/2024 33.4 (L)     MCV 01/23/2024 90     MCH 01/23/2024 29.0     MCHC 01/23/2024 32.3     RDW 01/23/2024 16.1 (H)     Platelets 01/23/2024 223     Glucose 01/23/2024 100 (H)     Sodium 01/23/2024 129 (L)     Potassium 01/23/2024 3.6     Chloride 01/23/2024 95 (L)     Bicarbonate 01/23/2024 21 (L)     Urea Nitrogen 01/23/2024 23     Creatinine 01/23/2024 1.10     eGFR 01/23/2024 65     Calcium 01/23/2024 8.8     Albumin 01/23/2024 3.6     Alkaline Phosphatase 01/23/2024 113     Total Protein 01/23/2024 7.2     AST 01/23/2024 21     Bilirubin, Total 01/23/2024 0.9     ALT 01/23/2024 9     Anion Gap 01/23/2024 13     Protime 01/23/2024 29.3 (H)     INR 01/23/2024 3.0 (H)     Protime 01/23/2024 28.6 (H)     INR 01/23/2024 3.0 (H)     Blood Culture 01/23/2024 No growth at 4 days -  FINAL  REPORT     Blood Culture 01/23/2024 No growth at 4 days -  FINAL REPORT     Protime 01/24/2024 30.0 (H)     INR 01/24/2024 3.1 (H)     Glucose 01/24/2024 99     Sodium 01/24/2024 131 (L)     Potassium 01/24/2024 3.2 (L)     Chloride 01/24/2024 96 (L)     Bicarbonate 01/24/2024 25     Urea Nitrogen 01/24/2024 23     Creatinine 01/24/2024 1.10     eGFR 01/24/2024 65     Calcium 01/24/2024 8.5     Anion Gap 01/24/2024 10     WBC 01/24/2024 6.1     nRBC 01/24/2024 0.0     RBC 01/24/2024 3.72 (L)     Hemoglobin 01/24/2024 10.6 (L)     Hematocrit 01/24/2024 33.2 (L)     MCV 01/24/2024 89     MCH 01/24/2024 28.5     MCHC 01/24/2024 31.9 (L)     RDW 01/24/2024 15.9 (H)     Platelets 01/24/2024 229     Glucose 01/25/2024 104 (H)     Sodium 01/25/2024 132 (L)     Potassium 01/25/2024 3.4     Chloride 01/25/2024 97     Bicarbonate 01/25/2024 25     Urea Nitrogen 01/25/2024 24     Creatinine 01/25/2024 1.20     eGFR 01/25/2024 59 (L)     Calcium 01/25/2024 8.7     Anion Gap 01/25/2024 10     WBC 01/25/2024 5.0     nRBC 01/25/2024 0.0     RBC 01/25/2024 3.80 (L)     Hemoglobin 01/25/2024 10.9 (L)     Hematocrit 01/25/2024 34.6 (L)     MCV 01/25/2024 91     MCH 01/25/2024 28.7     MCHC 01/25/2024 31.5 (L)     RDW 01/25/2024 16.0 (H)     Platelets 01/25/2024 228    Lab on 01/08/2024   Component Date Value    WBC 01/08/2024 5.4     nRBC 01/08/2024 0.0     RBC 01/08/2024 4.19 (L)     Hemoglobin 01/08/2024 11.9 (L)     Hematocrit 01/08/2024 38.3 (L)     MCV 01/08/2024 91     MCH 01/08/2024 28.4     MCHC 01/08/2024 31.1 (L)     RDW 01/08/2024 15.3 (H)     Platelets 01/08/2024 255     Glucose 01/08/2024 126 (H)     Sodium 01/08/2024 135 (L)     Potassium 01/08/2024 4.7     Chloride 01/08/2024 99     Bicarbonate 01/08/2024 27     Anion Gap 01/08/2024 14     Urea Nitrogen 01/08/2024 22     Creatinine 01/08/2024 1.10     eGFR 01/08/2024 65     Calcium 01/08/2024 9.3     Albumin 01/08/2024 4.2     Alkaline Phosphatase 01/08/2024 99      Total Protein 01/08/2024 7.3     AST 01/08/2024 22     Bilirubin, Total 01/08/2024 0.8     ALT 01/08/2024 11     Cholesterol 01/08/2024 98     HDL-Cholesterol 01/08/2024 43.1     Cholesterol/HDL Ratio 01/08/2024 2.3     LDL Calculated 01/08/2024 43     VLDL 01/08/2024 12     Triglycerides 01/08/2024 62     Non HDL Cholesterol 01/08/2024 55     Thyroid Stimulating Horm* 01/08/2024 6.34 (H)        Assessment/plan:  I would recommend an upgrade to a CRT device.  Assuming that he has vascular patency I would recommend upgrading to a biventricular ICD given his [place] [employer] class III symptoms and diminution of his ejection fraction in the setting of pacer dependency.  He is amenable to proceeding forward.  Given the fact that he is already in the elective replacement interval since December I would not delay this procedure more than a couple of weeks.      Problem List Items Addressed This Visit    None

## 2024-02-06 NOTE — ASSESSMENT & PLAN NOTE
I would recommend an upgrade to a CRT device.  Assuming that he has vascular patency I would recommend upgrading to a biventricular ICD given his [place] [employer] class III symptoms and diminution of his ejection fraction in the setting of pacer dependency.  He is amenable to proceeding forward.  Given the fact that he is already in the elective replacement interval since December I would not delay this procedure more than a couple of weeks.

## 2024-02-06 NOTE — PROGRESS NOTES
No chief complaint on file.           The patient is a 88 y.o. 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  He was recent hospitalized with COVID and importantly over the past several months his ejection fraction has declined rate is in the 35% range.  We discussed upgrading his device to a biventricular ICD or at the very least a biventricular pacemaker in the event that his vascular access is limited.  He is here to discuss this further.  He is completely pacer dependent.         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    Cardiac pacemaker in situ 03/12/2023    Chronic atrial fibrillation (CMS/HCC) 03/12/2023    Congestive heart failure (CMS/HCC) 03/12/2023    Emphysema, unspecified (CMS/HCC) 03/12/2023    Epistaxis 03/12/2023    Hypertension 03/12/2023    Hyponatremia 03/12/2023    Hypothyroidism 03/12/2023    Medication induced coagulopathy (CMS/HCC) 03/12/2023    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    Sensorineural hearing loss (SNHL) of both ears 03/12/2023    SOB (shortness of breath) on exertion 03/12/2023    Urinary frequency 03/12/2023    Asymmetric SNHL (sensorineural hearing loss) 03/12/2023    Bilateral impacted cerumen 03/12/2023    Obesity, Class II, BMI 35-39.9 11/27/2021    Dizziness 11/26/2021    Overweight with body mass index (BMI) of 29 to 29.9 in adult 03/12/2023    S/P TAVR (transcatheter aortic valve replacement) 03/12/2023    Acute bronchitis  07/15/2023    Acute renal failure (CMS/HCC) 07/15/2023    Aortic valve stenosis 11/26/2021    Atrial flutter (CMS/HCC) 11/26/2021    Carcinoma of colon (CMS/HCC) 11/26/2021    Complete atrioventricular block (CMS/HCC) 11/26/2021    Dyslipidemia 11/26/2021    Elevated prostate specific antigen (PSA) 02/17/2011    History of hypertension 11/26/2021    History of malignant neoplasm of tonsil 11/26/2021    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    Malnutrition of mild degree (CMS/HCC) 08/24/2022    Stage 3 chronic kidney disease (CMS/HCC) 11/26/2021    Sensorineural hearing loss, bilateral 07/15/2023    Cardiomyopathy (CMS/HCC) 09/03/2023    Dysarthria 09/03/2023    Gastrointestinal hemorrhage 09/03/2023    Heart murmur 09/03/2023    Hyperlipidemia, acquired 09/03/2023    Hypokalemia 09/03/2023    Nonsustained paroxysmal ventricular tachycardia (CMS/HCC) 09/03/2023    Rectal hemorrhage 09/03/2023    Toxic metabolic encephalopathy 09/03/2023    Anticoagulated on warfarin 09/19/2023    Class 1 obesity with alveolar hypoventilation and body mass index (BMI) of 30.0 to 30.9 in adult (CMS/HCC) 09/19/2023    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    General symptom 01/22/2024    COVID-19 01/22/2024    Generalized weakness 01/24/2024    Acute on chronic systolic heart failure (CMS/HCC) 01/25/2024     Resolved Ambulatory Problems     Diagnosis Date Noted    No Resolved Ambulatory Problems     Past Medical History:   Diagnosis Date    Atherosclerotic heart disease of native coronary artery with unstable angina pectoris (CMS/HCC)     Essential (primary) hypertension 11/05/2022    Personal history of diseases of the blood and blood-forming organs and certain disorders involving the immune mechanism     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, oral cavity, and pharynx     Personal history of malignant neoplasm of prostate     Personal history of other malignant neoplasm of large  intestine         Review of Systems   All other systems reviewed and are negative.       Objective     There were no vitals filed for this visit.     Vitals and nursing note reviewed.   Constitutional:       Appearance: Healthy appearance.   HENT:    Mouth/Throat:      Pharynx: Oropharynx is clear.   Pulmonary:      Effort: Pulmonary effort is normal.      Breath sounds: Normal breath sounds.   Cardiovascular:      PMI at left midclavicular line. Normal rate. Regular rhythm. Normal S1. Normal S2.       Murmurs: There is a grade 1/6 holosystolic murmur.      No gallop.  No click. No rub.   Pulses:     Intact distal pulses.   Edema:     Peripheral edema absent.   Abdominal:      General: Bowel sounds are normal.   Musculoskeletal:      Cervical back: Normal range of motion. Skin:     General: Skin is warm and dry.   Neurological:      General: No focal deficit present.      Mental Status: Alert and oriented to person, place and time.            Lab Review:   Admission on 01/22/2024, Discharged on 01/25/2024   Component Date Value    PROBNP 01/22/2024 7,972 (H)     Ventricular Rate 01/22/2024 70     Atrial Rate 01/22/2024 65     QRS Duration 01/22/2024 220     QT Interval 01/22/2024 504     QTC Calculation(Bazett) 01/22/2024 544     R Axis 01/22/2024 -65     T [place] 01/22/2024 109     QRS Count 01/22/2024 12     Q Onset 01/22/2024 183     T Offset 01/22/2024 435     QTC [place] 01/22/2024 530     WBC 01/22/2024 7.2     nRBC 01/22/2024 0.0     RBC 01/22/2024 3.73 (L)     Hemoglobin 01/22/2024 10.7 (L)     Hematocrit 01/22/2024 33.3 (L)     MCV 01/22/2024 89     MCH 01/22/2024 28.7     MCHC 01/22/2024 32.1     RDW 01/22/2024 16.0 (H)     Platelets 01/22/2024 230     Neutrophils % 01/22/2024 78.8     Immature Granulocytes %,* 01/22/2024 0.6     Lymphocytes % 01/22/2024 4.9     Monocytes % 01/22/2024 13.2     Eosinophils % 01/22/2024 1.4     Basophils % 01/22/2024 1.1     Neutrophils Absolute 01/22/2024 5.68 (H)      Immature Granulocytes Ab* 01/22/2024 0.04     Lymphocytes Absolute 01/22/2024 0.35 (L)     Monocytes Absolute 01/22/2024 0.95 (H)     Eosinophils Absolute 01/22/2024 0.10     Basophils Absolute 01/22/2024 0.08     Glucose 01/22/2024 128 (H)     Sodium 01/22/2024 128 (L)     Potassium 01/22/2024 4.1     Chloride 01/22/2024 94 (L)     Bicarbonate 01/22/2024 20 (L)     Urea Nitrogen 01/22/2024 21     Creatinine 01/22/2024 1.10     eGFR 01/22/2024 65     Calcium 01/22/2024 8.8     Albumin 01/22/2024 3.7     Alkaline Phosphatase 01/22/2024 113     Total Protein 01/22/2024 7.3     AST 01/22/2024 21     Bilirubin, Total 01/22/2024 1.0     ALT 01/22/2024 10     Anion Gap 01/22/2024 14     Flu A Result 01/22/2024 Not Detected     Flu B Result 01/22/2024 Not Detected     Coronavirus 2019, PCR 01/22/2024 Detected (A)     Protime 01/22/2024 27.1 (H)     INR 01/22/2024 2.8 (H)     Troponin T, High Sensiti* 01/22/2024 23 (HH)     Troponin T, High Sensiti* 01/22/2024 23 (HH)     Troponin T, High Sensiti* 01/22/2024 27 (HH)     WBC 01/23/2024 7.4     nRBC 01/23/2024 0.0     RBC 01/23/2024 3.72 (L)     Hemoglobin 01/23/2024 10.8 (L)     Hematocrit 01/23/2024 33.4 (L)     MCV 01/23/2024 90     MCH 01/23/2024 29.0     MCHC 01/23/2024 32.3     RDW 01/23/2024 16.1 (H)     Platelets 01/23/2024 223     Glucose 01/23/2024 100 (H)     Sodium 01/23/2024 129 (L)     Potassium 01/23/2024 3.6     Chloride 01/23/2024 95 (L)     Bicarbonate 01/23/2024 21 (L)     Urea Nitrogen 01/23/2024 23     Creatinine 01/23/2024 1.10     eGFR 01/23/2024 65     Calcium 01/23/2024 8.8     Albumin 01/23/2024 3.6     Alkaline Phosphatase 01/23/2024 113     Total Protein 01/23/2024 7.2     AST 01/23/2024 21     Bilirubin, Total 01/23/2024 0.9     ALT 01/23/2024 9     Anion Gap 01/23/2024 13     Protime 01/23/2024 29.3 (H)     INR 01/23/2024 3.0 (H)     Protime 01/23/2024 28.6 (H)     INR 01/23/2024 3.0 (H)     Blood Culture 01/23/2024 No growth at 4 days -  FINAL  REPORT     Blood Culture 01/23/2024 No growth at 4 days -  FINAL REPORT     Protime 01/24/2024 30.0 (H)     INR 01/24/2024 3.1 (H)     Glucose 01/24/2024 99     Sodium 01/24/2024 131 (L)     Potassium 01/24/2024 3.2 (L)     Chloride 01/24/2024 96 (L)     Bicarbonate 01/24/2024 25     Urea Nitrogen 01/24/2024 23     Creatinine 01/24/2024 1.10     eGFR 01/24/2024 65     Calcium 01/24/2024 8.5     Anion Gap 01/24/2024 10     WBC 01/24/2024 6.1     nRBC 01/24/2024 0.0     RBC 01/24/2024 3.72 (L)     Hemoglobin 01/24/2024 10.6 (L)     Hematocrit 01/24/2024 33.2 (L)     MCV 01/24/2024 89     MCH 01/24/2024 28.5     MCHC 01/24/2024 31.9 (L)     RDW 01/24/2024 15.9 (H)     Platelets 01/24/2024 229     Glucose 01/25/2024 104 (H)     Sodium 01/25/2024 132 (L)     Potassium 01/25/2024 3.4     Chloride 01/25/2024 97     Bicarbonate 01/25/2024 25     Urea Nitrogen 01/25/2024 24     Creatinine 01/25/2024 1.20     eGFR 01/25/2024 59 (L)     Calcium 01/25/2024 8.7     Anion Gap 01/25/2024 10     WBC 01/25/2024 5.0     nRBC 01/25/2024 0.0     RBC 01/25/2024 3.80 (L)     Hemoglobin 01/25/2024 10.9 (L)     Hematocrit 01/25/2024 34.6 (L)     MCV 01/25/2024 91     MCH 01/25/2024 28.7     MCHC 01/25/2024 31.5 (L)     RDW 01/25/2024 16.0 (H)     Platelets 01/25/2024 228    Lab on 01/08/2024   Component Date Value    WBC 01/08/2024 5.4     nRBC 01/08/2024 0.0     RBC 01/08/2024 4.19 (L)     Hemoglobin 01/08/2024 11.9 (L)     Hematocrit 01/08/2024 38.3 (L)     MCV 01/08/2024 91     MCH 01/08/2024 28.4     MCHC 01/08/2024 31.1 (L)     RDW 01/08/2024 15.3 (H)     Platelets 01/08/2024 255     Glucose 01/08/2024 126 (H)     Sodium 01/08/2024 135 (L)     Potassium 01/08/2024 4.7     Chloride 01/08/2024 99     Bicarbonate 01/08/2024 27     Anion Gap 01/08/2024 14     Urea Nitrogen 01/08/2024 22     Creatinine 01/08/2024 1.10     eGFR 01/08/2024 65     Calcium 01/08/2024 9.3     Albumin 01/08/2024 4.2     Alkaline Phosphatase 01/08/2024 99      Total Protein 01/08/2024 7.3     AST 01/08/2024 22     Bilirubin, Total 01/08/2024 0.8     ALT 01/08/2024 11     Cholesterol 01/08/2024 98     HDL-Cholesterol 01/08/2024 43.1     Cholesterol/HDL Ratio 01/08/2024 2.3     LDL Calculated 01/08/2024 43     VLDL 01/08/2024 12     Triglycerides 01/08/2024 62     Non HDL Cholesterol 01/08/2024 55     Thyroid Stimulating Horm* 01/08/2024 6.34 (H)        Assessment/plan:  I would recommend an upgrade to a CRT device.  Assuming that he has vascular patency I would recommend upgrading to a biventricular ICD given his [place] Heart Association class III symptoms and diminution of his ejection fraction in the setting of pacer dependency.  He is amenable to proceeding forward.  Given the fact that he is already in the elective replacement interval since December I would not delay this procedure more than a couple of weeks.      Problem List Items Addressed This Visit    None

## 2024-02-07 ENCOUNTER — HOSPITAL ENCOUNTER (OUTPATIENT)
Dept: CARDIOLOGY | Facility: CLINIC | Age: 89
Discharge: HOME | End: 2024-02-07
Payer: MEDICARE

## 2024-02-07 ENCOUNTER — HOME CARE VISIT (OUTPATIENT)
Dept: HOME HEALTH SERVICES | Facility: HOME HEALTH | Age: 89
End: 2024-02-07
Payer: MEDICARE

## 2024-02-07 VITALS
HEART RATE: 69 BPM | TEMPERATURE: 97.4 F | OXYGEN SATURATION: 98 % | DIASTOLIC BLOOD PRESSURE: 82 MMHG | SYSTOLIC BLOOD PRESSURE: 130 MMHG

## 2024-02-07 DIAGNOSIS — I48.0 PAF (PAROXYSMAL ATRIAL FIBRILLATION) (MULTI): Primary | ICD-10-CM

## 2024-02-07 LAB
ANION GAP SERPL CALC-SCNC: 14 MMOL/L (ref 10–20)
BUN SERPL-MCNC: 26 MG/DL (ref 6–23)
CALCIUM SERPL-MCNC: 9.3 MG/DL (ref 8.6–10.6)
CHLORIDE SERPL-SCNC: 100 MMOL/L (ref 98–107)
CO2 SERPL-SCNC: 27 MMOL/L (ref 21–32)
CREAT SERPL-MCNC: 1.33 MG/DL (ref 0.5–1.3)
EGFRCR SERPLBLD CKD-EPI 2021: 52 ML/MIN/1.73M*2
ERYTHROCYTE [DISTWIDTH] IN BLOOD BY AUTOMATED COUNT: 15.7 % (ref 11.5–14.5)
GLUCOSE SERPL-MCNC: 97 MG/DL (ref 74–99)
HCT VFR BLD AUTO: 35.2 % (ref 41–52)
HGB BLD-MCNC: 10.7 G/DL (ref 13.5–17.5)
INR PPP: 2.9 (ref 0.9–1.1)
MCH RBC QN AUTO: 28 PG (ref 26–34)
MCHC RBC AUTO-ENTMCNC: 30.4 G/DL (ref 32–36)
MCV RBC AUTO: 92 FL (ref 80–100)
NRBC BLD-RTO: 0 /100 WBCS (ref 0–0)
PLATELET # BLD AUTO: 274 X10*3/UL (ref 150–450)
POTASSIUM SERPL-SCNC: 4.3 MMOL/L (ref 3.5–5.3)
PROTHROMBIN TIME: 33.4 SECONDS (ref 9.8–12.8)
RBC # BLD AUTO: 3.82 X10*6/UL (ref 4.5–5.9)
SODIUM SERPL-SCNC: 137 MMOL/L (ref 136–145)
WBC # BLD AUTO: 5.1 X10*3/UL (ref 4.4–11.3)

## 2024-02-07 PROCEDURE — G0299 HHS/HOSPICE OF RN EA 15 MIN: HCPCS

## 2024-02-07 RX ORDER — WARFARIN 7.5 MG/1
7.5 TABLET ORAL
Qty: 90 TABLET | Refills: 3 | Status: SHIPPED | OUTPATIENT
Start: 2024-02-07 | End: 2024-03-14 | Stop reason: SDUPTHER

## 2024-02-07 SDOH — ECONOMIC STABILITY: GENERAL

## 2024-02-07 ASSESSMENT — ENCOUNTER SYMPTOMS
DENIES PAIN: 1
FATIGUES EASILY: 1
MUSCLE WEAKNESS: 1
DIZZINESS: 1
APPETITE LEVEL: FAIR

## 2024-02-07 ASSESSMENT — ACTIVITIES OF DAILY LIVING (ADL)
AMBULATION ASSISTANCE: STAND BY ASSIST
CURRENT_FUNCTION: STAND BY ASSIST
MONEY MANAGEMENT (EXPENSES/BILLS): NEEDS ASSISTANCE

## 2024-02-08 ENCOUNTER — HOME CARE VISIT (OUTPATIENT)
Dept: HOME HEALTH SERVICES | Facility: HOME HEALTH | Age: 89
End: 2024-02-08
Payer: MEDICARE

## 2024-02-08 PROCEDURE — G0151 HHCP-SERV OF PT,EA 15 MIN: HCPCS

## 2024-02-09 ENCOUNTER — TELEMEDICINE (OUTPATIENT)
Dept: PHARMACY | Facility: HOSPITAL | Age: 89
End: 2024-02-09
Payer: MEDICARE

## 2024-02-09 ENCOUNTER — HOME CARE VISIT (OUTPATIENT)
Dept: HOME HEALTH SERVICES | Facility: HOME HEALTH | Age: 89
End: 2024-02-09
Payer: MEDICARE

## 2024-02-09 VITALS
OXYGEN SATURATION: 99 % | TEMPERATURE: 98.3 F | DIASTOLIC BLOOD PRESSURE: 73 MMHG | SYSTOLIC BLOOD PRESSURE: 132 MMHG | HEART RATE: 70 BPM | RESPIRATION RATE: 20 BRPM

## 2024-02-09 VITALS
OXYGEN SATURATION: 98 % | SYSTOLIC BLOOD PRESSURE: 130 MMHG | BODY MASS INDEX: 29.74 KG/M2 | TEMPERATURE: 98 F | DIASTOLIC BLOOD PRESSURE: 52 MMHG | WEIGHT: 201.38 LBS | RESPIRATION RATE: 18 BRPM | HEART RATE: 70 BPM

## 2024-02-09 DIAGNOSIS — I50.23 ACUTE ON CHRONIC SYSTOLIC HEART FAILURE (MULTI): ICD-10-CM

## 2024-02-09 PROCEDURE — G0299 HHS/HOSPICE OF RN EA 15 MIN: HCPCS

## 2024-02-09 SDOH — ECONOMIC STABILITY: GENERAL

## 2024-02-09 ASSESSMENT — ENCOUNTER SYMPTOMS
DENIES PAIN: 1
DIZZINESS: 1
PERSON REPORTING PAIN: PATIENT
DENIES PAIN: 1
ARTHRALGIAS: 1
CHANGE IN APPETITE: UNCHANGED
APPETITE LEVEL: FAIR
FATIGUES EASILY: 1

## 2024-02-09 NOTE — PROGRESS NOTES
Pharmacy Post-Discharge Visit  Gorge Larsen is a 88 y.o. male who was referred to the Clinical Pharmacy Team to complete a post-discharge medication optimization and monitoring visit.  The patient was referred for their Congestive Heart Failure.    Recent Hospitalization  Admission Date: 1/22/24  Discharge Date: 1/25/24  Discharge Diagnosis: CHF, Pacemaker, COVID, Warfarin    Referring Provider: Natasha Antoine MD    Subjective   Allergies   Allergen Reactions    Codeine Nausea/vomiting    Latex, Natural Rubber Unknown    Lisinopril Unknown       Preferred Pharmacy    [id number] - [facility] [address] AT [facility] [address]  [address]  [facility] [address]  Phone: [phone] Fax: [phone]    [facility] - [facility] [address]  [address] [address]  Phone: [phone] Fax: [phone]      Social History     Social History Narrative    Not on file        Notable Medication changes following discharge:  Start:   Carvedilol 12.5 mg BID  Farxiga 10 mg daily  Furosemide 40 mg daily  Potassium chloride 20 mEq daily  Change:   losartan  Hold:  N/A  Stop:   Metoprolol succinate 25 mg daily  Sertraline 25 mg daily    CHF ASSESSMENT  EF% has decreased severely over past year, previously approximately 50-55%    Staging:  Most recent ejection fraction: ~35%  NYHA: Class III    Symptoms/Monitoring:  Reported baseline weight: 199.1, 199.5, 199.4, 198.8, 200.3, 201.6 lbs (past week/2 weeks)  Weighing self everyday? Yes!  Weight changes? No  Edema? Doesn't have any right now, has had edema-   Aware to contact cardiology if gaining 2-3 lbs/24 hrs OR 5 lbs/week? Yes  Shortness of breath? Did prior to going in to the hospital, okay now.     Diet/Lifestyle:  Following low-salt diet? No salt added, wife does most of the cooking. Wife is very conscious of low-salt, no frozen meals.   Monitoring liquid intake? No more than  2.5-3 bottles of water/day    Guideline-Directed Medical Therapy:  ARNI: No  If no, then ACEi/ARB?: Yes, describe: losartan 50 mg BID  Beta Blocker: Yes, describe: carvedilol 12.5 mg BID  MRA: No  SGLT2i: No- could not tolerate Farxiga  Furosemide 40 mg (patient taking 0.5 tablets BID)    Secondary Prevention:  The ASCVD Risk score (Ethan YU, et al., 2019) failed to calculate for the following reasons:    The 2019 ASCVD risk score is only valid for ages 40 to 79   Aspirin 81mg? yes  Statin?: Yes, describe: rosuvatain 5 mg daily  HTN?: History of hypertension, however patient now trending towards hypotension    Blood Pressure Readings  January  28: 98/49 (evening)  29: 125/67, 82/47  30: 92/58 (morning)  31: 124/66, 123/64    [Farxiga stopped, wife unsure of date, but end of January/beginning of February]    February  1: 108/62, 125/58  2: 120/65, 117/57 [losartan stopped 2/2]  3: 119/58, 115/60  4: 129/69, 124/63  5: 95/50, 101/62  6: 105/58, 122/53  7: 116/58, 111/55  8: 142/73, 132/63  9: 101/48, 130/52 (afternoon)      Laboratory Results  Lab Results   Component Value Date    BILITOT 0.9 01/23/2024    CALCIUM 9.3 02/06/2024    CO2 27 02/06/2024     02/06/2024    CREATININE 1.33 (H) 02/06/2024    GLUCOSE 97 02/06/2024    ALKPHOS 113 01/23/2024    K 4.3 02/06/2024    PROT 7.2 01/23/2024     02/06/2024    AST 21 01/23/2024    ALT 9 01/23/2024    BUN 26 (H) 02/06/2024    ANIONGAP 14 02/06/2024    MG 2.38 09/10/2022    PHOS 3.5 09/13/2022    ALBUMIN 3.6 01/23/2024    LIPASE 38 11/20/2021    GFRMALE 62 06/30/2023    EGFR 52 (L) 02/06/2024     Lab Results   Component Value Date    TRIG 62 01/08/2024    CHOL 98 01/08/2024    HDL 43.1 01/08/2024     Lab Results   Component Value Date    HGBA1C 5.3 12/11/2020         Assessment/Plan   Problem List Items Addressed This Visit       Acute on chronic systolic heart failure (CMS/[facility])     General Patient Discussion  CHF  Could not tolerate Farxiga, caused extreme  lethargy  As noted above, wife mentioned that EF% was perfectly find up until ~1 year ago. Most recent echo saw decrease from ~50-55% estimated EF to ~35% estimated EF.   Will be undergoing pacemaker procedure on 2/19/24. Hope is to change to biventricular ICD if patient's anatomy is receptive. If anatomy is not receptive, per wife, cardiology will simply replace batteries in existing device.   BP  See readings above, overall low BP.   Currently holding losartan 50 mg BID- Wife notes since stopping losartan, patient has been noticeably less lethargic and more 'with it' since stopping losartan.     Plan/Changes   Continue all meds under the continuation of care with the referring provider and clinical pharmacy team  Specialists: Patient currently sees outpatient  Cardiology (Dr. Walker Mcdonald, Dr. Jeremy Clarke).    Next Clinical Pharmacy Follow-Up  Thursday, February 15th at 2:30  Any updates from cardiology      Britt Arnold PharmD     Verbal consent to manage patient's drug therapy was obtained from the patient's wife. They were informed they may decline to participate or withdraw from participation in pharmacy services at any time.

## 2024-02-09 NOTE — Clinical Note
Dr. Clarke and Dr. Mcdonald- Patient has been unable to tolerate Farxiga, noting 'fatigue,' and has not been taking. Please see BP values as listed in my note- appears systolic pressure is slightly improving since losartan 50 mg BID is being held. Should any other dose decreases be made to furosemide and/or carvedilol to further aid BP, or is it preferential to wait until after patient's pacemaker procedure on 2/19/24 before making any additional changes?

## 2024-02-12 PROCEDURE — G0180 MD CERTIFICATION HHA PATIENT: HCPCS | Performed by: INTERNAL MEDICINE

## 2024-02-13 ENCOUNTER — OFFICE VISIT (OUTPATIENT)
Dept: PRIMARY CARE | Facility: CLINIC | Age: 89
End: 2024-02-13
Payer: MEDICARE

## 2024-02-13 VITALS — SYSTOLIC BLOOD PRESSURE: 124 MMHG | DIASTOLIC BLOOD PRESSURE: 66 MMHG

## 2024-02-13 DIAGNOSIS — I10 BENIGN ESSENTIAL HTN: ICD-10-CM

## 2024-02-13 DIAGNOSIS — I50.9 CONGESTIVE HEART FAILURE, UNSPECIFIED HF CHRONICITY, UNSPECIFIED HEART FAILURE TYPE (MULTI): Primary | ICD-10-CM

## 2024-02-13 DIAGNOSIS — I50.20 HEART FAILURE WITH REDUCED EJECTION FRACTION (MULTI): ICD-10-CM

## 2024-02-13 DIAGNOSIS — U07.1 COVID-19: ICD-10-CM

## 2024-02-13 DIAGNOSIS — R53.83 OTHER FATIGUE: ICD-10-CM

## 2024-02-13 PROCEDURE — 3074F SYST BP LT 130 MM HG: CPT | Performed by: INTERNAL MEDICINE

## 2024-02-13 PROCEDURE — 99214 OFFICE O/P EST MOD 30 MIN: CPT | Performed by: INTERNAL MEDICINE

## 2024-02-13 PROCEDURE — 1157F ADVNC CARE PLAN IN RCRD: CPT | Performed by: INTERNAL MEDICINE

## 2024-02-13 PROCEDURE — 1111F DSCHRG MED/CURRENT MED MERGE: CPT | Performed by: INTERNAL MEDICINE

## 2024-02-13 PROCEDURE — 1159F MED LIST DOCD IN RCRD: CPT | Performed by: INTERNAL MEDICINE

## 2024-02-13 PROCEDURE — 1036F TOBACCO NON-USER: CPT | Performed by: INTERNAL MEDICINE

## 2024-02-13 PROCEDURE — 1126F AMNT PAIN NOTED NONE PRSNT: CPT | Performed by: INTERNAL MEDICINE

## 2024-02-13 PROCEDURE — 3078F DIAST BP <80 MM HG: CPT | Performed by: INTERNAL MEDICINE

## 2024-02-13 RX ORDER — SERTRALINE HYDROCHLORIDE 25 MG/1
TABLET, FILM COATED ORAL
COMMUNITY
Start: 2024-02-10 | End: 2024-02-20 | Stop reason: HOSPADM

## 2024-02-13 RX ORDER — GUAIFENESIN AND DEXTROMETHORPHAN HYDROBROMIDE 10; 100 MG/5ML; MG/5ML
5 SYRUP ORAL EVERY 4 HOURS PRN
COMMUNITY
Start: 2024-01-22 | End: 2024-02-22 | Stop reason: ALTCHOICE

## 2024-02-13 RX ORDER — POLYETHYLENE GLYCOL 3350 17 G/17G
17 POWDER, FOR SOLUTION ORAL
COMMUNITY
Start: 2024-01-22 | End: 2024-02-22 | Stop reason: ALTCHOICE

## 2024-02-13 NOTE — PROGRESS NOTES
Subjective   Patient ID: Gorge Larsen is a 88 y.o. male who presents for Follow-up and Med Refill.    HPI   Patient is here for hospital follow-up.  He was first admitted for COVID.  He had generalized malaise.  He was found to be in decompensated CHF and volume overload and hyponatremia.  Ejection fraction on the echo showed 30% which has gone down from his previous ejection fraction.  Cardiology recommended EPS consult.  This saw the patient and recommended upgrading to biventricular pacemaker as patient is already due for battery change.  Patient is taking Lasix which is keeping the leg swelling down  History feels very tired and fatigued     Past recap  patient is here for follow-up  Did blood work  Having a lot of nosebleeds recently.  He is on Coumadin    Past recap   patient is accompanied with the daughter and wife  He is doing much better on Zoloft  Did blood work  Patient checks Coumadin at home but does not know who is monitoring his Coumadin  He has gained some weight  Follow-up on hypertension high cholesterol congestive heart failure     patient is here accompanied with daughter.  Daughter is concerned that patient is very anxious.  Anxiety was the cause for his drinking heavily.  He was taken off the Zoloft because of low sodium.  His sodium is now normal which probably was related to his heart condition.  She wants to give it a try again.  She wants his Coumadin checked.  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  He sits a lot and does not do much around     past recap  Patient complaining of chest congestion coughing up whitish-yellow phlegm   patient here for follow-up on blood work  He is hard of  hearing  Overall feeling much better  Leg swelling is not there he is using compression stockings  He had 2D echo done     Patient is here for follow-up on blood work and ultrasound kidneys  He is feeling much better  He is not taking spironolactone  Taking losartan only every other day  His legs are not swelling up  He has more energy and not too lethargic lethargic     Patient here for follow-up on blood work   He urinates a lot goes frequently  He has history of prostate cancer s/p surgery  He does not drink water but drinks iced tea     last visit when I spoke to the daughter she took him to [place]. He was hospitalized for hyponatremia for few days then he was sent to [facility] rehab. There he had syncopal episode and transferred to [facility] emergency room  Patient was found to have reduced ejection fraction from 60 to 30%, severe aortic valve stenosis. He was transferred to [facility] where he had TAVR. Patient is doing much better since the procedure leg swelling has gone down his more alert but still remains quite withdrawn. The daughter thinks that he is still very depressed  He is also hard of hearing and his hearing aids are not working well  He is also not able to see very well because of the cataracts        Patient is here for follow-up on lab  Daughter is still concerned as patient is very lethargic, not taking the medications as prescribed     here for flollow up   Patient is not taking some medications which made him feel bad  He quit taking Synthroid  He quit taking Cardizem  He is not taking Coreg  he feels better without those medications.   But daughter says that he is more tired he is sleeping all the time  He says he feels dizzy  He sleeps in a chair  His swelling in the legs is getting worse  He does not take Lasix every day  He is getting more short of breath recently        Patient is here for follow-up  He saw Dr. Mcdonald  He did proBNP which came back 1111  He had his Lasix 20 mg  and potassium 10 mEq he is  He did blood work  He has colon polyps positive for tubular adenoma  He has history of GI bleed and required blood transfusions  For last few weeks he is noticing dark stools off and on and concern about GI bleed again        past recap  To establish PCP  Follow-up on hypertension high cholesterol coronary artery disease  Needs refills on medication  He was hospitalized in  sodium was 116 his hydrochlorothiazide was discontinued he was very weak and confused and he went to the nursing home at [facility] now he is home  Now he is having a lot of swelling in the legs  He is sleeping and it recliner and not keeping his legs elevated     Past medical history: A. 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   Constitutional:       Appearance: Normal appearance.   HENT:      Head: Normocephalic and atraumatic.      Right Ear: Tympanic membrane, ear canal and external ear normal.      Left Ear: Tympanic membrane, ear canal and external ear normal.      Nose: Nose normal.      Mouth/Throat:      Pharynx: Oropharynx is clear.   Eyes:      Extraocular Movements: Extraocular movements intact.      Conjunctiva/sclera: Conjunctivae normal.      Pupils: Pupils are equal, round, and reactive to light.   Cardiovascular:      Rate and Rhythm: Normal rate and regular rhythm.      Pulses: Normal pulses.      Heart sounds: Normal heart sounds.   Pulmonary:      Effort:  Pulmonary effort is normal.      Breath sounds: Normal breath sounds.   Abdominal:      General: Abdomen is flat. Bowel sounds are normal.      Palpations: Abdomen is soft.   Musculoskeletal:      Cervical back: Normal range of motion and neck supple.   Skin:     General: Skin is warm and dry.   Neurological:      General: No focal deficit present.      Mental Status: He is alert and oriented to person, place, and time.   Psychiatric:         Mood and Affect: Mood normal.         Assessment/Plan   Problem List Items Addressed This Visit          Cardiac and Vasculature    Benign essential HTN    Congestive heart failure (CMS/HCC) - Primary       Infectious Diseases    COVID-19     Other Visit Diagnoses       Other fatigue        Heart failure with reduced ejection fraction (CMS/HCC)            10/7/22  Patient's hospital chart reviewed  Blood pressure stable  CBC CMP fasting with TSH ordered before next follow-up in a month  Syringing curettage done in right ear with BX removal with some improvement in hearing  Treat with neomycin eardrops  Advised to use mineral oil  Get ears cleaned  Advised to see the ENT to get new hearing aids  Encourage patient to go for cataract surgery  Poor vision poor hearing could be contributing to his anxiety and depression  Had a long discussion with her wife but and what kind of liquids he can have  Patient has a follow-up with cardiologist later today  May need echocardiogram done sooner  Clinically is looking better  We will follow-up in a month        12/3./22  Patient clinically looks much better  He has acute bronchitis  Treat with Augmentin  Venous insufficiency doing much better  Blood work reviewed  Sodium normal  Anemia improving  Follow-up blood work in 3 months  Echocardiogram shows improved ejection fraction from 30 to 35% to 50 to 55%     1/12/23  Had a long discussion with the family and the patient regarding his anxiety and depression and choice of medication  Explained  all SSRIs are risk for causing hyponatremia  Family does not want anything to make urine sodium go down as he gets very confused with low sodium  Patient is hard of hearing and has poor vision he does not move around much  May be he needs to change his lifestyle which will make him more happy and less anxious  Discussed BuSpar dizziness most likely related to his ear  Try meclizine as needed  Family does not want to use buspirone at this time as there is a small chance of hyponatremia related to it  That when I work with lifestyle modification to see if patient feels better if not then we will follow-up  Routine follow-up      3/17/23  INR 2.5 PTT 30.2  Continue current Coumadin  We will start Zoloft 25 mg a day  Blood pressure stable  Cholesterol very well controlled  Blood work looks pretty good  Congestive heart failure compensated no swelling of the leg  Hyponatremia resolved  He got new hearing aids  Follow-up blood work in 3 months    7/15/2023  Advised weight call Dr. Mcdonald office to make sure they are monitoring the Coumadin  Continue Zoloft patient is tolerating  Sodium is in range  Kidney function stable  Cholesterol well controlled  CHF compensated  Follow-up blood work in 6 months    1/13/2024  Blood work reviewed  TSH 6.34  Increase levothyroxine 75 mcg  Blood sugars elevated  Discussed diet exercise and risk for diabetes  Mild anemia still persists most likely related to epistaxis and patient is on Coumadin  Take iron supplement  Use saline nasal spray  Blood pressure stable  Cholesterol very well-controlled  Follow-up in 3 months    2/13/2024  Patient has recovered from COVID completely   patient appears quite compensated with CHF on Lasix  Will repeat CBC BMP in 1 week  Will check overnight pulse oximetry to see if hypoxia could be making him tired blood pressure stable  Patient is going for the battery change and upgrade the coming week  Follow-up after the battery update/pacemaker change

## 2024-02-15 ENCOUNTER — TELEMEDICINE (OUTPATIENT)
Dept: PHARMACY | Facility: HOSPITAL | Age: 89
End: 2024-02-15
Payer: MEDICARE

## 2024-02-15 ENCOUNTER — HOME CARE VISIT (OUTPATIENT)
Dept: HOME HEALTH SERVICES | Facility: HOME HEALTH | Age: 89
End: 2024-02-15
Payer: MEDICARE

## 2024-02-15 VITALS
SYSTOLIC BLOOD PRESSURE: 120 MMHG | HEART RATE: 72 BPM | RESPIRATION RATE: 20 BRPM | BODY MASS INDEX: 29.83 KG/M2 | DIASTOLIC BLOOD PRESSURE: 64 MMHG | TEMPERATURE: 97.2 F | WEIGHT: 202 LBS | OXYGEN SATURATION: 100 %

## 2024-02-15 DIAGNOSIS — I50.23 ACUTE ON CHRONIC SYSTOLIC HEART FAILURE (MULTI): ICD-10-CM

## 2024-02-15 PROCEDURE — G0300 HHS/HOSPICE OF LPN EA 15 MIN: HCPCS

## 2024-02-15 ASSESSMENT — ENCOUNTER SYMPTOMS
APPETITE LEVEL: GOOD
LOWER EXTREMITY EDEMA: 1
DIZZINESS: 1
CHANGE IN APPETITE: UNCHANGED
DENIES PAIN: 1

## 2024-02-15 NOTE — PROGRESS NOTES
Pharmacy Post-Discharge Visit  Gorge Larsen is a 88 y.o. male who was referred to the Clinical Pharmacy Team to complete a post-discharge medication optimization and monitoring visit.  The patient was referred for their Congestive Heart Failure.    Recent Hospitalization  Admission Date: 1/22/24  Discharge Date: 1/25/24  Discharge Diagnosis: CHF, Pacemaker, COVID, Warfarin    Referring Provider: Natasha Antoine MD    Subjective   Allergies   Allergen Reactions    Codeine Nausea/vomiting    Latex, Natural Rubber Unknown    Lisinopril Unknown       Preferred Pharmacy    [id number] - [facility] [address] AT [facility] [address]  [address]  [facility] [address]  Phone: [phone] Fax: [phone]    [facility] - [facility] [address]  [address]  [address] [address]  Phone: [phone] Fax: [phone]      Social History     Social History Narrative    Not on file      CHF ASSESSMENT  EF% has decreased severely over past year, previously approximately 50-55%     Staging:  Most recent ejection fraction: ~35%  NYHA: Class III     Symptoms/Monitoring:  Reported baseline weight: 199.1, 199.5, 199.4, 198.8, 200.3, 201.6 lbs (past week/2 weeks)  Weighing self everyday? Yes!  Weight changes? No  Edema? Doesn't have any right now, has had edema-   Aware to contact cardiology if gaining 2-3 lbs/24 hrs OR 5 lbs/week? Yes  Shortness of breath? Did prior to going in to the hospital, okay now.      Diet/Lifestyle:  Following low-salt diet? No salt added, wife does most of the cooking. Wife is very conscious of low-salt, no frozen meals.   Monitoring liquid intake? No more than 2.5-3 bottles of water/day     Guideline-Directed Medical Therapy:  ARNI: No  If no, then ACEi/ARB?: Yes, describe: losartan 50 mg BID  Beta Blocker: Yes, describe: carvedilol 12.5 mg BID  MRA: No  SGLT2i: No- could not tolerate Farxiga  Furosemide 40 mg (patient  taking 0.5 tablets BID)     Secondary Prevention:  The ASCVD Risk score (Ethan DK, et al., 2019) failed to calculate for the following reasons:    The 2019 ASCVD risk score is only valid for ages 40 to 79   Aspirin 81mg? yes  Statin?: Yes, describe: rosuvatain 5 mg daily  HTN?: History of hypertension, however patient now trending towards hypotension       Laboratory Results  Lab Results   Component Value Date    BILITOT 0.9 01/23/2024    CALCIUM 9.3 02/06/2024    CO2 27 02/06/2024     02/06/2024    CREATININE 1.33 (H) 02/06/2024    GLUCOSE 97 02/06/2024    ALKPHOS 113 01/23/2024    K 4.3 02/06/2024    PROT 7.2 01/23/2024     02/06/2024    AST 21 01/23/2024    ALT 9 01/23/2024    BUN 26 (H) 02/06/2024    ANIONGAP 14 02/06/2024    MG 2.38 09/10/2022    PHOS 3.5 09/13/2022    ALBUMIN 3.6 01/23/2024    LIPASE 38 11/20/2021    GFRMALE 62 06/30/2023    EGFR 52 (L) 02/06/2024     Lab Results   Component Value Date    TRIG 62 01/08/2024    CHOL 98 01/08/2024    HDL 43.1 01/08/2024     Lab Results   Component Value Date    HGBA1C 5.3 12/11/2020         Assessment/Plan   Problem List Items Addressed This Visit       Acute on chronic systolic heart failure (CMS/[facility])     General Patient Discussion  Per cardiology, no changes to blood pressure medications warranted at this time.  Will touch base again after patient's pacemaker replacement/battery replacement    Plan/Changes   Continue all meds under the continuation of care with the referring provider and clinical pharmacy team  Specialists: Patient currently sees outpatient  Cardiology (Dr. Walker Mcdonald, Dr. Jeremy Clarke).    Next Clinical Pharmacy Follow-Up  ~3 weeks, after pacemaker replacement/battery replacement; Monday, March 11th, 2:30  Evaluate BP      Britt Arnold PharmD     Verbal consent to manage patient's drug therapy was obtained from patient/patient's representative (Catrina). They were informed they may decline to participate or withdraw  from participation in pharmacy services at any time.

## 2024-02-19 ENCOUNTER — HOSPITAL ENCOUNTER (OUTPATIENT)
Facility: HOSPITAL | Age: 89
Setting detail: OBSERVATION
Discharge: HOME | End: 2024-02-20
Attending: INTERNAL MEDICINE | Admitting: INTERNAL MEDICINE
Payer: MEDICARE

## 2024-02-19 DIAGNOSIS — R53.1 GENERALIZED WEAKNESS: ICD-10-CM

## 2024-02-19 DIAGNOSIS — I25.5 ISCHEMIC CARDIOMYOPATHY: ICD-10-CM

## 2024-02-19 DIAGNOSIS — I50.23 ACUTE ON CHRONIC SYSTOLIC HEART FAILURE (MULTI): Primary | ICD-10-CM

## 2024-02-19 DIAGNOSIS — I48.11 LONGSTANDING PERSISTENT ATRIAL FIBRILLATION (MULTI): ICD-10-CM

## 2024-02-19 DIAGNOSIS — Z45.010 PACEMAKER AT END OF BATTERY LIFE: ICD-10-CM

## 2024-02-19 DIAGNOSIS — I44.2 ATRIOVENTRICULAR BLOCK, COMPLETE (MULTI): ICD-10-CM

## 2024-02-19 DIAGNOSIS — I50.41 ACUTE COMBINED SYSTOLIC (CONGESTIVE) AND DIASTOLIC (CONGESTIVE) HEART FAILURE (MULTI): ICD-10-CM

## 2024-02-19 DIAGNOSIS — Z95.0 PACEMAKER: ICD-10-CM

## 2024-02-19 DIAGNOSIS — I42.9 CARDIOMYOPATHY, UNSPECIFIED TYPE (MULTI): ICD-10-CM

## 2024-02-19 LAB
ANION GAP SERPL CALC-SCNC: 13 MMOL/L
BUN SERPL-MCNC: 38 MG/DL (ref 8–25)
CALCIUM SERPL-MCNC: 9.7 MG/DL (ref 8.5–10.4)
CHLORIDE SERPL-SCNC: 102 MMOL/L (ref 97–107)
CO2 SERPL-SCNC: 25 MMOL/L (ref 24–31)
CREAT SERPL-MCNC: 1.4 MG/DL (ref 0.4–1.6)
EGFRCR SERPLBLD CKD-EPI 2021: 49 ML/MIN/1.73M*2
ERYTHROCYTE [DISTWIDTH] IN BLOOD BY AUTOMATED COUNT: 16.9 % (ref 11.5–14.5)
GLUCOSE SERPL-MCNC: 104 MG/DL (ref 65–99)
HCT VFR BLD AUTO: 40.2 % (ref 41–52)
HGB BLD-MCNC: 12.8 G/DL (ref 13.5–17.5)
INR PPP: 1.7 (ref 0.9–1.2)
MCH RBC QN AUTO: 28.3 PG (ref 26–34)
MCHC RBC AUTO-ENTMCNC: 31.8 G/DL (ref 32–36)
MCV RBC AUTO: 89 FL (ref 80–100)
NRBC BLD-RTO: 0 /100 WBCS (ref 0–0)
PLATELET # BLD AUTO: 243 X10*3/UL (ref 150–450)
POTASSIUM SERPL-SCNC: 3.9 MMOL/L (ref 3.4–5.1)
PROTHROMBIN TIME: 17.8 SECONDS (ref 9.3–12.7)
RBC # BLD AUTO: 4.52 X10*6/UL (ref 4.5–5.9)
SODIUM SERPL-SCNC: 140 MMOL/L (ref 133–145)
WBC # BLD AUTO: 5.7 X10*3/UL (ref 4.4–11.3)

## 2024-02-19 PROCEDURE — C1882 AICD, OTHER THAN SING/DUAL: HCPCS | Performed by: INTERNAL MEDICINE

## 2024-02-19 PROCEDURE — 33249 INSJ/RPLCMT DEFIB W/LEAD(S): CPT | Performed by: INTERNAL MEDICINE

## 2024-02-19 PROCEDURE — C1889 IMPLANT/INSERT DEVICE, NOC: HCPCS | Performed by: INTERNAL MEDICINE

## 2024-02-19 PROCEDURE — 85027 COMPLETE CBC AUTOMATED: CPT | Performed by: INTERNAL MEDICINE

## 2024-02-19 PROCEDURE — C1751 CATH, INF, PER/CENT/MIDLINE: HCPCS | Performed by: INTERNAL MEDICINE

## 2024-02-19 PROCEDURE — 2500000002 HC RX 250 W HCPCS SELF ADMINISTERED DRUGS (ALT 637 FOR MEDICARE OP, ALT 636 FOR OP/ED)

## 2024-02-19 PROCEDURE — 2500000005 HC RX 250 GENERAL PHARMACY W/O HCPCS: Performed by: INTERNAL MEDICINE

## 2024-02-19 PROCEDURE — 3700000012 HC SEDATION LEVEL 5+ TIME - INITIAL 15 MINUTES 5/> YEARS: Performed by: INTERNAL MEDICINE

## 2024-02-19 PROCEDURE — C1769 GUIDE WIRE: HCPCS | Performed by: INTERNAL MEDICINE

## 2024-02-19 PROCEDURE — 2750000001 HC OR 275 NO HCPCS: Performed by: INTERNAL MEDICINE

## 2024-02-19 PROCEDURE — 85610 PROTHROMBIN TIME: CPT | Performed by: INTERNAL MEDICINE

## 2024-02-19 PROCEDURE — C1900 LEAD, CORONARY VENOUS: HCPCS | Performed by: INTERNAL MEDICINE

## 2024-02-19 PROCEDURE — 33235 REMOVAL PACEMAKER ELECTRODE: CPT | Performed by: INTERNAL MEDICINE

## 2024-02-19 PROCEDURE — 36415 COLL VENOUS BLD VENIPUNCTURE: CPT | Performed by: INTERNAL MEDICINE

## 2024-02-19 PROCEDURE — C1894 INTRO/SHEATH, NON-LASER: HCPCS | Performed by: INTERNAL MEDICINE

## 2024-02-19 PROCEDURE — 2500000004 HC RX 250 GENERAL PHARMACY W/ HCPCS (ALT 636 FOR OP/ED): Performed by: INTERNAL MEDICINE

## 2024-02-19 PROCEDURE — 80048 BASIC METABOLIC PNL TOTAL CA: CPT | Performed by: INTERNAL MEDICINE

## 2024-02-19 PROCEDURE — C1777 LEAD, AICD, ENDO SINGLE COIL: HCPCS | Performed by: INTERNAL MEDICINE

## 2024-02-19 PROCEDURE — G0378 HOSPITAL OBSERVATION PER HR: HCPCS

## 2024-02-19 PROCEDURE — 7100000010 HC PHASE TWO TIME - EACH INCREMENTAL 1 MINUTE: Performed by: INTERNAL MEDICINE

## 2024-02-19 PROCEDURE — 99153 MOD SED SAME PHYS/QHP EA: CPT | Performed by: INTERNAL MEDICINE

## 2024-02-19 PROCEDURE — C1892 INTRO/SHEATH,FIXED,PEEL-AWAY: HCPCS | Performed by: INTERNAL MEDICINE

## 2024-02-19 PROCEDURE — 33225 L VENTRIC PACING LEAD ADD-ON: CPT | Performed by: INTERNAL MEDICINE

## 2024-02-19 PROCEDURE — 7100000009 HC PHASE TWO TIME - INITIAL BASE CHARGE: Performed by: INTERNAL MEDICINE

## 2024-02-19 PROCEDURE — 2780000003 HC OR 278 NO HCPCS: Performed by: INTERNAL MEDICINE

## 2024-02-19 PROCEDURE — 2720000007 HC OR 272 NO HCPCS: Performed by: INTERNAL MEDICINE

## 2024-02-19 PROCEDURE — 2550000001 HC RX 255 CONTRASTS: Performed by: INTERNAL MEDICINE

## 2024-02-19 PROCEDURE — 2500000001 HC RX 250 WO HCPCS SELF ADMINISTERED DRUGS (ALT 637 FOR MEDICARE OP)

## 2024-02-19 PROCEDURE — 2500000004 HC RX 250 GENERAL PHARMACY W/ HCPCS (ALT 636 FOR OP/ED)

## 2024-02-19 PROCEDURE — 99152 MOD SED SAME PHYS/QHP 5/>YRS: CPT | Performed by: INTERNAL MEDICINE

## 2024-02-19 PROCEDURE — 3700000013 HC SEDATION LEVEL 5+ TIME - EACH ADDITIONAL 15 MINUTES: Performed by: INTERNAL MEDICINE

## 2024-02-19 DEVICE — CARDIAC RESYNCHRONIZATION THERAPY DEFIBRILLATOR
Type: IMPLANTABLE DEVICE | Site: HEART | Status: FUNCTIONAL
Brand: VIGILANT™ X4 CRT-D

## 2024-02-19 DEVICE — SLITTER, ADJUSTABLE: Type: IMPLANTABLE DEVICE | Site: HEART | Status: NON-FUNCTIONAL

## 2024-02-19 DEVICE — PACE/SENSE LEAD
Type: IMPLANTABLE DEVICE | Site: HEART | Status: FUNCTIONAL
Brand: ACUITY™ X4 STRAIGHT

## 2024-02-19 DEVICE — INTEGRATED BIPOLAR PACE/SENSE AND DEFIBRILLATION LEAD
Type: IMPLANTABLE DEVICE | Site: HEART | Status: FUNCTIONAL
Brand: RELIANCE 4-FRONT™

## 2024-02-19 RX ORDER — FUROSEMIDE 40 MG/1
40 TABLET ORAL DAILY
Status: DISCONTINUED | OUTPATIENT
Start: 2024-02-19 | End: 2024-02-20 | Stop reason: HOSPADM

## 2024-02-19 RX ORDER — POTASSIUM CHLORIDE 1.5 G/1.58G
20 POWDER, FOR SOLUTION ORAL
Status: DISCONTINUED | OUTPATIENT
Start: 2024-02-20 | End: 2024-02-20 | Stop reason: HOSPADM

## 2024-02-19 RX ORDER — MIDAZOLAM HYDROCHLORIDE 1 MG/ML
INJECTION INTRAMUSCULAR; INTRAVENOUS AS NEEDED
Status: DISCONTINUED | OUTPATIENT
Start: 2024-02-19 | End: 2024-02-19 | Stop reason: HOSPADM

## 2024-02-19 RX ORDER — CEFAZOLIN SODIUM 2 G/50ML
2 SOLUTION INTRAVENOUS EVERY 8 HOURS
Status: DISCONTINUED | OUTPATIENT
Start: 2024-02-19 | End: 2024-02-19

## 2024-02-19 RX ORDER — GUAIFENESIN/DEXTROMETHORPHAN 100-10MG/5
5 SYRUP ORAL EVERY 4 HOURS PRN
Status: DISCONTINUED | OUTPATIENT
Start: 2024-02-19 | End: 2024-02-20 | Stop reason: HOSPADM

## 2024-02-19 RX ORDER — IODIXANOL 270 MG/ML
INJECTION, SOLUTION INTRAVASCULAR AS NEEDED
Status: DISCONTINUED | OUTPATIENT
Start: 2024-02-19 | End: 2024-02-19 | Stop reason: HOSPADM

## 2024-02-19 RX ORDER — WARFARIN 7.5 MG/1
7.5 TABLET ORAL
Status: DISCONTINUED | OUTPATIENT
Start: 2024-02-20 | End: 2024-02-20 | Stop reason: HOSPADM

## 2024-02-19 RX ORDER — ACETAMINOPHEN 325 MG/1
TABLET ORAL
Status: COMPLETED
Start: 2024-02-19 | End: 2024-02-19

## 2024-02-19 RX ORDER — ASPIRIN 81 MG/1
81 TABLET ORAL DAILY
Status: DISCONTINUED | OUTPATIENT
Start: 2024-02-19 | End: 2024-02-20 | Stop reason: HOSPADM

## 2024-02-19 RX ORDER — CARVEDILOL 12.5 MG/1
12.5 TABLET ORAL
Status: DISCONTINUED | OUTPATIENT
Start: 2024-02-19 | End: 2024-02-20 | Stop reason: HOSPADM

## 2024-02-19 RX ORDER — SODIUM CHLORIDE 9 MG/ML
75 INJECTION, SOLUTION INTRAVENOUS CONTINUOUS
Status: DISCONTINUED | OUTPATIENT
Start: 2024-02-19 | End: 2024-02-19

## 2024-02-19 RX ORDER — WARFARIN SODIUM 5 MG/1
5 TABLET ORAL
Status: DISCONTINUED | OUTPATIENT
Start: 2024-02-20 | End: 2024-02-20 | Stop reason: HOSPADM

## 2024-02-19 RX ORDER — ACETAMINOPHEN 650 MG/1
650 SUPPOSITORY RECTAL EVERY 4 HOURS PRN
Status: DISCONTINUED | OUTPATIENT
Start: 2024-02-19 | End: 2024-02-20 | Stop reason: HOSPADM

## 2024-02-19 RX ORDER — CEFAZOLIN SODIUM 2 G/100ML
2 INJECTION, SOLUTION INTRAVENOUS ONCE
Status: COMPLETED | OUTPATIENT
Start: 2024-02-19 | End: 2024-02-19

## 2024-02-19 RX ORDER — LEVOTHYROXINE SODIUM 75 UG/1
75 TABLET ORAL
Status: DISCONTINUED | OUTPATIENT
Start: 2024-02-20 | End: 2024-02-20 | Stop reason: HOSPADM

## 2024-02-19 RX ORDER — ACETAMINOPHEN 160 MG/5ML
650 SOLUTION ORAL EVERY 4 HOURS PRN
Status: DISCONTINUED | OUTPATIENT
Start: 2024-02-19 | End: 2024-02-20 | Stop reason: HOSPADM

## 2024-02-19 RX ORDER — FENTANYL CITRATE 50 UG/ML
INJECTION, SOLUTION INTRAMUSCULAR; INTRAVENOUS AS NEEDED
Status: DISCONTINUED | OUTPATIENT
Start: 2024-02-19 | End: 2024-02-19 | Stop reason: HOSPADM

## 2024-02-19 RX ORDER — POLYETHYLENE GLYCOL 3350 17 G/17G
17 POWDER, FOR SOLUTION ORAL DAILY
Status: DISCONTINUED | OUTPATIENT
Start: 2024-02-19 | End: 2024-02-20 | Stop reason: HOSPADM

## 2024-02-19 RX ORDER — FLUMAZENIL 0.1 MG/ML
INJECTION INTRAVENOUS AS NEEDED
Status: DISCONTINUED | OUTPATIENT
Start: 2024-02-19 | End: 2024-02-19 | Stop reason: HOSPADM

## 2024-02-19 RX ORDER — ACETAMINOPHEN 325 MG/1
650 TABLET ORAL EVERY 4 HOURS PRN
Status: DISCONTINUED | OUTPATIENT
Start: 2024-02-19 | End: 2024-02-20 | Stop reason: HOSPADM

## 2024-02-19 RX ORDER — CEFAZOLIN SODIUM 2 G/100ML
INJECTION, SOLUTION INTRAVENOUS
Status: COMPLETED
Start: 2024-02-19 | End: 2024-02-19

## 2024-02-19 RX ORDER — ROSUVASTATIN CALCIUM 5 MG/1
5 TABLET, COATED ORAL NIGHTLY
Status: DISCONTINUED | OUTPATIENT
Start: 2024-02-19 | End: 2024-02-20 | Stop reason: HOSPADM

## 2024-02-19 RX ORDER — BUPIVACAINE HYDROCHLORIDE 2.5 MG/ML
INJECTION, SOLUTION EPIDURAL; INFILTRATION; INTRACAUDAL AS NEEDED
Status: DISCONTINUED | OUTPATIENT
Start: 2024-02-19 | End: 2024-02-19 | Stop reason: HOSPADM

## 2024-02-19 RX ADMIN — ACETAMINOPHEN 650 MG: 325 TABLET ORAL at 16:11

## 2024-02-19 RX ADMIN — CEFAZOLIN SODIUM 2 G: 2 INJECTION, SOLUTION INTRAVENOUS at 11:01

## 2024-02-19 RX ADMIN — ACETAMINOPHEN 650 MG: 325 TABLET ORAL at 20:19

## 2024-02-19 RX ADMIN — CARVEDILOL 12.5 MG: 12.5 TABLET, FILM COATED ORAL at 18:47

## 2024-02-19 RX ADMIN — ROSUVASTATIN CALCIUM 5 MG: 5 TABLET, FILM COATED ORAL at 21:02

## 2024-02-19 ASSESSMENT — PAIN SCALES - GENERAL
PAINLEVEL_OUTOF10: 0 - NO PAIN
PAINLEVEL_OUTOF10: 3
PAINLEVEL_OUTOF10: 0 - NO PAIN

## 2024-02-19 ASSESSMENT — COLUMBIA-SUICIDE SEVERITY RATING SCALE - C-SSRS
6. HAVE YOU EVER DONE ANYTHING, STARTED TO DO ANYTHING, OR PREPARED TO DO ANYTHING TO END YOUR LIFE?: NO
2. HAVE YOU ACTUALLY HAD ANY THOUGHTS OF KILLING YOURSELF?: NO
1. IN THE PAST MONTH, HAVE YOU WISHED YOU WERE DEAD OR WISHED YOU COULD GO TO SLEEP AND NOT WAKE UP?: NO

## 2024-02-19 ASSESSMENT — PAIN - FUNCTIONAL ASSESSMENT
PAIN_FUNCTIONAL_ASSESSMENT: FLACC (FACE, LEGS, ACTIVITY, CRY, CONSOLABILITY)
PAIN_FUNCTIONAL_ASSESSMENT: 0-10
PAIN_FUNCTIONAL_ASSESSMENT: FLACC (FACE, LEGS, ACTIVITY, CRY, CONSOLABILITY)
PAIN_FUNCTIONAL_ASSESSMENT: 0-10
PAIN_FUNCTIONAL_ASSESSMENT: FLACC (FACE, LEGS, ACTIVITY, CRY, CONSOLABILITY)
PAIN_FUNCTIONAL_ASSESSMENT: 0-10
PAIN_FUNCTIONAL_ASSESSMENT: 0-10

## 2024-02-19 ASSESSMENT — PAIN DESCRIPTION - ORIENTATION: ORIENTATION: LEFT

## 2024-02-19 ASSESSMENT — PAIN DESCRIPTION - LOCATION: LOCATION: ARM

## 2024-02-19 NOTE — POST-PROCEDURE NOTE
Physician Transition of Care Summary  Invasive Cardiovascular Lab    Procedure Date: 2/19/2024  Attending:    * Walker Mcdonald - Primary  Resident/Fellow/Other Assistant: Surgeon(s) and Role:    Indications:   Pre-op Diagnosis     * Cardiomyopathy, unspecified type (CMS/HCC) [I42.9]     * Atrioventricular block, complete (CMS/HCC) [I44.2]     * Pacemaker at end of battery life [Z45.010]     * Pacemaker [Z95.0]     * Ischemic cardiomyopathy [I25.5]     * Acute combined systolic (congestive) and diastolic (congestive) heart failure (CMS/HCC) [I50.41]  The patient is an 88-year-old male with a history of pacer dependency and complete AV block.  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  He has neurostatus vision class III heart failure symptoms and ejection fraction 25%.    Post-procedure diagnosis:   Post-op Diagnosis     * Cardiomyopathy, unspecified type (CMS/HCC) [I42.9]     * Atrioventricular block, complete (CMS/HCC) [I44.2]     * Pacemaker at end of battery life [Z45.010]     * Pacemaker [Z95.0]     * Ischemic cardiomyopathy [I25.5]     * Acute combined systolic (congestive) and diastolic (congestive) heart failure (CMS/HCC) [I50.41]    Procedure(s):   PPM Generator Explant  [id number] - [place] REMOVAL PERMANENT PACEMAKER PULSE GENERATOR ONLY    ICD BIV Implant  80468 - [place] INSJ/RPLCMT PERM DFB W/TRNSVNS LDS 1/DUAL CHMBR    ICD Upgrade Biventricular  11792 - [place] INSJ ELTRD CAR TRAVIS SYS TM INSJ DFB/PM PLS GEN        Procedure Findings:   Please see below    Description of the Procedure:   After written witnessed informed consent was obtained the procedure from the patient, the patient was transferred to the EP laboratory. The patient was in the fasting state. A grounding pad was placed. The patient was set up for monitoring of surface ECG. The  Left upper chest was prepped and draped in the usual sterile fashion. Bupivacaine was administered locally for  anesthetic. A 4cm incision was placed at the Left deltopectoral groove.  A pocket revision was performed  to accommodate the larger device generator.  Left subclavian  vein access was obtained after venogram demonstarted patency  .   The right ventricular shocking lead was delivered to the RV via a peel-away 8 Turkmen sheath to the RV apical region under the fluoroscopic guidance. Position was confirmed in the Portuguese and VALDEZ projections. The helix was deployed, and two-way communication was set up with the device interrogator. The lead is a single coil shocking lead. The sensing was 8 mv paced , with threshold for ventricular capture at 0.4 V at 0.4 ms pulse width, impedance was 440 ohms.  The lead was sutured at the suture sleeves of the pectoralis minor muscle. There was no phrenic nerve stimulation maximal output.    The Left ventricular shocking lead was delivered to the CS via a peel-away 9.5 Turkmen short sheath and a long extended hook sheath to a 1st lateral posterior  branch of the coronary sinus he after contrast injection using a Shreve-Jenny catheter balloon to occlude the coronary sinus under the fluoroscopic guidance. Position was confirmed in the Portuguese and VALDEZ projections.  The lead is a quadripolar CS lead. The sensing was 8 mv - paced, with threshold for ventricular capture at 1.8 V at 0.4 ms  pulse width, impedance was 1692 ohms (left ventricle ring 3 to left ventricle ring 4 ).  The lead was sutured at the suture sleeves of the pectoralis minor muscle. There was no phrenic nerve stimulation maximal output.    The right atrial lead is a chronic lead with excellent sensing, threshold and impedance.  The chronic RV lead was capped  An antibiotic pouch was used and the pocket was revised to accomadate the larger device    All  leads were attached to the new generator header and placed in a preformed generator pocket.   A tyrex antibiotic pouch was used  The incision was closed with 3 layers of suture. A 0  Vicryl interrupted layer, followed by a 3.0 B. V-Loc continuous layer, and a 4.0 V-loc continuous layer for the subcuticular layer. Steri-Strips and a dressing were applied.        Summary   Successful biventricular  ICD upgrade     Complications:   none    Stents/Implants:   Cardiovascular Implants       Pacemaker    Lead, Sense Defb, Steroid Eluting, Biopolar, 64cm - [id number] - Implanted        Inventory item: LEAD, SENSE DEFB, STEROID ELUTING, BIOPOLAR, 64CM Model/Cat number: [id number]    Serial number: [id number] : [employer]    Lot number: [id number] Device identifier: [id number]    Implant Date: 2/19/2024        As of 2/19/2024       Status: Implanted                      Lead, Pacing, Acuity, X4 Tines, 86 Cm - [id number] - Implanted        Inventory item: LEAD, PACING, ACUITY, X4 TINES, 86 CM Model/Cat number: [id number]    Serial number: [id number] : [employer]    Lot number: [id number] Device identifier: [id number]    Implant Date: 2/19/2024        As of 2/19/2024       Status: Implanted                      Pacemaker, Vigilant X4 Crt-D, Is4 Df4 - [id number] - Implanted        Inventory item: PACEMAKER, VIGILANT X4 CRT-D, IS4 DF4 Model/Cat number: [id number]    Serial number: [id number] : [employer]    Lot number: [id number] Device identifier: [id number]    Implant Date: 2/19/2024        As of 2/19/2024       Status: Implanted                              Anticoagulation/Antiplatelet Plan:   none    Estimated Blood Loss:   15 mL    Anesthesia: Moderate Sedation Anesthesia Staff: No anesthesia staff entered.    Any Specimen(s) Removed:   Order Name Source Comment Collection Info Order Time   BASIC METABOLIC PANEL Blood, Venous  Collected By: Rosa Millard RN 2/19/2024  9:56 AM     Release result to [employer]   Immediate        CBC Blood, Venous  Collected By: Rosa Millard RN 2/19/2024  9:56 AM     Release result to [facility]   Immediate        PROTIME-INR Blood,  Venous  Collected By: Rosa Millard RN 2/19/2024  9:58 AM     IS THE PATIENT ON AN ANTICOAGULANT?   Coumadin/Warfarin          Release result to MyChart   Immediate            Disposition:   stable      Electronically signed by: Walker Mcdonald MD, 2/19/2024 1:56 PM

## 2024-02-19 NOTE — PROGRESS NOTES
Gorge Larsen is a 88 y.o. male on day 0 of admission presenting with Cardiomyopathy (CMS/HCC).    Subjective   Patient seen in [facility].  Pending placement for observation post procedure.    Objective     Physical Exam  Vitals and nursing note reviewed.   Constitutional:       Appearance: Normal appearance.   Cardiovascular:      Rate and Rhythm: Normal rate and regular rhythm.      Pulses: Normal pulses.      Heart sounds: Murmur heard.   Pulmonary:      Effort: Pulmonary effort is normal.      Breath sounds: Normal breath sounds.   Abdominal:      General: Bowel sounds are normal.      Palpations: Abdomen is soft.   Musculoskeletal:         General: Normal range of motion.   Skin:     General: Skin is warm and dry.   Neurological:      General: No focal deficit present.      Mental Status: He is alert.     Last Recorded Vitals  Blood pressure 152/83, pulse 70, temperature 36.9 °C (98.4 °F), temperature source Oral, resp. rate 16, SpO2 100 %.    Intake/Output last 3 Shifts:  No intake/output data recorded.    Relevant Results  Results for orders placed or performed during the hospital encounter of 02/19/24 (from the past 24 hour(s))   Basic Metabolic Panel   Result Value Ref Range    Glucose 104 (H) 65 - 99 mg/dL    Sodium 140 133 - 145 mmol/L    Potassium 3.9 3.4 - 5.1 mmol/L    Chloride 102 97 - 107 mmol/L    Bicarbonate 25 24 - 31 mmol/L    Urea Nitrogen 38 (H) 8 - 25 mg/dL    Creatinine 1.40 0.40 - 1.60 mg/dL    eGFR 49 (L) >60 mL/min/1.73m*2    Calcium 9.7 8.5 - 10.4 mg/dL    Anion Gap 13 <=19 mmol/L   CBC   Result Value Ref Range    WBC 5.7 4.4 - 11.3 x10*3/uL    nRBC 0.0 0.0 - 0.0 /100 WBCs    RBC 4.52 4.50 - 5.90 x10*6/uL    Hemoglobin 12.8 (L) 13.5 - 17.5 g/dL    Hematocrit 40.2 (L) 41.0 - 52.0 %    MCV 89 80 - 100 fL    MCH 28.3 26.0 - 34.0 pg    MCHC 31.8 (L) 32.0 - 36.0 g/dL    RDW 16.9 (H) 11.5 - 14.5 %    Platelets 243 150 - 450 x10*3/uL   Protime-INR   Result Value Ref Range    Protime 17.8 (H) 9.3 -  12.7 seconds    INR 1.7 (H) 0.9 - 1.2      Assessment/Plan   Principal Problem:    Cardiomyopathy (CMS/HCC)  Active Problems:    Atrioventricular block, complete (CMS/HCC)    Pacemaker at end of battery life    Pacemaker    Acute combined systolic (congestive) and diastolic (congestive) heart failure (CMS/HCC)    2/19: Patient presented today for elective upgrade to a biventricular ICD in the setting of ischemic cardiomyopathy and [place] Heart Association class III symptoms.  Patient received a BiV ICD utilizing subclavian approach.  His RV pacing lead was capped.  A Tyrx antibiotic pouch was implanted at the time of the procedure.  Postprocedure patient did develop a small hematoma.  Direct pressure was held to incision site and pressure dressing was placed.  Will obtain PT/INR tomorrow morning with morning labs and likely resume Coumadin tomorrow evening.  Will obtain chest x-ray tomorrow morning.    Yasmeen Keller, APRN-CNP

## 2024-02-19 NOTE — Clinical Note
The PACEMAKER, VIGILANT X4 CRT-D, IS4 DF4 - [id number] device was inserted. The leads were placed into the connector and visually verified to be in correct position. Injury current obtained.

## 2024-02-19 NOTE — DISCHARGE SUMMARY
Discharge Diagnosis  Cardiomyopathy (CMS/HCC)    Issues Requiring Follow-Up  Device follow up and cardiomyopathy    Test Results Pending At Discharge  Pending Labs       No current pending labs.            Hospital Course   Successful BIV ICD upgrade    Pertinent Physical Exam At Time of Discharge  Physical Exam    Home Medications     Medication List      ASK your doctor about these medications     aspirin 81 mg EC tablet   carvedilol 12.5 mg tablet; Commonly known as: Coreg; Take 1 tablet (12.5   mg) by mouth 2 times a day with meals.   dapagliflozin propanediol 10 mg; Commonly known as: Farxiga; Take 1   tablet (10 mg) by mouth once daily. Do not start before January 26, 2024.   dextromethorphan-guaifenesin  mg/5 mL oral liquid; Commonly known   as: Robitussin DM   furosemide 40 mg tablet; Commonly known as: Lasix; Take 1 tablet (40 mg)   by mouth once daily. Do not start before January 26, 2024.   levothyroxine 50 mcg tablet; Commonly known as: Synthroid, Levoxyl; Take   1.5 tablets (75 mcg) by mouth once daily.   losartan 50 mg tablet; Commonly known as: Cozaar; Take 1 tablet (50 mg)   by mouth 2 times a day.   polyethylene glycol 17 gram packet; Commonly known as: Glycolax, Miralax   potassium chloride 20 mEq packet; Commonly known as: Klor-Con; Take 20   mEq by mouth once daily. Do not start before January 26, 2024.   rosuvastatin 5 mg tablet; Commonly known as: Crestor; Take 1 tablet (5   mg) by mouth once daily at bedtime.   sertraline 25 mg tablet; Commonly known as: Zoloft   * warfarin 5 mg tablet; Commonly known as: Coumadin; Take 1 tablet (5   mg) by mouth 4 times a week. Monday, Wednesday, Saturday and Sunday   * warfarin 7.5 mg tablet; Commonly known as: Coumadin; TAKE 1 TABLET BY   MOUTH EVERY DAY AS DIRECTED  * This list has 2 medication(s) that are the same as other medications   prescribed for you. Read the directions carefully, and ask your doctor or   other care provider to review them  with you.       Outpatient Follow-Up  Future Appointments   Date Time Provider Department Center   2/21/2024 To Be Determined Melissa Rahman RN [facility]   2/22/2024  2:30 PM Jeremy Clarke MD [id number] [facility]   2/28/2024 To Be Determined Melissa Rahman RN [facility]   3/6/2024 To Be Determined Melissa Rahman RN [facility]   3/11/2024  2:30 PM PHARMACY [facility] [id number] [facility]   3/13/2024 To Be Determined Melissa Rahman RN [facility]   3/20/2024 To Be Determined Melissa Rahman RN [facility]   4/13/2024 11:30 AM Natasha Antoine MD [id number] [facility]       Walker Mcdonald MD

## 2024-02-20 ENCOUNTER — APPOINTMENT (OUTPATIENT)
Dept: RADIOLOGY | Facility: HOSPITAL | Age: 89
End: 2024-02-20
Payer: MEDICARE

## 2024-02-20 VITALS
DIASTOLIC BLOOD PRESSURE: 56 MMHG | RESPIRATION RATE: 20 BRPM | OXYGEN SATURATION: 100 % | TEMPERATURE: 96.6 F | HEART RATE: 70 BPM | SYSTOLIC BLOOD PRESSURE: 112 MMHG

## 2024-02-20 PROBLEM — Z45.010 PACEMAKER AT END OF BATTERY LIFE: Status: RESOLVED | Noted: 2024-02-06 | Resolved: 2024-02-20

## 2024-02-20 LAB
ANION GAP SERPL CALC-SCNC: 10 MMOL/L
BUN SERPL-MCNC: 38 MG/DL (ref 8–25)
CALCIUM SERPL-MCNC: 8.9 MG/DL (ref 8.5–10.4)
CHLORIDE SERPL-SCNC: 105 MMOL/L (ref 97–107)
CO2 SERPL-SCNC: 23 MMOL/L (ref 24–31)
CREAT SERPL-MCNC: 1.2 MG/DL (ref 0.4–1.6)
EGFRCR SERPLBLD CKD-EPI 2021: 59 ML/MIN/1.73M*2
ERYTHROCYTE [DISTWIDTH] IN BLOOD BY AUTOMATED COUNT: 16.8 % (ref 11.5–14.5)
GLUCOSE SERPL-MCNC: 99 MG/DL (ref 65–99)
HCT VFR BLD AUTO: 32 % (ref 41–52)
HGB BLD-MCNC: 10.4 G/DL (ref 13.5–17.5)
INR PPP: 1.6 (ref 0.9–1.2)
MCH RBC QN AUTO: 28 PG (ref 26–34)
MCHC RBC AUTO-ENTMCNC: 32.5 G/DL (ref 32–36)
MCV RBC AUTO: 86 FL (ref 80–100)
NRBC BLD-RTO: 0 /100 WBCS (ref 0–0)
PLATELET # BLD AUTO: 202 X10*3/UL (ref 150–450)
POTASSIUM SERPL-SCNC: 3.8 MMOL/L (ref 3.4–5.1)
PROTHROMBIN TIME: 16.1 SECONDS (ref 9.3–12.7)
RBC # BLD AUTO: 3.71 X10*6/UL (ref 4.5–5.9)
SODIUM SERPL-SCNC: 138 MMOL/L (ref 133–145)
WBC # BLD AUTO: 6 X10*3/UL (ref 4.4–11.3)

## 2024-02-20 PROCEDURE — 2500000004 HC RX 250 GENERAL PHARMACY W/ HCPCS (ALT 636 FOR OP/ED)

## 2024-02-20 PROCEDURE — 71046 X-RAY EXAM CHEST 2 VIEWS: CPT

## 2024-02-20 PROCEDURE — 80048 BASIC METABOLIC PNL TOTAL CA: CPT

## 2024-02-20 PROCEDURE — 2500000001 HC RX 250 WO HCPCS SELF ADMINISTERED DRUGS (ALT 637 FOR MEDICARE OP)

## 2024-02-20 PROCEDURE — 36415 COLL VENOUS BLD VENIPUNCTURE: CPT

## 2024-02-20 PROCEDURE — G0378 HOSPITAL OBSERVATION PER HR: HCPCS

## 2024-02-20 PROCEDURE — 85027 COMPLETE CBC AUTOMATED: CPT

## 2024-02-20 PROCEDURE — 85610 PROTHROMBIN TIME: CPT

## 2024-02-20 RX ORDER — CARVEDILOL 12.5 MG/1
6.25 TABLET ORAL
Qty: 90 TABLET | Refills: 1 | Status: SHIPPED | OUTPATIENT
Start: 2024-02-20 | End: 2024-04-18 | Stop reason: ALTCHOICE

## 2024-02-20 RX ADMIN — CARVEDILOL 12.5 MG: 12.5 TABLET, FILM COATED ORAL at 08:11

## 2024-02-20 RX ADMIN — POTASSIUM CHLORIDE 20 MEQ: 1.5 FOR SOLUTION ORAL at 08:11

## 2024-02-20 RX ADMIN — ASPIRIN 81 MG: 81 TABLET, COATED ORAL at 08:11

## 2024-02-20 RX ADMIN — FUROSEMIDE 40 MG: 40 TABLET ORAL at 08:11

## 2024-02-20 RX ADMIN — LEVOTHYROXINE SODIUM 75 MCG: 0.07 TABLET ORAL at 06:22

## 2024-02-20 RX ADMIN — ACETAMINOPHEN 650 MG: 325 TABLET ORAL at 06:32

## 2024-02-20 ASSESSMENT — COGNITIVE AND FUNCTIONAL STATUS - GENERAL
STANDING UP FROM CHAIR USING ARMS: A LITTLE
MOBILITY SCORE: 18
DRESSING REGULAR UPPER BODY CLOTHING: A LITTLE
TURNING FROM BACK TO SIDE WHILE IN FLAT BAD: A LITTLE
MOVING TO AND FROM BED TO CHAIR: A LITTLE
MOBILITY SCORE: 18
DAILY ACTIVITIY SCORE: 18
TOILETING: A LITTLE
TURNING FROM BACK TO SIDE WHILE IN FLAT BAD: A LITTLE
MOVING FROM LYING ON BACK TO SITTING ON SIDE OF FLAT BED WITH BEDRAILS: A LITTLE
CLIMB 3 TO 5 STEPS WITH RAILING: A LITTLE
DRESSING REGULAR LOWER BODY CLOTHING: A LITTLE
DRESSING REGULAR LOWER BODY CLOTHING: A LITTLE
CLIMB 3 TO 5 STEPS WITH RAILING: A LOT
HELP NEEDED FOR BATHING: A LITTLE
TOILETING: A LITTLE
STANDING UP FROM CHAIR USING ARMS: A LITTLE
DRESSING REGULAR UPPER BODY CLOTHING: A LOT
WALKING IN HOSPITAL ROOM: A LITTLE
WALKING IN HOSPITAL ROOM: A LITTLE
MOVING TO AND FROM BED TO CHAIR: A LITTLE
PERSONAL GROOMING: A LITTLE
DAILY ACTIVITIY SCORE: 18
EATING MEALS: A LITTLE
HELP NEEDED FOR BATHING: A LITTLE
PERSONAL GROOMING: A LITTLE

## 2024-02-20 ASSESSMENT — PAIN - FUNCTIONAL ASSESSMENT
PAIN_FUNCTIONAL_ASSESSMENT: FLACC (FACE, LEGS, ACTIVITY, CRY, CONSOLABILITY)
PAIN_FUNCTIONAL_ASSESSMENT: 0-10

## 2024-02-20 ASSESSMENT — PAIN SCALES - WONG BAKER: WONGBAKER_NUMERICALRESPONSE: HURTS LITTLE BIT

## 2024-02-20 ASSESSMENT — PAIN SCALES - PAIN ASSESSMENT IN ADVANCED DEMENTIA (PAINAD)
TOTALSCORE: 1
BODYLANGUAGE: TENSE, DISTRESSED PACING, FIDGETING
FACIALEXPRESSION: SMILING OR INEXPRESSIVE
CONSOLABILITY: NO NEED TO CONSOLE
BREATHING: NORMAL

## 2024-02-20 ASSESSMENT — PAIN DESCRIPTION - LOCATION: LOCATION: SHOULDER

## 2024-02-20 ASSESSMENT — PAIN SCALES - GENERAL
PAINLEVEL_OUTOF10: 1
PAINLEVEL_OUTOF10: 0 - NO PAIN

## 2024-02-20 ASSESSMENT — PAIN DESCRIPTION - ORIENTATION: ORIENTATION: LEFT

## 2024-02-20 NOTE — CARE PLAN
The patient's goals for the shift include      The clinical goals for the shift include      Over the shift, the patient did make progress toward the following goals.

## 2024-02-20 NOTE — NURSING NOTE
Assumed care of patient.  Awake and alert, [place].  Resting quietly in bed with eyes opened.  Respirations even and unlabored on RA.  Complaints of achy pain to L shoulder and flank.  Sling in place and pressure dressing noted.  Paced rhythm on tele.,  call light in reach, bed locked and in low position.  Bed alarm engaged.  Breakfast tray ordered.

## 2024-02-20 NOTE — NURSING NOTE
CXR done two hours ago and awaiting final result prior to discharge.    Patient updated on plan of care.

## 2024-02-20 NOTE — DISCHARGE SUMMARY
Discharge Diagnosis  Cardiomyopathy (CMS/HCC)    Issues Requiring Follow-Up  Follow-up outpatient in device clinic.  INR monitoring    Test Results Pending At Discharge  Pending Labs       No current pending labs.          Hospital Course  Patient presented to the hospital for elective upgrade from a dual-chamber pacemaker to a biventricular ICD in the setting of ischemic cardiomyopathy and New York Heart Association class III symptoms.  Patient received a BiV ICD utilizing subclavian approach.  RV pacing lead was capped.  A Tyrx antibiotic pouch was implanted at the time of the procedure.  Postprocedure patient did develop a small hematoma, a pressure dressing was applied.  Left deltopectoral incision clean dry and intact without evidence for significant erythema or hematoma this morning.  Small dried blood is present under outer dressing.  INR 1.6 this morning, will resume Coumadin this evening with patient's usual dosing.  Postoperative device interrogation revealed normal device function.  Postoperative chest x-ray revealed appropriate lead placement with no evidence of pneumothorax.  Losartan was being held in the setting of hypotension.  Will resume outpatient as tolerated.  Patient did become hypotensive after morning carvedilol dose.  Will decrease carvedilol to 6.25 mg p.o. twice daily. Referral placed to continue home health care. Patient discharged home in stable condition.     Pertinent Physical Exam At Time of Discharge  Physical Exam  Vitals and nursing note reviewed.   Constitutional:       Appearance: Normal appearance.   Cardiovascular:      Rate and Rhythm: Normal rate and regular rhythm.      Pulses: Normal pulses.      Heart sounds: Murmur heard.   Pulmonary:      Effort: Pulmonary effort is normal.      Breath sounds: Normal breath sounds.   Abdominal:      General: Bowel sounds are normal.      Palpations: Abdomen is soft.   Musculoskeletal:         General: Normal range of motion.   Skin:      General: Skin is warm and dry.   Neurological:      General: No focal deficit present.      Mental Status: He is alert and oriented to person, place, and time.     Home Medications     Medication List      CHANGE how you take these medications     carvedilol 12.5 mg tablet; Commonly known as: Coreg; Take 0.5 tablets   (6.25 mg) by mouth 2 times a day with meals.; What changed: how much to   take     CONTINUE taking these medications     aspirin 81 mg EC tablet   dextromethorphan-guaifenesin  mg/5 mL oral liquid; Commonly known   as: Robitussin DM   furosemide 40 mg tablet; Commonly known as: Lasix; Take 1 tablet (40 mg)   by mouth once daily. Do not start before January 26, 2024.   levothyroxine 50 mcg tablet; Commonly known as: Synthroid, Levoxyl; Take   1.5 tablets (75 mcg) by mouth once daily.   polyethylene glycol 17 gram packet; Commonly known as: Glycolax, Miralax   potassium chloride 20 mEq packet; Commonly known as: Klor-Con; Take 20   mEq by mouth once daily. Do not start before January 26, 2024.   rosuvastatin 5 mg tablet; Commonly known as: Crestor; Take 1 tablet (5   mg) by mouth once daily at bedtime.   * warfarin 5 mg tablet; Commonly known as: Coumadin; Take 1 tablet (5   mg) by mouth 4 times a week. Monday, Wednesday, Saturday and Sunday   * warfarin 7.5 mg tablet; Commonly known as: Coumadin; TAKE 1 TABLET BY   MOUTH EVERY DAY AS DIRECTED  * This list has 2 medication(s) that are the same as other medications   prescribed for you. Read the directions carefully, and ask your doctor or   other care provider to review them with you.     STOP taking these medications     dapagliflozin propanediol 10 mg; Commonly known as: Farxiga   losartan 50 mg tablet; Commonly known as: Cozaar   sertraline 25 mg tablet; Commonly known as: Zoloft       Outpatient Follow-Up  Future Appointments   Date Time Provider Department Center   2/21/2024 To Be Determined Melissa Rahman RN [facility]   2/22/2024  2:30 PM  Jeremy Clarke MD [id number] [facility]   2/28/2024 To Be Determined Melissa Rahman RN [facility]   3/6/2024 To Be Determined Melissa Rahman RN [facility]   3/11/2024  2:30 PM PHARMACY [facility] [employer] [id number] [facility]   3/12/2024 10:15 AM [facility] [id number] CARDIAC DEVICE CLINIC [id number] [facility]   3/13/2024 To Be Determined Melissa Rahman RN [facility]   3/20/2024 To Be Determined Melissa Rahman RN [facility]   4/13/2024 11:30 AM Natasha Antoine MD [id number] [facility]       Yasmeen Keller, APRN-CNP

## 2024-02-20 NOTE — NURSING NOTE
Pt discharged home in stable condition.  Daughter here to transport patient home in private vehicle.

## 2024-02-20 NOTE — PROGRESS NOTES
Cardiology; Dr. Clarke  Date of Visit: 02/22/2024  2:30 PM EST  Location of visit: [address]     Chief Complaint: GORGE LARSEN is being seen for a hospital follow-up. H/O atrial fibrillation, coronary artery disease, heart failure, hypertension and a routine medication evaluation.     HPI / Summary:   Gorge Larsen is a 88 y.o. male presents for a hopsital follow up of Cardiology care. He had a pacemaker placed on Monday- see notes. The left arm and pacemaker site are swollen, painful, and red. Wife gave him antibiotics for this. He denies chest pain, sob, palpitations or pedal edema. Reviewed lab work results ans current medications. An Echo was done in October EF decreased to 35%, reviewed results- see report.    Surgical Hx:   He has a past surgical history that includes Other surgical history (03/22/2021); Other surgical history (03/22/2021); Other surgical history (03/22/2021); Other surgical history (03/22/2021); and Other surgical history (01/05/2023).   Social Hx:   He reports that he has never smoked. He has never used smokeless tobacco. He reports that he does not drink alcohol and does not use drugs.  Family Hx:   His family history includes Car accident in his father; Heart attack in his mother; Heart disease in his mother.   Allergies:  Allergies   Allergen Reactions    Codeine Nausea/vomiting    Latex, Natural Rubber Unknown    Lisinopril Unknown     Outpatient Medications:  Current Outpatient Medications   Medication Instructions    aspirin 81 mg, oral, Daily    carvedilol (COREG) 6.25 mg, oral, 2 times daily with meals    dapagliflozin propanediol (FARXIGA) 10 mg, oral, Daily    dextromethorphan-guaifenesin  mg/5 mL oral liquid 5 mL, oral, Every 4 hours PRN    furosemide (LASIX) 40 mg, oral, Daily    levothyroxine (SYNTHROID, LEVOXYL) 75 mcg, oral, Daily    losartan (COZAAR) 50 mg, oral, 2 times daily    polyethylene glycol (GLYCOLAX, MIRALAX) 17 g, oral, Daily RT    potassium  chloride (Klor-Con) 20 mEq packet 20 mEq, oral, Daily    rosuvastatin (CRESTOR) 5 mg, oral, Nightly    sertraline (Zoloft) 25 mg tablet     warfarin (COUMADIN) 5 mg, oral, 4 times weekly, Monday, Wednesday, Saturday and Sunday    warfarin (COUMADIN) 7.5 mg, oral, as directed     Physical Exam:  There were no vitals filed for this visit.  Wt Readings from Last 5 Encounters:   02/15/24 91.6 kg (202 lb)   02/09/24 91.3 kg (201 lb 6 oz)   01/26/24 91.7 kg (202 lb 2 oz)   01/22/24 96.2 kg (212 lb 1.3 oz)   01/13/24 98.4 kg (217 lb)     Physical Exam  Patient is an ill-appearing 89 y/o female in no distress.   JVP not elevated. Carotid impulses are 2+ without overlying bruit.   Chest exhibits fair to good air movement with completely clear breath sounds.   The cardiac rhythm is regular with no premature beats.   Normal S1 and S2. No gallop, murmur or rub, or click.   Abdomen is soft and benign without focal tenderness.   Upper left extremity swelling. Lower extremities with 1+ edema. The pedal pulses are intact.  All other systems have been reviewed and are negative for complaints     Last Labs:  Admission on 02/19/2024   Component Date Value    Glucose 02/19/2024 104 (H)     Sodium 02/19/2024 140     Potassium 02/19/2024 3.9     Chloride 02/19/2024 102     Bicarbonate 02/19/2024 25     Urea Nitrogen 02/19/2024 38 (H)     Creatinine 02/19/2024 1.40     eGFR 02/19/2024 49 (L)     Calcium 02/19/2024 9.7     Anion Gap 02/19/2024 13     WBC 02/19/2024 5.7     nRBC 02/19/2024 0.0     RBC 02/19/2024 4.52     Hemoglobin 02/19/2024 12.8 (L)     Hematocrit 02/19/2024 40.2 (L)     MCV 02/19/2024 89     MCH 02/19/2024 28.3     MCHC 02/19/2024 31.8 (L)     RDW 02/19/2024 16.9 (H)     Platelets 02/19/2024 243     Protime 02/19/2024 17.8 (H)     INR 02/19/2024 1.7 (H)     WBC 02/20/2024 6.0     nRBC 02/20/2024 0.0     RBC 02/20/2024 3.71 (L)     Hemoglobin 02/20/2024 10.4 (L)     Hematocrit 02/20/2024 32.0 (L)     MCV 02/20/2024 86      MCH 02/20/2024 28.0     MCHC 02/20/2024 32.5     RDW 02/20/2024 16.8 (H)     Platelets 02/20/2024 202     Glucose 02/20/2024 99     Sodium 02/20/2024 138     Potassium 02/20/2024 3.8     Chloride 02/20/2024 105     Bicarbonate 02/20/2024 23 (L)     Urea Nitrogen 02/20/2024 38 (H)     Creatinine 02/20/2024 1.20     eGFR 02/20/2024 59 (L)     Calcium 02/20/2024 8.9     Anion Gap 02/20/2024 10     Protime 02/20/2024 16.1 (H)     INR 02/20/2024 1.6 (H)    Lab on 02/06/2024   Component Date Value    BNP 02/06/2024 249 (H)     Glucose 02/06/2024 97     Sodium 02/06/2024 137     Potassium 02/06/2024 4.3     Chloride 02/06/2024 100     Bicarbonate 02/06/2024 27     Anion Gap 02/06/2024 14     Urea Nitrogen 02/06/2024 26 (H)     Creatinine 02/06/2024 1.33 (H)     eGFR 02/06/2024 52 (L)     Calcium 02/06/2024 9.3     WBC 02/06/2024 5.1     nRBC 02/06/2024 0.0     RBC 02/06/2024 3.82 (L)     Hemoglobin 02/06/2024 10.7 (L)     Hematocrit 02/06/2024 35.2 (L)     MCV 02/06/2024 92     MCH 02/06/2024 28.0     MCHC 02/06/2024 30.4 (L)     RDW 02/06/2024 15.7 (H)     Platelets 02/06/2024 274     Protime 02/06/2024 33.4 (H)     INR 02/06/2024 2.9 (H)    Admission on 01/22/2024, Discharged on 01/25/2024   Component Date Value    PROBNP 01/22/2024 7,972 (H)     Ventricular Rate 01/22/2024 70     Atrial Rate 01/22/2024 65     QRS Duration 01/22/2024 220     QT Interval 01/22/2024 504     QTC Calculation(Bazett) 01/22/2024 544     R Axis 01/22/2024 -65     T [place] 01/22/2024 109     QRS Count 01/22/2024 12     Q Onset 01/22/2024 183     T Offset 01/22/2024 435     QTC [place] 01/22/2024 530     WBC 01/22/2024 7.2     nRBC 01/22/2024 0.0     RBC 01/22/2024 3.73 (L)     Hemoglobin 01/22/2024 10.7 (L)     Hematocrit 01/22/2024 33.3 (L)     MCV 01/22/2024 89     MCH 01/22/2024 28.7     MCHC 01/22/2024 32.1     RDW 01/22/2024 16.0 (H)     Platelets 01/22/2024 230     Neutrophils % 01/22/2024 78.8     Immature Granulocytes %,* 01/22/2024 0.6      Lymphocytes % 01/22/2024 4.9     Monocytes % 01/22/2024 13.2     Eosinophils % 01/22/2024 1.4     Basophils % 01/22/2024 1.1     Neutrophils Absolute 01/22/2024 5.68 (H)     Immature Granulocytes Ab* 01/22/2024 0.04     Lymphocytes Absolute 01/22/2024 0.35 (L)     Monocytes Absolute 01/22/2024 0.95 (H)     Eosinophils Absolute 01/22/2024 0.10     Basophils Absolute 01/22/2024 0.08     Glucose 01/22/2024 128 (H)     Sodium 01/22/2024 128 (L)     Potassium 01/22/2024 4.1     Chloride 01/22/2024 94 (L)     Bicarbonate 01/22/2024 20 (L)     Urea Nitrogen 01/22/2024 21     Creatinine 01/22/2024 1.10     eGFR 01/22/2024 65     Calcium 01/22/2024 8.8     Albumin 01/22/2024 3.7     Alkaline Phosphatase 01/22/2024 113     Total Protein 01/22/2024 7.3     AST 01/22/2024 21     Bilirubin, Total 01/22/2024 1.0     ALT 01/22/2024 10     Anion Gap 01/22/2024 14     Flu A Result 01/22/2024 Not Detected     Flu B Result 01/22/2024 Not Detected     Coronavirus 2019, PCR 01/22/2024 Detected (A)     Protime 01/22/2024 27.1 (H)     INR 01/22/2024 2.8 (H)     Troponin T, High Sensiti* 01/22/2024 23 (HH)     Troponin T, High Sensiti* 01/22/2024 23 (HH)     Troponin T, High Sensiti* 01/22/2024 27 (HH)     WBC 01/23/2024 7.4     nRBC 01/23/2024 0.0     RBC 01/23/2024 3.72 (L)     Hemoglobin 01/23/2024 10.8 (L)     Hematocrit 01/23/2024 33.4 (L)     MCV 01/23/2024 90     MCH 01/23/2024 29.0     MCHC 01/23/2024 32.3     RDW 01/23/2024 16.1 (H)     Platelets 01/23/2024 223     Glucose 01/23/2024 100 (H)     Sodium 01/23/2024 129 (L)     Potassium 01/23/2024 3.6     Chloride 01/23/2024 95 (L)     Bicarbonate 01/23/2024 21 (L)     Urea Nitrogen 01/23/2024 23     Creatinine 01/23/2024 1.10     eGFR 01/23/2024 65     Calcium 01/23/2024 8.8     Albumin 01/23/2024 3.6     Alkaline Phosphatase 01/23/2024 113     Total Protein 01/23/2024 7.2     AST 01/23/2024 21     Bilirubin, Total 01/23/2024 0.9     ALT 01/23/2024 9     Anion Gap 01/23/2024 13      Protime 01/23/2024 29.3 (H)     INR 01/23/2024 3.0 (H)     Protime 01/23/2024 28.6 (H)     INR 01/23/2024 3.0 (H)     Blood Culture 01/23/2024 No growth at 4 days -  FINAL REPORT     Blood Culture 01/23/2024 No growth at 4 days -  FINAL REPORT     Protime 01/24/2024 30.0 (H)     INR 01/24/2024 3.1 (H)     Glucose 01/24/2024 99     Sodium 01/24/2024 131 (L)     Potassium 01/24/2024 3.2 (L)     Chloride 01/24/2024 96 (L)     Bicarbonate 01/24/2024 25     Urea Nitrogen 01/24/2024 23     Creatinine 01/24/2024 1.10     eGFR 01/24/2024 65     Calcium 01/24/2024 8.5     Anion Gap 01/24/2024 10     WBC 01/24/2024 6.1     nRBC 01/24/2024 0.0     RBC 01/24/2024 3.72 (L)     Hemoglobin 01/24/2024 10.6 (L)     Hematocrit 01/24/2024 33.2 (L)     MCV 01/24/2024 89     MCH 01/24/2024 28.5     MCHC 01/24/2024 31.9 (L)     RDW 01/24/2024 15.9 (H)     Platelets 01/24/2024 229     Glucose 01/25/2024 104 (H)     Sodium 01/25/2024 132 (L)     Potassium 01/25/2024 3.4     Chloride 01/25/2024 97     Bicarbonate 01/25/2024 25     Urea Nitrogen 01/25/2024 24     Creatinine 01/25/2024 1.20     eGFR 01/25/2024 59 (L)     Calcium 01/25/2024 8.7     Anion Gap 01/25/2024 10     WBC 01/25/2024 5.0     nRBC 01/25/2024 0.0     RBC 01/25/2024 3.80 (L)     Hemoglobin 01/25/2024 10.9 (L)     Hematocrit 01/25/2024 34.6 (L)     MCV 01/25/2024 91     MCH 01/25/2024 28.7     MCHC 01/25/2024 31.5 (L)     RDW 01/25/2024 16.0 (H)     Platelets 01/25/2024 228    Lab on 01/08/2024   Component Date Value    WBC 01/08/2024 5.4     nRBC 01/08/2024 0.0     RBC 01/08/2024 4.19 (L)     Hemoglobin 01/08/2024 11.9 (L)     Hematocrit 01/08/2024 38.3 (L)     MCV 01/08/2024 91     MCH 01/08/2024 28.4     MCHC 01/08/2024 31.1 (L)     RDW 01/08/2024 15.3 (H)     Platelets 01/08/2024 255     Glucose 01/08/2024 126 (H)     Sodium 01/08/2024 135 (L)     Potassium 01/08/2024 4.7     Chloride 01/08/2024 99     Bicarbonate 01/08/2024 27     Anion Gap 01/08/2024 14     Urea  Nitrogen 01/08/2024 22     Creatinine 01/08/2024 1.10     eGFR 01/08/2024 65     Calcium 01/08/2024 9.3     Albumin 01/08/2024 4.2     Alkaline Phosphatase 01/08/2024 99     Total Protein 01/08/2024 7.3     AST 01/08/2024 22     Bilirubin, Total 01/08/2024 0.8     ALT 01/08/2024 11     Cholesterol 01/08/2024 98     HDL-Cholesterol 01/08/2024 43.1     Cholesterol/HDL Ratio 01/08/2024 2.3     LDL Calculated 01/08/2024 43     VLDL 01/08/2024 12     Triglycerides 01/08/2024 62     Non HDL Cholesterol 01/08/2024 55     Thyroid Stimulating Horm* 01/08/2024 6.34 (H)    Hospital Outpatient Visit on 10/19/2023   Component Date Value    LV A4C EF 10/19/2023 46.7       Assessment/Plan    1. 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 Patient admitted to [facility] from [place] 08/[facility]/2022 with complaints of worsening encephalopathy from SNF with shortness of breath found to have newly reduced EF, 60 to 30%, of unknown etiology and worsening aortic stenosis. Echo done October 2022 showed an EF of 35%, LAD, mild to moderate MR, moderate TR, TAVR. Patient had repeat echo November 2022 showing an EF of 50 to 55%, ad, RA mild to moderately dilated, mild to moderate MR, mildly elevated RVSP, moderate TR, TAVR, PASP 44 mmHg.  The patient had a more recent echocardiogram on 10/19/2023 which demonstrated a reduction in the LV ejection fraction to 35%.  There was moderate left and right atrial enlargement 2+ mitral valve regurgitation 3+ tricuspid valve regurgitation severe pulmonary hypertension PA systolic pressure 65 mmHg peak systolic pressure gradient of 12 mmHg.  He was admitted recently to the [facility] system in 1/2024 with COVID along with shortness of breath.  He was started on Lasix 40 mg twice daily subsequently reduced to daily.  He was already on Farxiga 10 mg daily.  Losartan was increased to 25 mg twice daily.  He was switched from metoprolol to carvedilol 6.25 mg twice daily.  Interrogation  of his permanent pacemaker indicated a predominant AV paced rhythm 98% of the time and pulse generator replacement was required in 3 months.  Given the above findings it was thought that he should have his dual-chamber pacemaker upgraded to a biventricular ICD which was performed 2/19/2024 as discussed below.  Prior to his hospital discharge the carvedilol was uptitrated to 50 mg twice daily and the carvedilol 12.5 mg twice daily.     2. CAD. S/P CABG 2002.     3. TAVR. Initial reticence patient had TAVR completed September 9, 2022 without complication. Echo done status post TAVR showed EF 30 to 35%.     4. Hypertension.  .     5. Hyperlipidemia.      6. Afib. On warfarin due to GI bleeding on Xarelto.      7. 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  Patient will be continued to be followed by electrophysiology.  As discussed below due to the patient's reduction in LV ejection fraction to 35% and the need for continuous pacing he has dual-chamber permanent pacemaker was upgraded to a biventricular ICD on 2/19/2024.  The right atrial lead was kept in place.  The right ventricular pacing lead was capped in order to install new RV lead capable of delivering shock therapy.  A left ventricular lead was placed.  Patient developed a hematoma in the left upper extremity.  Prior to discharge the carvedilol was decreased to 6.25 mg twice daily.  Some concern for possible infection but inspection appears to be more hematoma than erythema.  He will nevertheless be given Keflex 500 mg 4 times daily for period of 10 days for prophylaxis.  He will return in 6 to 8 weeks for follow-up.     8. Emphysema.      9. Hypothyroid.      10. History of COVID-19 infection.     11. History of CABG x3.        Orders:  No orders of the defined types were placed in this encounter.     Followup Appts:  Future Appointments   Date Time Provider Department Center   2/21/2024 To Be Determined Melissa Rahman RN [facility]   2/22/2024   2:30 PM Jeremy Clarke MD [id number] [facility]   2/28/2024 To Be Determined Melissa Rahman RN [facility]   3/6/2024 To Be Determined Melissa Rahman RN [facility]   3/11/2024  2:30 PM PHARMACY [employer] [employer] [id number] [facility]   3/12/2024 10:15 AM [facility] [id number] CARDIAC DEVICE CLINIC [id number] [facility]   3/13/2024 To Be Determined Melissa Rahman RN [facility]   3/20/2024 To Be Determined Melissa Rahman RN [facility]   4/13/2024 11:30 AM Natasha Antoine MD [id number] [facility]   ____________________________________________________________  Tere Schwartz RN  [facility]  [facility]

## 2024-02-20 NOTE — CONSULTS
Nutrition Assessement Note    Nutrition Assessment    Reason for Assessment: Dietitian discretion (CHF)    Reason for Hospital Admission:  Gorge Larsen is a 88 y.o. male who is admitted for PPM generator explant, ICD BIV implant, ICD upgrade biventricular procedures. past medical history of coronary artery disease post CABG x 3 in 2002. Unable to speak with pt, can provide diet education at follow-up if appropriate.     Nutrition History:     Energy Intake: Good > 75 %  Food Allergies/Intolerances:  None  GI Symptoms: None  Oral Problems: None    Anthropometrics:  Ht: 175.3 cm  , Wt: 91.6 kg (202 lbs) , BMI: 29.8    IBW/kg (Dietitian Calculated): 72.73 kg  Percent of IBW: 125.95 %  Adjusted Body Weight (kg): 77.73 kg    Weight Change:  Daily Weight  02/15/24 : 91.6 kg (202 lb)  02/09/24 : 91.3 kg (201 lb 6 oz)  01/26/24 : 91.7 kg (202 lb 2 oz)  01/22/24 : 96.2 kg (212 lb 1.3 oz)  01/13/24 : 98.4 kg (217 lb)  10/19/23 : 93.9 kg (207 lb)  07/15/23 : 92.1 kg (203 lb)  05/30/23 : 93.4 kg (206 lb)  04/20/23 : 88.5 kg (195 lb)  03/17/23 : 88.5 kg (195 lb)                   Nutrition Focused Physical Exam Findings: defer:   unable to see pt                      Nutrition Significant Labs:  Lab Results   Component Value Date    WBC 6.0 02/20/2024    HGB 10.4 (L) 02/20/2024    HCT 32.0 (L) 02/20/2024     02/20/2024    CHOL 98 01/08/2024    TRIG 62 01/08/2024    HDL 43.1 01/08/2024    ALT 9 01/23/2024    AST 21 01/23/2024     02/20/2024    K 3.8 02/20/2024     02/20/2024    CREATININE 1.20 02/20/2024    BUN 38 (H) 02/20/2024    CO2 23 (L) 02/20/2024    TSH 6.34 (H) 01/08/2024    PSA 0.1 07/22/2022    INR 1.6 (H) 02/20/2024    HGBA1C 5.3 12/11/2020       Current Facility-Administered Medications:     acetaminophen (Tylenol) tablet 650 mg, 650 mg, oral, q4h PRN, 650 mg at 02/20/24 0632 **OR** acetaminophen (Tylenol) oral liquid 650 mg, 650 mg, oral, q4h PRN **OR** acetaminophen (Tylenol) suppository 650  mg, 650 mg, rectal, q4h PRN, Yasmeen KOVACS Krishna APRN-CNP    aspirin EC tablet 81 mg, 81 mg, oral, Daily, Yasmeen KOVACS Krishna APRN-CNP, 81 mg at 02/20/24 0811    carvedilol (Coreg) tablet 12.5 mg, 12.5 mg, oral, BID with meals, Yasmeen KOVACS Krishna APRN-CNP, 12.5 mg at 02/20/24 0811    dextromethorphan-guaifenesin (Robitussin DM)  mg/5 mL oral liquid 5 mL, 5 mL, oral, q4h PRN, Yasmeen KOVACS Krishna APRN-CNP    furosemide (Lasix) tablet 40 mg, 40 mg, oral, Daily, Yasmeen KOVACS Krishna APRN-CNP, 40 mg at 02/20/24 0811    levothyroxine (Synthroid, Levoxyl) tablet 75 mcg, 75 mcg, oral, Daily, Yasmeen BETHANIE Krishna APRN-CNP, 75 mcg at 02/20/24 0622    polyethylene glycol (Glycolax, Miralax) packet 17 g, 17 g, oral, Daily, Yasmeen G Krishna APRN-CNP    potassium chloride (Klor-Con) packet 20 mEq, 20 mEq, oral, Daily with breakfast, Yasmeen KOVACS Krishna APRN-CNP, 20 mEq at 02/20/24 0811    rosuvastatin (Crestor) tablet 5 mg, 5 mg, oral, Nightly, Yasmeen KOVACS Krishna APRN-CNP, 5 mg at 02/19/24 2102    warfarin (Coumadin) tablet 5 mg, 5 mg, oral, Once per day on Sun Mon Wed Fri Sat, Yasmeen BETHANIE Krishna APRN-CNP    warfarin (Coumadin) tablet 7.5 mg, 7.5 mg, oral, Once per day on Tue Thu, Yasmeen BETHANIE Krishna APRN-CNP    Dietary Orders (From admission, onward)       Start     Ordered    02/19/24 1649  Adult diet Cardiac; 70 gm fat; 2 - 3 grams Sodium  Diet effective now        Question Answer Comment   Diet type Cardiac    Fat restriction: 70 gm fat    Sodium restriction: 2 - 3 grams Sodium        02/19/24 1648                  Estimated Needs:   Estimated Energy Needs  Total Energy Estimated Needs (kCal):  (2314-5530)  Total Estimated Energy Need per Day (kCal/kg):  (22-25)  Method for Estimating Needs: ABW    Estimated Protein Needs  Total Protein Estimated Needs (g):  ()  Total Protein Estimated Needs (g/kg):  (1-1.5)  Method for Estimating Needs: ABW    Estimated Fluid Needs  Total Fluid Estimated Needs (mL):  (5851-4874)  Method  for Estimating Needs: 1 mL/kcal        Nutrition Diagnosis   Nutrition Diagnosis:       Nutrition Diagnosis  Patient has Nutrition Diagnosis: Yes  Diagnosis Status (1): New  Nutrition Diagnosis 1: Increased nutrient needs  Related to (1): increased demand for nutrients  As Evidenced by (1): conditions associated with dx       Nutrition Interventions/Recommendations   Nutrition Interventions and Recommendations:    Nutrition Prescription:  Individualized Nutrition Prescription Provided for : 9612-3512 kcals,  gm protein via cardiac diet    Nutrition Interventions:   Food and/or Nutrient Delivery Interventions  Interventions: Meals and snacks  Meals and Snacks: Fat-modified diet, Mineral-modified diet  Goal: Provide diet as ordered    Education Documentation  No documentation found.           Nutrition Monitoring and Evaluation   Monitoring/Evaluation:   Food/Nutrient Related History Monitoring  Monitoring and Evaluation Plan: Energy intake  Energy Intake: Estimated energy intake  Criteria: Pt to consume >/= 75% of estimated needs         Time Spent/Follow-up:   Follow Up  Time Spent (min): 20 minutes  Last Date of Nutrition Visit: 02/20/24  Nutrition Follow-Up Needed?: 5-7 days  Follow up Comment: 2/27/24

## 2024-02-20 NOTE — CARE PLAN
The patient's goals for the shift include      The clinical goals for the shift include discharge planning    Over the shift, the patient did not make progress toward the following goals. Barriers to progression include CXR pending. Recommendations to address these barriers include waiting on final results.

## 2024-02-20 NOTE — PROGRESS NOTES
Pt to dc home today. [facility] met with the pt, he confirmed he has been active with  Homecare, internal referral in for pt to resume home health Rn.      02/20/24 1109   Discharge Planning   Home or Post Acute Services In home services   Type of Home Care Services Home nursing visits   Patient expects to be discharged to: Home and resume with  Homecare

## 2024-02-20 NOTE — NURSING NOTE
PCA reported low BP during vital rounds.  This nurse rechecked manual BP and reported to Aminta Keller CNP.  NNO.  Patient denies dizziness or lightheadedness at this time.

## 2024-02-21 ENCOUNTER — HOME CARE VISIT (OUTPATIENT)
Dept: HOME HEALTH SERVICES | Facility: HOME HEALTH | Age: 89
End: 2024-02-21
Payer: MEDICARE

## 2024-02-21 VITALS
HEART RATE: 71 BPM | OXYGEN SATURATION: 93 % | DIASTOLIC BLOOD PRESSURE: 58 MMHG | TEMPERATURE: 97.7 F | SYSTOLIC BLOOD PRESSURE: 108 MMHG

## 2024-02-21 DIAGNOSIS — R53.1 GENERALIZED WEAKNESS: ICD-10-CM

## 2024-02-21 PROCEDURE — G0299 HHS/HOSPICE OF RN EA 15 MIN: HCPCS

## 2024-02-21 RX ORDER — FUROSEMIDE 40 MG/1
40 TABLET ORAL DAILY
Qty: 90 TABLET | Refills: 0 | Status: SHIPPED | OUTPATIENT
Start: 2024-02-21 | End: 2024-05-21

## 2024-02-21 SDOH — ECONOMIC STABILITY: GENERAL

## 2024-02-21 ASSESSMENT — ENCOUNTER SYMPTOMS
PAIN LOCATION - PAIN SEVERITY: 2/10
PAIN LOCATION - PAIN FREQUENCY: INTERMITTENT
CHANGE IN APPETITE: DECREASED
APPETITE LEVEL: FAIR
PAIN LOCATION - EXACERBATING FACTORS: MOVEMENT
LOWEST PAIN SEVERITY IN PAST 24 HOURS: 2/10
HIGHEST PAIN SEVERITY IN PAST 24 HOURS: 7/10
PAIN LOCATION: LEFT SHOULDER
PAIN LOCATION - PAIN DURATION: INTERMITTENT
PAIN LOCATION - PAIN QUALITY: ACHE
PAIN: 1
PAIN LOCATION - RELIEVING FACTORS: TYLENOL
DIZZINESS: 1
LAST BOWEL MOVEMENT: 66891
COUGH CHARACTERISTICS: PRODUCTIVE
MUSCLE WEAKNESS: 1
COUGH: 1

## 2024-02-21 ASSESSMENT — ACTIVITIES OF DAILY LIVING (ADL)
AMBULATION ASSISTANCE: 1
MONEY MANAGEMENT (EXPENSES/BILLS): NEEDS ASSISTANCE
AMBULATION ASSISTANCE: STAND BY ASSIST

## 2024-02-22 ENCOUNTER — TELEPHONE (OUTPATIENT)
Dept: CARDIOLOGY | Facility: CLINIC | Age: 89
End: 2024-02-22

## 2024-02-22 ENCOUNTER — OFFICE VISIT (OUTPATIENT)
Dept: CARDIOLOGY | Facility: CLINIC | Age: 89
End: 2024-02-22
Payer: MEDICARE

## 2024-02-22 VITALS
OXYGEN SATURATION: 98 % | HEIGHT: 69 IN | WEIGHT: 208 LBS | SYSTOLIC BLOOD PRESSURE: 88 MMHG | BODY MASS INDEX: 30.81 KG/M2 | DIASTOLIC BLOOD PRESSURE: 36 MMHG | HEART RATE: 70 BPM | RESPIRATION RATE: 16 BRPM

## 2024-02-22 DIAGNOSIS — I50.23 ACUTE ON CHRONIC SYSTOLIC CONGESTIVE HEART FAILURE (MULTI): ICD-10-CM

## 2024-02-22 DIAGNOSIS — I48.0 PAF (PAROXYSMAL ATRIAL FIBRILLATION) (MULTI): ICD-10-CM

## 2024-02-22 DIAGNOSIS — Q24.5 CORONARY ARTERY ABNORMALITY (HHS-HCC): ICD-10-CM

## 2024-02-22 DIAGNOSIS — I48.20 CHRONIC ATRIAL FIBRILLATION (MULTI): ICD-10-CM

## 2024-02-22 DIAGNOSIS — I44.2 CHB (COMPLETE HEART BLOCK) (MULTI): ICD-10-CM

## 2024-02-22 DIAGNOSIS — L03.114 CELLULITIS OF LEFT UPPER EXTREMITY: Primary | ICD-10-CM

## 2024-02-22 DIAGNOSIS — Z95.0 CARDIAC PACEMAKER: Primary | ICD-10-CM

## 2024-02-22 DIAGNOSIS — I10 BENIGN ESSENTIAL HTN: ICD-10-CM

## 2024-02-22 PROCEDURE — 99214 OFFICE O/P EST MOD 30 MIN: CPT | Performed by: INTERNAL MEDICINE

## 2024-02-22 PROCEDURE — 3074F SYST BP LT 130 MM HG: CPT | Performed by: INTERNAL MEDICINE

## 2024-02-22 PROCEDURE — 1125F AMNT PAIN NOTED PAIN PRSNT: CPT | Performed by: INTERNAL MEDICINE

## 2024-02-22 PROCEDURE — 1111F DSCHRG MED/CURRENT MED MERGE: CPT | Performed by: INTERNAL MEDICINE

## 2024-02-22 PROCEDURE — 3078F DIAST BP <80 MM HG: CPT | Performed by: INTERNAL MEDICINE

## 2024-02-22 PROCEDURE — 1159F MED LIST DOCD IN RCRD: CPT | Performed by: INTERNAL MEDICINE

## 2024-02-22 PROCEDURE — 1036F TOBACCO NON-USER: CPT | Performed by: INTERNAL MEDICINE

## 2024-02-22 PROCEDURE — 1157F ADVNC CARE PLAN IN RCRD: CPT | Performed by: INTERNAL MEDICINE

## 2024-02-22 RX ORDER — CEPHALEXIN 500 MG/1
500 CAPSULE ORAL 4 TIMES DAILY
Status: DISCONTINUED | OUTPATIENT
Start: 2024-02-22 | End: 2024-02-23

## 2024-02-22 ASSESSMENT — PAIN SCALES - GENERAL
PAINLEVEL: 2
PAINLEVEL: 2

## 2024-02-22 ASSESSMENT — PATIENT HEALTH QUESTIONNAIRE - PHQ9
2. FEELING DOWN, DEPRESSED OR HOPELESS: NOT AT ALL
1. LITTLE INTEREST OR PLEASURE IN DOING THINGS: NOT AT ALL
SUM OF ALL RESPONSES TO PHQ9 QUESTIONS 1 AND 2: 0

## 2024-02-22 ASSESSMENT — ENCOUNTER SYMPTOMS: DEPRESSION: 0

## 2024-02-22 NOTE — PATIENT INSTRUCTIONS
Take Keflex 500 mg 4 times daily for 10 days  Follow up in two months  Lab work was done this month  Continue all current medications

## 2024-02-22 NOTE — TELEPHONE ENCOUNTER
Patient's daughter called in to advise of swelling around surgical site and arm. There is red discoloration.Appointment offered for next day 11 am  Patient's daughter declined and advised she will betaking patient to ED.

## 2024-02-23 ENCOUNTER — HOSPITAL ENCOUNTER (OUTPATIENT)
Dept: CARDIOLOGY | Facility: CLINIC | Age: 89
Discharge: HOME | End: 2024-02-23
Payer: MEDICARE

## 2024-02-23 DIAGNOSIS — I42.8 NONISCHEMIC CARDIOMYOPATHY (MULTI): ICD-10-CM

## 2024-02-23 DIAGNOSIS — R68.89 GENERAL SYMPTOM: Primary | ICD-10-CM

## 2024-02-23 DIAGNOSIS — I44.2 CHB (COMPLETE HEART BLOCK) (MULTI): Primary | ICD-10-CM

## 2024-02-23 DIAGNOSIS — Z95.810 IMPLANTABLE CARDIOVERTER-DEFIBRILLATOR (ICD) IN SITU: ICD-10-CM

## 2024-02-23 DIAGNOSIS — Z95.0 CARDIAC PACEMAKER: ICD-10-CM

## 2024-02-23 DIAGNOSIS — I44.2 CHB (COMPLETE HEART BLOCK) (MULTI): ICD-10-CM

## 2024-02-23 DIAGNOSIS — I48.0 PAF (PAROXYSMAL ATRIAL FIBRILLATION) (MULTI): ICD-10-CM

## 2024-02-23 RX ORDER — CEPHALEXIN 500 MG/1
500 CAPSULE ORAL 4 TIMES DAILY
Qty: 40 CAPSULE | Refills: 0 | Status: SHIPPED | OUTPATIENT
Start: 2024-02-23 | End: 2024-03-04

## 2024-02-25 ENCOUNTER — HOME CARE VISIT (OUTPATIENT)
Dept: HOME HEALTH SERVICES | Facility: HOME HEALTH | Age: 89
End: 2024-02-25
Payer: MEDICARE

## 2024-02-26 ENCOUNTER — HOSPITAL ENCOUNTER (OUTPATIENT)
Dept: RADIOLOGY | Facility: HOSPITAL | Age: 89
Discharge: HOME | End: 2024-02-26
Payer: MEDICARE

## 2024-02-26 ENCOUNTER — HOME CARE VISIT (OUTPATIENT)
Dept: HOME HEALTH SERVICES | Facility: HOME HEALTH | Age: 89
End: 2024-02-26
Payer: MEDICARE

## 2024-02-26 VITALS
DIASTOLIC BLOOD PRESSURE: 66 MMHG | SYSTOLIC BLOOD PRESSURE: 146 MMHG | HEART RATE: 66 BPM | OXYGEN SATURATION: 96 % | RESPIRATION RATE: 18 BRPM | TEMPERATURE: 98.1 F

## 2024-02-26 DIAGNOSIS — M79.89 LEFT ARM SWELLING: ICD-10-CM

## 2024-02-26 DIAGNOSIS — I44.2 CHB (COMPLETE HEART BLOCK) (MULTI): ICD-10-CM

## 2024-02-26 DIAGNOSIS — Z95.0 CARDIAC PACEMAKER: ICD-10-CM

## 2024-02-26 DIAGNOSIS — Z95.0 CARDIAC PACEMAKER: Primary | ICD-10-CM

## 2024-02-26 PROCEDURE — 0023 HH SOC

## 2024-02-26 PROCEDURE — G0299 HHS/HOSPICE OF RN EA 15 MIN: HCPCS

## 2024-02-26 PROCEDURE — 93971 EXTREMITY STUDY: CPT

## 2024-02-26 SDOH — ECONOMIC STABILITY: GENERAL

## 2024-02-26 ASSESSMENT — ENCOUNTER SYMPTOMS
APPETITE LEVEL: FAIR
BOWEL PATTERN NORMAL: 1
LAST BOWEL MOVEMENT: 66895
DENIES PAIN: 1
CHANGE IN APPETITE: UNCHANGED
FATIGUES EASILY: 1

## 2024-02-26 ASSESSMENT — ACTIVITIES OF DAILY LIVING (ADL)
AMBULATION ASSISTANCE: STAND BY ASSIST
MONEY MANAGEMENT (EXPENSES/BILLS): NEEDS ASSISTANCE
AMBULATION ASSISTANCE: 1

## 2024-02-27 RX ORDER — POTASSIUM CHLORIDE 1.5 G/1.58G
20 POWDER, FOR SOLUTION ORAL DAILY
Qty: 90 PACKET | Refills: 0 | Status: SHIPPED | OUTPATIENT
Start: 2024-02-27 | End: 2024-05-21 | Stop reason: SDUPTHER

## 2024-02-27 NOTE — TELEPHONE ENCOUNTER
Message collected from answering service.      pt is having left arm edema               from shoulder to fingers        Asking for call back for follow up

## 2024-02-29 DIAGNOSIS — F41.9 ANXIETY: ICD-10-CM

## 2024-03-01 ENCOUNTER — APPOINTMENT (OUTPATIENT)
Dept: RADIOLOGY | Facility: HOSPITAL | Age: 89
End: 2024-03-01
Payer: MEDICARE

## 2024-03-01 ENCOUNTER — HOSPITAL ENCOUNTER (EMERGENCY)
Facility: HOSPITAL | Age: 89
Discharge: HOME | End: 2024-03-01
Attending: EMERGENCY MEDICINE
Payer: MEDICARE

## 2024-03-01 VITALS
HEART RATE: 71 BPM | WEIGHT: 200 LBS | SYSTOLIC BLOOD PRESSURE: 140 MMHG | HEIGHT: 69 IN | TEMPERATURE: 98.6 F | DIASTOLIC BLOOD PRESSURE: 77 MMHG | OXYGEN SATURATION: 97 % | BODY MASS INDEX: 29.62 KG/M2 | RESPIRATION RATE: 17 BRPM

## 2024-03-01 DIAGNOSIS — I97.638 POSTOPERATIVE HEMATOMA INVOLVING CIRCULATORY SYSTEM FOLLOWING OTHER CIRCULATORY SYSTEM PROCEDURE: Primary | ICD-10-CM

## 2024-03-01 LAB
ALBUMIN SERPL-MCNC: 3.8 G/DL (ref 3.5–5)
ALP BLD-CCNC: 107 U/L (ref 35–125)
ALT SERPL-CCNC: 13 U/L (ref 5–40)
ANION GAP SERPL CALC-SCNC: 10 MMOL/L
AST SERPL-CCNC: 25 U/L (ref 5–40)
BASOPHILS # BLD AUTO: 0.1 X10*3/UL (ref 0–0.1)
BASOPHILS NFR BLD AUTO: 1.9 %
BILIRUB DIRECT SERPL-MCNC: 0.2 MG/DL (ref 0–0.2)
BILIRUB SERPL-MCNC: 0.8 MG/DL (ref 0.1–1.2)
BUN SERPL-MCNC: 29 MG/DL (ref 8–25)
CALCIUM SERPL-MCNC: 9.2 MG/DL (ref 8.5–10.4)
CHLORIDE SERPL-SCNC: 101 MMOL/L (ref 97–107)
CO2 SERPL-SCNC: 27 MMOL/L (ref 24–31)
CREAT SERPL-MCNC: 1.2 MG/DL (ref 0.4–1.6)
EGFRCR SERPLBLD CKD-EPI 2021: 59 ML/MIN/1.73M*2
EOSINOPHIL # BLD AUTO: 0.48 X10*3/UL (ref 0–0.4)
EOSINOPHIL NFR BLD AUTO: 9 %
ERYTHROCYTE [DISTWIDTH] IN BLOOD BY AUTOMATED COUNT: 18.2 % (ref 11.5–14.5)
GLUCOSE SERPL-MCNC: 154 MG/DL (ref 65–99)
HCT VFR BLD AUTO: 26.5 % (ref 41–52)
HGB BLD-MCNC: 8.3 G/DL (ref 13.5–17.5)
IMM GRANULOCYTES # BLD AUTO: 0.03 X10*3/UL (ref 0–0.5)
IMM GRANULOCYTES NFR BLD AUTO: 0.6 % (ref 0–0.9)
INR PPP: 2.3 (ref 0.9–1.2)
LACTATE BLDV-SCNC: 1.5 MMOL/L (ref 0.4–2)
LYMPHOCYTES # BLD AUTO: 0.49 X10*3/UL (ref 0.8–3)
LYMPHOCYTES NFR BLD AUTO: 9.2 %
MCH RBC QN AUTO: 28.4 PG (ref 26–34)
MCHC RBC AUTO-ENTMCNC: 31.3 G/DL (ref 32–36)
MCV RBC AUTO: 91 FL (ref 80–100)
MONOCYTES # BLD AUTO: 0.43 X10*3/UL (ref 0.05–0.8)
MONOCYTES NFR BLD AUTO: 8 %
NEUTROPHILS # BLD AUTO: 3.82 X10*3/UL (ref 1.6–5.5)
NEUTROPHILS NFR BLD AUTO: 71.3 %
NRBC BLD-RTO: 0 /100 WBCS (ref 0–0)
PLATELET # BLD AUTO: 276 X10*3/UL (ref 150–450)
POTASSIUM SERPL-SCNC: 4.3 MMOL/L (ref 3.4–5.1)
PROT SERPL-MCNC: 7.1 G/DL (ref 5.9–7.9)
PROTHROMBIN TIME: 22.8 SECONDS (ref 9.3–12.7)
RBC # BLD AUTO: 2.92 X10*6/UL (ref 4.5–5.9)
SODIUM SERPL-SCNC: 138 MMOL/L (ref 133–145)
WBC # BLD AUTO: 5.4 X10*3/UL (ref 4.4–11.3)

## 2024-03-01 PROCEDURE — 96375 TX/PRO/DX INJ NEW DRUG ADDON: CPT

## 2024-03-01 PROCEDURE — 73060 X-RAY EXAM OF HUMERUS: CPT | Mod: LEFT SIDE | Performed by: RADIOLOGY

## 2024-03-01 PROCEDURE — 83605 ASSAY OF LACTIC ACID: CPT | Performed by: EMERGENCY MEDICINE

## 2024-03-01 PROCEDURE — 80053 COMPREHEN METABOLIC PANEL: CPT | Performed by: EMERGENCY MEDICINE

## 2024-03-01 PROCEDURE — 36415 COLL VENOUS BLD VENIPUNCTURE: CPT | Performed by: EMERGENCY MEDICINE

## 2024-03-01 PROCEDURE — 73030 X-RAY EXAM OF SHOULDER: CPT | Mod: LT

## 2024-03-01 PROCEDURE — 85610 PROTHROMBIN TIME: CPT | Performed by: EMERGENCY MEDICINE

## 2024-03-01 PROCEDURE — 96374 THER/PROPH/DIAG INJ IV PUSH: CPT

## 2024-03-01 PROCEDURE — 99284 EMERGENCY DEPT VISIT MOD MDM: CPT | Mod: 25

## 2024-03-01 PROCEDURE — 80048 BASIC METABOLIC PNL TOTAL CA: CPT | Performed by: EMERGENCY MEDICINE

## 2024-03-01 PROCEDURE — 71045 X-RAY EXAM CHEST 1 VIEW: CPT

## 2024-03-01 PROCEDURE — 73060 X-RAY EXAM OF HUMERUS: CPT | Mod: LT

## 2024-03-01 PROCEDURE — 71045 X-RAY EXAM CHEST 1 VIEW: CPT | Performed by: RADIOLOGY

## 2024-03-01 PROCEDURE — 85025 COMPLETE CBC W/AUTO DIFF WBC: CPT | Performed by: EMERGENCY MEDICINE

## 2024-03-01 PROCEDURE — 73030 X-RAY EXAM OF SHOULDER: CPT | Mod: LEFT SIDE | Performed by: RADIOLOGY

## 2024-03-01 PROCEDURE — 2500000004 HC RX 250 GENERAL PHARMACY W/ HCPCS (ALT 636 FOR OP/ED): Performed by: EMERGENCY MEDICINE

## 2024-03-01 PROCEDURE — 82248 BILIRUBIN DIRECT: CPT | Performed by: EMERGENCY MEDICINE

## 2024-03-01 RX ORDER — ONDANSETRON 4 MG/1
4 TABLET, FILM COATED ORAL EVERY 6 HOURS
Qty: 12 TABLET | Refills: 0 | Status: SHIPPED | OUTPATIENT
Start: 2024-03-01 | End: 2024-03-04

## 2024-03-01 RX ORDER — ONDANSETRON HYDROCHLORIDE 2 MG/ML
4 INJECTION, SOLUTION INTRAVENOUS ONCE
Status: COMPLETED | OUTPATIENT
Start: 2024-03-01 | End: 2024-03-01

## 2024-03-01 RX ORDER — OXYCODONE AND ACETAMINOPHEN 5; 325 MG/1; MG/1
1 TABLET ORAL EVERY 6 HOURS PRN
Qty: 12 TABLET | Refills: 0 | Status: SHIPPED | OUTPATIENT
Start: 2024-03-01 | End: 2024-03-04

## 2024-03-01 RX ORDER — SERTRALINE HYDROCHLORIDE 25 MG/1
25 TABLET, FILM COATED ORAL DAILY
Qty: 30 TABLET | Refills: 0 | Status: CANCELLED | OUTPATIENT
Start: 2024-03-01 | End: 2024-04-30

## 2024-03-01 RX ORDER — ACETAMINOPHEN 325 MG/1
650 TABLET ORAL ONCE
Status: COMPLETED | OUTPATIENT
Start: 2024-03-01 | End: 2024-03-01

## 2024-03-01 RX ADMIN — ACETAMINOPHEN 650 MG: 325 TABLET ORAL at 10:09

## 2024-03-01 RX ADMIN — ONDANSETRON 4 MG: 2 INJECTION INTRAMUSCULAR; INTRAVENOUS at 10:09

## 2024-03-01 RX ADMIN — HYDROMORPHONE HYDROCHLORIDE 0.2 MG: 1 INJECTION, SOLUTION INTRAMUSCULAR; INTRAVENOUS; SUBCUTANEOUS at 10:04

## 2024-03-01 ASSESSMENT — PAIN - FUNCTIONAL ASSESSMENT
PAIN_FUNCTIONAL_ASSESSMENT: 0-10

## 2024-03-01 ASSESSMENT — PAIN DESCRIPTION - DESCRIPTORS: DESCRIPTORS: ACHING

## 2024-03-01 ASSESSMENT — PAIN DESCRIPTION - ORIENTATION
ORIENTATION: LEFT
ORIENTATION: RIGHT
ORIENTATION: LEFT

## 2024-03-01 ASSESSMENT — PAIN DESCRIPTION - LOCATION
LOCATION: ARM

## 2024-03-01 ASSESSMENT — PAIN SCALES - GENERAL
PAINLEVEL_OUTOF10: 10 - WORST POSSIBLE PAIN
PAINLEVEL_OUTOF10: 5 - MODERATE PAIN
PAINLEVEL_OUTOF10: 5 - MODERATE PAIN

## 2024-03-01 ASSESSMENT — PAIN DESCRIPTION - FREQUENCY: FREQUENCY: CONSTANT/CONTINUOUS

## 2024-03-01 ASSESSMENT — PAIN DESCRIPTION - PROGRESSION: CLINICAL_PROGRESSION: NOT CHANGED

## 2024-03-01 ASSESSMENT — PAIN DESCRIPTION - PAIN TYPE: TYPE: ACUTE PAIN

## 2024-03-01 NOTE — DISCHARGE INSTRUCTIONS
Use warm compresses in the area, continue with all your medications and follow-up with Dr. Mcdonald.  You can use Percocet for pain at night and Zofran for nausea. May continue antibiotics per Dr. Mcdonald

## 2024-03-01 NOTE — ED PROVIDER NOTES
EMERGENCY DEPARTMENT ENCOUNTER      [ ] CODE STEMI [ ] CODE Neuro [ ] CODE Yellow [ ] Modified Trauma [ ] CODE Blue      CHIEF COMPLAINT      Chief Complaint   Patient presents with    Arm Injury     Left arm pain and swwelling from having a pacemaker put in on Monday        Mode of Arrival:  Primary Care Provider: Natasha Antoine MD  Medical Record Number: [id number]      History obtained by: Patient  Limited by nothing  Time seen: 6:37 AM    QUALITY MEASURES   PPE Utilized: N95 with goggles and gloves      HPI      Gorge Larsen is a 88 y.o. male with a history of recent pacemaker placement by Dr. Mcdonald, complicated by hematoma in the area, currently on Keflex for the last couple days out of possibility of cellulitis but unclear, brought in by daughter after stating the patient is still having left arm pain and swelling ever since the pacemaker was put in 5 days ago.  Denies necessarily any increase in the pain but still notices swelling in the area.  States that he already had a DVT study that was negative.  Has not had any associated fever chills chest pain or trouble breathing only pain in that area specially when abduction left arm to about 90 degrees.  Is compliant with all of his medications including his warfarin.  Daughter states that due to nausea patient has not been taking the appropriate dose of Keflex only taking it 2 times a day instead of 4 times a day and has only been taking it for 2 days.  No other complaints.      Patient otherwise denies fever, chills, n/v/d, chest pain, shortness of breath, sore throat, cough, rhinorrhea, abdominal pain, dysuria, hematuria,   hematemesis, hematochezia, melena or any other accompanying symptoms of late.      The patient has no other complaints at this time.               PAST MEDICAL HISTORY    Past Medical History:   Diagnosis Date    Arthritis     Atherosclerotic heart disease of native coronary artery with unstable angina pectoris (CMS/[facility])     Coronary  artery disease with unstable angina pectoris, unspecified vessel or lesion type, unspecified whether native or transplanted heart    Cancer (CMS/HCC)     CHF (congestive heart failure) (CMS/HCC)     COPD (chronic obstructive pulmonary disease) (CMS/HCC)     Essential (primary) hypertension 11/05/2022    Hypertension, unspecified type    Personal history of diseases of the blood and blood-forming organs and certain disorders involving the immune mechanism     History of bleeding disorder    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  Arthur Valles MD    SURGICAL HISTORY    Past Surgical History:   Procedure Laterality Date    CARDIAC ELECTROPHYSIOLOGY PROCEDURE Left 02/19/2024    Procedure: PPM Generator Explant;  Surgeon: Walker Mcdonald MD;  Location: [facility] Cardiac Cath Lab;  Service: Electrophysiology;  Laterality: Left;  NO AUTH NEEDED    CARDIAC ELECTROPHYSIOLOGY PROCEDURE Left 02/19/2024    Procedure: ICD BIV Implant;  Surgeon: Walker Mcdonald MD;  Location: [facility] Cardiac Cath Lab;  Service: Electrophysiology;  Laterality: Left;  upgrade dual PPM to CRT-D, Cap current RV lead. CASE# [id number]    CARDIAC ELECTROPHYSIOLOGY PROCEDURE Left 02/19/2024    Procedure: ICD Upgrade Biventricular;  Surgeon: Walker Mcdonald MD;  Location: [facility] Cardiac Cath Lab;  Service: Electrophysiology;  Laterality: Left;  upgrade dual PPM to CRT-D (LV lead implant), cap RV lead, CASE#[id number]    OTHER SURGICAL HISTORY  03/22/2021    Colonoscopy    OTHER SURGICAL HISTORY  03/22/2021    Colon surgery 2000    OTHER SURGICAL HISTORY  03/22/2021     Pacemaker insertion    OTHER SURGICAL HISTORY  03/22/2021    Coronary artery bypass graft 2002    OTHER SURGICAL HISTORY  01/05/2023    Throat surgery       I have personally reviewed the patient's past surgical history in the records.  Arthur Valles MD    CURRENT MEDICATIONS    I have reviewed the patient’s medications.   Please see nursing and pharmacy records for the most up to date list.     [unfilled]    ALLERGIES    Allergies   Allergen Reactions    Codeine Nausea/vomiting    Latex, Natural Rubber Unknown    Lisinopril Unknown       I have personally reviewed the patient's past history of allergies in the records.  Arthur Valles MD    FAMILY HISTORY    Family History   Problem Relation Name Age of Onset    Heart attack Mother      Heart disease Mother      Other (Car accident) Father         I have personally reviewed the patient's family history in the records.  Arthur Valles MD    SOCIAL HISTORY    Social History     Socioeconomic History    Marital status:      Spouse name: None    Number of children: None    Years of education: None    Highest education level: None   Occupational History    None   Tobacco Use    Smoking status: Former     Types: Cigarettes    Smokeless tobacco: Never   Substance and Sexual Activity    Alcohol use: Never    Drug use: Never    Sexual activity: None   Other Topics Concern    None   Social History Narrative    None     Social Determinants of Health     Financial Resource Strain: Low Risk  (1/23/2024)    Overall Financial Resource Strain (CARDIA)     Difficulty of Paying Living Expenses: Not hard at all   Food Insecurity: Not on file   Transportation Needs: No Transportation Needs (1/26/2024)    OASIS : Transportation     Lack of Transportation (Medical): No     Lack of Transportation (Non-Medical): No     Patient Unable or Declines to Respond: No   Physical Activity: Not on file   Stress: Not on file   Social Connections: Feeling Socially Integrated (1/26/2024)    OASIS  ": Social Isolation     Frequency of experiencing loneliness or isolation: Never   Intimate Partner Violence: Not on file   Housing Stability: Low Risk  (1/23/2024)    Housing Stability Vital Sign     Unable to Pay for Housing in the Last Year: No     Number of Places Lived in the Last Year: 1     Unstable Housing in the Last Year: No         I have personally reviewed the patient's social history in the records.  Arthur Valles MD    REVIEW OF SYSTEMS      14 point ROS was reviewed and negative except as noted above in HPI.      PHYSICAL EXAM    VITAL SIGNS:    /77   Pulse 71   Temp 37 °C (98.6 °F) (Oral)   Resp 17   Ht 1.753 m (5' 9\")   Wt 90.7 kg (200 lb)   SpO2 97%   BMI 29.53 kg/m²    Review EMR for vital signs  Nursing note and vitals reviewed.    Constitutional:  Alert and oriented, well-developed, well-nourished, appears stated age, non-toxic appearing  HENT:  Normocephalic, atraumatic, bilateral external ears normal, oropharynx moist, Nose normal.  Neck: normal range of motion, no tenderness, supple, no stridor.  Eyes:  PERRL, EOMI, conjunctiva normal, no discharge.   Cardiovascular:  Normal heart rate, normal rhythm, no murmurs, no rubs, no gallops.   Respiratory:  Normal breath sounds, no respiratory distress, no wheezing, no chest wall tenderness.   GI:  Bowel sounds normal, soft, no tenderness, no rebound or guarding, no distention, no masses pulsatile or otherwise   (any female  exam was done with female chaperone present):   Deferred  Integument: Left chest wall site anteriorly shows a hematoma under the pacemaker that was placed, with expected sequelae of bruising discoloring into purple and yellow noted along the chest wall as well as left arm.  Patient able to AB duct and up to about 90 degrees in total endorsing pain.  No erythema or warmth noted otherwise.  Back:  No midline tenderness, no CVA tenderness.   Musculoskeletal:  Intact distal pulses, no tenderness, no cyanosis, no " clubbing, with capillary refill less than 2 seconds. Good range of motion in all major joints. No tenderness to palpation or major deformities noted.    Neurologic:  Alert & oriented x 3, normal motor function, normal sensory function, no focal deficits noted. Cranial nerves II-XII intact.  Psychiatric:  Affect normal, judgment normal, mood normal.       EKG  none    Reviewed and interpreted by me, Arthur Valles MD             RADIOLOGY  XR chest 1 view   Final Result   No acute cardiopulmonary disease.  Stable appearing examination.        Signed by: Solomon Tuttle 3/1/2024 10:40 AM   Dictation workstation:   [id number]      XR shoulder left 2+ views   Final Result   Limited examination of the glenohumeral joint due to obscuration with   pacemaker. No evidence to suggest acute osseous abnormality.        Signed by: Solomon Tuttle 3/1/2024 10:37 AM   Dictation workstation:   [id number]      XR humerus left   Final Result   No evidence for acute or significant osseous abnormality        MACRO:   None        Signed by: Solomon Tuttle 3/1/2024 10:39 AM   Dictation workstation:   [id number]          All Imaging studies evaluated and interpreted by ED physician except when noted otherwise.    ED PROVIDER INTERPRETATION (XRAYS ONLY):       *I have interpreted the x-ray real-time in the ED myself, and made a clinical decision on it prior to the formal radiology reading.    Arthur Valles M.D.    RADIOLOGIST IMPRESSION (U/S, CT, MRI):   XR chest 1 view   Final Result   No acute cardiopulmonary disease.  Stable appearing examination.        Signed by: Solomon Tuttle 3/1/2024 10:40 AM   Dictation workstation:   [id number]      XR shoulder left 2+ views   Final Result   Limited examination of the glenohumeral joint due to obscuration with   pacemaker. No evidence to suggest acute osseous abnormality.        Signed by: Solomon Tuttle 3/1/2024 10:37 AM   Dictation workstation:   [id number]      XR humerus left    Final Result   No evidence for acute or significant osseous abnormality        MACRO:   None        Signed by: Solomon Tuttle 3/1/2024 10:39 AM   Dictation workstation:   [id number]            PERTINENT LABS    Please refer to the chart for all lab work and to MDM for relevant discussion.      PROCEDURE    None (procdoc)    ED COURSE & MEDICAL DECISION MAKING    Pertinent Labs & Imaging studies reviewed. (See chart for details)    MDM:    Assessment: Gorge Larsen is a 88 y.o.male who presents to the ED with suspicion more so for hematoma leading to pain given that patient uses blood thinners but was to obtain a left shoulder humerus and chest x-ray along with checking for any sign of systemic infection with a CBC chemistry hepatic function panel INR to check his warfarin, lactic acid level will provide pain medication with Dilaudid Tylenol and Zofran for his relief for now.  Patient and daughter at bedside understand and agree with plan         Prior records in EPIC reviewed by me.     2023 Coding Requirements:  --Independent historian(s):    see HPI  --Review of prior records:    EHR reviewed   --Relevant comorbidities:    see records  --Social determinants of health:          CELLULITIS/ ABSCESS / RASH/ INSECT BITE  I have considered the following   with regards to this patient's condition: Skin rash, allergic reaction, contact   dermatitis, poison ivy, drug rash, erythema multiforme, hives, urticaria, soft   tissue infection, abscess, cellulitis, necrotizing fasciitis, systemic infection, SIRS,   Sepsis, impetigo, MRSA, insect bite, scabies or bedbugs, systemic infection,   chickenpox, herpes zoster, disseminated gonococcemia, Lyme disease,   meningococcemia, pityriasis rosea, [place] mountain spotted fever, scarlet fever,   syphilis, toxic shock syndrome, viral exanthem.    CBC does not show an elevated white count and lactic acid within normal limits.  Hemoglobin noted to be 8.3.  Chemistry and LFTs  "within normal limits.  Hemoglobin was 10.42 days ago but suspect that this is more so due to the surgery    X-ray within normal limits    Discussed with patient and daughter, suspect that this is not infected but rather hematoma that is resolving.  INR 2.3 after discussion with recommended warm compresses and they stated that in a few days they do have follow-up with Dr. Mcdonald will review but otherwise patient did agree to a trial of Percocet for home for pain as well as Zofran for nausea.  Stated that patient can continue with Keflex.  Both understand and agree with plan      ED VITALS  Vitals:    03/01/24 0900 03/01/24 0912 03/01/24 1211   BP: 131/50  140/77   BP Location: Right arm     Patient Position: Sitting     Pulse: 70  71   Resp: 18  17   Temp: 37 °C (98.6 °F)     TempSrc: Oral     SpO2: 100% 100% 97%   Weight: 90.7 kg (200 lb)     Height: 1.753 m (5' 9\")         BP  Min: 131/50  Max: 140/77    As part of the 2022 [facility] [place] reporting requirements, the following measures have been reviewed and documented:    None     1. Postoperative hematoma involving circulatory system following other circulatory system procedure        Diagnoses as of 03/02/24 0637   Postoperative hematoma involving circulatory system following other circulatory system procedure         DISPOSITION       DISCHARGE.  The patient is discharged back to their place of residence.  Discharge diagnosis, instructions and plan were discussed and understood. At the time of discharge the patient was comfortable and was in no apparent distress. Patient is aware of diagnostic uncertainty and was notified though testing is negative here, there is a very small chance that pathology may be missed.  The patient understands these risks and the patient /family understood to return immediately to the emergency department if the symptoms worsen or if they have any additional concerns.    DISCHARGE MEDICATIONS  New Prescriptions    No medications on file "       FOLLOW UP  No follow-up provider specified.    Arthur Valles    This note was created with the assistance of voice recognition technology.  While attempts were made to ensure accuracy, mis-transcription may be present due to limitations in the software.        Electronically signed by MD Arthur Ross MD  03/02/24 0637

## 2024-03-01 NOTE — Clinical Note
Patient Dc from ER stable condition. No distress noted. Educated on dc paperwork. No addition questions. Wheelchair to car with family

## 2024-03-04 ENCOUNTER — HOME CARE VISIT (OUTPATIENT)
Dept: HOME HEALTH SERVICES | Facility: HOME HEALTH | Age: 89
End: 2024-03-04
Payer: MEDICARE

## 2024-03-04 VITALS
OXYGEN SATURATION: 98 % | TEMPERATURE: 98.5 F | HEART RATE: 70 BPM | BODY MASS INDEX: 29.98 KG/M2 | SYSTOLIC BLOOD PRESSURE: 140 MMHG | DIASTOLIC BLOOD PRESSURE: 60 MMHG | RESPIRATION RATE: 18 BRPM | WEIGHT: 203 LBS

## 2024-03-04 PROCEDURE — G0299 HHS/HOSPICE OF RN EA 15 MIN: HCPCS

## 2024-03-04 ASSESSMENT — ENCOUNTER SYMPTOMS
PAIN SEVERITY GOAL: 0/10
PAIN LOCATION: LEFT ARM
PAIN LOCATION - PAIN DURATION: VARIES
MUSCLE WEAKNESS: 1
PAIN LOCATION - RELIEVING FACTORS: REST
FATIGUE: 1
DESCRIPTION OF MEMORY LOSS: IMMEDIATE
PAIN LOCATION - PAIN FREQUENCY: INFREQUENT
PAIN: 1
HIGHEST PAIN SEVERITY IN PAST 24 HOURS: 5/10
DESCRIPTION OF MEMORY LOSS: SHORT TERM
FATIGUES EASILY: 1
HYPERTENSION: 1
LOWEST PAIN SEVERITY IN PAST 24 HOURS: 2/10
SUBJECTIVE PAIN PROGRESSION: GRADUALLY IMPROVING
PAIN LOCATION - PAIN QUALITY: ACHY
LIMITED RANGE OF MOTION: 1
PAIN LOCATION - PAIN SEVERITY: 2/10
PERSON REPORTING PAIN: PATIENT
LOWER EXTREMITY EDEMA: 1
APPETITE LEVEL: GOOD
FORGETFULNESS: 1
CHANGE IN APPETITE: UNCHANGED

## 2024-03-04 ASSESSMENT — ACTIVITIES OF DAILY LIVING (ADL)
LIGHT HOUSEKEEPING: DEPENDENT
LAUNDRY ASSESSED: 1
TRANSPORTATION ASSESSED: 1
LAUNDRY: DEPENDENT
SHOPPING ASSESSED: 1
HOUSEKEEPING ASSESSED: 1
SHOPPING: DEPENDENT
TRANSPORTATION: DEPENDENT

## 2024-03-06 SDOH — ECONOMIC STABILITY: GENERAL

## 2024-03-06 ASSESSMENT — ACTIVITIES OF DAILY LIVING (ADL): MONEY MANAGEMENT (EXPENSES/BILLS): TOTALLY DEPENDENT

## 2024-03-06 NOTE — HOME HEALTH
"Pt and his ex wife/CG denies problems states LUE improved, less swollen and less ecchymotic. Educated on how Coumadin and similar products can cause bleeding under the skin from minor bumping and the recovery period then is longer.  CG notes, \"he just sits in their\" and pt notes 'I don't know what I would do without this\" pointing to his TV. Both seperately agree he does enjoy his grandchildren and she willie encourage them to come over and even take him out if he agrees."

## 2024-03-07 ENCOUNTER — HOSPITAL ENCOUNTER (OUTPATIENT)
Dept: CARDIOLOGY | Facility: CLINIC | Age: 89
Discharge: HOME | End: 2024-03-07
Payer: MEDICARE

## 2024-03-07 ENCOUNTER — HOME CARE VISIT (OUTPATIENT)
Dept: HOME HEALTH SERVICES | Facility: HOME HEALTH | Age: 89
End: 2024-03-07
Payer: MEDICARE

## 2024-03-07 DIAGNOSIS — Z95.810 IMPLANTABLE CARDIOVERTER-DEFIBRILLATOR (ICD) IN SITU: ICD-10-CM

## 2024-03-07 DIAGNOSIS — I42.8 NONISCHEMIC CARDIOMYOPATHY (MULTI): ICD-10-CM

## 2024-03-07 DIAGNOSIS — I44.2 CHB (COMPLETE HEART BLOCK) (MULTI): ICD-10-CM

## 2024-03-11 ENCOUNTER — TELEMEDICINE (OUTPATIENT)
Dept: PHARMACY | Facility: HOSPITAL | Age: 89
End: 2024-03-11
Payer: MEDICARE

## 2024-03-11 ENCOUNTER — HOSPITAL ENCOUNTER (OUTPATIENT)
Dept: CARDIOLOGY | Facility: CLINIC | Age: 89
Discharge: HOME | End: 2024-03-11
Payer: MEDICARE

## 2024-03-11 DIAGNOSIS — I50.23 ACUTE ON CHRONIC SYSTOLIC HEART FAILURE (MULTI): ICD-10-CM

## 2024-03-11 NOTE — PROGRESS NOTES
Pharmacy Post-Discharge Visit  Gorge Larsen is a 88 y.o. male who was referred to the Clinical Pharmacy Team to complete a post-discharge medication optimization and monitoring visit.  The patient was referred for their Congestive Heart Failure.    Recent Hospitalization  Admission Date: 1/22/24  Discharge Date: 1/25/24  Discharge Diagnosis: CHF, Pacemaker, COVID, Warfarin    Admission Date: 2/19/24 (planned)  Discharge Date: 2/20/24  Discharge Diagnosis: Cardiomyopathy       Referring Provider: Natasha Antoine MD    [facility]  [id number] - [place], [place] - [address] AT [facility] & [place]  [address]  [facility] [address]  Phone: [phone] Fax: [phone]    [facility] [address]  [address]  [facility] [address]  [facility] [address]  Phone: [phone] Fax: [phone]    Allergies   Allergen Reactions    Codeine Nausea/vomiting    Latex, Natural Rubber Unknown    Lisinopril Unknown       CHF ASSESSMENT  EF% has decreased severely over past year, previously approximately 50-55%     Staging:  Most recent ejection fraction: ~35%  NYHA: Class III     Symptoms/Monitoring:  Reported baseline weight: 199-201 lbs   Weighing self everyday? Yes!  Weight changes? No  Edema? Doesn't have any right now  Aware to contact cardiology if gaining 2-3 lbs/24 hrs OR 5 lbs/week? Yes  Shortness of breath? Did prior to going in to the hospital, okay now.      Diet/Lifestyle:  Following low-salt diet? No salt added, wife does most of the cooking. Wife is very conscious of low-salt, no frozen meals.   Monitoring liquid intake? No more than 2.5-3 bottles of water/day     Guideline-Directed Medical Therapy:  ARNI: No  If no, then ACEi/ARB?: No  Beta Blocker: Yes, describe: carvedilol 12.5 mg, 0.5 tab BID  MRA: No  SGLT2i: No- could not tolerate Farxiga  Furosemide 40 mg daily     Secondary Prevention:  The ASCVD Risk score (Ethan YU, et al.,  2019) failed to calculate for the following reasons:    The 2019 ASCVD risk score is only valid for ages 40 to 79   Aspirin 81mg? yes  Statin?: Yes, describe: rosuvastatin 5 mg daily  HTN?: History of hypertension, however patient now trending towards hypotension    Patient-Reported BP  Morning Pre-medications: 135/55, 164/65, 143/62, 156/72, 129/54  Afternoon Post-medications: 113/45, 114/47    Historical Pharmacotherapy  Losartan- hypotension  Farxiga- hypotension    Laboratory Results  Lab Results   Component Value Date    BILITOT 0.8 03/01/2024    CALCIUM 9.2 03/01/2024    CO2 27 03/01/2024     03/01/2024    CREATININE 1.20 03/01/2024    GLUCOSE 154 (H) 03/01/2024    ALKPHOS 107 03/01/2024    K 4.3 03/01/2024    PROT 7.1 03/01/2024     03/01/2024    AST 25 03/01/2024    ALT 13 03/01/2024    BUN 29 (H) 03/01/2024    ANIONGAP 10 03/01/2024    MG 2.38 09/10/2022    PHOS 3.5 09/13/2022    ALBUMIN 3.8 03/01/2024    LIPASE 38 11/20/2021    GFRMALE 62 06/30/2023    EGFR 59 (L) 03/01/2024     Lab Results   Component Value Date    TRIG 62 01/08/2024    CHOL 98 01/08/2024    HDL 43.1 01/08/2024     Lab Results   Component Value Date    HGBA1C 5.3 12/11/2020         Assessment/Plan   Problem List Items Addressed This Visit       Acute on chronic systolic heart failure (CMS/[facility])     General Patient Discussion  Successfully underwent pacemaker procedure in February 2024  BP medications adjusted since then due to hypotension:  Changed: Carvedilol lowered to 12.5 mg tab, 0.5 tab BID (previously 12.5 mg BID)  Stopped: Farxiga 10 mg daily   Stopped: Losartan 50 mg daily   Per patient, despite above medication changes, still consistently experiences all-day dizziness after taking his morning BP medications. Patient denies any dizziness prior to taking his morning BP medications.   Per above noted patient-reported BP, afternoon diastolic BP (only two readings provided) running mid-40s mmHg.   Catrina (ex-wife) reports  patient's weight remains stable as she cooks a low-sodium diet for patient. Only rarely does she note lower-leg swelling/edema.      Plan/Changes   Continue all meds under the continuation of care with the referring provider and clinical pharmacy team  Specialists: Patient currently sees outpatient  Cardiology (Dr. Jeremy Clarke, Dr. Walker Mcdonald).  Instructed Catrina to continue documenting afternoon BP values, in addition to morning BP values.     Next Clinical Pharmacy Follow-Up  Approximately 2 weeks, Monday, March 25th, 2:30 pm  Updates from cardiology (if any)      Britt Arnold PharmD     Verbal consent to manage patient's drug therapy was obtained from the patient's ex-wife, Catrina. They were informed they may decline to participate or withdraw from participation in pharmacy services at any time.

## 2024-03-11 NOTE — Clinical Note
Dr. Clarke and Dr. Mcdonald-  Spoke with patient's ex-wife (Catrina) who lives with the patient. Patient's current BP medications include only carvedilol 6.25 mg BID and furosemide 40 mg once daily in the morning. Patient attests to dizziness that lasts all day after taking morning BP medications, but notes no dizziness before taking morning BP medications. Please see reported BP values- patient's diastolic habitually runs low. Any changes to medication regimen I may advise patient on prior to next in-office device check 4/4/24 or cardiology visit on 4/18/24, or would you prefer to follow-up with patient on those dates?

## 2024-03-12 ENCOUNTER — TELEPHONE (OUTPATIENT)
Dept: CARDIOLOGY | Facility: CLINIC | Age: 89
End: 2024-03-12
Payer: MEDICARE

## 2024-03-12 ENCOUNTER — HOME CARE VISIT (OUTPATIENT)
Dept: HOME HEALTH SERVICES | Facility: HOME HEALTH | Age: 89
End: 2024-03-12
Payer: MEDICARE

## 2024-03-12 ENCOUNTER — APPOINTMENT (OUTPATIENT)
Dept: CARDIOLOGY | Facility: CLINIC | Age: 89
End: 2024-03-12
Payer: MEDICARE

## 2024-03-12 VITALS
SYSTOLIC BLOOD PRESSURE: 150 MMHG | TEMPERATURE: 97.8 F | DIASTOLIC BLOOD PRESSURE: 49 MMHG | RESPIRATION RATE: 18 BRPM | OXYGEN SATURATION: 98 %

## 2024-03-12 PROCEDURE — G0299 HHS/HOSPICE OF RN EA 15 MIN: HCPCS

## 2024-03-12 SDOH — ECONOMIC STABILITY: GENERAL

## 2024-03-12 ASSESSMENT — ACTIVITIES OF DAILY LIVING (ADL)
AMBULATION ASSISTANCE: STAND BY ASSIST
AMBULATION ASSISTANCE: 1
MONEY MANAGEMENT (EXPENSES/BILLS): NEEDS ASSISTANCE

## 2024-03-12 ASSESSMENT — ENCOUNTER SYMPTOMS
DYSPNEA ACTIVITY LEVEL: AFTER AMBULATING LESS THAN 10 FT
CONTUSION: 1
APPETITE LEVEL: FAIR
DIZZINESS: 1
DENIES PAIN: 1
SHORTNESS OF BREATH: 1
MUSCLE WEAKNESS: 1

## 2024-03-14 DIAGNOSIS — I48.20 CHRONIC ATRIAL FIBRILLATION (MULTI): Primary | ICD-10-CM

## 2024-03-14 RX ORDER — OMEPRAZOLE 20 MG/1
CAPSULE, DELAYED RELEASE ORAL
Qty: 90 CAPSULE | OUTPATIENT
Start: 2024-03-14

## 2024-03-14 RX ORDER — WARFARIN SODIUM 5 MG/1
5 TABLET ORAL
Qty: 90 TABLET | Refills: 0 | Status: SHIPPED | OUTPATIENT
Start: 2024-03-14 | End: 2025-01-23

## 2024-03-14 RX ORDER — WARFARIN 7.5 MG/1
7.5 TABLET ORAL 2 TIMES WEEKLY
Qty: 90 TABLET | Refills: 0 | Status: SHIPPED | OUTPATIENT
Start: 2024-03-14

## 2024-03-14 RX ORDER — WARFARIN SODIUM 5 MG/1
5 TABLET ORAL
Qty: 90 TABLET | Refills: 1 | Status: CANCELLED | OUTPATIENT
Start: 2024-03-14 | End: 2025-01-23

## 2024-03-14 RX ORDER — WARFARIN 7.5 MG/1
7.5 TABLET ORAL 2 TIMES WEEKLY
Qty: 90 TABLET | Refills: 3 | Status: CANCELLED | OUTPATIENT
Start: 2024-03-14

## 2024-03-19 ENCOUNTER — APPOINTMENT (OUTPATIENT)
Dept: CARDIOLOGY | Facility: CLINIC | Age: 89
End: 2024-03-19
Payer: MEDICARE

## 2024-03-21 ENCOUNTER — TELEPHONE (OUTPATIENT)
Dept: CARDIOLOGY | Facility: CLINIC | Age: 89
End: 2024-03-21
Payer: MEDICARE

## 2024-03-22 DIAGNOSIS — I10 HYPERTENSION: Primary | ICD-10-CM

## 2024-03-22 RX ORDER — LOSARTAN POTASSIUM 50 MG/1
50 TABLET ORAL DAILY
COMMUNITY
End: 2024-03-22 | Stop reason: SDUPTHER

## 2024-03-22 RX ORDER — LOSARTAN POTASSIUM 50 MG/1
50 TABLET ORAL DAILY
Qty: 30 TABLET | Refills: 0 | Status: SHIPPED | OUTPATIENT
Start: 2024-03-22 | End: 2024-04-17

## 2024-03-23 ENCOUNTER — HOME CARE VISIT (OUTPATIENT)
Dept: HOME HEALTH SERVICES | Facility: HOME HEALTH | Age: 89
End: 2024-03-23
Payer: MEDICARE

## 2024-03-23 VITALS
SYSTOLIC BLOOD PRESSURE: 122 MMHG | BODY MASS INDEX: 30.01 KG/M2 | RESPIRATION RATE: 18 BRPM | TEMPERATURE: 97.7 F | DIASTOLIC BLOOD PRESSURE: 62 MMHG | WEIGHT: 203.25 LBS | HEART RATE: 70 BPM | OXYGEN SATURATION: 98 %

## 2024-03-23 PROCEDURE — G0299 HHS/HOSPICE OF RN EA 15 MIN: HCPCS

## 2024-03-23 ASSESSMENT — ENCOUNTER SYMPTOMS
ARTHRALGIAS: 1
COUGH CHARACTERISTICS: NON-PRODUCTIVE
MUSCLE WEAKNESS: 1
COUGH: 1
DENIES PAIN: 1
BOWEL PATTERN NORMAL: 1
LOWER EXTREMITY EDEMA: 1
LAST BOWEL MOVEMENT: 66922
DRY SKIN: 1
DIZZINESS: 1

## 2024-03-23 ASSESSMENT — ACTIVITIES OF DAILY LIVING (ADL)
AMBULATION ASSISTANCE: STAND BY ASSIST
AMBULATION ASSISTANCE: 1
OASIS_M1830: 03
ENTERING_EXITING_HOME: SUPERVISION

## 2024-03-25 ENCOUNTER — TELEMEDICINE (OUTPATIENT)
Dept: PHARMACY | Facility: HOSPITAL | Age: 89
End: 2024-03-25
Payer: MEDICARE

## 2024-03-25 DIAGNOSIS — I50.23 ACUTE ON CHRONIC SYSTOLIC HEART FAILURE (MULTI): ICD-10-CM

## 2024-03-25 NOTE — Clinical Note
Dr. Johan BAEZA, patient's ex-wife Seema (primary caregiver) informed me that patient has added back in losartan 25 mg once daily instead of 50 mg daily you recommended on 3/21/24 via phone message. Yesterday pre-meds: 159/81, post-meds: 125/66. Patient to follow-up with you 4/18/24.

## 2024-03-25 NOTE — PROGRESS NOTES
Pharmacy Post-Discharge Visit  Gorge Larsen is a 88 y.o. male who was referred to the Clinical Pharmacy Team to complete a post-discharge medication optimization and monitoring visit.  The patient was referred for their Congestive Heart Failure.    Recent Hospitalization  Admission Date: 1/22/24  Discharge Date: 1/25/24  Discharge Diagnosis: CHF, Pacemaker, COVID, Warfarin     Admission Date: 2/19/24 (planned)  Discharge Date: 2/20/24  Discharge Diagnosis: Cardiomyopathy        Referring Provider: Natasha Antoine MD    [facility]  [facility] #[id number] - [place], [place] - [address] AT [facility] & [place]  [address]  [facility] [address]  Phone: [phone] Fax: [phone]    [facility] [address]  [address]  [facility] [address]  [facility] [address]  Phone: [phone] Fax: [phone]    Allergies   Allergen Reactions    Codeine Nausea/vomiting    Latex, Natural Rubber Unknown    Lisinopril Unknown       CHF ASSESSMENT  EF% has decreased severely over past year, previously approximately 50-55%     Staging:  Most recent ejection fraction: ~35%  NYHA: Class III     Symptoms/Monitoring:  Reported baseline weight: 199-201 lbs   Weighing self everyday? Yes!  Weight changes? No  Edema? Doesn't have any right now  Aware to contact cardiology if gaining 2-3 lbs/24 hrs OR 5 lbs/week? Yes  Shortness of breath? Did prior to going in to the hospital, okay now.      Diet/Lifestyle:  Following low-salt diet? No salt added, wife does most of the cooking. Wife is very conscious of low-salt, no frozen meals.   Monitoring liquid intake? No more than 2.5-3 bottles of water/day     Guideline-Directed Medical Therapy:  ARNI: No  If no, then ACEi/ARB?: No  Beta Blocker: Yes, describe: carvedilol 12.5 mg, 0.5 tab BID  MRA: No  SGLT2i: No- could not tolerate Farxiga  Furosemide 40 mg daily  Losartan 25 mg (administering      Secondary Prevention:  The ASCVD  Risk score (Ethan YU, et al., 2019) failed to calculate for the following reasons:    The 2019 ASCVD risk score is only valid for ages 40 to 79   Aspirin 81mg? yes  Statin?: Yes, describe: rosuvastatin 5 mg daily  HTN?: History of hypertension, however patient now trending towards hypotension     Patient-Reported BP  Last visit:  Morning Pre-medications: 135/55, 164/65, 143/62, 156/72, 129/54  Afternoon Post-medications: 113/45, 114/47    Today:  Morning Pre-medications: 159/81  Afternoon Post-medications: 125/66     Historical Pharmacotherapy  Losartan- hypotension  Farxiga- hypotension    Laboratory Results  Lab Results   Component Value Date    BILITOT 0.8 03/01/2024    CALCIUM 9.2 03/01/2024    CO2 27 03/01/2024     03/01/2024    CREATININE 1.20 03/01/2024    GLUCOSE 154 (H) 03/01/2024    ALKPHOS 107 03/01/2024    K 4.3 03/01/2024    PROT 7.1 03/01/2024     03/01/2024    AST 25 03/01/2024    ALT 13 03/01/2024    BUN 29 (H) 03/01/2024    ANIONGAP 10 03/01/2024    MG 2.38 09/10/2022    PHOS 3.5 09/13/2022    ALBUMIN 3.8 03/01/2024    LIPASE 38 11/20/2021    GFRMALE 62 06/30/2023    EGFR 59 (L) 03/01/2024     Lab Results   Component Value Date    TRIG 62 01/08/2024    CHOL 98 01/08/2024    HDL 43.1 01/08/2024     Lab Results   Component Value Date    HGBA1C 5.3 12/11/2020         Assessment/Plan   Problem List Items Addressed This Visit       Acute on chronic systolic heart failure (CMS/HCC)     General Patient Discussion  Per ex-wife Seema, patient continues to get stronger day-by-day s/p pacemaker replacement/upgrade  Has noticed patient's BP numbers have been steadily increasing over past few weeks  Cardiology recently (3/21/24) instructed patient to start losartan 50 mg daily. Patient only taking 25 mg daily per patient preference.     Plan/Changes   Continue all meds under the continuation of care with the referring provider and clinical pharmacy team  Specialists: Patient currently sees  outpatient cardiology.    Next Clinical Pharmacy Follow-Up  As needed, thank you for allowing Clinical Pharmacy to participate in the care of this patient.      Britt Arnold PharmD     Verbal consent to manage patient's drug therapy was obtained from the patient. They were informed they may decline to participate or withdraw from participation in pharmacy services at any time.

## 2024-03-26 PROCEDURE — 400014 HH F/U

## 2024-03-29 ENCOUNTER — HOME CARE VISIT (OUTPATIENT)
Dept: HOME HEALTH SERVICES | Facility: HOME HEALTH | Age: 89
End: 2024-03-29
Payer: MEDICARE

## 2024-03-29 VITALS
RESPIRATION RATE: 18 BRPM | DIASTOLIC BLOOD PRESSURE: 58 MMHG | TEMPERATURE: 97.4 F | OXYGEN SATURATION: 96 % | SYSTOLIC BLOOD PRESSURE: 102 MMHG | HEART RATE: 70 BPM

## 2024-03-29 PROCEDURE — G0299 HHS/HOSPICE OF RN EA 15 MIN: HCPCS

## 2024-03-29 PROCEDURE — 400014 HH F/U

## 2024-03-29 SDOH — ECONOMIC STABILITY: GENERAL

## 2024-03-29 ASSESSMENT — ACTIVITIES OF DAILY LIVING (ADL)
MONEY MANAGEMENT (EXPENSES/BILLS): NEEDS ASSISTANCE
AMBULATION ASSISTANCE: 1
AMBULATION ASSISTANCE: STAND BY ASSIST

## 2024-03-29 ASSESSMENT — ENCOUNTER SYMPTOMS
FATIGUES EASILY: 1
DENIES PAIN: 1
DRY SKIN: 1
DIZZINESS: 1
MUSCLE WEAKNESS: 1
APPETITE LEVEL: FAIR

## 2024-04-04 ENCOUNTER — APPOINTMENT (OUTPATIENT)
Dept: CARDIOLOGY | Facility: CLINIC | Age: 89
End: 2024-04-04
Payer: MEDICARE

## 2024-04-04 ENCOUNTER — HOME CARE VISIT (OUTPATIENT)
Dept: HOME HEALTH SERVICES | Facility: HOME HEALTH | Age: 89
End: 2024-04-04
Payer: MEDICARE

## 2024-04-04 VITALS
SYSTOLIC BLOOD PRESSURE: 160 MMHG | DIASTOLIC BLOOD PRESSURE: 72 MMHG | TEMPERATURE: 98.4 F | OXYGEN SATURATION: 98 % | RESPIRATION RATE: 18 BRPM | BODY MASS INDEX: 29.77 KG/M2 | WEIGHT: 201.56 LBS | HEART RATE: 70 BPM

## 2024-04-04 PROCEDURE — G0299 HHS/HOSPICE OF RN EA 15 MIN: HCPCS

## 2024-04-04 SDOH — ECONOMIC STABILITY: GENERAL

## 2024-04-04 ASSESSMENT — ENCOUNTER SYMPTOMS
CHANGE IN APPETITE: UNCHANGED
DENIES PAIN: 1
COUGH: 1
SPUTUM CONSISTENCY: THICK
COUGH CHARACTERISTICS: PRODUCTIVE
APPETITE LEVEL: GOOD
STOOL FREQUENCY: DAILY
LAST BOWEL MOVEMENT: 66934
SPUTUM COLOR: YELLOW
SPUTUM AMOUNT: MODERATE
SPUTUM PRODUCTION: 1

## 2024-04-04 ASSESSMENT — ACTIVITIES OF DAILY LIVING (ADL): MONEY MANAGEMENT (EXPENSES/BILLS): INDEPENDENT

## 2024-04-11 ENCOUNTER — HOME CARE VISIT (OUTPATIENT)
Dept: HOME HEALTH SERVICES | Facility: HOME HEALTH | Age: 89
End: 2024-04-11
Payer: MEDICARE

## 2024-04-11 ENCOUNTER — OFFICE VISIT (OUTPATIENT)
Dept: PRIMARY CARE | Facility: CLINIC | Age: 89
End: 2024-04-11
Payer: MEDICARE

## 2024-04-11 ENCOUNTER — HOSPITAL ENCOUNTER (OUTPATIENT)
Dept: RADIOLOGY | Facility: CLINIC | Age: 89
Discharge: HOME | End: 2024-04-11
Payer: MEDICARE

## 2024-04-11 VITALS
SYSTOLIC BLOOD PRESSURE: 160 MMHG | WEIGHT: 201.31 LBS | OXYGEN SATURATION: 100 % | RESPIRATION RATE: 18 BRPM | DIASTOLIC BLOOD PRESSURE: 72 MMHG | TEMPERATURE: 97.9 F | BODY MASS INDEX: 29.73 KG/M2 | HEART RATE: 70 BPM

## 2024-04-11 VITALS
HEIGHT: 69 IN | WEIGHT: 201 LBS | DIASTOLIC BLOOD PRESSURE: 72 MMHG | BODY MASS INDEX: 29.77 KG/M2 | SYSTOLIC BLOOD PRESSURE: 160 MMHG

## 2024-04-11 DIAGNOSIS — R05.1 ACUTE COUGH: ICD-10-CM

## 2024-04-11 DIAGNOSIS — J32.9 SINUSITIS, UNSPECIFIED CHRONICITY, UNSPECIFIED LOCATION: Primary | ICD-10-CM

## 2024-04-11 PROCEDURE — 99213 OFFICE O/P EST LOW 20 MIN: CPT | Performed by: INTERNAL MEDICINE

## 2024-04-11 PROCEDURE — G0300 HHS/HOSPICE OF LPN EA 15 MIN: HCPCS

## 2024-04-11 PROCEDURE — 1157F ADVNC CARE PLAN IN RCRD: CPT | Performed by: INTERNAL MEDICINE

## 2024-04-11 PROCEDURE — 3077F SYST BP >= 140 MM HG: CPT | Performed by: INTERNAL MEDICINE

## 2024-04-11 PROCEDURE — 71046 X-RAY EXAM CHEST 2 VIEWS: CPT | Performed by: RADIOLOGY

## 2024-04-11 PROCEDURE — 71046 X-RAY EXAM CHEST 2 VIEWS: CPT

## 2024-04-11 PROCEDURE — 94640 AIRWAY INHALATION TREATMENT: CPT | Performed by: INTERNAL MEDICINE

## 2024-04-11 PROCEDURE — 1159F MED LIST DOCD IN RCRD: CPT | Performed by: INTERNAL MEDICINE

## 2024-04-11 PROCEDURE — 3078F DIAST BP <80 MM HG: CPT | Performed by: INTERNAL MEDICINE

## 2024-04-11 RX ORDER — AZITHROMYCIN 500 MG/1
500 TABLET, FILM COATED ORAL DAILY
Qty: 10 TABLET | Refills: 0 | Status: SHIPPED | OUTPATIENT
Start: 2024-04-11 | End: 2024-04-21

## 2024-04-11 RX ORDER — ALBUTEROL SULFATE 90 UG/1
2 AEROSOL, METERED RESPIRATORY (INHALATION) EVERY 4 HOURS PRN
Qty: 8.5 G | Refills: 0 | Status: SHIPPED | OUTPATIENT
Start: 2024-04-11 | End: 2025-04-11

## 2024-04-11 RX ORDER — ALBUTEROL SULFATE 0.63 MG/3ML
0.63 SOLUTION RESPIRATORY (INHALATION) ONCE
Status: COMPLETED | OUTPATIENT
Start: 2024-04-11 | End: 2024-04-11

## 2024-04-11 RX ADMIN — ALBUTEROL SULFATE 0.63 MG: 0.63 SOLUTION RESPIRATORY (INHALATION) at 14:32

## 2024-04-11 ASSESSMENT — ENCOUNTER SYMPTOMS
APPETITE LEVEL: GOOD
COUGH: 1
DENIES PAIN: 1
COUGH CHARACTERISTICS: MOIST
LAST BOWEL MOVEMENT: 66940
COUGH CHARACTERISTICS: NON-PRODUCTIVE
DYSPNEA ACTIVITY LEVEL: AFTER AMBULATING 10 - 20 FT
CHANGE IN APPETITE: UNCHANGED
SHORTNESS OF BREATH: 1

## 2024-04-12 ENCOUNTER — HOSPITAL ENCOUNTER (EMERGENCY)
Facility: HOSPITAL | Age: 89
Discharge: HOME | End: 2024-04-12
Payer: MEDICARE

## 2024-04-12 ENCOUNTER — APPOINTMENT (OUTPATIENT)
Dept: RADIOLOGY | Facility: HOSPITAL | Age: 89
End: 2024-04-12
Payer: MEDICARE

## 2024-04-12 VITALS
DIASTOLIC BLOOD PRESSURE: 77 MMHG | TEMPERATURE: 98.2 F | HEART RATE: 85 BPM | OXYGEN SATURATION: 94 % | WEIGHT: 200 LBS | SYSTOLIC BLOOD PRESSURE: 135 MMHG | BODY MASS INDEX: 29.62 KG/M2 | HEIGHT: 69 IN | RESPIRATION RATE: 15 BRPM
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
## 2024-04-12 DIAGNOSIS — S09.90XA CLOSED HEAD INJURY, INITIAL ENCOUNTER: Primary | ICD-10-CM

## 2024-04-12 PROCEDURE — 70450 CT HEAD/BRAIN W/O DYE: CPT | Performed by: RADIOLOGY

## 2024-04-12 PROCEDURE — 72125 CT NECK SPINE W/O DYE: CPT

## 2024-04-12 PROCEDURE — 72125 CT NECK SPINE W/O DYE: CPT | Performed by: RADIOLOGY

## 2024-04-12 PROCEDURE — 99285 EMERGENCY DEPT VISIT HI MDM: CPT | Mod: 25

## 2024-04-12 PROCEDURE — 71045 X-RAY EXAM CHEST 1 VIEW: CPT | Performed by: RADIOLOGY

## 2024-04-12 PROCEDURE — 71045 X-RAY EXAM CHEST 1 VIEW: CPT

## 2024-04-12 PROCEDURE — 70450 CT HEAD/BRAIN W/O DYE: CPT

## 2024-04-12 RX ORDER — ACETAMINOPHEN 325 MG/1
975 TABLET ORAL ONCE
Status: COMPLETED | OUTPATIENT
Start: 2024-04-12 | End: 2024-04-12

## 2024-04-12 RX ADMIN — ACETAMINOPHEN 975 MG: 325 TABLET ORAL at 10:19

## 2024-04-12 ASSESSMENT — COLUMBIA-SUICIDE SEVERITY RATING SCALE - C-SSRS
1. IN THE PAST MONTH, HAVE YOU WISHED YOU WERE DEAD OR WISHED YOU COULD GO TO SLEEP AND NOT WAKE UP?: NO
2. HAVE YOU ACTUALLY HAD ANY THOUGHTS OF KILLING YOURSELF?: NO
6. HAVE YOU EVER DONE ANYTHING, STARTED TO DO ANYTHING, OR PREPARED TO DO ANYTHING TO END YOUR LIFE?: NO

## 2024-04-12 ASSESSMENT — PAIN DESCRIPTION - PROGRESSION: CLINICAL_PROGRESSION: NOT CHANGED

## 2024-04-12 ASSESSMENT — PAIN - FUNCTIONAL ASSESSMENT
PAIN_FUNCTIONAL_ASSESSMENT: 0-10
PAIN_FUNCTIONAL_ASSESSMENT: 0-10

## 2024-04-12 ASSESSMENT — PAIN SCALES - GENERAL
PAINLEVEL_OUTOF10: 0 - NO PAIN
PAINLEVEL_OUTOF10: 2
PAINLEVEL_OUTOF10: 0 - NO PAIN
PAINLEVEL_OUTOF10: 5 - MODERATE PAIN

## 2024-04-12 NOTE — ED PROVIDER NOTES
HPI   Chief Complaint   Patient presents with    Fall     Tripped and fell while going to the restroom, hit head and is on coumadin       HPI     Patient is an 88-year-old male presenting with his daughter for evaluation after a fall.  Patient states that he was walking to the bathroom when he tripped and fell.  Does admit to striking his head.  He did not lose consciousness.  He is on Coumadin for atrial fibrillation.  Patient has been ambulatory since the fall.  Complaining of diffuse back pain and a headache.  No chest pain or shortness of breath.  No abdominal pain, nausea, vomiting, diarrhea or constipation.               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 (Multi)     Coronary artery disease with unstable angina pectoris, unspecified vessel or lesion type, unspecified whether native or transplanted heart    Cancer (Multi)     CHF (congestive heart failure) (Multi)     COPD (chronic obstructive pulmonary disease) (Multi)     Essential (primary) hypertension 11/05/2022    Hypertension, unspecified type    Personal history of diseases of the blood and blood-forming organs and certain disorders involving the immune mechanism     History of bleeding disorder    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, oral cavity, and pharynx     History of malignant neoplasm of tonsil    Personal history of malignant neoplasm of prostate     History of malignant neoplasm of prostate    Personal history of other malignant neoplasm of large intestine     History of malignant neoplasm of colon     Past Surgical History:   Procedure Laterality Date    CARDIAC ELECTROPHYSIOLOGY PROCEDURE Left 02/19/2024    Procedure: PPM Generator Explant;  Surgeon: Walker Mcdonald MD;  Location: [facility] Cardiac Cath Lab;  Service:  Electrophysiology;  Laterality: Left;  NO AUTH NEEDED    CARDIAC ELECTROPHYSIOLOGY PROCEDURE Left 02/19/2024    Procedure: ICD BIV Implant;  Surgeon: Walker Mcdonald MD;  Location: [facility] Cardiac Cath Lab;  Service: Electrophysiology;  Laterality: Left;  upgrade dual PPM to CRT-D, Cap current RV lead. CASE# [id number]    CARDIAC ELECTROPHYSIOLOGY PROCEDURE Left 02/19/2024    Procedure: ICD Upgrade Biventricular;  Surgeon: Walker Mcdonald MD;  Location: [facility] Cardiac Cath Lab;  Service: Electrophysiology;  Laterality: Left;  upgrade dual PPM to CRT-D (LV lead implant), cap RV lead, CASE#[id number]    OTHER SURGICAL HISTORY  03/22/2021    Colonoscopy    OTHER SURGICAL HISTORY  03/22/2021    Colon surgery 2000    OTHER SURGICAL HISTORY  03/22/2021    Pacemaker insertion    OTHER SURGICAL HISTORY  03/22/2021    Coronary artery bypass graft 2002    OTHER SURGICAL HISTORY  01/05/2023    Throat surgery     Family History   Problem Relation Name Age of Onset    Heart attack Mother      Heart disease Mother      Other (Car accident) Father       Social History     Tobacco Use    Smoking status: Former     Types: Cigarettes    Smokeless tobacco: Never   Substance Use Topics    Alcohol use: Never    Drug use: Never       Physical Exam   ED Triage Vitals   Temperature Heart Rate Respirations BP   04/12/24 0845 04/12/24 0846 04/12/24 0846 04/12/24 0848   36.4 °C (97.5 °F) 66 16 137/59      Pulse Ox Temp Source Heart Rate Source Patient Position   04/12/24 0846 04/12/24 0845 04/12/24 0845 04/12/24 0845   (!) 93 % Oral Monitor Sitting      BP Location FiO2 (%)     04/12/24 0845 --     Right arm        Physical Exam    GENERAL: Well nourished and well developed. Answers questions appropriately.    SKIN: Clean and dry without evidence of rashes.    HEENT: Skull normocephalic, atraumatic. No scleral icterus. PERRLA, with EOMI.  Mucous membranes moist and pink in color. Supple neck, trachea midline. No lymphadenopathy or masses.   No obvious head trauma, palpable hematoma, jacobo sign or raccoon eyes.    RESPIRATORY: Clear to ausculation with normal effort. No adventitious sounds, intercostal retractions or shallow breathing.    CARDIOVASCULAR: Regular rate and rhythm with normal S1, S2. No S3, S4, murmurs or gallops. Capillary refill brisk, less than 3 seconds bilaterally. No unilateral lower extremity edema.    ABD/: Soft, nontender abdomen. Bowel sounds equal in all four quadrants. No tenderness, rebound, guarding or rigidity.    MSK: Spontaneously moves all 4 extremities without difficulty.  No injury or obvious deformity.  No midline neck or back pain or step-off deformities including cervical, thoracic or the lumbar spine.  Equal sensation of all 4 extremities.  No joint effusions, clubbing, cyanosis, or edema.    NEURO/PSYCH: A&Ox3. Cranial nerves II-XII grossly intact. No focal motor or sensory deficits. Appropriate mood and behavior.    ED Course & MDM   Diagnoses as of 04/12/24 1123   Closed head injury, initial encounter   Fall, initial encounter       Medical Decision Making  Parts of this chart have been completed using voice recognition software. Please excuse any errors of transcription. Despite the medical decision making time stamp above-my medical decision making has taken place during the patient's entire visit. My thought process and reason for plan has been formulated from the time that I saw the patient until the time of disposition and is not specific to one specific moment during their visit and furthermore my MDM encompasses this entire chart and not only this text box.      HPI: Detailed above.    Exam: A medically appropriate exam performed, outlined above, given the known history and presentation.    History obtained from: Patient    Social Determinants of Health considered during this visit: Was at home with daughter    CT head wo IV contrast   Final Result   No depressed skull fracture. No acute intracranial  bleed or focal   mass effect.        Mild-to-moderate volume loss.        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 No CT evidence of cervical   spine fracture in this exam.        MACRO:   None        Signed by: Vikram Pond 4/12/2024 11:09 AM   Dictation workstation:   [id number]      XR chest 1 view   Final Result   No acute change from 04/11/2024.        MACRO:   None        Signed by: Vikram Pond 4/12/2024 11:02 AM   Dictation workstation:   [id number]        Medications   acetaminophen (Tylenol) tablet 975 mg (975 mg oral Given 4/12/24 1019)       Differential diagnoses considered for this visit include: Acute intracranial processes versus cervical instability versus contusion    Considerations/further MDM:    Patient is an 88-year-old male presenting for evaluation of a head injury after sustaining a mechanical fall. Chest x-ray showed no acute change from imaging obtained on 04/11/2024.  CT head without contrast was negative for skull fracture or acute intracranial bleed.  No mass effect.  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  Results were discussed with the family and patient at the bedside.  There were no acute findings on patient's workup today.  He was comfortable to be discharged home with his family.  Released in good condition.    I estimate there is LOW risk for severe head injury requiring admission or transfer to another facility. I have considered: FRACTURE, DISLOCATION, FACIAL FRACTURE, OCULAR INVOLEMENT, CAUDA EQUINA or CENTRAL CORD SYNDROME, EPIDURAL MASS LESION, MENINGITIS, SPINAL STENOSIS, OR HERNIATED DISK CAUSING SEVERE STENOSIS SUBARACHNOID HEMORRHAGE, CAVERNOUS VENOUS THROMBOSIS,  MENINGITIS, INTRACRANIAL HEMORRHAGE, SUBDURAL HEMATOMA, OR STROKE/TIA, CONCUSSION. thus I consider the discharge disposition reasonable. We have discussed the diagnosis and risks, and we agree with discharging home to follow-up with their primary doctor, or specialist as provided. We also discussed returning to the Emergency Department immediately if new or worsening symptoms occur. We have discussed the symptoms which are most concerning (e.g., changing or worsening pain, weakness, vomiting, fever) that necessitate immediate return.    Attending physician Dr. Maik Resendiz was available for consultation.  Patient's history, physical exam, diagnostic studies, and treatment plan were discussed thoroughly.    Procedure  Procedures     Rebekah Bhagat PA-C  04/12/24 1123

## 2024-04-12 NOTE — DISCHARGE INSTRUCTIONS
Thank you for choosing [facility]  for your emergency care.    Please return to the Emergency Department immediately if new or worsening symptoms occur. Symptoms of that are most concerning include lightheadedness, dizziness, headaches or vision changes.    It is important to remember that your care does not end here and you must continue to monitor your condition closely. Please return to the emergency department for any worsening or concerning signs or symptoms as directed by our conversations and the discharge instructions. Otherwise please follow up with your doctor in 2 days if no better or worse. If you do not have a doctor please contact the referral number on your discharge instructions. Please contact any physician specialists provided in your discharge notes as it is very important to follow up with them regarding your condition. If you are unable to reach the physicians provided, please come back to the Emergency Department at any time.      As always, please take medications as directed. If you have any questions at all regarding your medications, please contact the pharmacist, the emergency department, or your doctor. Before taking any medication prescribed in the Emergency Department, please review the medication side effects and drug interactions as they may interact with your home medications.     Education materials have been provided to you about your encounter today.  Please review the attachments at your earliest convenience.

## 2024-04-13 ENCOUNTER — APPOINTMENT (OUTPATIENT)
Dept: PRIMARY CARE | Facility: CLINIC | Age: 89
End: 2024-04-13
Payer: MEDICARE

## 2024-04-14 ASSESSMENT — ENCOUNTER SYMPTOMS: COUGH: 1

## 2024-04-14 NOTE — PROGRESS NOTES
Subjective   Patient ID: Gorge Larsen is a 88 y.o. male who presents for Cough and sinus congestion.    Cough       Patient is here with complaints of having sinus congestion cough dizziness for more than a week  Bringing up yellow phlegm  Feeling very tired fatigue      Past recap  Patient is here for hospital follow-up.  He was first admitted for COVID.  He had generalized malaise.  He was found to be in decompensated CHF and volume overload and hyponatremia.  Ejection fraction on the echo showed 30% which has gone down from his previous ejection fraction.  Cardiology recommended EPS consult.  This saw the patient and recommended upgrading to biventricular pacemaker as patient is already due for battery change.  Patient is taking Lasix which is keeping the leg swelling down  History feels very tired and fatigued     Past recap  patient is here for follow-up  Did blood work  Having a lot of nosebleeds recently.  He is on Coumadin    patient is accompanied with the daughter and wife  He is doing much better on Zoloft  Did blood work  Patient checks Coumadin at home but does not know who is monitoring his Coumadin  He has gained some weight  Follow-up on hypertension high cholesterol congestive heart failure     patient is here accompanied with daughter.  Daughter is concerned that patient is very anxious.  Anxiety was the cause for his drinking heavily.  He was taken off the Zoloft because of low sodium.  His sodium is now normal which probably was related to his heart condition.  She wants to give it a try again.  She wants his Coumadin checked.  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  He sits a lot and does not do  much around     past recap  Patient complaining of chest congestion coughing up whitish-yellow phlegm   patient here for follow-up on blood work  He is hard of hearing  Overall feeling much better  Leg swelling is not there he is using compression stockings  He had 2D echo done     Patient is here for follow-up on blood work and ultrasound kidneys  He is feeling much better  He is not taking spironolactone  Taking losartan only every other day  His legs are not swelling up  He has more energy and not too lethargic lethargic     Patient here for follow-up on blood work   He urinates a lot goes frequently  He has history of prostate cancer s/p surgery  He does not drink water but drinks iced tea     last visit when I spoke to the daughter she took him to [place]. He was hospitalized for hyponatremia for few days then he was sent to [facility] rehab. There he had syncopal episode and transferred to [facility] emergency room  Patient was found to have reduced ejection fraction from 60 to 30%, severe aortic valve stenosis. He was transferred to [facility] where he had TAVR. Patient is doing much better since the procedure leg swelling has gone down his more alert but still remains quite withdrawn. The daughter thinks that he is still very depressed  He is also hard of hearing and his hearing aids are not working well  He is also not able to see very well because of the cataracts        Patient is here for follow-up on lab  Daughter is still concerned as patient is very lethargic, not taking the medications as prescribed     here for flollow up   Patient is not taking some medications which made him feel bad  He quit taking Synthroid  He quit taking Cardizem  He is not taking Coreg  he feels better without those medications.   But daughter says that he is more tired he is sleeping all the time  He says he feels dizzy  He sleeps in a chair  His swelling in the legs is getting worse  He does not take Lasix every day  He  "is getting more short of breath recently        Patient is here for follow-up  He saw Dr. Mcdonald  He did proBNP which came back 1111  He had his Lasix 20 mg and potassium 10 mEq he is  He did blood work  He has colon polyps positive for tubular adenoma  He has history of GI bleed and required blood transfusions  For last few weeks he is noticing dark stools off and on and concern about GI bleed again        past recap  To establish PCP  Follow-up on hypertension high cholesterol coronary artery disease  Needs refills on medication  He was hospitalized in  sodium was 116 his hydrochlorothiazide was discontinued he was very weak and confused and he went to the nursing home at [facility] now he is home  Now he is having a lot of swelling in the legs  He is sleeping and it recliner and not keeping his legs elevated     Past medical history: A. 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        Objective   /72   Ht 1.753 m (5' 9\")   Wt 91.2 kg (201 lb)   BMI 29.68 kg/m²     Physical Exam  Vitals reviewed.   Constitutional:       Appearance: Normal appearance.   HENT:      Head: Normocephalic and atraumatic.      Right Ear: Tympanic membrane, ear canal and external ear normal.      Left Ear: Tympanic membrane, ear canal and external ear normal.      Nose: Congestion and rhinorrhea present.      Comments: Deviated nasal septum to the left     Mouth/Throat:      Pharynx: Oropharynx is clear.   Eyes:      " Extraocular Movements: Extraocular movements intact.      Conjunctiva/sclera: Conjunctivae normal.      Pupils: Pupils are equal, round, and reactive to light.   Cardiovascular:      Rate and Rhythm: Normal rate and regular rhythm.      Pulses: Normal pulses.      Heart sounds: Normal heart sounds.   Pulmonary:      Effort: Pulmonary effort is normal.      Breath sounds: Normal breath sounds.   Abdominal:      General: Abdomen is flat. Bowel sounds are normal.      Palpations: Abdomen is soft.   Musculoskeletal:      Cervical back: Normal range of motion and neck supple.   Skin:     General: Skin is warm and dry.   Neurological:      General: No focal deficit present.      Mental Status: He is alert and oriented to person, place, and time.   Psychiatric:         Mood and Affect: Mood normal.         Assessment/Plan   Problem List Items Addressed This Visit    None  Visit Diagnoses       Acute cough        Relevant Medications    albuterol 0.63 mg/3 mL nebulizer solution 0.63 mg (Completed)    azithromycin (Zithromax) 500 mg tablet    albuterol (ProAir HFA) 90 mcg/actuation inhaler    Other Relevant Orders    XR chest 2 views (Completed)        10/7/22  Patient's hospital chart reviewed  Blood pressure stable  CBC CMP fasting with TSH ordered before next follow-up in a month  Syringing curettage done in right ear with BX removal with some improvement in hearing  Treat with neomycin eardrops  Advised to use mineral oil  Get ears cleaned  Advised to see the ENT to get new hearing aids  Encourage patient to go for cataract surgery  Poor vision poor hearing could be contributing to his anxiety and depression  Had a long discussion with her wife but and what kind of liquids he can have  Patient has a follow-up with cardiologist later today  May need echocardiogram done sooner  Clinically is looking better  We will follow-up in a month        12/3./22  Patient clinically looks much better  He has acute bronchitis  Treat  with Augmentin  Venous insufficiency doing much better  Blood work reviewed  Sodium normal  Anemia improving  Follow-up blood work in 3 months  Echocardiogram shows improved ejection fraction from 30 to 35% to 50 to 55%     1/12/23  Had a long discussion with the family and the patient regarding his anxiety and depression and choice of medication  Explained all SSRIs are risk for causing hyponatremia  Family does not want anything to make urine sodium go down as he gets very confused with low sodium  Patient is hard of hearing and has poor vision he does not move around much  May be he needs to change his lifestyle which will make him more happy and less anxious  Discussed BuSpar dizziness most likely related to his ear  Try meclizine as needed  Family does not want to use buspirone at this time as there is a small chance of hyponatremia related to it  That when I work with lifestyle modification to see if patient feels better if not then we will follow-up  Routine follow-up      3/17/23  INR 2.5 PTT 30.2  Continue current Coumadin  We will start Zoloft 25 mg a day  Blood pressure stable  Cholesterol very well controlled  Blood work looks pretty good  Congestive heart failure compensated no swelling of the leg  Hyponatremia resolved  He got new hearing aids  Follow-up blood work in 3 months    7/15/2023  Advised weight call Dr. Mcdonald office to make sure they are monitoring the Coumadin  Continue Zoloft patient is tolerating  Sodium is in range  Kidney function stable  Cholesterol well controlled  CHF compensated  Follow-up blood work in 6 months    1/13/2024  Blood work reviewed  TSH 6.34  Increase levothyroxine 75 mcg  Blood sugars elevated  Discussed diet exercise and risk for diabetes  Mild anemia still persists most likely related to epistaxis and patient is on Coumadin  Take iron supplement  Use saline nasal spray  Blood pressure stable  Cholesterol very well-controlled  Follow-up in 3  months    2/13/2024  Patient has recovered from COVID completely   patient appears quite compensated with CHF on Lasix  Will repeat CBC BMP in 1 week  Will check overnight pulse oximetry to see if hypoxia could be making him tired blood pressure stable  Patient is going for the battery change and upgrade the coming week  Follow-up after the battery update/pacemaker change    4/11/2024  Chest x-ray ordered to rule out congestive heart failure  Treat with Zithromax for 10 days  Breathing treatment given  Albuterol inhaler  Follow-up if not better

## 2024-04-17 DIAGNOSIS — I10 HYPERTENSION: ICD-10-CM

## 2024-04-17 RX ORDER — LOSARTAN POTASSIUM 50 MG/1
50 TABLET ORAL DAILY
Qty: 90 TABLET | Refills: 3 | Status: SHIPPED | OUTPATIENT
Start: 2024-04-17

## 2024-04-17 NOTE — PROGRESS NOTES
Subjective   Gorge Larsen is a 88 y.o. male.    Chief Complaint:  GORGE LARSEN is being seen for a six week follow-up. H/O atrial fibrillation, coronary artery disease, heart failure, hypertension and a routine medication evaluation.     HPI  Gorge Larsen is a 88 y.o. male presents for a six week follow up care. He had a pacemaker upgrade in February. Was seen in ER for left arm and pacemaker site swelling and pain. Was in ER again in March after a fall with a head injury- see notes. He denies chest pain, sob, heart palpitations or pedal edema. Reviewed lab work results ans current medications. An Echo was done in October EF decreased to 35%, reviewed results- see report.     Objective   Physical Exam  Patient is an ill-appearing 87 y/o female in no distress.   JVP not elevated. Carotid impulses are 2+ without overlying bruit.   Chest exhibits fair to good air movement with scattered expiratory rhonchi.   The cardiac rhythm is regular with no premature beats.   Normal S1 and S2. No gallop, murmur or rub, or click.   Abdomen is soft and benign without focal tenderness.   Upper left extremity swelling. Lower extremities with 1+ edema. The pedal pulses are intact.  All other systems have been reviewed and are negative for complaints    Lab Review:   Admission on 03/01/2024, Discharged on 03/01/2024   Component Date Value    Glucose 03/01/2024 154 (H)     Sodium 03/01/2024 138     Potassium 03/01/2024 4.3     Chloride 03/01/2024 101     Bicarbonate 03/01/2024 27     Urea Nitrogen 03/01/2024 29 (H)     Creatinine 03/01/2024 1.20     eGFR 03/01/2024 59 (L)     Calcium 03/01/2024 9.2     Anion Gap 03/01/2024 10     WBC 03/01/2024 5.4     nRBC 03/01/2024 0.0     RBC 03/01/2024 2.92 (L)     Hemoglobin 03/01/2024 8.3 (L)     Hematocrit 03/01/2024 26.5 (L)     MCV 03/01/2024 91     MCH 03/01/2024 28.4     MCHC 03/01/2024 31.3 (L)     RDW 03/01/2024 18.2 (H)     Platelets 03/01/2024 276     Neutrophils % 03/01/2024  71.3     Immature Granulocytes %,* 03/01/2024 0.6     Lymphocytes % 03/01/2024 9.2     Monocytes % 03/01/2024 8.0     Eosinophils % 03/01/2024 9.0     Basophils % 03/01/2024 1.9     Neutrophils Absolute 03/01/2024 3.82     Immature Granulocytes Ab* 03/01/2024 0.03     Lymphocytes Absolute 03/01/2024 0.49 (L)     Monocytes Absolute 03/01/2024 0.43     Eosinophils Absolute 03/01/2024 0.48 (H)     Basophils Absolute 03/01/2024 0.10     POCT Lactate, Venous 03/01/2024 1.5     AST 03/01/2024 25     ALT 03/01/2024 13     Alkaline Phosphatase 03/01/2024 107     Bilirubin, Total 03/01/2024 0.8     Bilirubin, Direct 03/01/2024 0.2     Total Protein 03/01/2024 7.1     Albumin 03/01/2024 3.8     Protime 03/01/2024 22.8 (H)     INR 03/01/2024 2.3 (H)    Admission on 02/19/2024, Discharged on 02/20/2024   Component Date Value    Glucose 02/19/2024 104 (H)     Sodium 02/19/2024 140     Potassium 02/19/2024 3.9     Chloride 02/19/2024 102     Bicarbonate 02/19/2024 25     Urea Nitrogen 02/19/2024 38 (H)     Creatinine 02/19/2024 1.40     eGFR 02/19/2024 49 (L)     Calcium 02/19/2024 9.7     Anion Gap 02/19/2024 13     WBC 02/19/2024 5.7     nRBC 02/19/2024 0.0     RBC 02/19/2024 4.52     Hemoglobin 02/19/2024 12.8 (L)     Hematocrit 02/19/2024 40.2 (L)     MCV 02/19/2024 89     MCH 02/19/2024 28.3     MCHC 02/19/2024 31.8 (L)     RDW 02/19/2024 16.9 (H)     Platelets 02/19/2024 243     Protime 02/19/2024 17.8 (H)     INR 02/19/2024 1.7 (H)     WBC 02/20/2024 6.0     nRBC 02/20/2024 0.0     RBC 02/20/2024 3.71 (L)     Hemoglobin 02/20/2024 10.4 (L)     Hematocrit 02/20/2024 32.0 (L)     MCV 02/20/2024 86     MCH 02/20/2024 28.0     MCHC 02/20/2024 32.5     RDW 02/20/2024 16.8 (H)     Platelets 02/20/2024 202     Glucose 02/20/2024 99     Sodium 02/20/2024 138     Potassium 02/20/2024 3.8     Chloride 02/20/2024 105     Bicarbonate 02/20/2024 23 (L)     Urea Nitrogen 02/20/2024 38 (H)     Creatinine 02/20/2024 1.20     eGFR  02/20/2024 59 (L)     Calcium 02/20/2024 8.9     Anion Gap 02/20/2024 10     Protime 02/20/2024 16.1 (H)     INR 02/20/2024 1.6 (H)    Lab on 02/06/2024   Component Date Value    BNP 02/06/2024 249 (H)     Glucose 02/06/2024 97     Sodium 02/06/2024 137     Potassium 02/06/2024 4.3     Chloride 02/06/2024 100     Bicarbonate 02/06/2024 27     Anion Gap 02/06/2024 14     Urea Nitrogen 02/06/2024 26 (H)     Creatinine 02/06/2024 1.33 (H)     eGFR 02/06/2024 52 (L)     Calcium 02/06/2024 9.3     WBC 02/06/2024 5.1     nRBC 02/06/2024 0.0     RBC 02/06/2024 3.82 (L)     Hemoglobin 02/06/2024 10.7 (L)     Hematocrit 02/06/2024 35.2 (L)     MCV 02/06/2024 92     MCH 02/06/2024 28.0     MCHC 02/06/2024 30.4 (L)     RDW 02/06/2024 15.7 (H)     Platelets 02/06/2024 274     Protime 02/06/2024 33.4 (H)     INR 02/06/2024 2.9 (H)    Admission on 01/22/2024, Discharged on 01/25/2024   Component Date Value    PROBNP 01/22/2024 7,972 (H)     Ventricular Rate 01/22/2024 70     Atrial Rate 01/22/2024 65     QRS Duration 01/22/2024 220     QT Interval 01/22/2024 504     QTC Calculation(Bazett) 01/22/2024 544     R Axis 01/22/2024 -65     T [place] 01/22/2024 109     QRS Count 01/22/2024 12     Q Onset 01/22/2024 183     T Offset 01/22/2024 435     QTC [place] 01/22/2024 530     WBC 01/22/2024 7.2     nRBC 01/22/2024 0.0     RBC 01/22/2024 3.73 (L)     Hemoglobin 01/22/2024 10.7 (L)     Hematocrit 01/22/2024 33.3 (L)     MCV 01/22/2024 89     MCH 01/22/2024 28.7     MCHC 01/22/2024 32.1     RDW 01/22/2024 16.0 (H)     Platelets 01/22/2024 230     Neutrophils % 01/22/2024 78.8     Immature Granulocytes %,* 01/22/2024 0.6     Lymphocytes % 01/22/2024 4.9     Monocytes % 01/22/2024 13.2     Eosinophils % 01/22/2024 1.4     Basophils % 01/22/2024 1.1     Neutrophils Absolute 01/22/2024 5.68 (H)     Immature Granulocytes Ab* 01/22/2024 0.04     Lymphocytes Absolute 01/22/2024 0.35 (L)     Monocytes Absolute 01/22/2024 0.95 (H)      Eosinophils Absolute 01/22/2024 0.10     Basophils Absolute 01/22/2024 0.08     Glucose 01/22/2024 128 (H)     Sodium 01/22/2024 128 (L)     Potassium 01/22/2024 4.1     Chloride 01/22/2024 94 (L)     Bicarbonate 01/22/2024 20 (L)     Urea Nitrogen 01/22/2024 21     Creatinine 01/22/2024 1.10     eGFR 01/22/2024 65     Calcium 01/22/2024 8.8     Albumin 01/22/2024 3.7     Alkaline Phosphatase 01/22/2024 113     Total Protein 01/22/2024 7.3     AST 01/22/2024 21     Bilirubin, Total 01/22/2024 1.0     ALT 01/22/2024 10     Anion Gap 01/22/2024 14     Flu A Result 01/22/2024 Not Detected     Flu B Result 01/22/2024 Not Detected     Coronavirus 2019, PCR 01/22/2024 Detected (A)     Protime 01/22/2024 27.1 (H)     INR 01/22/2024 2.8 (H)     Troponin T, High Sensiti* 01/22/2024 23 (HH)     Troponin T, High Sensiti* 01/22/2024 23 (HH)     Troponin T, High Sensiti* 01/22/2024 27 (HH)     WBC 01/23/2024 7.4     nRBC 01/23/2024 0.0     RBC 01/23/2024 3.72 (L)     Hemoglobin 01/23/2024 10.8 (L)     Hematocrit 01/23/2024 33.4 (L)     MCV 01/23/2024 90     MCH 01/23/2024 29.0     MCHC 01/23/2024 32.3     RDW 01/23/2024 16.1 (H)     Platelets 01/23/2024 223     Glucose 01/23/2024 100 (H)     Sodium 01/23/2024 129 (L)     Potassium 01/23/2024 3.6     Chloride 01/23/2024 95 (L)     Bicarbonate 01/23/2024 21 (L)     Urea Nitrogen 01/23/2024 23     Creatinine 01/23/2024 1.10     eGFR 01/23/2024 65     Calcium 01/23/2024 8.8     Albumin 01/23/2024 3.6     Alkaline Phosphatase 01/23/2024 113     Total Protein 01/23/2024 7.2     AST 01/23/2024 21     Bilirubin, Total 01/23/2024 0.9     ALT 01/23/2024 9     Anion Gap 01/23/2024 13     Protime 01/23/2024 29.3 (H)     INR 01/23/2024 3.0 (H)     Protime 01/23/2024 28.6 (H)     INR 01/23/2024 3.0 (H)     Blood Culture 01/23/2024 No growth at 4 days -  FINAL REPORT     Blood Culture 01/23/2024 No growth at 4 days -  FINAL REPORT     Protime 01/24/2024 30.0 (H)     INR 01/24/2024 3.1 (H)      Glucose 01/24/2024 99     Sodium 01/24/2024 131 (L)     Potassium 01/24/2024 3.2 (L)     Chloride 01/24/2024 96 (L)     Bicarbonate 01/24/2024 25     Urea Nitrogen 01/24/2024 23     Creatinine 01/24/2024 1.10     eGFR 01/24/2024 65     Calcium 01/24/2024 8.5     Anion Gap 01/24/2024 10     WBC 01/24/2024 6.1     nRBC 01/24/2024 0.0     RBC 01/24/2024 3.72 (L)     Hemoglobin 01/24/2024 10.6 (L)     Hematocrit 01/24/2024 33.2 (L)     MCV 01/24/2024 89     MCH 01/24/2024 28.5     MCHC 01/24/2024 31.9 (L)     RDW 01/24/2024 15.9 (H)     Platelets 01/24/2024 229     Glucose 01/25/2024 104 (H)     Sodium 01/25/2024 132 (L)     Potassium 01/25/2024 3.4     Chloride 01/25/2024 97     Bicarbonate 01/25/2024 25     Urea Nitrogen 01/25/2024 24     Creatinine 01/25/2024 1.20     eGFR 01/25/2024 59 (L)     Calcium 01/25/2024 8.7     Anion Gap 01/25/2024 10     WBC 01/25/2024 5.0     nRBC 01/25/2024 0.0     RBC 01/25/2024 3.80 (L)     Hemoglobin 01/25/2024 10.9 (L)     Hematocrit 01/25/2024 34.6 (L)     MCV 01/25/2024 91     MCH 01/25/2024 28.7     MCHC 01/25/2024 31.5 (L)     RDW 01/25/2024 16.0 (H)     Platelets 01/25/2024 228    Lab on 01/08/2024   Component Date Value    WBC 01/08/2024 5.4     nRBC 01/08/2024 0.0     RBC 01/08/2024 4.19 (L)     Hemoglobin 01/08/2024 11.9 (L)     Hematocrit 01/08/2024 38.3 (L)     MCV 01/08/2024 91     MCH 01/08/2024 28.4     MCHC 01/08/2024 31.1 (L)     RDW 01/08/2024 15.3 (H)     Platelets 01/08/2024 255     Glucose 01/08/2024 126 (H)     Sodium 01/08/2024 135 (L)     Potassium 01/08/2024 4.7     Chloride 01/08/2024 99     Bicarbonate 01/08/2024 27     Anion Gap 01/08/2024 14     Urea Nitrogen 01/08/2024 22     Creatinine 01/08/2024 1.10     eGFR 01/08/2024 65     Calcium 01/08/2024 9.3     Albumin 01/08/2024 4.2     Alkaline Phosphatase 01/08/2024 99     Total Protein 01/08/2024 7.3     AST 01/08/2024 22     Bilirubin, Total 01/08/2024 0.8     ALT 01/08/2024 11     Cholesterol 01/08/2024 98      HDL-Cholesterol 01/08/2024 43.1     Cholesterol/HDL Ratio 01/08/2024 2.3     LDL Calculated 01/08/2024 43     VLDL 01/08/2024 12     Triglycerides 01/08/2024 62     Non HDL Cholesterol 01/08/2024 55     Thyroid Stimulating Horm* 01/08/2024 6.34 (H)    Hospital Outpatient Visit on 10/19/2023   Component Date Value    LV A4C EF 10/19/2023 46.7      Assessment/Plan   1. 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 Patient admitted to [facility] [place] 08/[facility]/2022 with complaints of worsening encephalopathy from SN[facility] with shortness of breath found to have newly reduced EF, 60 to 30%, of unknown etiology and worsening aortic stenosis. Echo done October 2022 showed an EF of 35%, LAD, mild to moderate MR, moderate TR, TAVR. Patient had repeat echo November 2022 showing an EF of 50 to 55%, ad, RA mild to moderately dilated, mild to moderate MR, mildly elevated RVSP, moderate TR, TAVR, PASP 44 mmHg.  The patient had a more recent echocardiogram on 10/19/2023 which demonstrated a reduction in the LV ejection fraction to 35%.  There was moderate left and right atrial enlargement 2+ mitral valve regurgitation 3+ tricuspid valve regurgitation severe pulmonary hypertension PA systolic pressure 65 mmHg peak systolic pressure gradient of 12 mmHg.  He was admitted recently to the [facility] system in 1/2024 with COVID along with shortness of breath.  He was started on Lasix 40 mg twice daily subsequently reduced to daily.  He was already on Farxiga 10 mg daily.  Losartan was increased to 25 mg twice daily.  He was switched from metoprolol to carvedilol 6.25 mg twice daily.  Interrogation of his permanent pacemaker indicated a predominant AV paced rhythm 98% of the time and pulse generator replacement was required in 3 months.  Given the above findings it was thought that he should have his dual-chamber pacemaker upgraded to a biventricular ICD which was performed 2/19/2024 as discussed below.   Prior to his hospital discharge the carvedilol was uptitrated to 50 mg twice daily and the carvedilol 12.5 mg twice daily.  Clinically that the patient is improved without any significant shortness of breath.  He does become slightly out of breath with walking and has minimal lower extremity edema.  Of note his dual-chamber permanent pacemaker was upgraded to a biventricular ICD on 2/19/2024.  Following the procedure he developed a hematoma plus minus infection and was given Keflex prophylactically.  Lab work on 3/1/2024 included a CBC with hematocrit 26.5.  Electrolyte panel included creatinine 1.2.  Liver function tests were normal.  Ultrasound of the left upper extremity on 2/26/2024 was negative for DVT.  Will increase dose of carvedilol from 6.25 mg twice daily to 12.5 mg twice daily recheck patient in 3 months.     2. CAD. S/P CABG 2002.     3. TAVR. Initial reticence patient had TAVR completed September 9, 2022 without complication. Echo done status post TAVR showed EF 30 to 35%.     4. Hypertension.  .     5. Hyperlipidemia.      6. Afib. On warfarin due to GI bleeding on Xarelto.  The patient does have his INR testing performed at home but prefers that the results be forwarded to the Coumadin clinic rather than to electrophysiology.     7. Pacemaker. Complete AV block. 2015 [employer].  Patient will be continued to be followed by electrophysiology.  As discussed below due to the patient's reduction in LV ejection fraction to 35% and the need for continuous pacing he has dual-chamber permanent pacemaker was upgraded to a biventricular ICD on 2/19/2024.  The right atrial lead was kept in place.  The right ventricular pacing lead was capped in order to install new RV lead capable of delivering shock therapy.  A left ventricular lead was placed.  Patient developed a hematoma in the left upper extremity.  Prior to discharge the carvedilol was decreased to 6.25 mg twice daily.  Some concern for possible  infection but inspection appears to be more hematoma than erythema.  He will nevertheless be given Keflex 500 mg 4 times daily for period of 10 days for prophylaxis.  The patient did have a device interrogation performed on 3/8/2024 showing estimated battery life 8 years with no episodes of atrial fibrillation.  He is pacing both the right and left ventricles 99%.     8. Emphysema.      9. Hypothyroid.      10. History of COVID-19 infection.     11. History of CABG x3.

## 2024-04-18 ENCOUNTER — OFFICE VISIT (OUTPATIENT)
Dept: CARDIOLOGY | Facility: CLINIC | Age: 89
End: 2024-04-18
Payer: MEDICARE

## 2024-04-18 VITALS
DIASTOLIC BLOOD PRESSURE: 50 MMHG | HEIGHT: 69 IN | SYSTOLIC BLOOD PRESSURE: 104 MMHG | HEART RATE: 70 BPM | RESPIRATION RATE: 16 BRPM | BODY MASS INDEX: 30.51 KG/M2 | WEIGHT: 206 LBS | OXYGEN SATURATION: 97 %

## 2024-04-18 DIAGNOSIS — I50.23 ACUTE ON CHRONIC SYSTOLIC CONGESTIVE HEART FAILURE (MULTI): ICD-10-CM

## 2024-04-18 DIAGNOSIS — I10 BENIGN ESSENTIAL HTN: ICD-10-CM

## 2024-04-18 DIAGNOSIS — R60.0 BILATERAL LEG EDEMA: ICD-10-CM

## 2024-04-18 DIAGNOSIS — I48.20 CHRONIC ATRIAL FIBRILLATION (MULTI): ICD-10-CM

## 2024-04-18 DIAGNOSIS — Q24.5 CORONARY ARTERY ABNORMALITY (HHS-HCC): Primary | ICD-10-CM

## 2024-04-18 PROCEDURE — 1157F ADVNC CARE PLAN IN RCRD: CPT | Performed by: INTERNAL MEDICINE

## 2024-04-18 PROCEDURE — 3078F DIAST BP <80 MM HG: CPT | Performed by: INTERNAL MEDICINE

## 2024-04-18 PROCEDURE — 1159F MED LIST DOCD IN RCRD: CPT | Performed by: INTERNAL MEDICINE

## 2024-04-18 PROCEDURE — 3074F SYST BP LT 130 MM HG: CPT | Performed by: INTERNAL MEDICINE

## 2024-04-18 PROCEDURE — 99214 OFFICE O/P EST MOD 30 MIN: CPT | Performed by: INTERNAL MEDICINE

## 2024-04-18 RX ORDER — CARVEDILOL 12.5 MG/1
12.5 TABLET ORAL
Qty: 180 TABLET | Refills: 3 | Status: SHIPPED | OUTPATIENT
Start: 2024-04-18 | End: 2025-04-18

## 2024-04-18 ASSESSMENT — PATIENT HEALTH QUESTIONNAIRE - PHQ9
SUM OF ALL RESPONSES TO PHQ9 QUESTIONS 1 AND 2: 0
2. FEELING DOWN, DEPRESSED OR HOPELESS: NOT AT ALL
1. LITTLE INTEREST OR PLEASURE IN DOING THINGS: NOT AT ALL

## 2024-04-19 ENCOUNTER — HOME CARE VISIT (OUTPATIENT)
Dept: HOME HEALTH SERVICES | Facility: HOME HEALTH | Age: 89
End: 2024-04-19
Payer: MEDICARE

## 2024-04-19 VITALS
RESPIRATION RATE: 18 BRPM | HEART RATE: 70 BPM | OXYGEN SATURATION: 98 % | TEMPERATURE: 97.7 F | DIASTOLIC BLOOD PRESSURE: 50 MMHG | SYSTOLIC BLOOD PRESSURE: 130 MMHG

## 2024-04-19 PROCEDURE — G0299 HHS/HOSPICE OF RN EA 15 MIN: HCPCS

## 2024-04-19 SDOH — ECONOMIC STABILITY: GENERAL

## 2024-04-19 ASSESSMENT — ENCOUNTER SYMPTOMS
CHANGE IN APPETITE: UNCHANGED
MUSCLE WEAKNESS: 1
APPETITE LEVEL: GOOD
PERSON REPORTING PAIN: PATIENT
COUGH CHARACTERISTICS: PRODUCTIVE
DIZZINESS: 1
FATIGUES EASILY: 1
BOWEL PATTERN NORMAL: 1
COUGH: 1
DENIES PAIN: 1
LAST BOWEL MOVEMENT: 66949

## 2024-04-19 ASSESSMENT — ACTIVITIES OF DAILY LIVING (ADL)
AMBULATION ASSISTANCE: 1
AMBULATION ASSISTANCE: STAND BY ASSIST
MONEY MANAGEMENT (EXPENSES/BILLS): INDEPENDENT

## 2024-04-20 ENCOUNTER — APPOINTMENT (OUTPATIENT)
Dept: PRIMARY CARE | Facility: CLINIC | Age: 89
End: 2024-04-20
Payer: MEDICARE

## 2024-04-26 ENCOUNTER — LAB (OUTPATIENT)
Dept: LAB | Facility: LAB | Age: 89
End: 2024-04-26
Payer: MEDICARE

## 2024-04-26 ENCOUNTER — HOME CARE VISIT (OUTPATIENT)
Dept: HOME HEALTH SERVICES | Facility: HOME HEALTH | Age: 89
End: 2024-04-26
Payer: MEDICARE

## 2024-04-26 VITALS
BODY MASS INDEX: 29.72 KG/M2 | WEIGHT: 201.25 LBS | DIASTOLIC BLOOD PRESSURE: 68 MMHG | TEMPERATURE: 97.5 F | OXYGEN SATURATION: 99 % | RESPIRATION RATE: 18 BRPM | SYSTOLIC BLOOD PRESSURE: 110 MMHG | HEART RATE: 70 BPM

## 2024-04-26 DIAGNOSIS — E03.9 HYPOTHYROIDISM, UNSPECIFIED TYPE: ICD-10-CM

## 2024-04-26 DIAGNOSIS — F32.A DEPRESSION, UNSPECIFIED DEPRESSION TYPE: ICD-10-CM

## 2024-04-26 DIAGNOSIS — E78.5 DYSLIPIDEMIA: ICD-10-CM

## 2024-04-26 DIAGNOSIS — I10 ESSENTIAL HYPERTENSION: ICD-10-CM

## 2024-04-26 DIAGNOSIS — I50.9 CONGESTIVE HEART FAILURE, UNSPECIFIED HF CHRONICITY, UNSPECIFIED HEART FAILURE TYPE (MULTI): ICD-10-CM

## 2024-04-26 DIAGNOSIS — D64.9 ANEMIA, UNSPECIFIED TYPE: ICD-10-CM

## 2024-04-26 LAB
ALBUMIN SERPL BCP-MCNC: 4.1 G/DL (ref 3.4–5)
ALP SERPL-CCNC: 108 U/L (ref 33–136)
ALT SERPL W P-5'-P-CCNC: 17 U/L (ref 10–52)
ANION GAP SERPL CALC-SCNC: 10 MMOL/L (ref 10–20)
AST SERPL W P-5'-P-CCNC: 23 U/L (ref 9–39)
BILIRUB SERPL-MCNC: 0.7 MG/DL (ref 0–1.2)
BUN SERPL-MCNC: 31 MG/DL (ref 6–23)
CALCIUM SERPL-MCNC: 9.2 MG/DL (ref 8.6–10.6)
CHLORIDE SERPL-SCNC: 101 MMOL/L (ref 98–107)
CHOLEST SERPL-MCNC: 110 MG/DL (ref 0–199)
CHOLESTEROL/HDL RATIO: 2.4
CO2 SERPL-SCNC: 28 MMOL/L (ref 21–32)
CREAT SERPL-MCNC: 1.18 MG/DL (ref 0.5–1.3)
EGFRCR SERPLBLD CKD-EPI 2021: 60 ML/MIN/1.73M*2
ERYTHROCYTE [DISTWIDTH] IN BLOOD BY AUTOMATED COUNT: 16.7 % (ref 11.5–14.5)
GLUCOSE SERPL-MCNC: 95 MG/DL (ref 74–99)
HCT VFR BLD AUTO: 35.9 % (ref 41–52)
HDLC SERPL-MCNC: 45.7 MG/DL
HGB BLD-MCNC: 11.6 G/DL (ref 13.5–17.5)
LDLC SERPL CALC-MCNC: 50 MG/DL
MCH RBC QN AUTO: 29.2 PG (ref 26–34)
MCHC RBC AUTO-ENTMCNC: 32.3 G/DL (ref 32–36)
MCV RBC AUTO: 90 FL (ref 80–100)
NON HDL CHOLESTEROL: 64 MG/DL (ref 0–149)
NRBC BLD-RTO: 0 /100 WBCS (ref 0–0)
PLATELET # BLD AUTO: 236 X10*3/UL (ref 150–450)
POTASSIUM SERPL-SCNC: 4.3 MMOL/L (ref 3.5–5.3)
PROT SERPL-MCNC: 7.3 G/DL (ref 6.4–8.2)
RBC # BLD AUTO: 3.97 X10*6/UL (ref 4.5–5.9)
SODIUM SERPL-SCNC: 135 MMOL/L (ref 136–145)
TRIGL SERPL-MCNC: 74 MG/DL (ref 0–149)
TSH SERPL-ACNC: 2.29 MIU/L (ref 0.44–3.98)
VLDL: 15 MG/DL (ref 0–40)
WBC # BLD AUTO: 5.3 X10*3/UL (ref 4.4–11.3)

## 2024-04-26 PROCEDURE — 400014 HH F/U

## 2024-04-26 PROCEDURE — 36415 COLL VENOUS BLD VENIPUNCTURE: CPT

## 2024-04-26 PROCEDURE — 80061 LIPID PANEL: CPT

## 2024-04-26 PROCEDURE — 80053 COMPREHEN METABOLIC PANEL: CPT

## 2024-04-26 PROCEDURE — 84443 ASSAY THYROID STIM HORMONE: CPT

## 2024-04-26 PROCEDURE — 85027 COMPLETE CBC AUTOMATED: CPT

## 2024-04-26 PROCEDURE — G0299 HHS/HOSPICE OF RN EA 15 MIN: HCPCS

## 2024-04-26 SDOH — ECONOMIC STABILITY: GENERAL

## 2024-04-26 ASSESSMENT — ENCOUNTER SYMPTOMS
APPETITE LEVEL: GOOD
MUSCLE WEAKNESS: 1
FATIGUES EASILY: 1
DIZZINESS: 1
PERSON REPORTING PAIN: PATIENT
DENIES PAIN: 1
CHANGE IN APPETITE: UNCHANGED

## 2024-04-26 ASSESSMENT — ACTIVITIES OF DAILY LIVING (ADL)
AMBULATION ASSISTANCE: STAND BY ASSIST
CURRENT_FUNCTION: STAND BY ASSIST
MONEY MANAGEMENT (EXPENSES/BILLS): INDEPENDENT

## 2024-04-29 ENCOUNTER — OFFICE VISIT (OUTPATIENT)
Dept: PRIMARY CARE | Facility: CLINIC | Age: 89
End: 2024-04-29
Payer: MEDICARE

## 2024-04-29 VITALS
DIASTOLIC BLOOD PRESSURE: 60 MMHG | HEIGHT: 69 IN | WEIGHT: 201 LBS | BODY MASS INDEX: 29.77 KG/M2 | SYSTOLIC BLOOD PRESSURE: 132 MMHG

## 2024-04-29 DIAGNOSIS — I50.20 HEART FAILURE WITH REDUCED EJECTION FRACTION (MULTI): ICD-10-CM

## 2024-04-29 DIAGNOSIS — I48.20 CHRONIC ATRIAL FIBRILLATION (MULTI): ICD-10-CM

## 2024-04-29 DIAGNOSIS — I10 BENIGN ESSENTIAL HTN: ICD-10-CM

## 2024-04-29 DIAGNOSIS — E87.1 HYPONATREMIA: Primary | ICD-10-CM

## 2024-04-29 DIAGNOSIS — E78.5 DYSLIPIDEMIA: ICD-10-CM

## 2024-04-29 DIAGNOSIS — I10 ESSENTIAL HYPERTENSION: ICD-10-CM

## 2024-04-29 PROCEDURE — 99214 OFFICE O/P EST MOD 30 MIN: CPT | Performed by: INTERNAL MEDICINE

## 2024-04-29 PROCEDURE — 1157F ADVNC CARE PLAN IN RCRD: CPT | Performed by: INTERNAL MEDICINE

## 2024-04-29 PROCEDURE — 1159F MED LIST DOCD IN RCRD: CPT | Performed by: INTERNAL MEDICINE

## 2024-04-29 PROCEDURE — 3075F SYST BP GE 130 - 139MM HG: CPT | Performed by: INTERNAL MEDICINE

## 2024-04-29 PROCEDURE — 3078F DIAST BP <80 MM HG: CPT | Performed by: INTERNAL MEDICINE

## 2024-04-29 RX ORDER — FUROSEMIDE 20 MG/1
20 TABLET ORAL DAILY
Qty: 90 TABLET | Refills: 0 | Status: SHIPPED | OUTPATIENT
Start: 2024-04-29

## 2024-04-29 RX ORDER — FUROSEMIDE 20 MG/1
20 TABLET ORAL DAILY
COMMUNITY
End: 2024-04-29 | Stop reason: SDUPTHER

## 2024-04-29 ASSESSMENT — ENCOUNTER SYMPTOMS: COUGH: 1

## 2024-04-29 NOTE — PROGRESS NOTES
Subjective   Patient ID: Gorge Larsen is a 88 y.o. male who presents for Follow-up.    Patient is here for follow-up  Leg swelling is doing much better  Gets dizzy wondering if he can cut down the dose of Lasix  His legs were swelling up in the pacemaker was not working he got the new pacemaker and it is working well  Refusing overnight pulse oximetry  Follow-up on hypertension high cholesterol hypothyroidism      Past recap  Patient is here for hospital follow-up.  He was first admitted for COVID.  He had generalized malaise.  He was found to be in decompensated CHF and volume overload and hyponatremia.  Ejection fraction on the echo showed 30% which has gone down from his previous ejection fraction.  Cardiology recommended EPS consult.  This saw the patient and recommended upgrading to biventricular pacemaker as patient is already due for battery change.  Patient is taking Lasix which is keeping the leg swelling down  History feels very tired and fatigued     Past recap  patient is here for follow-up  Did blood work  Having a lot of nosebleeds recently.  He is on Coumadin    patient is accompanied with the daughter and wife  He is doing much better on Zoloft  Did blood work  Patient checks Coumadin at home but does not know who is monitoring his Coumadin  He has gained some weight  Follow-up on hypertension high cholesterol congestive heart failure     patient is here accompanied with daughter.  Daughter is concerned that patient is very anxious.  Anxiety was the cause for his drinking heavily.  He was taken off the Zoloft because of low sodium.  His sodium is now normal which probably was related to his heart condition.  She wants to give it a try again.  She wants his Coumadin checked.  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  He sits a lot and does not do much around     past recap  Patient complaining of chest congestion coughing up whitish-yellow phlegm   patient here for follow-up on blood work  He is hard of hearing  Overall feeling much better  Leg swelling is not there he is using compression stockings  He had 2D echo done     Patient is here for follow-up on blood work and ultrasound kidneys  He is feeling much better  He is not taking spironolactone  Taking losartan only every other day  His legs are not swelling up  He has more energy and not too lethargic lethargic     Patient here for follow-up on blood work   He urinates a lot goes frequently  He has history of prostate cancer s/p surgery  He does not drink water but drinks iced tea     last visit when I spoke to the daughter she took him to [place]. He was hospitalized for hyponatremia for few days then he was sent to [facility] rehab. There he had syncopal episode and transferred to [facility] emergency room  Patient was found to have reduced ejection fraction from 60 to 30%, severe aortic valve stenosis. He was transferred to [facility] where he had TAVR. Patient is doing much better since the procedure leg swelling has gone down his more alert but still remains quite withdrawn. The daughter thinks that he is still very depressed  He is also hard of hearing and his hearing aids are not working well  He is also not able to see very well because of the cataracts        Patient is here for follow-up on lab  Daughter is still concerned as patient is very lethargic, not taking the medications as prescribed     here for flollow up   Patient is not taking some medications which made him feel bad  He quit taking Synthroid  He quit taking Cardizem  He is not taking Coreg  he feels better without those medications.   But daughter says that he is more tired he is  "sleeping all the time  He says he feels dizzy  He sleeps in a chair  His swelling in the legs is getting worse  He does not take Lasix every day  He is getting more short of breath recently        Patient is here for follow-up  He saw Dr. Mcdonald  He did proBNP which came back 1111  He had his Lasix 20 mg and potassium 10 mEq he is  He did blood work  He has colon polyps positive for tubular adenoma  He has history of GI bleed and required blood transfusions  For last few weeks he is noticing dark stools off and on and concern about GI bleed again        past recap  To establish PCP  Follow-up on hypertension high cholesterol coronary artery disease  Needs refills on medication  He was hospitalized in  sodium was 116 his hydrochlorothiazide was discontinued he was very weak and confused and he went to the nursing home at [facility] now he is home  Now he is having a lot of swelling in the legs  He is sleeping and it recliner and not keeping his legs elevated     Past medical history: A. 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        Objective   /60   Ht 1.753 m (5' 9\")   Wt 91.2 kg (201 lb)   BMI 29.68 kg/m²     Physical Exam  Vitals reviewed.   Constitutional:       Appearance: Normal appearance.   HENT:      Head: Normocephalic and atraumatic.      Right Ear: Tympanic membrane, ear canal and external ear normal.      Left Ear: Tympanic membrane, ear canal and external ear normal.      Nose: "      Comments: Deviated nasal septum to the left     Mouth/Throat:      Pharynx: Oropharynx is clear.   Eyes:      Extraocular Movements: Extraocular movements intact.      Conjunctiva/sclera: Conjunctivae normal.      Pupils: Pupils are equal, round, and reactive to light.   Cardiovascular:      Rate and Rhythm: Normal rate and regular rhythm.      Pulses: Normal pulses.      Heart sounds: Normal heart sounds.   Pulmonary:      Effort: Pulmonary effort is normal.      Breath sounds: Normal breath sounds.   Abdominal:      General: Abdomen is flat. Bowel sounds are normal.      Palpations: Abdomen is soft.   Musculoskeletal:      Cervical back: Normal range of motion and neck supple.   Skin:     General: Skin is warm and dry.   Neurological:      General: No focal deficit present.      Mental Status: He is alert and oriented to person, place, and time.   Psychiatric:         Mood and Affect: Mood normal.         Assessment/Plan   Problem List Items Addressed This Visit          Cardiac and Vasculature    Benign essential HTN    Relevant Orders    CBC    Comprehensive Metabolic Panel    Lipid Panel    Chronic atrial fibrillation (Multi)    Relevant Medications    furosemide (Lasix) 20 mg tablet    Other Relevant Orders    CBC    Comprehensive Metabolic Panel    Lipid Panel    Dyslipidemia    Relevant Orders    CBC    Comprehensive Metabolic Panel    Lipid Panel     Other Visit Diagnoses       Heart failure with reduced ejection fraction (Multi)        Relevant Medications    furosemide (Lasix) 20 mg tablet    Other Relevant Orders    CBC    Comprehensive Metabolic Panel    Lipid Panel    Essential hypertension        Relevant Orders    CBC    Comprehensive Metabolic Panel    Lipid Panel          10/7/22  Patient's hospital chart reviewed  Blood pressure stable  CBC CMP fasting with TSH ordered before next follow-up in a month  Syringing curettage done in right ear with BX removal with some improvement in  hearing  Treat with neomycin eardrops  Advised to use mineral oil  Get ears cleaned  Advised to see the ENT to get new hearing aids  Encourage patient to go for cataract surgery  Poor vision poor hearing could be contributing to his anxiety and depression  Had a long discussion with her wife but and what kind of liquids he can have  Patient has a follow-up with cardiologist later today  May need echocardiogram done sooner  Clinically is looking better  We will follow-up in a month        12/3./22  Patient clinically looks much better  He has acute bronchitis  Treat with Augmentin  Venous insufficiency doing much better  Blood work reviewed  Sodium normal  Anemia improving  Follow-up blood work in 3 months  Echocardiogram shows improved ejection fraction from 30 to 35% to 50 to 55%     1/12/23  Had a long discussion with the family and the patient regarding his anxiety and depression and choice of medication  Explained all SSRIs are risk for causing hyponatremia  Family does not want anything to make urine sodium go down as he gets very confused with low sodium  Patient is hard of hearing and has poor vision he does not move around much  May be he needs to change his lifestyle which will make him more happy and less anxious  Discussed BuSpar dizziness most likely related to his ear  Try meclizine as needed  Family does not want to use buspirone at this time as there is a small chance of hyponatremia related to it  That when I work with lifestyle modification to see if patient feels better if not then we will follow-up  Routine follow-up      3/17/23  INR 2.5 PTT 30.2  Continue current Coumadin  We will start Zoloft 25 mg a day  Blood pressure stable  Cholesterol very well controlled  Blood work looks pretty good  Congestive heart failure compensated no swelling of the leg  Hyponatremia resolved  He got new hearing aids  Follow-up blood work in 3 months    7/15/2023  Advised weight call Dr. Mcdonald office to make  sure they are monitoring the Coumadin  Continue Zoloft patient is tolerating  Sodium is in range  Kidney function stable  Cholesterol well controlled  CHF compensated  Follow-up blood work in 6 months    1/13/2024  Blood work reviewed  TSH 6.34  Increase levothyroxine 75 mcg  Blood sugars elevated  Discussed diet exercise and risk for diabetes  Mild anemia still persists most likely related to epistaxis and patient is on Coumadin  Take iron supplement  Use saline nasal spray  Blood pressure stable  Cholesterol very well-controlled  Follow-up in 3 months    2/13/2024  Patient has recovered from COVID completely   patient appears quite compensated with CHF on Lasix  Will repeat CBC BMP in 1 week  Will check overnight pulse oximetry to see if hypoxia could be making him tired blood pressure stable  Patient is going for the battery change and upgrade the coming week  Follow-up after the battery update/pacemaker change    4/11/2024  Chest x-ray ordered to rule out congestive heart failure  Treat with Zithromax for 10 days  Breathing treatment given  Albuterol inhaler  Follow-up if not better    4/29/2024  Will reduce Lasix 20 mg a day  Blood pressure stable  CHF compensated  Encouraged to do overnight pulse oximetry it is not sleep study which patient thinks it is  Cholesterol very well-controlled  Mild anemia persists but stable  Patient is on blood thinners  Sodium slightly low could be from dehydration will monitor off with reduced dose of Lasix  Follow-up blood work in 3 months

## 2024-05-03 ENCOUNTER — HOME CARE VISIT (OUTPATIENT)
Dept: HOME HEALTH SERVICES | Facility: HOME HEALTH | Age: 89
End: 2024-05-03
Payer: MEDICARE

## 2024-05-03 VITALS
OXYGEN SATURATION: 98 % | TEMPERATURE: 97.8 F | RESPIRATION RATE: 18 BRPM | DIASTOLIC BLOOD PRESSURE: 60 MMHG | SYSTOLIC BLOOD PRESSURE: 132 MMHG

## 2024-05-03 DIAGNOSIS — R35.0 URINARY FREQUENCY: ICD-10-CM

## 2024-05-03 PROCEDURE — G0299 HHS/HOSPICE OF RN EA 15 MIN: HCPCS

## 2024-05-03 ASSESSMENT — ACTIVITIES OF DAILY LIVING (ADL)
AMBULATION ASSISTANCE: 1
OASIS_M1830: 00
HOME_HEALTH_OASIS: 00
AMBULATION ASSISTANCE: INDEPENDENT

## 2024-05-03 ASSESSMENT — ENCOUNTER SYMPTOMS: DENIES PAIN: 1

## 2024-05-03 NOTE — ED PROVIDER NOTES
Patient was seen by advance practitioner.  I was available for consultation and to discuss case with the advanced practitioner and agree with plan of care. I did not physically evaluate the patient.     Maik Resendiz,   05/03/24 0204

## 2024-05-08 ENCOUNTER — LAB (OUTPATIENT)
Dept: LAB | Facility: LAB | Age: 89
End: 2024-05-08
Payer: MEDICARE

## 2024-05-08 DIAGNOSIS — R35.0 URINARY FREQUENCY: ICD-10-CM

## 2024-05-08 LAB
APPEARANCE UR: CLEAR
BILIRUB UR STRIP.AUTO-MCNC: NEGATIVE MG/DL
COLOR UR: COLORLESS
GLUCOSE UR STRIP.AUTO-MCNC: NORMAL MG/DL
KETONES UR STRIP.AUTO-MCNC: NEGATIVE MG/DL
LEUKOCYTE ESTERASE UR QL STRIP.AUTO: NEGATIVE
NITRITE UR QL STRIP.AUTO: NEGATIVE
PH UR STRIP.AUTO: 6.5 [PH]
PROT UR STRIP.AUTO-MCNC: NEGATIVE MG/DL
RBC # UR STRIP.AUTO: NEGATIVE /UL
SP GR UR STRIP.AUTO: 1.01
UROBILINOGEN UR STRIP.AUTO-MCNC: NORMAL MG/DL

## 2024-05-08 PROCEDURE — 81003 URINALYSIS AUTO W/O SCOPE: CPT

## 2024-05-08 PROCEDURE — 87086 URINE CULTURE/COLONY COUNT: CPT

## 2024-05-09 LAB — BACTERIA UR CULT: NORMAL

## 2024-05-16 ENCOUNTER — HOSPITAL ENCOUNTER (OUTPATIENT)
Dept: CARDIOLOGY | Facility: CLINIC | Age: 89
Discharge: HOME | End: 2024-05-16
Payer: MEDICARE

## 2024-05-16 DIAGNOSIS — I44.2 CHB (COMPLETE HEART BLOCK) (MULTI): ICD-10-CM

## 2024-05-16 DIAGNOSIS — Z95.0 CARDIAC PACEMAKER: ICD-10-CM

## 2024-05-16 DIAGNOSIS — I48.0 PAF (PAROXYSMAL ATRIAL FIBRILLATION) (MULTI): ICD-10-CM

## 2024-05-16 DIAGNOSIS — Z95.810 IMPLANTABLE CARDIOVERTER-DEFIBRILLATOR (ICD) IN SITU: ICD-10-CM

## 2024-05-16 DIAGNOSIS — I42.8 NONISCHEMIC CARDIOMYOPATHY (MULTI): ICD-10-CM

## 2024-05-16 DIAGNOSIS — Z95.0 CARDIAC PACEMAKER: Primary | ICD-10-CM

## 2024-05-16 PROCEDURE — 93284 PRGRMG EVAL IMPLANTABLE DFB: CPT | Performed by: INTERNAL MEDICINE

## 2024-05-16 PROCEDURE — 93284 PRGRMG EVAL IMPLANTABLE DFB: CPT

## 2024-05-21 DIAGNOSIS — R53.1 GENERALIZED WEAKNESS: ICD-10-CM

## 2024-05-21 DIAGNOSIS — E78.5 DYSLIPIDEMIA: ICD-10-CM

## 2024-05-21 RX ORDER — ROSUVASTATIN CALCIUM 5 MG/1
5 TABLET, COATED ORAL NIGHTLY
Qty: 90 TABLET | Refills: 0 | Status: SHIPPED | OUTPATIENT
Start: 2024-05-21

## 2024-05-21 RX ORDER — POTASSIUM CHLORIDE 1.5 G/1.58G
20 POWDER, FOR SOLUTION ORAL DAILY
Qty: 90 PACKET | Refills: 0 | Status: SHIPPED | OUTPATIENT
Start: 2024-05-21 | End: 2024-08-19

## 2024-05-29 ENCOUNTER — HOSPITAL ENCOUNTER (OUTPATIENT)
Dept: CARDIOLOGY | Facility: CLINIC | Age: 89
Discharge: HOME | End: 2024-05-29
Payer: MEDICARE

## 2024-05-29 DIAGNOSIS — I50.9 CONGESTIVE HEART FAILURE, UNSPECIFIED HF CHRONICITY, UNSPECIFIED HEART FAILURE TYPE (MULTI): ICD-10-CM

## 2024-06-06 ENCOUNTER — TELEPHONE (OUTPATIENT)
Dept: CARDIOLOGY | Facility: CLINIC | Age: 89
End: 2024-06-06
Payer: MEDICARE

## 2024-06-06 DIAGNOSIS — Z95.2 S/P TAVR (TRANSCATHETER AORTIC VALVE REPLACEMENT): ICD-10-CM

## 2024-06-06 DIAGNOSIS — I48.20 CHRONIC ATRIAL FIBRILLATION (MULTI): Primary | ICD-10-CM

## 2024-06-06 NOTE — TELEPHONE ENCOUNTER
Electronic  AMS Initial Referral and [facility] Remote INR order faxed to Dr. Jeremy Clarke for signature and return to [facility] for processing.  Electronic  order has been signed and Coumadin Intake is aware of this patient.     Mr. Larsen has been home testing through [facility] Remote INR since October 2022 using a [employer] XS meter (according to daughter) with results sent directly to Dr. Mcdonald for management.  Patient has now established care with Dr. Jeremy Clarke and he has agreed to refer patient to  Patient Self Testing Program as referring provider.  Coumadin Intake will contact daughter Mahogany and schedule meter review ASAP while [facility] clinic waits for signed [facility] Remote INR order from PATT Clarke to reactivate patient's Remote INR account.  Mahogany is aware that review would need to be done as soon as possible so INRs are properly managed.

## 2024-06-06 NOTE — TELEPHONE ENCOUNTER
Called and spoke with daughter Bharati.  Pt scheduled for new patient coumadin clinic visit on Wed 6/11.  Dr. Clarke notified via secure chat and agrees with appointment date.

## 2024-06-11 ENCOUNTER — ANTICOAGULATION - WARFARIN VISIT (OUTPATIENT)
Dept: CARDIOLOGY | Facility: CLINIC | Age: 89
End: 2024-06-11
Payer: MEDICARE

## 2024-06-11 DIAGNOSIS — Z95.2 S/P TAVR (TRANSCATHETER AORTIC VALVE REPLACEMENT): ICD-10-CM

## 2024-06-11 DIAGNOSIS — I48.20 CHRONIC ATRIAL FIBRILLATION (MULTI): Primary | ICD-10-CM

## 2024-06-11 NOTE — PROGRESS NOTES
I CALLED AND SPOKE WITH PT WIFE BECAUSE PT WAS ON PST LIST TODAY. PT WIFE STATES HE IS GOING TO [facility] TOMORROW 6/12  PT HAS HOME METER THAT WIFE CHECKS INRS ON HOWEVER WAS NOT TRAINED BY OUR STAFF. PT WIFE STATES SHE WAS INSTRUCTED TO BRING IN PT METER.

## 2024-06-12 ENCOUNTER — ANTICOAGULATION - WARFARIN VISIT (OUTPATIENT)
Dept: CARDIOLOGY | Facility: CLINIC | Age: 89
End: 2024-06-12
Payer: MEDICARE

## 2024-06-12 DIAGNOSIS — I48.20 CHRONIC ATRIAL FIBRILLATION (MULTI): Primary | ICD-10-CM

## 2024-06-12 DIAGNOSIS — Z95.2 S/P TAVR (TRANSCATHETER AORTIC VALVE REPLACEMENT): ICD-10-CM

## 2024-06-12 LAB
POC INR: 2.4
POC PROTHROMBIN TIME: NORMAL

## 2024-06-12 PROCEDURE — 99211 OFF/OP EST MAY X REQ PHY/QHP: CPT

## 2024-06-12 NOTE — PROGRESS NOTES
Patient identification verified with 2 identifiers.    Location: [facility] - [address] [place], [place] [address] [phone]     Referring Physician: Dr. Jeremy Clarke  Enrollment/ Re-enrollment date: 2025   INR Goal: 2.0-3.0  INR monitoring is per [facility] protocol.  Anticoagulation Medication: warfarin  Indication: Atrial Fibrillation/Atrial Flutter    Subjective   Bleeding signs/symptoms: No    Bruising: No   Major bleeding event: No  Thrombosis signs/symptoms: No  Thromboembolic event: No  Missed doses: No  Extra doses: No  Medication changes: No  Dietary changes: No  Change in health: No  Change in activity: No  Alcohol: No  Other concerns: No    Upcoming Procedures:  Does the Patient Have any upcoming procedures that require interruption in anticoagulation therapy? no  Does the patient require bridging? no      Anticoagulation Summary  As of 2024      INR goal:  2.0-3.0   TTR:  --   INR used for dosin.40 (2024)   Weekly warfarin total:  35 mg               Assessment/Plan   Therapeutic     1. New dose: no change    2. Next INR: 2 weeks      Education provided to patient during the visit:  Patient instructed to call in interim with questions, concerns and changes.   Patient educated on interactions between medications and warfarin.   Patient educated on dietary consistency in vitamin k consumption.   Patient educated on affects of alcohol consumption while taking warfarin.   Patient educated on signs of bleeding/clotting.   Patient educated on compliance with dosing, follow up appointments, and prescribed plan of care.

## 2024-06-13 ENCOUNTER — TELEPHONE (OUTPATIENT)
Dept: CARDIOLOGY | Facility: CLINIC | Age: 89
End: 2024-06-13
Payer: MEDICARE

## 2024-06-13 NOTE — TELEPHONE ENCOUNTER
Pt's INR results are being sent to [facility] fax [phone] erroneously.  Current scanned [facility] Remote INR order was hand written by Dr. Clarke/office staff prior to receiving corrected one from [facility] enrollment.  Spoke with Jessica Reese (Dr. Clarke adm) and resent order with correct contact info via fax to [phone] as directed by her for signature and return to [facility] for processing.  Current notification values are also corrected on new form for signature.

## 2024-06-18 ENCOUNTER — ANTICOAGULATION - WARFARIN VISIT (OUTPATIENT)
Dept: CARDIOLOGY | Facility: CLINIC | Age: 89
End: 2024-06-18
Payer: MEDICARE

## 2024-06-18 DIAGNOSIS — Z95.2 S/P TAVR (TRANSCATHETER AORTIC VALVE REPLACEMENT): ICD-10-CM

## 2024-06-18 DIAGNOSIS — I48.20 CHRONIC ATRIAL FIBRILLATION (MULTI): Primary | ICD-10-CM

## 2024-06-18 LAB
INR IN PPP BY COAGULATION ASSAY EXTERNAL: 2.3
PROTHROMBIN TIME (PT) IN PPP BY COAGULATION ASSAY EXTERNAL: NORMAL SECONDS

## 2024-06-18 NOTE — PROGRESS NOTES
Patient identification verified with 2 identifiers.    Location: [facility] Patient Self-Testing Program [phone]    Referring Physician: PATT Clarke MD  Enrollment/ Re-enrollment date: 2025   INR Goal: 2.0-3.0  INR monitoring is per [facility] protocol.  Anticoagulation Medication: warfarin  Indication: Atrial Fibrillation/Atrial Flutter and Aortic Valve Replacement    Subjective  Unable to determine; voicemail left to call PST line with changes or concerns.     Bleeding signs/symptoms: No    Bruising: No   Major bleeding event: No  Thrombosis signs/symptoms: No  Thromboembolic event: No  Missed doses: No  Extra doses: No  Medication changes: No  Dietary changes: No  Change in health: No  Change in activity: No  Alcohol: No  Other concerns: No    Upcoming Procedures:  Does the Patient Have any upcoming procedures that require interruption in anticoagulation therapy? no  Does the patient require bridging? no      Anticoagulation Summary  As of 2024      INR goal:  2.0-3.0   TTR:  100.0% (2 d)   INR used for dosin.30 (2024)   Weekly warfarin total:  35 mg               Assessment/Plan   Therapeutic     1. New dose: no change    2. Next INR: 1 week      Education provided to patient during the visit:  Patient instructed to call in interim with questions, concerns and changes.

## 2024-06-18 NOTE — TELEPHONE ENCOUNTER
received signed Remote INR form from Dr. Clarke's office with correct contact numbers, scanned into chart and forwarded to Remote INR for update.

## 2024-06-25 ENCOUNTER — ANTICOAGULATION - WARFARIN VISIT (OUTPATIENT)
Dept: CARDIOLOGY | Facility: CLINIC | Age: 89
End: 2024-06-25
Payer: MEDICARE

## 2024-06-25 DIAGNOSIS — I48.20 CHRONIC ATRIAL FIBRILLATION (MULTI): ICD-10-CM

## 2024-06-25 DIAGNOSIS — Z95.2 S/P TAVR (TRANSCATHETER AORTIC VALVE REPLACEMENT): ICD-10-CM

## 2024-06-25 LAB
INR IN PPP BY COAGULATION ASSAY EXTERNAL: 2.1
PROTHROMBIN TIME (PT) IN PPP BY COAGULATION ASSAY EXTERNAL: NORMAL SECONDS

## 2024-06-25 NOTE — PROGRESS NOTES
Patient identification verified with 2 identifiers.    Location: [facility] Patient Self-Testing Program [phone]    Referring Physician: PATT Clarke MD  Enrollment/ Re-enrollment date: 2025   INR Goal: 2.0-3.0  INR monitoring is per [facility] protocol.  Anticoagulation Medication: warfarin  Indication: Atrial Fibrillation/Atrial Flutter and Aortic Valve Replacement    Subjective    Bleeding signs/symptoms: No    Bruising: No   Major bleeding event: No  Thrombosis signs/symptoms: No  Thromboembolic event: No  Missed doses: No  Extra doses: No  Medication changes: No  Dietary changes: No  Change in health: No  Change in activity: No  Alcohol: No  Other concerns: No    Upcoming Procedures:  Does the Patient Have any upcoming procedures that require interruption in anticoagulation therapy? no  Does the patient require bridging? no      Anticoagulation Summary  As of 2024      INR goal:  2.0-3.0   TTR:  100.0% (1.3 wk)   INR used for dosin.10 (2024)   Weekly warfarin total:  35 mg               Assessment/Plan   Therapeutic     1. New dose: no change    2. Next INR: 1 week      Education provided to patient during the visit:  Patient instructed to call in interim with questions, concerns and changes.   Patient educated on interactions between medications and warfarin.   Patient educated on dietary consistency in vitamin k consumption.   Patient educated on affects of alcohol consumption while taking warfarin.   Patient educated on signs of bleeding/clotting.   Patient educated on compliance with dosing, follow up appointments, and prescribed plan of care.

## 2024-06-29 LAB — BODY SURFACE AREA: 2.11 M2

## 2024-07-02 ENCOUNTER — APPOINTMENT (OUTPATIENT)
Dept: RADIOLOGY | Facility: HOSPITAL | Age: 89
End: 2024-07-02
Payer: MEDICARE

## 2024-07-02 ENCOUNTER — HOSPITAL ENCOUNTER (OUTPATIENT)
Facility: HOSPITAL | Age: 89
Setting detail: OBSERVATION
Discharge: HOME | End: 2024-07-04
Attending: INTERNAL MEDICINE | Admitting: INTERNAL MEDICINE
Payer: MEDICARE

## 2024-07-02 ENCOUNTER — ANTICOAGULATION - WARFARIN VISIT (OUTPATIENT)
Dept: CARDIOLOGY | Facility: CLINIC | Age: 89
End: 2024-07-02
Payer: MEDICARE

## 2024-07-02 DIAGNOSIS — Z95.2 S/P TAVR (TRANSCATHETER AORTIC VALVE REPLACEMENT): ICD-10-CM

## 2024-07-02 DIAGNOSIS — Z95.0 PACEMAKER: ICD-10-CM

## 2024-07-02 DIAGNOSIS — K92.2 LOWER GI BLEED: ICD-10-CM

## 2024-07-02 DIAGNOSIS — I48.20 CHRONIC ATRIAL FIBRILLATION (MULTI): Primary | ICD-10-CM

## 2024-07-02 DIAGNOSIS — R04.2 HEMOPTYSIS: Primary | ICD-10-CM

## 2024-07-02 PROBLEM — R19.5 HEME POSITIVE STOOL: Status: ACTIVE | Noted: 2024-07-02

## 2024-07-02 LAB
ALBUMIN SERPL-MCNC: 3.6 G/DL (ref 3.5–5)
ALP BLD-CCNC: 100 U/L (ref 35–125)
ALT SERPL-CCNC: 10 U/L (ref 5–40)
ANION GAP SERPL CALC-SCNC: 10 MMOL/L
APPEARANCE UR: CLEAR
APTT PPP: 35.5 SECONDS (ref 22–32.5)
AST SERPL-CCNC: 25 U/L (ref 5–40)
BASOPHILS # BLD AUTO: 0.1 X10*3/UL (ref 0–0.1)
BASOPHILS NFR BLD AUTO: 1.5 %
BILIRUB SERPL-MCNC: 0.8 MG/DL (ref 0.1–1.2)
BILIRUB UR STRIP.AUTO-MCNC: NEGATIVE MG/DL
BUN SERPL-MCNC: 24 MG/DL (ref 8–25)
CALCIUM SERPL-MCNC: 8.6 MG/DL (ref 8.5–10.4)
CHLORIDE SERPL-SCNC: 103 MMOL/L (ref 97–107)
CO2 SERPL-SCNC: 21 MMOL/L (ref 24–31)
COLOR UR: COLORLESS
CREAT SERPL-MCNC: 1 MG/DL (ref 0.4–1.6)
EGFRCR SERPLBLD CKD-EPI 2021: 73 ML/MIN/1.73M*2
EOSINOPHIL # BLD AUTO: 0.22 X10*3/UL (ref 0–0.4)
EOSINOPHIL NFR BLD AUTO: 3.3 %
ERYTHROCYTE [DISTWIDTH] IN BLOOD BY AUTOMATED COUNT: 15.4 % (ref 11.5–14.5)
GLUCOSE SERPL-MCNC: 100 MG/DL (ref 65–99)
GLUCOSE UR STRIP.AUTO-MCNC: NORMAL MG/DL
HCT VFR BLD AUTO: 39.1 % (ref 41–52)
HEMOCCULT SP1 STL QL: POSITIVE
HGB BLD-MCNC: 12.8 G/DL (ref 13.5–17.5)
HOLD SPECIMEN: NORMAL
IMM GRANULOCYTES # BLD AUTO: 0.01 X10*3/UL (ref 0–0.5)
IMM GRANULOCYTES NFR BLD AUTO: 0.2 % (ref 0–0.9)
INR IN PPP BY COAGULATION ASSAY EXTERNAL: 1.9
INR PPP: 1.8 (ref 0.9–1.2)
KETONES UR STRIP.AUTO-MCNC: NEGATIVE MG/DL
LEUKOCYTE ESTERASE UR QL STRIP.AUTO: NEGATIVE
LYMPHOCYTES # BLD AUTO: 0.67 X10*3/UL (ref 0.8–3)
LYMPHOCYTES NFR BLD AUTO: 10.1 %
MCH RBC QN AUTO: 30 PG (ref 26–34)
MCHC RBC AUTO-ENTMCNC: 32.7 G/DL (ref 32–36)
MCV RBC AUTO: 92 FL (ref 80–100)
MONOCYTES # BLD AUTO: 0.55 X10*3/UL (ref 0.05–0.8)
MONOCYTES NFR BLD AUTO: 8.3 %
NEUTROPHILS # BLD AUTO: 5.09 X10*3/UL (ref 1.6–5.5)
NEUTROPHILS NFR BLD AUTO: 76.6 %
NITRITE UR QL STRIP.AUTO: NEGATIVE
NRBC BLD-RTO: 0 /100 WBCS (ref 0–0)
PH UR STRIP.AUTO: 6.5 [PH]
PLATELET # BLD AUTO: 218 X10*3/UL (ref 150–450)
POTASSIUM SERPL-SCNC: 4.4 MMOL/L (ref 3.4–5.1)
PROT SERPL-MCNC: 6.8 G/DL (ref 5.9–7.9)
PROT UR STRIP.AUTO-MCNC: ABNORMAL MG/DL
PROTHROMBIN TIME (PT) IN PPP BY COAGULATION ASSAY EXTERNAL: NORMAL SECONDS
PROTHROMBIN TIME: 18.5 SECONDS (ref 9.3–12.7)
RBC # BLD AUTO: 4.27 X10*6/UL (ref 4.5–5.9)
RBC # UR STRIP.AUTO: NEGATIVE /UL
RBC #/AREA URNS AUTO: NORMAL /HPF
SODIUM SERPL-SCNC: 134 MMOL/L (ref 133–145)
SP GR UR STRIP.AUTO: 1.01
TROPONIN T SERPL-MCNC: 18 NG/L
TROPONIN T SERPL-MCNC: 22 NG/L
UROBILINOGEN UR STRIP.AUTO-MCNC: NORMAL MG/DL
WBC # BLD AUTO: 6.6 X10*3/UL (ref 4.4–11.3)
WBC #/AREA URNS AUTO: NORMAL /HPF

## 2024-07-02 PROCEDURE — 2550000001 HC RX 255 CONTRASTS: Performed by: CLINICAL NURSE SPECIALIST

## 2024-07-02 PROCEDURE — 99285 EMERGENCY DEPT VISIT HI MDM: CPT | Mod: 25

## 2024-07-02 PROCEDURE — G0378 HOSPITAL OBSERVATION PER HR: HCPCS

## 2024-07-02 PROCEDURE — 70450 CT HEAD/BRAIN W/O DYE: CPT | Performed by: RADIOLOGY

## 2024-07-02 PROCEDURE — 72125 CT NECK SPINE W/O DYE: CPT

## 2024-07-02 PROCEDURE — 99222 1ST HOSP IP/OBS MODERATE 55: CPT | Performed by: INTERNAL MEDICINE

## 2024-07-02 PROCEDURE — 36415 COLL VENOUS BLD VENIPUNCTURE: CPT | Performed by: CLINICAL NURSE SPECIALIST

## 2024-07-02 PROCEDURE — 85025 COMPLETE CBC W/AUTO DIFF WBC: CPT | Performed by: CLINICAL NURSE SPECIALIST

## 2024-07-02 PROCEDURE — 84484 ASSAY OF TROPONIN QUANT: CPT | Performed by: CLINICAL NURSE SPECIALIST

## 2024-07-02 PROCEDURE — 71275 CT ANGIOGRAPHY CHEST: CPT

## 2024-07-02 PROCEDURE — 85730 THROMBOPLASTIN TIME PARTIAL: CPT | Performed by: CLINICAL NURSE SPECIALIST

## 2024-07-02 PROCEDURE — 72125 CT NECK SPINE W/O DYE: CPT | Performed by: RADIOLOGY

## 2024-07-02 PROCEDURE — 71275 CT ANGIOGRAPHY CHEST: CPT | Performed by: RADIOLOGY

## 2024-07-02 PROCEDURE — 2500000002 HC RX 250 W HCPCS SELF ADMINISTERED DRUGS (ALT 637 FOR MEDICARE OP, ALT 636 FOR OP/ED): Performed by: INTERNAL MEDICINE

## 2024-07-02 PROCEDURE — 70450 CT HEAD/BRAIN W/O DYE: CPT

## 2024-07-02 PROCEDURE — 2500000001 HC RX 250 WO HCPCS SELF ADMINISTERED DRUGS (ALT 637 FOR MEDICARE OP): Performed by: INTERNAL MEDICINE

## 2024-07-02 PROCEDURE — 81001 URINALYSIS AUTO W/SCOPE: CPT | Performed by: CLINICAL NURSE SPECIALIST

## 2024-07-02 PROCEDURE — 80053 COMPREHEN METABOLIC PANEL: CPT | Performed by: CLINICAL NURSE SPECIALIST

## 2024-07-02 PROCEDURE — 96374 THER/PROPH/DIAG INJ IV PUSH: CPT | Mod: 59

## 2024-07-02 PROCEDURE — 2500000004 HC RX 250 GENERAL PHARMACY W/ HCPCS (ALT 636 FOR OP/ED): Performed by: CLINICAL NURSE SPECIALIST

## 2024-07-02 PROCEDURE — 82270 OCCULT BLOOD FECES: CPT | Performed by: CLINICAL NURSE SPECIALIST

## 2024-07-02 PROCEDURE — 85610 PROTHROMBIN TIME: CPT | Performed by: CLINICAL NURSE SPECIALIST

## 2024-07-02 RX ORDER — PANTOPRAZOLE SODIUM 40 MG/1
40 TABLET, DELAYED RELEASE ORAL
Status: CANCELLED | OUTPATIENT
Start: 2024-07-03

## 2024-07-02 RX ORDER — LOSARTAN POTASSIUM 50 MG/1
50 TABLET ORAL DAILY
Status: DISCONTINUED | OUTPATIENT
Start: 2024-07-02 | End: 2024-07-04 | Stop reason: HOSPADM

## 2024-07-02 RX ORDER — GUAIFENESIN/DEXTROMETHORPHAN 100-10MG/5
5 SYRUP ORAL EVERY 4 HOURS PRN
Status: CANCELLED | OUTPATIENT
Start: 2024-07-02

## 2024-07-02 RX ORDER — DOCUSATE SODIUM 100 MG/1
100 CAPSULE, LIQUID FILLED ORAL 2 TIMES DAILY
Status: CANCELLED | OUTPATIENT
Start: 2024-07-02

## 2024-07-02 RX ORDER — TALC
3 POWDER (GRAM) TOPICAL NIGHTLY PRN
Status: CANCELLED | OUTPATIENT
Start: 2024-07-02

## 2024-07-02 RX ORDER — ACETAMINOPHEN 650 MG/1
650 SUPPOSITORY RECTAL EVERY 4 HOURS PRN
Status: CANCELLED | OUTPATIENT
Start: 2024-07-02

## 2024-07-02 RX ORDER — ACETAMINOPHEN 325 MG/1
650 TABLET ORAL EVERY 4 HOURS PRN
Status: CANCELLED | OUTPATIENT
Start: 2024-07-02

## 2024-07-02 RX ORDER — ALBUTEROL SULFATE 0.83 MG/ML
3 SOLUTION RESPIRATORY (INHALATION) EVERY 4 HOURS PRN
Status: DISCONTINUED | OUTPATIENT
Start: 2024-07-02 | End: 2024-07-04 | Stop reason: HOSPADM

## 2024-07-02 RX ORDER — CARVEDILOL 12.5 MG/1
12.5 TABLET ORAL
Status: DISCONTINUED | OUTPATIENT
Start: 2024-07-02 | End: 2024-07-04 | Stop reason: HOSPADM

## 2024-07-02 RX ORDER — PANTOPRAZOLE SODIUM 40 MG/10ML
40 INJECTION, POWDER, LYOPHILIZED, FOR SOLUTION INTRAVENOUS
Status: CANCELLED | OUTPATIENT
Start: 2024-07-03

## 2024-07-02 RX ORDER — POTASSIUM CHLORIDE 1.5 G/1.58G
20 POWDER, FOR SOLUTION ORAL DAILY
Status: DISCONTINUED | OUTPATIENT
Start: 2024-07-02 | End: 2024-07-04 | Stop reason: HOSPADM

## 2024-07-02 RX ORDER — ASPIRIN 81 MG/1
81 TABLET ORAL DAILY
Status: DISCONTINUED | OUTPATIENT
Start: 2024-07-02 | End: 2024-07-04 | Stop reason: HOSPADM

## 2024-07-02 RX ORDER — FAMOTIDINE 10 MG/ML
40 INJECTION INTRAVENOUS ONCE
Status: COMPLETED | OUTPATIENT
Start: 2024-07-02 | End: 2024-07-02

## 2024-07-02 RX ORDER — ROSUVASTATIN CALCIUM 10 MG/1
5 TABLET, COATED ORAL NIGHTLY
Status: DISCONTINUED | OUTPATIENT
Start: 2024-07-02 | End: 2024-07-04 | Stop reason: HOSPADM

## 2024-07-02 RX ORDER — FUROSEMIDE 20 MG/1
20 TABLET ORAL DAILY
Status: DISCONTINUED | OUTPATIENT
Start: 2024-07-02 | End: 2024-07-04 | Stop reason: HOSPADM

## 2024-07-02 RX ORDER — ONDANSETRON 4 MG/1
4 TABLET, FILM COATED ORAL EVERY 8 HOURS PRN
Status: CANCELLED | OUTPATIENT
Start: 2024-07-02

## 2024-07-02 RX ORDER — WARFARIN SODIUM 5 MG/1
5 TABLET ORAL DAILY
Status: DISCONTINUED | OUTPATIENT
Start: 2024-07-02 | End: 2024-07-04 | Stop reason: HOSPADM

## 2024-07-02 RX ORDER — POLYETHYLENE GLYCOL 3350 17 G/17G
17 POWDER, FOR SOLUTION ORAL DAILY
Status: CANCELLED | OUTPATIENT
Start: 2024-07-02

## 2024-07-02 RX ORDER — ACETAMINOPHEN 160 MG/5ML
650 SOLUTION ORAL EVERY 4 HOURS PRN
Status: CANCELLED | OUTPATIENT
Start: 2024-07-02

## 2024-07-02 RX ORDER — GUAIFENESIN 600 MG/1
600 TABLET, EXTENDED RELEASE ORAL EVERY 12 HOURS PRN
Status: CANCELLED | OUTPATIENT
Start: 2024-07-02

## 2024-07-02 RX ORDER — LEVOTHYROXINE SODIUM 75 UG/1
75 TABLET ORAL DAILY
Status: DISCONTINUED | OUTPATIENT
Start: 2024-07-03 | End: 2024-07-04 | Stop reason: HOSPADM

## 2024-07-02 RX ORDER — ONDANSETRON HYDROCHLORIDE 2 MG/ML
4 INJECTION, SOLUTION INTRAVENOUS EVERY 8 HOURS PRN
Status: CANCELLED | OUTPATIENT
Start: 2024-07-02

## 2024-07-02 SDOH — SOCIAL STABILITY: SOCIAL INSECURITY: DO YOU FEEL ANYONE HAS EXPLOITED OR TAKEN ADVANTAGE OF YOU FINANCIALLY OR OF YOUR PERSONAL PROPERTY?: NO

## 2024-07-02 SDOH — ECONOMIC STABILITY: FOOD INSECURITY: WITHIN THE PAST 12 MONTHS, YOU WORRIED THAT YOUR FOOD WOULD RUN OUT BEFORE YOU GOT MONEY TO BUY MORE.: NEVER TRUE

## 2024-07-02 SDOH — SOCIAL STABILITY: SOCIAL NETWORK: HOW OFTEN DO YOU ATTEND CHURCH OR RELIGIOUS SERVICES?: 1 TO 4 TIMES PER YEAR

## 2024-07-02 SDOH — SOCIAL STABILITY: SOCIAL INSECURITY: HAS ANYONE EVER THREATENED TO HURT YOUR FAMILY OR YOUR PETS?: NO

## 2024-07-02 SDOH — SOCIAL STABILITY: SOCIAL INSECURITY
WITHIN THE LAST YEAR, HAVE TO BEEN RAPED OR FORCED TO HAVE ANY KIND OF SEXUAL ACTIVITY BY YOUR PARTNER OR EX-PARTNER?: NO

## 2024-07-02 SDOH — SOCIAL STABILITY: SOCIAL INSECURITY: DOES ANYONE TRY TO KEEP YOU FROM HAVING/CONTACTING OTHER FRIENDS OR DOING THINGS OUTSIDE YOUR HOME?: NO

## 2024-07-02 SDOH — SOCIAL STABILITY: SOCIAL NETWORK
DO YOU BELONG TO ANY CLUBS OR ORGANIZATIONS SUCH AS CHURCH GROUPS UNIONS, FRATERNAL OR ATHLETIC GROUPS, OR SCHOOL GROUPS?: NO

## 2024-07-02 SDOH — ECONOMIC STABILITY: FOOD INSECURITY: WITHIN THE PAST 12 MONTHS, THE FOOD YOU BOUGHT JUST DIDN'T LAST AND YOU DIDN'T HAVE MONEY TO GET MORE.: NEVER TRUE

## 2024-07-02 SDOH — ECONOMIC STABILITY: INCOME INSECURITY: IN THE PAST 12 MONTHS, HAS THE ELECTRIC, GAS, OIL, OR WATER COMPANY THREATENED TO SHUT OFF SERVICE IN YOUR HOME?: NO

## 2024-07-02 SDOH — SOCIAL STABILITY: SOCIAL NETWORK: ARE YOU MARRIED, WIDOWED, DIVORCED, SEPARATED, NEVER MARRIED, OR LIVING WITH A PARTNER?: DIVORCED

## 2024-07-02 SDOH — SOCIAL STABILITY: SOCIAL INSECURITY: ARE THERE ANY APPARENT SIGNS OF INJURIES/BEHAVIORS THAT COULD BE RELATED TO ABUSE/NEGLECT?: NO

## 2024-07-02 SDOH — SOCIAL STABILITY: SOCIAL INSECURITY: DO YOU FEEL UNSAFE GOING BACK TO THE PLACE WHERE YOU ARE LIVING?: NO

## 2024-07-02 SDOH — SOCIAL STABILITY: SOCIAL NETWORK: HOW OFTEN DO YOU ATTENT MEETINGS OF THE CLUB OR ORGANIZATION YOU BELONG TO?: 1 TO 4 TIMES PER YEAR

## 2024-07-02 SDOH — SOCIAL STABILITY: SOCIAL INSECURITY: WITHIN THE LAST YEAR, HAVE YOU BEEN AFRAID OF YOUR PARTNER OR EX-PARTNER?: NO

## 2024-07-02 SDOH — SOCIAL STABILITY: SOCIAL INSECURITY: WERE YOU ABLE TO COMPLETE ALL THE BEHAVIORAL HEALTH SCREENINGS?: YES

## 2024-07-02 SDOH — HEALTH STABILITY: PHYSICAL HEALTH: ON AVERAGE, HOW MANY MINUTES DO YOU ENGAGE IN EXERCISE AT THIS LEVEL?: 30 MIN

## 2024-07-02 SDOH — HEALTH STABILITY: MENTAL HEALTH
HOW OFTEN DO YOU NEED TO HAVE SOMEONE HELP YOU WHEN YOU READ INSTRUCTIONS, PAMPHLETS, OR OTHER WRITTEN MATERIAL FROM YOUR DOCTOR OR PHARMACY?: RARELY

## 2024-07-02 SDOH — SOCIAL STABILITY: SOCIAL INSECURITY: ARE YOU OR HAVE YOU BEEN THREATENED OR ABUSED PHYSICALLY, EMOTIONALLY, OR SEXUALLY BY ANYONE?: NO

## 2024-07-02 SDOH — SOCIAL STABILITY: SOCIAL INSECURITY: WITHIN THE LAST YEAR, HAVE YOU BEEN HUMILIATED OR EMOTIONALLY ABUSED IN OTHER WAYS BY YOUR PARTNER OR EX-PARTNER?: NO

## 2024-07-02 SDOH — SOCIAL STABILITY: SOCIAL INSECURITY
WITHIN THE LAST YEAR, HAVE YOU BEEN KICKED, HIT, SLAPPED, OR OTHERWISE PHYSICALLY HURT BY YOUR PARTNER OR EX-PARTNER?: NO

## 2024-07-02 SDOH — SOCIAL STABILITY: SOCIAL NETWORK: HOW OFTEN DO YOU GET TOGETHER WITH FRIENDS OR RELATIVES?: NEVER

## 2024-07-02 SDOH — HEALTH STABILITY: MENTAL HEALTH
STRESS IS WHEN SOMEONE FEELS TENSE, NERVOUS, ANXIOUS, OR CAN'T SLEEP AT NIGHT BECAUSE THEIR MIND IS TROUBLED. HOW STRESSED ARE YOU?: NOT AT ALL

## 2024-07-02 SDOH — HEALTH STABILITY: PHYSICAL HEALTH: ON AVERAGE, HOW MANY DAYS PER WEEK DO YOU ENGAGE IN MODERATE TO STRENUOUS EXERCISE (LIKE A BRISK WALK)?: 4 DAYS

## 2024-07-02 SDOH — SOCIAL STABILITY: SOCIAL INSECURITY: HAVE YOU HAD THOUGHTS OF HARMING ANYONE ELSE?: NO

## 2024-07-02 SDOH — SOCIAL STABILITY: SOCIAL NETWORK: IN A TYPICAL WEEK, HOW MANY TIMES DO YOU TALK ON THE PHONE WITH FAMILY, FRIENDS, OR NEIGHBORS?: NEVER

## 2024-07-02 SDOH — SOCIAL STABILITY: SOCIAL INSECURITY: HAVE YOU HAD ANY THOUGHTS OF HARMING ANYONE ELSE?: NO

## 2024-07-02 SDOH — SOCIAL STABILITY: SOCIAL INSECURITY: ABUSE: ADULT

## 2024-07-02 ASSESSMENT — COGNITIVE AND FUNCTIONAL STATUS - GENERAL
DAILY ACTIVITIY SCORE: 24
PATIENT BASELINE BEDBOUND: NO
MOBILITY SCORE: 23
CLIMB 3 TO 5 STEPS WITH RAILING: A LITTLE

## 2024-07-02 ASSESSMENT — LIFESTYLE VARIABLES
AUDIT-C TOTAL SCORE: 0
HOW MANY STANDARD DRINKS CONTAINING ALCOHOL DO YOU HAVE ON A TYPICAL DAY: PATIENT DOES NOT DRINK
HOW OFTEN DO YOU HAVE A DRINK CONTAINING ALCOHOL: NEVER
SKIP TO QUESTIONS 9-10: 1
SUBSTANCE_ABUSE_PAST_12_MONTHS: NO
HOW OFTEN DO YOU HAVE 6 OR MORE DRINKS ON ONE OCCASION: NEVER
PRESCIPTION_ABUSE_PAST_12_MONTHS: NO
AUDIT-C TOTAL SCORE: 0

## 2024-07-02 ASSESSMENT — PATIENT HEALTH QUESTIONNAIRE - PHQ9
1. LITTLE INTEREST OR PLEASURE IN DOING THINGS: NOT AT ALL
SUM OF ALL RESPONSES TO PHQ9 QUESTIONS 1 & 2: 0
2. FEELING DOWN, DEPRESSED OR HOPELESS: NOT AT ALL

## 2024-07-02 ASSESSMENT — ACTIVITIES OF DAILY LIVING (ADL)
DRESSING YOURSELF: INDEPENDENT
PATIENT'S MEMORY ADEQUATE TO SAFELY COMPLETE DAILY ACTIVITIES?: YES
FEEDING YOURSELF: INDEPENDENT
HEARING - LEFT EAR: HEARING AID
HEARING - RIGHT EAR: HEARING AID
JUDGMENT_ADEQUATE_SAFELY_COMPLETE_DAILY_ACTIVITIES: YES
ASSISTIVE_DEVICE: EYEGLASSES;HEARING AID - RIGHT
GROOMING: INDEPENDENT
ADEQUATE_TO_COMPLETE_ADL: YES
LACK_OF_TRANSPORTATION: NO
BATHING: INDEPENDENT
TOILETING: INDEPENDENT
WALKS IN HOME: INDEPENDENT

## 2024-07-02 ASSESSMENT — PAIN SCALES - GENERAL: PAINLEVEL_OUTOF10: 0 - NO PAIN

## 2024-07-02 ASSESSMENT — PAIN - FUNCTIONAL ASSESSMENT: PAIN_FUNCTIONAL_ASSESSMENT: 0-10

## 2024-07-02 NOTE — PROGRESS NOTES
Pharmacy Medication History Review    Gorge Larsen is a 88 y.o. male. Pharmacy reviewed the patient's [id number] medications and allergies for accuracy.    Medications ADDED:  none  Medications CHANGED:  none  Medications REMOVED:   none     The list below reflects the updated PTA list. Comments regarding how patient may be taking medications differently can be found in the Admit Orders Activity  Prior to Admission Medications   Prescriptions Last Dose Informant   albuterol (ProAir HFA) 90 mcg/actuation inhaler  self   Sig: Inhale 2 puffs every 4 hours if needed for wheezing or shortness of breath.   aspirin 81 mg EC tablet 7/1/2024 self   Sig: Take 1 tablet (81 mg) by mouth once daily.   carvedilol (Coreg) 12.5 mg tablet 7/1/2024 self   Sig: Take 1 tablet (12.5 mg) by mouth 2 times a day with meals.   furosemide (Lasix) 20 mg tablet 7/1/2024 self   Sig: Take 1 tablet (20 mg) by mouth once daily.   furosemide (Lasix) 40 mg tablet  self   Sig: Take 1 tablet (40 mg) by mouth once daily.   levothyroxine (Synthroid, Levoxyl) 50 mcg tablet 7/1/2024 self   Sig: Take 1.5 tablets (75 mcg) by mouth once daily.   losartan (Cozaar) 50 mg tablet  self   Sig: TAKE 1 TABLET(50 MG) BY MOUTH DAILY   potassium chloride (Klor-Con) 20 mEq packet 7/2/2024 self   Sig: Take 20 mEq by mouth once daily.   rosuvastatin (Crestor) 5 mg tablet 7/1/2024 self   Sig: Take 1 tablet (5 mg) by mouth once daily at bedtime.   warfarin (Coumadin) 5 mg tablet 7/1/2024 self   Sig: Take 1 tablet (5 mg) by mouth 5 times a week. Monday, Wednesday, Friday,Saturday and Sunday   warfarin (Coumadin) 7.5 mg tablet  self   Sig: Take 1 tablet (7.5 mg) by mouth 2 times a week. Juan Velasquez      Facility-Administered Medications: None        The list below reflects the updated allergy list. Please review each documented allergy for additional clarification and justification.  Allergies  Reviewed by Skylar Quiroz on 7/2/2024        Severity Reactions  "Comments    Codeine Not Specified Nausea/vomiting     Latex, Natural Rubber Not Specified Unknown     Lisinopril Not Specified Unknown             Pharmacy has been updated to [facility] [employer].    Sources used to complete the med history include dispense history, historical AVS, patient interview. Patient is a fair historian.    Below are additional concerns with the patient's PTA list.  Could not confirm Losartan with patient. Notes from different providers in April indicate different instructions for Losartan. There are no progress notes that refer to stopping therapy. It is not on patient list but he does report taking \"several blood pressure medications\"    Skylar Quiroz, CPhT-ADV  Please reach out via [employer] Secure Chat for questions    "

## 2024-07-02 NOTE — PROGRESS NOTES
Patient identification verified with 2 identifiers.    Location: [facility] Patient Self-Testing Program [phone]    Referring Physician: Dr. Jeremy Clarke  Enrollment/ Re-enrollment date: 2025   INR Goal: 2.0-3.0  INR monitoring is per [facility] protocol.  Anticoagulation Medication: warfarin  Indication: Atrial Fibrillation/Atrial Flutter and Aortic Valve Replacement    Subjective   Bleeding signs/symptoms: No    Bruising: No   Major bleeding event: No  Thrombosis signs/symptoms: No  Thromboembolic event: No  Missed doses: No  Extra doses: No  Medication changes: No  Dietary changes: No  Change in health: No  Change in activity: No  Alcohol: No  Other concerns: No    Upcoming Procedures:  Does the Patient Have any upcoming procedures that require interruption in anticoagulation therapy? no  Does the patient require bridging? no      Anticoagulation Summary  As of 2024      INR goal:  2.0-3.0   TTR:  78.1% (2.3 wk)   INR used for dosin.90 (2024)   Weekly warfarin total:  37.5 mg               Assessment/Plan   Subtherapeutic     1. New dose:  Increase TWD 5%/      2. Next INR: 1 week      Education provided to patient during the visit:  Patient instructed to call in interim with questions, concerns and changes.   Patient educated on interactions between medications and warfarin.   Patient educated on dietary consistency in vitamin k consumption.   Patient educated on affects of alcohol consumption while taking warfarin.   Patient educated on signs of bleeding/clotting.   Patient educated on compliance with dosing, follow up appointments, and prescribed plan of care.

## 2024-07-02 NOTE — ED PROVIDER NOTES
Department of Emergency Medicine   ED  Provider Note  Admit Date/RoomTime: 7/2/2024 10:25 AM  ED Room: 414/414-A        History of Present Illness:  Chief Complaint   Patient presents with    Coughing Up Blood     Pt on coumadin is coughing up blood and frequent nosebleeds         Gorge Larsen is a 88 y.o. male takes care of himself at home presents to the emergency department complaints of coughing up blood.  Patient reports he had a nosebleed today which was more than he normally has.  He has history of nosebleeds on the right now usually is controlled.  Denies any fever or chills.  No abdominal pain no nausea no vomiting.  He is on Coumadin.  Today he started pain and noticed there was blood in his sputum he is unsure if is secondary to the bleed from his nose.  He reports also black stools.  He denies fall or injury.  Today however reports 2 weeks ago he tripped coming out of the garage with a trash can and fell and hit his head.  He was not evaluated afterwards.  Presents now for evaluation.  No further bleeding from the nose    Review of Systems:   Pertinent positives and negatives are stated within HPI, all other systems reviewed and are negative.        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  Past  Surgical History:  has a past surgical history that includes Other surgical history (03/22/2021); Other surgical history (03/22/2021); Other surgical history (03/22/2021); Other surgical history (03/22/2021); Other surgical history (01/05/2023); Cardiac electrophysiology procedure (Left, 02/19/2024); Cardiac electrophysiology procedure (Left, 02/19/2024); and Cardiac electrophysiology procedure (Left, 02/19/2024).  Social History:  reports that he has quit smoking. His smoking use included cigarettes. He has never used smokeless tobacco. He reports that he does not drink alcohol and does not use drugs.  Family History: family history includes Car accident in his father; Heart attack in his mother; Heart disease in his mother.. Unless otherwise noted, family history is non contributory  The patient’s home medications have been reviewed.  Allergies: Codeine; Latex, natural rubber; and Lisinopril        ---------------------------------------------------PHYSICAL EXAM--------------------------------------    GENERAL APPEARANCE: Awake and alert.  Oriented.  Hard of hearing  VITAL SIGNS: As per the nurses' triage record.   HEENT: Normocephalic, atraumatic.  No raccoon eyes or jacobo signs noted.  No epistaxis noted.  No bite to the tongue or lip.  No hemotympanum noted.  No contusions abrasions lacerations noted to the face or scalp.  extraocular muscles are intact. Pupils equal round and reactive to light. Conjunctiva are pink. Negative scleral icterus. Mucous membranes are moist. Tongue in the midline. Pharynx was without erythema or exudates, uvula midline  NECK: Soft Nontender and supple, full gross ROM, no meningeal signs.  No pain palpation of the cervical spine no step-offs crepitus or bruising full range of motion  CHEST: Nontender to palpation. Clear to auscultation bilaterally. No rales, rhonchi, or wheezing.   HEART: S1, S2. Regular rate and rhythm. No murmurs, gallops or rubs.  Strong and equal pulses in the  "extremities.   ABDOMEN: Soft, nontender, nondistended, positive bowel sounds, no palpable masses.  Rectal exam was performed chaperone present RN caring for patient.  No bright red blood per rectum no fissures lesions or tears noted.  Stool was brown in color.  MUSCULCSKELETAL: The calves are nontender to palpation. Full gross active range of motion.  Trace edema bilateral lower extremities  NEUROLOGICAL: Awake, alert and oriented x 3. Power intact in the upper and lower extremities. Sensation is intact to light touch in the upper and lower extremities.   IMMUNOLOGICAL: No lymphatic streaking noted   DERM: No petechiae, rashes, or ecchymoses.          ------------------------- NURSING NOTES AND VITALS REVIEWED ---------------------------  The nursing notes within the ED encounter and vital signs as below have been reviewed by myself  BP (!) 127/92 (BP Location: Left arm, Patient Position: Sitting)   Pulse 70   Temp 36.3 °C (97.3 °F) (Oral)   Resp 17   Ht 1.753 m (5' 9\")   Wt 93 kg (205 lb)   SpO2 100%   BMI 30.27 kg/m²     Oxygen Saturation Interpretation: 100%    The cardiac monitor revealed paced  with a heart rate in the 70s as interpreted by me. The cardiac monitor was ordered secondary to the patient's heart rate and to monitor the patient for dysrhythmia.       The patient’s available past medical records and past encounters were reviewed.          -----------------------DIAGNOSTIC RESULTS------------------------  LABS:    Labs Reviewed   OCCULT BLOOD X1, STOOL - Abnormal       Result Value    Occult Blood, Stool X1 Positive (*)    CBC WITH AUTO DIFFERENTIAL - Abnormal    WBC 6.6      nRBC 0.0      RBC 4.27 (*)     Hemoglobin 12.8 (*)     Hematocrit 39.1 (*)     MCV 92      MCH 30.0      MCHC 32.7      RDW 15.4 (*)     Platelets 218      Neutrophils % 76.6      Immature Granulocytes %, Automated 0.2      Lymphocytes % 10.1      Monocytes % 8.3      Eosinophils % 3.3      Basophils % 1.5      Neutrophils " Absolute 5.09      Immature Granulocytes Absolute, Automated 0.01      Lymphocytes Absolute 0.67 (*)     Monocytes Absolute 0.55      Eosinophils Absolute 0.22      Basophils Absolute 0.10     COMPREHENSIVE METABOLIC PANEL - Abnormal    Glucose 100 (*)     Sodium 134      Potassium 4.4      Chloride 103      Bicarbonate 21 (*)     Urea Nitrogen 24      Creatinine 1.00      eGFR 73      Calcium 8.6      Albumin 3.6      Alkaline Phosphatase 100      Total Protein 6.8      AST 25      Bilirubin, Total 0.8      ALT 10      Anion Gap 10     PROTIME-INR - Abnormal    Protime 18.5 (*)     INR 1.8 (*)     Narrative:     INR Therapeutic Range: 2.0-3.5   APTT - Abnormal    aPTT 35.5 (*)    SERIAL TROPONIN, INITIAL ([facility]) - Abnormal    Troponin T, High Sensitivity 18 (*)    URINALYSIS WITH REFLEX CULTURE AND MICROSCOPIC - Abnormal    Color, Urine Colorless (*)     Appearance, Urine Clear      Specific Gravity, Urine 1.010      pH, Urine 6.5      Protein, Urine 30 (1+) (*)     Glucose, Urine Normal      Blood, Urine NEGATIVE      Ketones, Urine NEGATIVE      Bilirubin, Urine NEGATIVE      Urobilinogen, Urine Normal      Nitrite, Urine NEGATIVE      Leukocyte Esterase, Urine NEGATIVE     SERIAL TROPONIN,  2 HOUR ([facility]) - Abnormal    Troponin T, High Sensitivity 22 (*)    URINALYSIS MICROSCOPIC WITH REFLEX CULTURE - Normal    WBC, Urine NONE      RBC, Urine NONE     TROPONIN T SERIES, HIGH SENSITIVITY (0, 2 HR, 6 HR)    Narrative:     The following orders were created for panel order Troponin T Series, High Sensitivity (0, 2HR, 6HR).  Procedure                               Abnormality         Status                     ---------                               -----------         ------                     Serial Troponin, Initial...[[id number]]  Abnormal            Final result               Serial Troponin, 2 Hour ...[[id number]]                                                   Please view results for these tests on the  individual orders.   URINALYSIS WITH REFLEX CULTURE AND MICROSCOPIC    Narrative:     The following orders were created for panel order Urinalysis with Reflex Culture and Microscopic.  Procedure                               Abnormality         Status                     ---------                               -----------         ------                     Urinalysis with Reflex C...[[id number]]  Abnormal            Final result               Extra Urine Gray Tube[[id number]]                            In process                   Please view results for these tests on the individual orders.   EXTRA URINE GRAY TUBE       As interpreted by me, the above displayed labs are abnormal. All other labs obtained during this visit were within normal range or not returned as of this dictation.            CT angio chest for pulmonary embolism   Final Result   1. No pulmonary embolus.   2. No acute intrathoracic process.The lungs are clear with no   significant airspace opacity and no suspicious pulmonary nodule.             Signed by: Solomon Tuttle 7/2/2024 3:18 PM   Dictation workstation:   [id number]      CT head wo IV contrast   Final Result   Stable appearing examination. No CT evidence for acute intracranial   pathology.        Signed by: Solomon Tuttle 7/2/2024 2:29 PM   Dictation workstation:   [id number]      CT cervical spine wo IV contrast   Final Result   1. No acute fracture or spondylolisthesis.   2. Degenerative disc disease and spondylosis as described in the body   of the report.             Signed by: Solomon Tuttle 7/2/2024 3:08 PM   Dictation workstation:   [id number]              CT angio chest for pulmonary embolism   Final Result   1. No pulmonary embolus.   2. No acute intrathoracic process.The lungs are clear with no   significant airspace opacity and no suspicious pulmonary nodule.             Signed by: Solomon Tuttle 7/2/2024 3:18 PM   Dictation workstation:   [id number]      CT head  wo IV contrast   Final Result   Stable appearing examination. No CT evidence for acute intracranial   pathology.        Signed by: Solomon Tuttle 7/2/2024 2:29 PM   Dictation workstation:   [id number]      CT cervical spine wo IV contrast   Final Result   1. No acute fracture or spondylolisthesis.   2. Degenerative disc disease and spondylosis as described in the body   of the report.             Signed by: Solomon Tuttle 7/2/2024 3:08 PM   Dictation workstation:   [id number]              ------------------------------ ED COURSE/MEDICAL DECISION MAKING----------------------  Medical Decision Making:   Exam: A medically appropriate exam performed, outlined above, given the known history and presentation.    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       CC/HPI Summary, Social Determinants of health, Records Reviewed, DDx, testing done/not done, ED Course, Reassessment, disposition considerations/shared decision making with patient, consults, disposition:   Presents with nosebleed and coughing up blood.  Reported fall 10 days ago.  Plan  Troponin  Stool for occult blood  APTT  PT/INR  CBC  CMP  Urine  CT  chest angio 1. No pulmonary embolus.  2. No acute intrathoracic process.The lungs are clear with no  significant airspace opacity and no suspicious pulmonary nodule.  CT cervical 1. No acute fracture or spondylolisthesis.  2. Degenerative disc disease and spondylosis as described in the body  of the report.  CT head Stable appearing examination. No CT evidence for acute intracranial  pathology.        Medical Decision Making/Differential Diagnosis:  Differentials include but not limited to hypercoagulability on Coumadin versus electrode abnormality versus dehydration versus anemia versus pneumonia versus PE versus pulmonary hemorrhage  Review  Troponin 18 repeat troponin 22 no complaints of chest pain  Stool for occult blood positive  APTT 35.5  Pro time 18.5  INR 1.8  Leukocytes 6.6   hemoglobin 12.8  LFTs within normal limits.  Renal function  Urine negative for blood negative for leukocytes ketones  On clinical exam patient is in no acute distress.  Abdomen soft bowel sounds are present x 4.  No guarding or rigidity noted.  Stool for occult blood was positive although no black tarry stools or bright red blood per rectum noted.  No complaints of abdominal pain.  No further hemoptysis in the emergency department no further nosebleeds.  Mild anemia.  Coag panel reviewed INR 1.8 on Coumadin.  Troponin 18 repeat troponin 22.  Patient is paced on the monitor.  Electrolytes within normals with mild electrolyte imbalance.  Patient not hypoglycemic.  LFTs within normal limits normal renal function.  Urine is not consistent with UTI no blood in the urine.  CT angio of the chest showed no pulmonary embolism.  No acute thoracic process.  Patient also had reported fall 2010 days ago.  CT of the head showed no evidence of acute intracranial pathology.  CT cervical showed no fracture or spondylolithiasis.  Degenerative changes noted.  Discussed case with patient's primary care physician is agreed to admit the patient under  their service for further evaluation and treatment.  Monitor hemoglobins carefully.  Monitor for hemoptysis.  Discussed case with attending physician patient seen independently  Patient and family amenable to admission  PROCEDURES  Unless otherwise noted below, none      CONSULTS:   None      ED Course as of 07/02/24 1932 Tue Jul 02, 2024   1600 Spoke with patient's primary care physician Dr. Antoine who is agreed to admit the patient for monitoring.  He is guaiac positive.  Reported hemoptysis monitor H&H's.  Telemetry observation [TB]      ED Course User Index  [TB] NIKOLAY Carrion-CNP         Diagnoses as of 07/02/24 1932   Hemoptysis   Lower GI bleed         This patient has remained hemodynamically stable during their ED course.      Critical Care: none       Counseling:  The emergency provider has spoken with the patient family and discussed today’s results, in addition to providing specific details for the plan of care and counseling regarding the diagnosis and prognosis.  Questions are answered at this time and they are agreeable with the plan.         --------------------------------- IMPRESSION AND DISPOSITION ---------------------------------    IMPRESSION  1. Hemoptysis    2. Lower GI bleed        DISPOSITION  Disposition: Admit observation  Patient condition is stable        NOTE: This report was transcribed using voice recognition software. Every effort was made to ensure accuracy; however, inadvertent computerized transcription errors may be present      NINA Carrion  07/02/24 1932

## 2024-07-03 ENCOUNTER — APPOINTMENT (OUTPATIENT)
Dept: CARDIOLOGY | Facility: CLINIC | Age: 89
End: 2024-07-03
Payer: MEDICARE

## 2024-07-03 LAB
HOLD SPECIMEN: NORMAL
INR PPP: 1.8 (ref 0.9–1.2)
PROTHROMBIN TIME: 18.3 SECONDS (ref 9.3–12.7)

## 2024-07-03 PROCEDURE — 85610 PROTHROMBIN TIME: CPT | Performed by: INTERNAL MEDICINE

## 2024-07-03 PROCEDURE — 2500000002 HC RX 250 W HCPCS SELF ADMINISTERED DRUGS (ALT 637 FOR MEDICARE OP, ALT 636 FOR OP/ED): Performed by: INTERNAL MEDICINE

## 2024-07-03 PROCEDURE — 36415 COLL VENOUS BLD VENIPUNCTURE: CPT | Performed by: INTERNAL MEDICINE

## 2024-07-03 PROCEDURE — 99232 SBSQ HOSP IP/OBS MODERATE 35: CPT | Performed by: INTERNAL MEDICINE

## 2024-07-03 PROCEDURE — 2500000001 HC RX 250 WO HCPCS SELF ADMINISTERED DRUGS (ALT 637 FOR MEDICARE OP): Performed by: INTERNAL MEDICINE

## 2024-07-03 PROCEDURE — G0378 HOSPITAL OBSERVATION PER HR: HCPCS

## 2024-07-03 PROCEDURE — 93296 REM INTERROG EVL PM/IDS: CPT

## 2024-07-03 PROCEDURE — 93295 DEV INTERROG REMOTE 1/2/MLT: CPT | Performed by: INTERNAL MEDICINE

## 2024-07-03 PROCEDURE — 85027 COMPLETE CBC AUTOMATED: CPT | Mod: WESLAB | Performed by: INTERNAL MEDICINE

## 2024-07-03 SDOH — HEALTH STABILITY: MENTAL HEALTH
HOW OFTEN DO YOU NEED TO HAVE SOMEONE HELP YOU WHEN YOU READ INSTRUCTIONS, PAMPHLETS, OR OTHER WRITTEN MATERIAL FROM YOUR DOCTOR OR PHARMACY?: NEVER

## 2024-07-03 SDOH — ECONOMIC STABILITY: INCOME INSECURITY: HOW HARD IS IT FOR YOU TO PAY FOR THE VERY BASICS LIKE FOOD, HOUSING, MEDICAL CARE, AND HEATING?: NOT HARD AT ALL

## 2024-07-03 SDOH — SOCIAL STABILITY: SOCIAL INSECURITY: WITHIN THE LAST YEAR, HAVE YOU BEEN AFRAID OF YOUR PARTNER OR EX-PARTNER?: NO

## 2024-07-03 SDOH — SOCIAL STABILITY: SOCIAL NETWORK: HOW OFTEN DO YOU ATTENT MEETINGS OF THE CLUB OR ORGANIZATION YOU BELONG TO?: NEVER

## 2024-07-03 SDOH — SOCIAL STABILITY: SOCIAL NETWORK: ARE YOU MARRIED, WIDOWED, DIVORCED, SEPARATED, NEVER MARRIED, OR LIVING WITH A PARTNER?: DIVORCED

## 2024-07-03 SDOH — ECONOMIC STABILITY: TRANSPORTATION INSECURITY
IN THE PAST 12 MONTHS, HAS THE LACK OF TRANSPORTATION KEPT YOU FROM MEDICAL APPOINTMENTS OR FROM GETTING MEDICATIONS?: NO

## 2024-07-03 SDOH — HEALTH STABILITY: MENTAL HEALTH: HOW OFTEN DO YOU HAVE A DRINK CONTAINING ALCOHOL?: NEVER

## 2024-07-03 SDOH — ECONOMIC STABILITY: INCOME INSECURITY: IN THE PAST 12 MONTHS, HAS THE ELECTRIC, GAS, OIL, OR WATER COMPANY THREATENED TO SHUT OFF SERVICE IN YOUR HOME?: NO

## 2024-07-03 SDOH — ECONOMIC STABILITY: HOUSING INSECURITY
IN THE LAST 12 MONTHS, WAS THERE A TIME WHEN YOU DID NOT HAVE A STEADY PLACE TO SLEEP OR SLEPT IN A SHELTER (INCLUDING NOW)?: NO

## 2024-07-03 SDOH — ECONOMIC STABILITY: HOUSING INSECURITY: IN THE LAST 12 MONTHS, HOW MANY PLACES HAVE YOU LIVED?: 1

## 2024-07-03 SDOH — ECONOMIC STABILITY: INCOME INSECURITY: IN THE LAST 12 MONTHS, WAS THERE A TIME WHEN YOU WERE NOT ABLE TO PAY THE MORTGAGE OR RENT ON TIME?: NO

## 2024-07-03 SDOH — SOCIAL STABILITY: SOCIAL NETWORK: HOW OFTEN DO YOU ATTEND CHURCH OR RELIGIOUS SERVICES?: NEVER

## 2024-07-03 SDOH — HEALTH STABILITY: MENTAL HEALTH: HOW MANY STANDARD DRINKS CONTAINING ALCOHOL DO YOU HAVE ON A TYPICAL DAY?: PATIENT DOES NOT DRINK

## 2024-07-03 SDOH — ECONOMIC STABILITY: FOOD INSECURITY: WITHIN THE PAST 12 MONTHS, YOU WORRIED THAT YOUR FOOD WOULD RUN OUT BEFORE YOU GOT MONEY TO BUY MORE.: NEVER TRUE

## 2024-07-03 SDOH — ECONOMIC STABILITY: FOOD INSECURITY: WITHIN THE PAST 12 MONTHS, THE FOOD YOU BOUGHT JUST DIDN'T LAST AND YOU DIDN'T HAVE MONEY TO GET MORE.: NEVER TRUE

## 2024-07-03 SDOH — HEALTH STABILITY: MENTAL HEALTH: HOW OFTEN DO YOU HAVE 6 OR MORE DRINKS ON ONE OCCASION?: NEVER

## 2024-07-03 SDOH — SOCIAL STABILITY: SOCIAL INSECURITY: WITHIN THE LAST YEAR, HAVE YOU BEEN HUMILIATED OR EMOTIONALLY ABUSED IN OTHER WAYS BY YOUR PARTNER OR EX-PARTNER?: NO

## 2024-07-03 SDOH — SOCIAL STABILITY: SOCIAL NETWORK: IN A TYPICAL WEEK, HOW MANY TIMES DO YOU TALK ON THE PHONE WITH FAMILY, FRIENDS, OR NEIGHBORS?: ONCE A WEEK

## 2024-07-03 SDOH — ECONOMIC STABILITY: TRANSPORTATION INSECURITY
IN THE PAST 12 MONTHS, HAS LACK OF TRANSPORTATION KEPT YOU FROM MEETINGS, WORK, OR FROM GETTING THINGS NEEDED FOR DAILY LIVING?: NO

## 2024-07-03 SDOH — HEALTH STABILITY: PHYSICAL HEALTH: ON AVERAGE, HOW MANY DAYS PER WEEK DO YOU ENGAGE IN MODERATE TO STRENUOUS EXERCISE (LIKE A BRISK WALK)?: 0 DAYS

## 2024-07-03 SDOH — HEALTH STABILITY: PHYSICAL HEALTH: ON AVERAGE, HOW MANY MINUTES DO YOU ENGAGE IN EXERCISE AT THIS LEVEL?: 0 MIN

## 2024-07-03 SDOH — SOCIAL STABILITY: SOCIAL NETWORK: HOW OFTEN DO YOU GET TOGETHER WITH FRIENDS OR RELATIVES?: ONCE A WEEK

## 2024-07-03 ASSESSMENT — ENCOUNTER SYMPTOMS
CONSTIPATION: 0
APNEA: 0
DIARRHEA: 0
LIGHT-HEADEDNESS: 0
JOINT SWELLING: 0
AGITATION: 0
EYE ITCHING: 0
MYALGIAS: 0
STRIDOR: 0
VOICE CHANGE: 0
SORE THROAT: 0
FACIAL SWELLING: 0
TREMORS: 0
NECK STIFFNESS: 0
UNEXPECTED WEIGHT CHANGE: 0
SPEECH DIFFICULTY: 0
HEMATURIA: 0
SINUS PAIN: 0
WEAKNESS: 0
SLEEP DISTURBANCE: 0
HALLUCINATIONS: 0
DIZZINESS: 0
ARTHRALGIAS: 0
BACK PAIN: 0
DECREASED CONCENTRATION: 0
CONFUSION: 0
WOUND: 0
CHEST TIGHTNESS: 0
RHINORRHEA: 0
TROUBLE SWALLOWING: 0
POLYPHAGIA: 0
BRUISES/BLEEDS EASILY: 0
NAUSEA: 0
ANAL BLEEDING: 0
RECTAL PAIN: 0
WHEEZING: 0
DIAPHORESIS: 0
BLOOD IN STOOL: 1
DYSPHORIC MOOD: 0
CHOKING: 0
ADENOPATHY: 0
COUGH: 0
FLANK PAIN: 0
PHOTOPHOBIA: 0
POLYDIPSIA: 0
NUMBNESS: 0
APPETITE CHANGE: 0
SHORTNESS OF BREATH: 0
EYE DISCHARGE: 0
ACTIVITY CHANGE: 0
NECK PAIN: 0
PALPITATIONS: 0
NERVOUS/ANXIOUS: 0
FREQUENCY: 0
EYE REDNESS: 0
VOMITING: 0
DIFFICULTY URINATING: 0
ABDOMINAL DISTENTION: 0
DYSURIA: 0
FACIAL ASYMMETRY: 0
FATIGUE: 0
CHILLS: 0
SEIZURES: 0
EYE PAIN: 0
ABDOMINAL PAIN: 0
HYPERACTIVE: 0
FEVER: 0
COLOR CHANGE: 0
HEADACHES: 0
SINUS PRESSURE: 0

## 2024-07-03 ASSESSMENT — LIFESTYLE VARIABLES
SKIP TO QUESTIONS 9-10: 1
AUDIT-C TOTAL SCORE: 0

## 2024-07-03 ASSESSMENT — COGNITIVE AND FUNCTIONAL STATUS - GENERAL
MOBILITY SCORE: 23
DAILY ACTIVITIY SCORE: 24
CLIMB 3 TO 5 STEPS WITH RAILING: A LITTLE

## 2024-07-03 ASSESSMENT — PAIN SCALES - GENERAL
PAINLEVEL_OUTOF10: 0 - NO PAIN
PAINLEVEL_OUTOF10: 0 - NO PAIN

## 2024-07-03 ASSESSMENT — ACTIVITIES OF DAILY LIVING (ADL): LACK_OF_TRANSPORTATION: NO

## 2024-07-03 ASSESSMENT — PAIN SCALES - WONG BAKER: WONGBAKER_NUMERICALRESPONSE: NO HURT

## 2024-07-03 NOTE — H&P
History Of Present Illness  Gorge Larsen is a 88 y.o. male presenting with nosebleed and coughing up blood.  He thought he lost a lot of blood in did not feel good felt dizzy.  He also thought he had black stool.  He is on Coumadin.  In the ER he had Hemoccult positive stool.  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 (Multi)     Coronary artery disease with unstable angina pectoris, unspecified vessel or lesion type, unspecified whether native or transplanted heart    Cancer (Multi)     CHF (congestive heart failure) (Multi)     COPD (chronic obstructive pulmonary disease) (Multi)     Essential (primary) hypertension 11/05/2022    Hypertension, unspecified type    Personal history of diseases of the blood and blood-forming organs and certain disorders involving the immune mechanism     History of bleeding disorder    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, oral cavity, and pharynx     History of malignant neoplasm of tonsil    Personal history of malignant neoplasm of prostate     History of malignant neoplasm of prostate    Personal history of other malignant neoplasm of large intestine     History of malignant neoplasm of colon       Surgical History  Past Surgical History:   Procedure Laterality Date    CARDIAC ELECTROPHYSIOLOGY PROCEDURE Left 02/19/2024    Procedure: PPM Generator Explant;  Surgeon: Walker Mcdonald MD;  Location: [facility] Cardiac Cath Lab;  Service: Electrophysiology;  Laterality: Left;  NO AUTH NEEDED    CARDIAC ELECTROPHYSIOLOGY PROCEDURE Left 02/19/2024    Procedure: ICD BIV Implant;  Surgeon: Walker Mcdonald MD;  Location: [facility] Cardiac Cath Lab;  Service: Electrophysiology;  Laterality: Left;  upgrade dual PPM to CRT-D, Cap current RV lead. CASE# [id number]    CARDIAC ELECTROPHYSIOLOGY PROCEDURE Left 02/19/2024     Procedure: ICD Upgrade Biventricular;  Surgeon: Walker Mcdonald MD;  Location: [facility] Cardiac Cath Lab;  Service: Electrophysiology;  Laterality: Left;  upgrade dual PPM to CRT-D (LV lead implant), cap RV lead, CASE#[id number]    OTHER SURGICAL HISTORY  03/22/2021    Colonoscopy    OTHER SURGICAL HISTORY  03/22/2021    Colon surgery 2000    OTHER SURGICAL HISTORY  03/22/2021    Pacemaker insertion    OTHER SURGICAL HISTORY  03/22/2021    Coronary artery bypass graft 2002    OTHER SURGICAL HISTORY  01/05/2023    Throat surgery        Social History  He reports that he quit smoking about 40 years ago. His smoking use included cigarettes. He has never used smokeless tobacco. He reports that he does not drink alcohol and does not use drugs.    Family History  Family History   Problem Relation Name Age of Onset    Heart attack Mother      Heart disease Mother      Other (Car accident) Father          Allergies  Codeine; Latex, natural rubber; and Lisinopril    Review of Systems   Constitutional:  Negative for activity change, appetite change, chills, diaphoresis, fatigue, fever and unexpected weight change.   HENT:  Positive for nosebleeds. Negative for congestion, dental problem, drooling, ear discharge, ear pain, facial swelling, hearing loss, mouth sores, postnasal drip, rhinorrhea, sinus pressure, sinus pain, sneezing, sore throat, tinnitus, trouble swallowing and voice change.    Eyes:  Negative for photophobia, pain, discharge, redness, itching and visual disturbance.   Respiratory:  Negative for apnea, cough, choking, chest tightness, shortness of breath, wheezing and stridor.    Cardiovascular:  Negative for chest pain, palpitations and leg swelling.   Gastrointestinal:  Positive for blood in stool. Negative for abdominal distention, abdominal pain, anal bleeding, constipation, diarrhea, nausea, rectal pain and vomiting.   Endocrine: Negative for cold intolerance, heat intolerance, polydipsia, polyphagia and  polyuria.   Genitourinary:  Negative for decreased urine volume, difficulty urinating, dysuria, enuresis, flank pain, frequency, genital sores, hematuria and urgency.   Musculoskeletal:  Negative for arthralgias, back pain, gait problem, joint swelling, myalgias, neck pain and neck stiffness.   Skin:  Negative for color change, pallor, rash and wound.   Allergic/Immunologic: Negative for environmental allergies, food allergies and immunocompromised state.   Neurological:  Negative for dizziness, tremors, seizures, syncope, facial asymmetry, speech difficulty, weakness, light-headedness, numbness and headaches.   Hematological:  Negative for adenopathy. Does not bruise/bleed easily.   Psychiatric/Behavioral:  Negative for agitation, behavioral problems, confusion, decreased concentration, dysphoric mood, hallucinations, self-injury, sleep disturbance and suicidal ideas. The patient is not nervous/anxious and is not hyperactive.         Physical Exam  Vitals reviewed.   Constitutional:       Appearance: Normal appearance.   HENT:      Head: Normocephalic and atraumatic.      Right Ear: Tympanic membrane, ear canal and external ear normal.      Left Ear: Tympanic membrane, ear canal and external ear normal.      Nose: Nose normal.      Mouth/Throat:      Pharynx: Oropharynx is clear.   Eyes:      Extraocular Movements: Extraocular movements intact.      Conjunctiva/sclera: Conjunctivae normal.      Pupils: Pupils are equal, round, and reactive to light.   Cardiovascular:      Rate and Rhythm: Normal rate and regular rhythm.      Pulses: Normal pulses.      Heart sounds: Normal heart sounds.   Pulmonary:      Effort: Pulmonary effort is normal.      Breath sounds: Normal breath sounds.   Abdominal:      General: Abdomen is flat. Bowel sounds are normal.      Palpations: Abdomen is soft.   Musculoskeletal:      Cervical back: Normal range of motion and neck supple.   Skin:     General: Skin is warm and dry.  "  Neurological:      General: No focal deficit present.      Mental Status: He is alert and oriented to person, place, and time.   Psychiatric:         Mood and Affect: Mood normal.          Last Recorded Vitals  Blood pressure 160/67, pulse 70, temperature 36.6 °C (97.9 °F), temperature source Oral, resp. rate 19, height 1.753 m (5' 9.02\"), weight 93.4 kg (205 lb 14.6 oz), SpO2 100%.    Relevant Results        Scheduled medications  aspirin, 81 mg, oral, Daily  carvedilol, 12.5 mg, oral, BID  furosemide, 20 mg, oral, Daily  levothyroxine, 75 mcg, oral, Daily  losartan, 50 mg, oral, Daily  potassium chloride, 20 mEq, oral, Daily  rosuvastatin, 5 mg, oral, Nightly  warfarin, 5 mg, oral, Daily      Continuous medications     PRN medications  PRN medications: albuterol  Results for orders placed or performed during the hospital encounter of 07/02/24 (from the past 24 hour(s))   Serial Troponin, 2 Hour ([facility])   Result Value Ref Range    Troponin T, High Sensitivity 22 (HH) <=14 ng/L     CT angio chest for pulmonary embolism    Result Date: 7/2/2024  Interpreted By:  Solomon Tuttle, STUDY: CT ANGIO CHEST FOR PULMONARY EMBOLISM; 7/2/2024 2:14 pm   INDICATION: Hemoptysis.   COMPARISON: 09/09/2022   ACCESSION NUMBER(S): [id number]   ORDERING CLINICIAN: CELIO MOSCOSO   TECHNIQUE: Contiguous axial images of the thorax were obtained from the level of the thoracic inlet through the lung bases. 3-D MIPS were created, processed and reviewed on a separate workstation. 100 ml of Omnipaque 350 was utilized. All CT examinations are performed with 1 or more of the following dose reduction techniques: Automated exposure control, adjustment of mA and/or kv according to patient's size, or use of iterative reconstruction techniques.   FINDINGS: The thyroid gland is unremarkable.   There is adequate contrast opacification of the pulmonary arterial vasculature. No filling defect or vessel cutoff to indicate pulmonary embolus.   The " heart size is mildly enlarged. Aortic core valve is noted. No pericardial effusion is identified. The aorta and pulmonary arteries are normal in caliber. Reflux of contrast is noted into the hepatic veins suggestive of CHF.   There are no pathologically enlarged mediastinal, hilar, or axillary lymph nodes are seen.   The trachea and mainstem bronchi are patent. No suspicious pulmonary nodules are seen. 4 mm calcified granuloma in the right upper lobe. The lungs are clear. There is no evidence of pneumothorax or pleural effusion.   The visualized osseous structures are intact.   Limited images through the upper abdomen are unremarkable.       1. No pulmonary embolus. 2. No acute intrathoracic process.The lungs are clear with no significant airspace opacity and no suspicious pulmonary nodule.     Signed by: Solomon Tuttle 7/2/2024 3:18 PM Dictation workstation:   [id number]    CT cervical spine wo IV contrast    Result Date: 7/2/2024  Interpreted By:  Solomon Tuttle, STUDY: CT CERVICAL SPINE WO IV CONTRAST; 7/2/2024 2:13 pm   INDICATION: Signs/Symptoms:fall;   COMPARISON: None   ACCESSION NUMBER(S): [id number]   ORDERING CLINICIAN: CELIO MOSCOSO   TECHNIQUE: Contiguous axial images were acquired from the skull base to the lung apices. Coronal and sagittal reformatted images were obtained. All CT examinations are performed with 1 or more of the following dose reduction techniques: Automated exposure control, adjustment of mA and/or kv according to patient's size, or use of iterative reconstruction techniques.   FINDINGS: There is straightening of the normal cervical lordosis.  No acute fracture or spondylolisthesis is identified. Prominent facet degenerative changes and uncovertebral joint degenerative changes are present     The occipital condyles, arch of C1, and the odontoid processes are intact. The atlantoaxial relationship is well maintained.   Intervertebral disc spaces are adequately maintained. No  evidence for bony spinal canal stenosis.   Severe right neural foramina stenosis at C3-4. Moderate-severe left neural foramina stenosis at C3-4 moderate right neural foramina stenosis at C2-3. Moderate-severe bilateral neural foramina stenosis at C4-5. Moderate bilateral neural foramina stenosis at C5-6.   The visualized lung apices are unremarkable.       1. No acute fracture or spondylolisthesis. 2. Degenerative disc disease and spondylosis as described in the body of the report.     Signed by: Solomon Tuttle 7/2/2024 3:08 PM Dictation workstation:   [id number]    CT head wo IV contrast    Result Date: 7/2/2024  Interpreted By:  Solomon Tuttle, STUDY: CELIO MOSCOSO; 7/2/2024 2:13 pm   INDICATION: Signs/Symptoms:Fall on Coumadin;   COMPARISON: 04/12/2024   ACCESSION NUMBER(S): [id number]   ORDERING CLINICIAN: CELIO MOSCOSO   TECHNIQUE: Contiguous axial images were acquired from the vertex through the posterior fossa without IV contrast. All CT examinations are performed with 1 or more of the following dose reduction techniques: Automated exposure control, adjustment of mA and/or kv according to patient's size, or use of iterative reconstruction techniques.   FINDINGS: No focal mass effect or midline shift is identified. The ventricles and sulci are symmetric and appropriate for the patient's age.   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 The gray white matter differentiation is preserved.   No acute intracranial hemorrhage is seen. No intra-axial or extra-axial fluid collection is seen.   The visualized paranasal sinuses and mastoid air cells are clear.       Stable appearing examination. No CT evidence for acute intracranial pathology.   Signed by: Solomon Tuttle 7/2/2024 2:29 PM Dictation workstation:   [id number] \     Assessment/Plan   Principal Problem:    Heme positive stool  Active Problems:    Anemia    Anxiety    Benign  essential HTN    Coronary artery abnormality (HHS-HCC)    Cardiac pacemaker in situ    Hypothyroidism    Stage 3 chronic kidney disease (Multi)    Lower GI bleed    Hemoptysis      CT chest negative  Hemoptysis most likely from nosebleed  Consult GI for Hemoccult positive stool  Blood pressure stable  Continue home medications  Monitor labs       I spent  minutes in the professional and overall care of this patient.      Natasha Antoine MD

## 2024-07-03 NOTE — CARE PLAN
The patient's goals for the shift include      The clinical goals for the shift include monitor stool for blood    Problem: Fall/Injury  Goal: Not fall by end of shift  Outcome: Progressing

## 2024-07-03 NOTE — CARE PLAN
"Pt said that he has a POA and Living Will --both are on file  ADOD: 2 days    Pt lives at home with his ex-wife in a 1 story condo, no steps to climb to enter the home  Pt uses a walker, no falls.  His ex wife drives him to his appointments, their oldest dtr does the housework and their son in law assist with chores.  Pt does not use home 02, cpap or bipap.   His PCP is Dr. HALEY Antoine and he uses [facility] on [address] and [facility].  He denies depression, but says that he gets a \"little anxious\". He said that Dr. Antoine discontinued his anti-anxiety medication.  Pt is here for hematochezia  No anticipated discharge needs at this time    DISCHARGE PLAN: HOME WITH FAMILY  "

## 2024-07-03 NOTE — PROGRESS NOTES
07/03/24 1252   ACS Disability Status   Are you deaf or do you have serious difficulty hearing? Y   Are you blind or do you have serious difficulty seeing, even when wearing glasses? N   Because of a physical, mental, or emotional condition, do you have serious difficulty concentrating, remembering, or making decisions? (5 years old or older) N   Do you have serious difficulty walking or climbing stairs? Y   Do you have serious difficulty dressing or bathing? N   Because of a physical, mental, or emotional condition, do you have serious difficulty doing errands alone such as visiting the doctor? Y

## 2024-07-03 NOTE — PROGRESS NOTES
07/03/24 1251   Discharge Planning   Living Arrangements Spouse/significant other   Support Systems Spouse/significant other;Children   Type of Residence Private residence   Number of Stairs to Enter Residence 0   Number of Stairs Within Residence 0   Do you have animals or pets at home? No   Who is requesting discharge planning? Provider   Home or Post Acute Services None   Patient expects to be discharged to: Home with ex- wife   Does the patient need discharge transport arranged? No   Financial Resource Strain   How hard is it for you to pay for the very basics like food, housing, medical care, and heating? Not hard   Housing Stability   In the last 12 months, was there a time when you were not able to pay the mortgage or rent on time? N   In the last 12 months, how many places have you lived? 1   In the last 12 months, was there a time when you did not have a steady place to sleep or slept in a shelter (including now)? N   Transportation Needs   In the past 12 months, has lack of transportation kept you from medical appointments or from getting medications? no   In the past 12 months, has lack of transportation kept you from meetings, work, or from getting things needed for daily living? No   Patient Choice   Patient / Family choosing to utilize agency / facility established prior to hospitalization No

## 2024-07-03 NOTE — PROGRESS NOTES
07/03/24 1252   Current Planned Discharge Disposition   Current Planned Discharge Disposition Home

## 2024-07-03 NOTE — PROGRESS NOTES
07/03/24 1249   Physical Activity   On average, how many days per week do you engage in moderate to strenuous exercise (like a brisk walk)? 0 days   On average, how many minutes do you engage in exercise at this level? 0 min   Financial Resource Strain   How hard is it for you to pay for the very basics like food, housing, medical care, and heating? Not hard   Housing Stability   In the last 12 months, was there a time when you were not able to pay the mortgage or rent on time? N   In the last 12 months, how many places have you lived? 1   In the last 12 months, was there a time when you did not have a steady place to sleep or slept in a shelter (including now)? N   Transportation Needs   In the past 12 months, has lack of transportation kept you from medical appointments or from getting medications? no   In the past 12 months, has lack of transportation kept you from meetings, work, or from getting things needed for daily living? No   Food Insecurity   Within the past 12 months, you worried that your food would run out before you got the money to buy more. Never true   Within the past 12 months, the food you bought just didn't last and you didn't have money to get more. Never true   Stress   Do you feel stress - tense, restless, nervous, or anxious, or unable to sleep at night because your mind is troubled all the time - these days? Not at all   Social Connections   In a typical week, how many times do you talk on the phone with family, friends, or neighbors? Once a week   How often do you get together with friends or relatives? Once   How often do you attend Yazidi or Oriental orthodox services? Never   Do you belong to any clubs or organizations such as Yazidi groups, unions, fraternal or athletic groups, or school groups? No   How often do you attend meetings of the clubs or organizations you belong to? Never   Are you , , , , never , or living with a partner?    Intimate  Partner Violence   Within the last year, have you been afraid of your partner or ex-partner? No   Within the last year, have you been humiliated or emotionally abused in other ways by your partner or ex-partner? No   Within the last year, have you been kicked, hit, slapped, or otherwise physically hurt by your partner or ex-partner? No   Within the last year, have you been raped or forced to have any kind of sexual activity by your partner or ex-partner? No   Alcohol Use   Q1: How often do you have a drink containing alcohol? Never   Q2: How many drinks containing alcohol do you have on a typical day when you are drinking? None   Q3: How often do you have six or more drinks on one occasion? Never   Utilities   In the past 12 months has the electric, gas, oil, or water company threatened to shut off services in your home? No   Health Literacy   How often do you need to have someone help you when you read instructions, pamphlets, or other written material from your doctor or pharmacy? Never

## 2024-07-03 NOTE — PROGRESS NOTES
"Gorge Larsen is a 88 y.o. male on day 0 of admission presenting with Heme positive stool.    Subjective   No nosebleed since yesterday       Objective     Physical Exam  Vitals reviewed.   Constitutional:       Appearance: Normal appearance.   HENT:      Head: Normocephalic and atraumatic.      Right Ear: Tympanic membrane, ear canal and external ear normal.      Left Ear: Tympanic membrane, ear canal and external ear normal.      Nose: Nose normal.      Mouth/Throat:      Pharynx: Oropharynx is clear.   Eyes:      Extraocular Movements: Extraocular movements intact.      Conjunctiva/sclera: Conjunctivae normal.      Pupils: Pupils are equal, round, and reactive to light.   Cardiovascular:      Rate and Rhythm: Normal rate and regular rhythm.      Pulses: Normal pulses.      Heart sounds: Normal heart sounds.   Pulmonary:      Effort: Pulmonary effort is normal.      Breath sounds: Normal breath sounds.   Abdominal:      General: Abdomen is flat. Bowel sounds are normal.      Palpations: Abdomen is soft.   Musculoskeletal:      Cervical back: Normal range of motion and neck supple.   Skin:     General: Skin is warm and dry.   Neurological:      General: No focal deficit present.      Mental Status: He is alert and oriented to person, place, and time.   Psychiatric:         Mood and Affect: Mood normal.         Last Recorded Vitals  Blood pressure 160/67, pulse 70, temperature 36.6 °C (97.9 °F), temperature source Oral, resp. rate 19, height 1.753 m (5' 9.02\"), weight 93.4 kg (205 lb 14.6 oz), SpO2 100%.  Intake/Output last 3 Shifts:  I/O last 3 completed shifts:  In: 0 (0 mL/kg)   Out: 1350 (14.5 mL/kg) [Urine:1350 (0.4 mL/kg/hr)]  Weight: 93.4 kg     Relevant Results                Scheduled medications  aspirin, 81 mg, oral, Daily  carvedilol, 12.5 mg, oral, BID  furosemide, 20 mg, oral, Daily  levothyroxine, 75 mcg, oral, Daily  losartan, 50 mg, oral, Daily  potassium chloride, 20 mEq, oral, " Daily  rosuvastatin, 5 mg, oral, Nightly  warfarin, 5 mg, oral, Daily      Continuous medications     PRN medications  PRN medications: albuterol  Results for orders placed or performed during the hospital encounter of 07/02/24 (from the past 24 hour(s))   Serial Troponin, 2 Hour ([facility])   Result Value Ref Range    Troponin T, High Sensitivity 22 (HH) <=14 ng/L                  Assessment/Plan   Principal Problem:    Heme positive stool  Active Problems:    Anemia    Anxiety    Benign essential HTN    Coronary artery abnormality (HHS-HCC)    Cardiac pacemaker in situ    Hypothyroidism    Stage 3 chronic kidney disease (Multi)    Lower GI bleed    Hemoptysis    No further signs of GI bleed or nosebleed  CT of the lungs negative for any acute finding  Will wait for GI input  If no plans for further intervention and GI feels safe will discharge patient  Discussed with the daughter       I spent  minutes in the professional and overall care of this patient.      Natasha Antoine MD

## 2024-07-04 VITALS
DIASTOLIC BLOOD PRESSURE: 36 MMHG | TEMPERATURE: 97.5 F | HEIGHT: 69 IN | RESPIRATION RATE: 17 BRPM | SYSTOLIC BLOOD PRESSURE: 128 MMHG | OXYGEN SATURATION: 97 % | HEART RATE: 71 BPM | WEIGHT: 205.91 LBS | BODY MASS INDEX: 30.5 KG/M2

## 2024-07-04 LAB
ANION GAP SERPL CALC-SCNC: 14 MMOL/L
BODY SURFACE AREA: 2.13 M2
BUN SERPL-MCNC: 26 MG/DL (ref 8–25)
CALCIUM SERPL-MCNC: 9.6 MG/DL (ref 8.5–10.4)
CHLORIDE SERPL-SCNC: 100 MMOL/L (ref 97–107)
CO2 SERPL-SCNC: 24 MMOL/L (ref 24–31)
CREAT SERPL-MCNC: 1.2 MG/DL (ref 0.4–1.6)
EGFRCR SERPLBLD CKD-EPI 2021: 59 ML/MIN/1.73M*2
ERYTHROCYTE [DISTWIDTH] IN BLOOD BY AUTOMATED COUNT: 15.1 % (ref 11.5–14.5)
ERYTHROCYTE [DISTWIDTH] IN BLOOD BY AUTOMATED COUNT: 15.4 % (ref 11.5–14.5)
GLUCOSE SERPL-MCNC: 102 MG/DL (ref 65–99)
HCT VFR BLD AUTO: 38.9 % (ref 41–52)
HCT VFR BLD AUTO: 39.5 % (ref 41–52)
HGB BLD-MCNC: 12.6 G/DL (ref 13.5–17.5)
HGB BLD-MCNC: 12.8 G/DL (ref 13.5–17.5)
INR PPP: 2.1 (ref 0.9–1.2)
MCH RBC QN AUTO: 29.8 PG (ref 26–34)
MCH RBC QN AUTO: 30 PG (ref 26–34)
MCHC RBC AUTO-ENTMCNC: 31.9 G/DL (ref 32–36)
MCHC RBC AUTO-ENTMCNC: 32.9 G/DL (ref 32–36)
MCV RBC AUTO: 91 FL (ref 80–100)
MCV RBC AUTO: 93 FL (ref 80–100)
NRBC BLD-RTO: 0 /100 WBCS (ref 0–0)
NRBC BLD-RTO: 0 /100 WBCS (ref 0–0)
PLATELET # BLD AUTO: 190 X10*3/UL (ref 150–450)
PLATELET # BLD AUTO: 198 X10*3/UL (ref 150–450)
POTASSIUM SERPL-SCNC: 4.2 MMOL/L (ref 3.4–5.1)
PROTHROMBIN TIME: 20.8 SECONDS (ref 9.3–12.7)
RBC # BLD AUTO: 4.23 X10*6/UL (ref 4.5–5.9)
RBC # BLD AUTO: 4.27 X10*6/UL (ref 4.5–5.9)
SODIUM SERPL-SCNC: 138 MMOL/L (ref 133–145)
WBC # BLD AUTO: 5.5 X10*3/UL (ref 4.4–11.3)
WBC # BLD AUTO: 6.3 X10*3/UL (ref 4.4–11.3)

## 2024-07-04 PROCEDURE — 2500000002 HC RX 250 W HCPCS SELF ADMINISTERED DRUGS (ALT 637 FOR MEDICARE OP, ALT 636 FOR OP/ED): Performed by: INTERNAL MEDICINE

## 2024-07-04 PROCEDURE — 85610 PROTHROMBIN TIME: CPT | Performed by: INTERNAL MEDICINE

## 2024-07-04 PROCEDURE — G0378 HOSPITAL OBSERVATION PER HR: HCPCS

## 2024-07-04 PROCEDURE — 85027 COMPLETE CBC AUTOMATED: CPT | Performed by: INTERNAL MEDICINE

## 2024-07-04 PROCEDURE — 82374 ASSAY BLOOD CARBON DIOXIDE: CPT | Performed by: INTERNAL MEDICINE

## 2024-07-04 PROCEDURE — 99238 HOSP IP/OBS DSCHRG MGMT 30/<: CPT | Performed by: INTERNAL MEDICINE

## 2024-07-04 PROCEDURE — 2500000001 HC RX 250 WO HCPCS SELF ADMINISTERED DRUGS (ALT 637 FOR MEDICARE OP): Performed by: INTERNAL MEDICINE

## 2024-07-04 PROCEDURE — 36415 COLL VENOUS BLD VENIPUNCTURE: CPT | Performed by: INTERNAL MEDICINE

## 2024-07-04 ASSESSMENT — ENCOUNTER SYMPTOMS
BLOOD IN STOOL: 0
ABDOMINAL PAIN: 0
RECTAL PAIN: 0
FATIGUE: 0
FEVER: 0
DIARRHEA: 0
CONSTIPATION: 1
VOMITING: 0
ANAL BLEEDING: 0

## 2024-07-04 ASSESSMENT — PAIN SCALES - GENERAL: PAINLEVEL_OUTOF10: 0 - NO PAIN

## 2024-07-04 NOTE — CARE PLAN
The patient's goals for the shift include      The clinical goals for the shift include monitor stool for blood      Problem: Fall/Injury  Goal: Not fall by end of shift  Outcome: Progressing  Goal: Be free from injury by end of the shift  Outcome: Progressing  Goal: Verbalize understanding of personal risk factors for fall in the hospital  Outcome: Progressing  Goal: Verbalize understanding of risk factor reduction measures to prevent injury from fall in the home  Outcome: Progressing  Goal: Use assistive devices by end of the shift  Outcome: Progressing  Goal: Pace activities to prevent fatigue by end of the shift  Outcome: Progressing     Problem: Pain - Adult  Goal: Verbalizes/displays adequate comfort level or baseline comfort level  Outcome: Progressing     Problem: Safety - Adult  Goal: Free from fall injury  Outcome: Progressing     Problem: Discharge Planning  Goal: Discharge to home or other facility with appropriate resources  Outcome: Progressing     Problem: Chronic Conditions and Co-morbidities  Goal: Patient's chronic conditions and co-morbidity symptoms are monitored and maintained or improved  Outcome: Progressing     Problem: Pain  Goal: Takes deep breaths with improved pain control throughout the shift  Outcome: Progressing  Goal: Turns in bed with improved pain control throughout the shift  Outcome: Progressing  Goal: Walks with improved pain control throughout the shift  Outcome: Progressing  Goal: Performs ADL's with improved pain control throughout shift  Outcome: Progressing  Goal: Participates in PT with improved pain control throughout the shift  Outcome: Progressing  Goal: Free from opioid side effects throughout the shift  Outcome: Progressing  Goal: Free from acute confusion related to pain meds throughout the shift  Outcome: Progressing

## 2024-07-04 NOTE — CONSULTS
Consults    Reason For Consult  Melena    History Of Present Illness  Gorge Larsen is a 88 y.o. male presenting with nose bleeds and hemoptysis. He is on Coumadin. Reports a few episodes of dark stools. Denies red blood. He has hx colon cancer with resection in 2000. Had colonoscopy in 2020 with 4 small polyps and a few AVMs (treated with APC). He had an EGD in 2021 with duodenal polyp as well. He denies any abdominal pain, nausea, vomiting. He reports brown stool today. 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    Surgical History  He has a past surgical history that includes Other surgical history (03/22/2021); Other surgical history (03/22/2021); Other surgical history (03/22/2021); Other surgical history (03/22/2021); Other surgical history (01/05/2023); Cardiac electrophysiology procedure (Left, 02/19/2024); Cardiac electrophysiology procedure (Left, 02/19/2024); and Cardiac electrophysiology procedure (Left, 02/19/2024).     Social History  He reports that he quit smoking about 40 years ago. His smoking use included cigarettes. He has never used smokeless tobacco. He reports that he does not drink alcohol and does not use drugs.    Family History  Family History   Problem Relation Name Age of Onset    Heart attack Mother      Heart disease Mother      Other (Car accident) Father         "  Allergies  Codeine; Latex, natural rubber; and Lisinopril    Review of Systems   Constitutional:  Negative for fatigue and fever.   Gastrointestinal:  Positive for constipation. Negative for abdominal pain, anal bleeding, blood in stool, diarrhea, rectal pain and vomiting.        Physical Exam  Vitals reviewed.   Constitutional:       General: He is awake.      Appearance: Normal appearance.   HENT:      Head: Normocephalic and atraumatic.      Mouth/Throat:      Mouth: Mucous membranes are moist.   Cardiovascular:      Rate and Rhythm: Normal rate and regular rhythm.   Pulmonary:      Effort: Pulmonary effort is normal.      Breath sounds: Normal breath sounds.   Abdominal:      General: There is no distension.      Palpations: Abdomen is soft.      Tenderness: There is no abdominal tenderness. There is no guarding.   Musculoskeletal:      Cervical back: Normal range of motion and neck supple.   Skin:     General: Skin is warm and dry.   Neurological:      General: No focal deficit present.      Mental Status: He is alert and oriented to person, place, and time. Mental status is at baseline.   Psychiatric:         Attention and Perception: Attention and perception normal.         Mood and Affect: Mood normal.         Behavior: Behavior normal.          Last Recorded Vitals  Blood pressure 161/70, pulse 67, temperature 37.6 °C (99.7 °F), temperature source Oral, resp. rate 18, height 1.753 m (5' 9.02\"), weight 93.4 kg (205 lb 14.6 oz), SpO2 96%.    Relevant Results  Results for orders placed or performed during the hospital encounter of 07/02/24 (from the past 24 hour(s))   CBC   Result Value Ref Range    WBC 6.3 4.4 - 11.3 x10*3/uL    nRBC 0.0 0.0 - 0.0 /100 WBCs    RBC 4.23 (L) 4.50 - 5.90 x10*6/uL    Hemoglobin 12.6 (L) 13.5 - 17.5 g/dL    Hematocrit 39.5 (L) 41.0 - 52.0 %    MCV 93 80 - 100 fL    MCH 29.8 26.0 - 34.0 pg    MCHC 31.9 (L) 32.0 - 36.0 g/dL    RDW 15.4 (H) 11.5 - 14.5 %    Platelets 190 150 - 450 " x10*3/uL   Protime-INR   Result Value Ref Range    Protime 18.3 (H) 9.3 - 12.7 seconds    INR 1.8 (H) 0.9 - 1.2   PST Top   Result Value Ref Range    Extra Tube Hold for add-ons.    CBC   Result Value Ref Range    WBC 5.5 4.4 - 11.3 x10*3/uL    nRBC 0.0 0.0 - 0.0 /100 WBCs    RBC 4.27 (L) 4.50 - 5.90 x10*6/uL    Hemoglobin 12.8 (L) 13.5 - 17.5 g/dL    Hematocrit 38.9 (L) 41.0 - 52.0 %    MCV 91 80 - 100 fL    MCH 30.0 26.0 - 34.0 pg    MCHC 32.9 32.0 - 36.0 g/dL    RDW 15.1 (H) 11.5 - 14.5 %    Platelets 198 150 - 450 x10*3/uL   Basic Metabolic Panel   Result Value Ref Range    Glucose 102 (H) 65 - 99 mg/dL    Sodium 138 133 - 145 mmol/L    Potassium 4.2 3.4 - 5.1 mmol/L    Chloride 100 97 - 107 mmol/L    Bicarbonate 24 24 - 31 mmol/L    Urea Nitrogen 26 (H) 8 - 25 mg/dL    Creatinine 1.20 0.40 - 1.60 mg/dL    eGFR 59 (L) >60 mL/min/1.73m*2    Calcium 9.6 8.5 - 10.4 mg/dL    Anion Gap 14 <=19 mmol/L   Protime-INR   Result Value Ref Range    Protime 20.8 (H) 9.3 - 12.7 seconds    INR 2.1 (H) 0.9 - 1.2     CT angio chest for pulmonary embolism    Result Date: 7/2/2024  Interpreted By:  Solomon Tuttle, STUDY: CT ANGIO CHEST FOR PULMONARY EMBOLISM; 7/2/2024 2:14 pm   INDICATION: Hemoptysis.   COMPARISON: 09/09/2022   ACCESSION NUMBER(S): [id number]   ORDERING CLINICIAN: CELIO MOSCOSO   TECHNIQUE: Contiguous axial images of the thorax were obtained from the level of the thoracic inlet through the lung bases. 3-D MIPS were created, processed and reviewed on a separate workstation. 100 ml of Omnipaque 350 was utilized. All CT examinations are performed with 1 or more of the following dose reduction techniques: Automated exposure control, adjustment of mA and/or kv according to patient's size, or use of iterative reconstruction techniques.   FINDINGS: The thyroid gland is unremarkable.   There is adequate contrast opacification of the pulmonary arterial vasculature. No filling defect or vessel cutoff to indicate  pulmonary embolus.   The heart size is mildly enlarged. Aortic core valve is noted. No pericardial effusion is identified. The aorta and pulmonary arteries are normal in caliber. Reflux of contrast is noted into the hepatic veins suggestive of CHF.   There are no pathologically enlarged mediastinal, hilar, or axillary lymph nodes are seen.   The trachea and mainstem bronchi are patent. No suspicious pulmonary nodules are seen. 4 mm calcified granuloma in the right upper lobe. The lungs are clear. There is no evidence of pneumothorax or pleural effusion.   The visualized osseous structures are intact.   Limited images through the upper abdomen are unremarkable.       1. No pulmonary embolus. 2. No acute intrathoracic process.The lungs are clear with no significant airspace opacity and no suspicious pulmonary nodule.     Signed by: Solomon Tuttle 7/2/2024 3:18 PM Dictation workstation:   [id number]    CT cervical spine wo IV contrast    Result Date: 7/2/2024  Interpreted By:  Solomon Tuttle, STUDY: CT CERVICAL SPINE WO IV CONTRAST; 7/2/2024 2:13 pm   INDICATION: Signs/Symptoms:fall;   COMPARISON: None   ACCESSION NUMBER(S): [id number]   ORDERING CLINICIAN: CELIO MOSCOSO   TECHNIQUE: Contiguous axial images were acquired from the skull base to the lung apices. Coronal and sagittal reformatted images were obtained. All CT examinations are performed with 1 or more of the following dose reduction techniques: Automated exposure control, adjustment of mA and/or kv according to patient's size, or use of iterative reconstruction techniques.   FINDINGS: There is straightening of the normal cervical lordosis.  No acute fracture or spondylolisthesis is identified. Prominent facet degenerative changes and uncovertebral joint degenerative changes are present     The occipital condyles, arch of C1, and the odontoid processes are intact. The atlantoaxial relationship is well maintained.   Intervertebral disc spaces are  adequately maintained. No evidence for bony spinal canal stenosis.   Severe right neural foramina stenosis at C3-4. Moderate-severe left neural foramina stenosis at C3-4 moderate right neural foramina stenosis at C2-3. Moderate-severe bilateral neural foramina stenosis at C4-5. Moderate bilateral neural foramina stenosis at C5-6.   The visualized lung apices are unremarkable.       1. No acute fracture or spondylolisthesis. 2. Degenerative disc disease and spondylosis as described in the body of the report.     Signed by: Solomon Tuttle 7/2/2024 3:08 PM Dictation workstation:   [id number]    CT head wo IV contrast    Result Date: 7/2/2024  Interpreted By:  Solomon Tuttle, STUDY: CELIO MOSCOSO; 7/2/2024 2:13 pm   INDICATION: Signs/Symptoms:Fall on Coumadin;   COMPARISON: 04/12/2024   ACCESSION NUMBER(S): [id number]   ORDERING CLINICIAN: CELIO MOSCOSO   TECHNIQUE: Contiguous axial images were acquired from the vertex through the posterior fossa without IV contrast. All CT examinations are performed with 1 or more of the following dose reduction techniques: Automated exposure control, adjustment of mA and/or kv according to patient's size, or use of iterative reconstruction techniques.   FINDINGS: No focal mass effect or midline shift is identified. The ventricles and sulci are symmetric and appropriate for the patient's age.   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 The gray white matter differentiation is preserved.   No acute intracranial hemorrhage is seen. No intra-axial or extra-axial fluid collection is seen.   The visualized paranasal sinuses and mastoid air cells are clear.       Stable appearing examination. No CT evidence for acute intracranial pathology.   Signed by: Solomon Tuttle 7/2/2024 2:29 PM Dictation workstation:   [id number]        Assessment/Plan     Melena, Anticoagulation, Epistaxis    -Stool is brown today.  He had black stool following nose bleed. This is more likely source than true GI bleeding as HH remains stabe at 12.5 and dark stools have resolved    -Had colonoscopy in 2020 with AVMs. Duodenal polyp on EGD in 2021. Would only repeat if HH declined    -No absolute contraindication to anticoagulation from GI standpoint     I spent 30 minutes in the professional and overall care of this patient.

## 2024-07-05 ENCOUNTER — DOCUMENTATION (OUTPATIENT)
Dept: CARDIOLOGY | Facility: CLINIC | Age: 89
End: 2024-07-05
Payer: MEDICARE

## 2024-07-05 NOTE — DISCHARGE SUMMARY
Discharge Diagnosis  Heme positive stool    Issues Requiring Follow-Up  Nosebleed      Test Results Pending At Discharge  Pending Labs       No current pending labs.            Hospital Course   Gorge Larsen is a 88 y.o. male presenting with nosebleed and coughing up blood.  He thought he lost a lot of blood in did not feel good felt dizzy.  He also thought he had black stool.  He is on Coumadin.  In the ER he had Hemoccult positive stool.  No further nosebleed   No further nosebleeds no further GI bleed.  GI consulted.  No need for any GI intervention.  Advised patient to use humidifier and saline nasal spray.  Follow-up with me next week    Pertinent Physical Exam At Time of Discharge  Physical Exam  Vitals reviewed.   Constitutional:       Appearance: Normal appearance.   HENT:      Head: Normocephalic and atraumatic.      Right Ear: Tympanic membrane, ear canal and external ear normal.      Left Ear: Tympanic membrane, ear canal and external ear normal.      Nose: Nose normal.      Mouth/Throat:      Pharynx: Oropharynx is clear.   Eyes:      Extraocular Movements: Extraocular movements intact.      Conjunctiva/sclera: Conjunctivae normal.      Pupils: Pupils are equal, round, and reactive to light.   Cardiovascular:      Rate and Rhythm: Normal rate and regular rhythm.      Pulses: Normal pulses.      Heart sounds: Normal heart sounds.   Pulmonary:      Effort: Pulmonary effort is normal.      Breath sounds: Normal breath sounds.   Abdominal:      General: Abdomen is flat. Bowel sounds are normal.      Palpations: Abdomen is soft.   Musculoskeletal:      Cervical back: Normal range of motion and neck supple.   Skin:     General: Skin is warm and dry.   Neurological:      General: No focal deficit present.      Mental Status: He is alert and oriented to person, place, and time.   Psychiatric:         Mood and Affect: Mood normal.         Home Medications     Medication List      CHANGE how you take these  medications     furosemide 20 mg tablet; Commonly known as: Lasix; Take 1 tablet (20 mg)   by mouth once daily.; What changed: Another medication with the same name   was removed. Continue taking this medication, and follow the directions   you see here.   warfarin 5 mg tablet; Commonly known as: Coumadin; Take as directed. If   you are unsure how to take this medication, talk to your nurse or doctor.;   Original instructions: Take 1 tablet (5 mg) by mouth 5 times a week.   Monday, Wednesday, Friday,Saturday and Sunday; What changed: Another   medication with the same name was removed. Continue taking this   medication, and follow the directions you see here.     CONTINUE taking these medications     albuterol 90 mcg/actuation inhaler; Commonly known as: ProAir HFA;   Inhale 2 puffs every 4 hours if needed for wheezing or shortness of   breath.   aspirin 81 mg EC tablet   carvedilol 12.5 mg tablet; Commonly known as: Coreg; Take 1 tablet (12.5   mg) by mouth 2 times a day with meals.   levothyroxine 50 mcg tablet; Commonly known as: Synthroid, Levoxyl; Take   1.5 tablets (75 mcg) by mouth once daily.   losartan 50 mg tablet; Commonly known as: Cozaar; TAKE 1 TABLET(50 MG)   BY MOUTH DAILY   potassium chloride 20 mEq packet; Commonly known as: Klor-Con; Take 20   mEq by mouth once daily.   rosuvastatin 5 mg tablet; Commonly known as: Crestor; Take 1 tablet (5   mg) by mouth once daily at bedtime.       Outpatient Follow-Up  Future Appointments   Date Time Provider Department [place]   7/22/2024  1:15 PM Natasha Antoine MD [id number] [facility]   10/17/2024  2:30 PM Jeremy Clarke MD [id number] [facility]   10/29/2024 10:15 AM [facility] [id number] [id number] FELIPA Antoine MD

## 2024-07-05 NOTE — PROGRESS NOTES
Pts wife left VM at the [facility] [facility] with question regarding Warfarin dose.  Called and spoke with Catrina.  Pt was admitted 7/2-7/4 with epistaxis and blood in stool.  INR on Tuesday was 1.9 and wife was instructed to increase TWD.  Because pt was in ED and hospitalized, pt did not get increased dose.  Wife questioning discharge instructions which state pt should take 5 mg Mon, Wed, Fri, Sat and Sun.  INR on discharge was 2.1.  I instructed wife to continue to give 5mg daily and retest pt on Mon, 7/8.  PST phone number also given to wife for furture issues.

## 2024-07-05 NOTE — PROGRESS NOTES
"Gorge Larsen is a 88 y.o. male on day 0 of admission presenting with Heme positive stool.    Subjective   No more nosebleed, his stools are brown       Objective     Physical Exam  Vitals reviewed.   Constitutional:       Appearance: Normal appearance.   HENT:      Head: Normocephalic and atraumatic.      Right Ear: Tympanic membrane, ear canal and external ear normal.      Left Ear: Tympanic membrane, ear canal and external ear normal.      Nose: Nose normal.      Mouth/Throat:      Pharynx: Oropharynx is clear.   Eyes:      Extraocular Movements: Extraocular movements intact.      Conjunctiva/sclera: Conjunctivae normal.      Pupils: Pupils are equal, round, and reactive to light.   Cardiovascular:      Rate and Rhythm: Normal rate and regular rhythm.      Pulses: Normal pulses.      Heart sounds: Normal heart sounds.   Pulmonary:      Effort: Pulmonary effort is normal.      Breath sounds: Normal breath sounds.   Abdominal:      General: Abdomen is flat. Bowel sounds are normal.      Palpations: Abdomen is soft.   Musculoskeletal:      Cervical back: Normal range of motion and neck supple.   Skin:     General: Skin is warm and dry.   Neurological:      General: No focal deficit present.      Mental Status: He is alert and oriented to person, place, and time.   Psychiatric:         Mood and Affect: Mood normal.         Last Recorded Vitals  Blood pressure (!) 128/36, pulse 71, temperature 36.4 °C (97.5 °F), temperature source Oral, resp. rate 17, height 1.753 m (5' 9.02\"), weight 93.4 kg (205 lb 14.6 oz), SpO2 97%.  Intake/Output last 3 Shifts:  No intake/output data recorded.    Relevant Results                             Assessment/Plan   Principal Problem:    Heme positive stool  Active Problems:    Anemia    Anxiety    Benign essential HTN    Coronary artery abnormality (HHS-HCC)    Cardiac pacemaker in situ    Hypothyroidism    Stage 3 chronic kidney disease (Multi)    Lower GI bleed    Hemoptysis    No " further drop in H&H no more bleeding  GI input noted  Patient will be discharged home on his routine home medication       I spent  minutes in the professional and overall care of this patient.      Natasha Antoine MD

## 2024-07-08 ENCOUNTER — ANTICOAGULATION - WARFARIN VISIT (OUTPATIENT)
Dept: CARDIOLOGY | Facility: CLINIC | Age: 89
End: 2024-07-08
Payer: MEDICARE

## 2024-07-08 DIAGNOSIS — Z95.2 S/P TAVR (TRANSCATHETER AORTIC VALVE REPLACEMENT): ICD-10-CM

## 2024-07-08 DIAGNOSIS — I48.20 CHRONIC ATRIAL FIBRILLATION (MULTI): Primary | ICD-10-CM

## 2024-07-08 LAB
INR IN PPP BY COAGULATION ASSAY EXTERNAL: 2.8
PROTHROMBIN TIME (PT) IN PPP BY COAGULATION ASSAY EXTERNAL: NORMAL SECONDS

## 2024-07-08 NOTE — PROGRESS NOTES
Patient identification verified with 2 identifiers.    Location: [facility] Patient Self-Testing Program [phone]    Referring Physician: MARIANNE BUCKLEY  Enrollment/ Re-enrollment date: 2025   INR Goal: 2.0-3.0  INR monitoring is per [facility] protocol.  Anticoagulation Medication: warfarin  Indication: Atrial Fibrillation/Atrial Flutter    Subjective   Bleeding signs/symptoms: No    Bruising: No   Major bleeding event: No  Thrombosis signs/symptoms: No  Thromboembolic event: No  Missed doses: No  Extra doses: No  Medication changes: No  Dietary changes: No  Change in health: No  Change in activity: No  Alcohol: No  Other concerns: No    Upcoming Procedures:  Does the Patient Have any upcoming procedures that require interruption in anticoagulation therapy? no  Does the patient require bridging? no      Anticoagulation Summary  As of 2024      INR goal:  2.0-3.0   TTR:  74.6% (3.1 wk)   INR used for dosin.80 (2024)   Weekly warfarin total:  37.5 mg               Assessment/Plan   Therapeutic     1. New dose: no change    2. Next INR: 1 week      Education provided to patient during the visit:  Patient instructed to call in interim with questions, concerns and changes.   Patient educated on interactions between medications and warfarin.   Patient educated on dietary consistency in vitamin k consumption.   Patient educated on affects of alcohol consumption while taking warfarin.   Patient educated on signs of bleeding/clotting.   Patient educated on compliance with dosing, follow up appointments, and prescribed plan of care.

## 2024-07-15 ENCOUNTER — ANTICOAGULATION - WARFARIN VISIT (OUTPATIENT)
Dept: CARDIOLOGY | Facility: CLINIC | Age: 89
End: 2024-07-15
Payer: MEDICARE

## 2024-07-15 DIAGNOSIS — I48.20 CHRONIC ATRIAL FIBRILLATION (MULTI): Primary | ICD-10-CM

## 2024-07-15 DIAGNOSIS — Z95.2 S/P TAVR (TRANSCATHETER AORTIC VALVE REPLACEMENT): ICD-10-CM

## 2024-07-15 LAB
INR IN PPP BY COAGULATION ASSAY EXTERNAL: 2.4 (ref 2–3)
PROTHROMBIN TIME (PT) IN PPP BY COAGULATION ASSAY EXTERNAL: NORMAL

## 2024-07-15 NOTE — PROGRESS NOTES
Patient identification verified with 2 identifiers.    Location: [facility] Patient Self-Testing Program [phone]    Referring Physician: MARIANNE BUCKLEY  Enrollment/ Re-enrollment date: 2025   INR Goal: 2.0-3.0  INR monitoring is per [facility] protocol.  Anticoagulation Medication: warfarin  Indication: Atrial Fibrillation/Atrial Flutter    Subjective   Bleeding signs/symptoms: No    Bruising: No   Major bleeding event: No  Thrombosis signs/symptoms: No  Thromboembolic event: No  Missed doses: No  Extra doses: No  Medication changes: No  Dietary changes: No  Change in health: No  Change in activity: No  Alcohol: No  Other concerns: No    Upcoming Procedures:  Does the Patient Have any upcoming procedures that require interruption in anticoagulation therapy? no  Does the patient require bridging? no      Anticoagulation Summary  As of 7/15/2024      INR goal:  2.0-3.0   TTR:  80.8% (4.1 wk)   INR used for dosin.40 (7/15/2024)   Weekly warfarin total:  37.5 mg               Assessment/Plan   Therapeutic     1. New dose: no change  Called and left voicemail message with continued dosing instructions.  2. Next INR: 2 weeks      Education provided to patient during the visit:  Patient instructed to call in interim with questions, concerns and changes.   Patient educated on interactions between medications and warfarin.   Patient educated on dietary consistency in vitamin k consumption.   Patient educated on affects of alcohol consumption while taking warfarin.   Patient educated on signs of bleeding/clotting.   Patient educated on compliance with dosing, follow up appointments, and prescribed plan of care.

## 2024-07-22 ENCOUNTER — APPOINTMENT (OUTPATIENT)
Dept: PRIMARY CARE | Facility: CLINIC | Age: 89
End: 2024-07-22
Payer: MEDICARE

## 2024-07-22 VITALS
DIASTOLIC BLOOD PRESSURE: 54 MMHG | SYSTOLIC BLOOD PRESSURE: 124 MMHG | BODY MASS INDEX: 30.21 KG/M2 | HEIGHT: 69 IN | WEIGHT: 204 LBS

## 2024-07-22 DIAGNOSIS — E78.5 DYSLIPIDEMIA: ICD-10-CM

## 2024-07-22 DIAGNOSIS — E03.9 HYPOTHYROIDISM, UNSPECIFIED TYPE: ICD-10-CM

## 2024-07-22 DIAGNOSIS — R05.1 ACUTE COUGH: ICD-10-CM

## 2024-07-22 DIAGNOSIS — I48.20 CHRONIC ATRIAL FIBRILLATION (MULTI): ICD-10-CM

## 2024-07-22 DIAGNOSIS — I50.20 HEART FAILURE WITH REDUCED EJECTION FRACTION (MULTI): ICD-10-CM

## 2024-07-22 DIAGNOSIS — J44.9 CHRONIC OBSTRUCTIVE PULMONARY DISEASE, UNSPECIFIED COPD TYPE (MULTI): ICD-10-CM

## 2024-07-22 PROCEDURE — 99214 OFFICE O/P EST MOD 30 MIN: CPT | Performed by: INTERNAL MEDICINE

## 2024-07-22 PROCEDURE — 1157F ADVNC CARE PLAN IN RCRD: CPT | Performed by: INTERNAL MEDICINE

## 2024-07-22 PROCEDURE — 1159F MED LIST DOCD IN RCRD: CPT | Performed by: INTERNAL MEDICINE

## 2024-07-22 PROCEDURE — 3074F SYST BP LT 130 MM HG: CPT | Performed by: INTERNAL MEDICINE

## 2024-07-22 PROCEDURE — 94640 AIRWAY INHALATION TREATMENT: CPT | Performed by: INTERNAL MEDICINE

## 2024-07-22 PROCEDURE — 3078F DIAST BP <80 MM HG: CPT | Performed by: INTERNAL MEDICINE

## 2024-07-22 RX ORDER — AZITHROMYCIN 250 MG/1
TABLET, FILM COATED ORAL
Qty: 6 TABLET | Refills: 0 | Status: SHIPPED | OUTPATIENT
Start: 2024-07-22 | End: 2024-07-27

## 2024-07-22 RX ORDER — ALBUTEROL SULFATE 90 UG/1
2 INHALANT RESPIRATORY (INHALATION) EVERY 4 HOURS PRN
Qty: 8.5 G | Refills: 0 | Status: SHIPPED | OUTPATIENT
Start: 2024-07-22 | End: 2025-07-22

## 2024-07-22 RX ORDER — WARFARIN SODIUM 5 MG/1
5 TABLET ORAL
Qty: 90 TABLET | Refills: 0 | Status: SHIPPED | OUTPATIENT
Start: 2024-07-23 | End: 2025-06-03

## 2024-07-22 RX ORDER — FUROSEMIDE 20 MG/1
20 TABLET ORAL DAILY
Qty: 90 TABLET | Refills: 0 | Status: SHIPPED | OUTPATIENT
Start: 2024-07-22

## 2024-07-22 RX ORDER — ROSUVASTATIN CALCIUM 5 MG/1
5 TABLET, COATED ORAL NIGHTLY
Qty: 90 TABLET | Refills: 0 | Status: SHIPPED | OUTPATIENT
Start: 2024-07-22

## 2024-07-22 RX ORDER — ALBUTEROL SULFATE 0.63 MG/3ML
0.63 SOLUTION RESPIRATORY (INHALATION) ONCE
Status: COMPLETED | OUTPATIENT
Start: 2024-07-22 | End: 2024-07-22

## 2024-07-22 RX ORDER — LEVOTHYROXINE SODIUM 50 UG/1
75 TABLET ORAL DAILY
Qty: 135 TABLET | Refills: 0 | Status: SHIPPED | OUTPATIENT
Start: 2024-07-22 | End: 2024-10-20

## 2024-07-29 ENCOUNTER — ANTICOAGULATION - WARFARIN VISIT (OUTPATIENT)
Dept: CARDIOLOGY | Facility: CLINIC | Age: 89
End: 2024-07-29
Payer: MEDICARE

## 2024-07-29 DIAGNOSIS — Z95.2 S/P TAVR (TRANSCATHETER AORTIC VALVE REPLACEMENT): ICD-10-CM

## 2024-07-29 DIAGNOSIS — I48.20 CHRONIC ATRIAL FIBRILLATION (MULTI): Primary | ICD-10-CM

## 2024-07-29 LAB
INR IN PPP BY COAGULATION ASSAY EXTERNAL: 2.3 (ref 2–3)
PROTHROMBIN TIME (PT) IN PPP BY COAGULATION ASSAY EXTERNAL: NORMAL

## 2024-07-29 NOTE — PROGRESS NOTES
Patient identification verified with 2 identifiers.    Location: [facility] Patient Self-Testing Program [phone]    Referring Physician: MARIANNE BUCKLEY  Enrollment/ Re-enrollment date: 2025   INR Goal: 2.0-3.0  INR monitoring is per [facility] protocol.  Anticoagulation Medication: warfarin  Indication: Atrial Fibrillation/Atrial Flutter    Subjective   Bleeding signs/symptoms: No    Bruising: No   Major bleeding event: No  Thrombosis signs/symptoms: No  Thromboembolic event: No  Missed doses: No  Extra doses: No  Medication changes: No  Dietary changes: No  Change in health: No  Change in activity: No  Alcohol: No  Other concerns: No    Upcoming Procedures:  Does the Patient Have any upcoming procedures that require interruption in anticoagulation therapy? no  Does the patient require bridging? no      Anticoagulation Summary  As of 2024      INR goal:  2.0-3.0   TTR:  87.0% (1.4 mo)   INR used for dosin.30 (2024)   Weekly warfarin total:  35 mg               Assessment/Plan   Therapeutic     1. New dose: no change  Called and left voicemail message with continued dosing instructions.  2. Next INR: 2 weeks      Education provided to patient during the visit:  Patient instructed to call in interim with questions, concerns and changes.   Patient educated on interactions between medications and warfarin.   Patient educated on dietary consistency in vitamin k consumption.   Patient educated on affects of alcohol consumption while taking warfarin.   Patient educated on signs of bleeding/clotting.   Patient educated on compliance with dosing, follow up appointments, and prescribed plan of care.

## 2024-08-08 ASSESSMENT — ENCOUNTER SYMPTOMS: COUGH: 1

## 2024-08-08 NOTE — PROGRESS NOTES
Subjective   Patient ID: Gorge Larsen is a 88 y.o. male who presents for Follow-up and Med Refill.    Patient is here for hospital follow-up.  Was hospitalized for shortness of breath constipation and blood per rectum.  No active GI bleed.  Blood per rectum from hemorrhoids  Patient still gets short of breath easily  Follow-up on hypertension high cholesterol CHF anemia  For last few days having more chest congestion cough and yellow phlegm  Patient mostly sits a lot      Past recap  Patient is here for follow-up  Leg swelling is doing much better  Gets dizzy wondering if he can cut down the dose of Lasix  His legs were swelling up in the pacemaker was not working he got the new pacemaker and it is working well  Refusing overnight pulse oximetry  Follow-up on hypertension high cholesterol hypothyroidism    Patient is here for hospital follow-up.  He was first admitted for COVID.  He had generalized malaise.  He was found to be in decompensated CHF and volume overload and hyponatremia.  Ejection fraction on the echo showed 30% which has gone down from his previous ejection fraction.  Cardiology recommended EPS consult.  This saw the patient and recommended upgrading to biventricular pacemaker as patient is already due for battery change.  Patient is taking Lasix which is keeping the leg swelling down  History feels very tired and fatigued     Past recap  patient is here for follow-up  Did blood work  Having a lot of nosebleeds recently.  He is on Coumadin    patient is accompanied with the daughter and wife  He is doing much better on Zoloft  Did blood work  Patient checks Coumadin at home but does not know who is monitoring his Coumadin  He has gained some weight  Follow-up on hypertension high cholesterol congestive heart failure     patient is here accompanied with daughter.  Daughter is concerned that patient is very anxious.  Anxiety was the cause for his drinking heavily.  He was taken off the Zoloft  because of low sodium.  His sodium is now normal which probably was related to his heart condition.  She wants to give it a try again.  She wants his Coumadin checked.  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  He sits a lot and does not do much around     past recap  Patient complaining of chest congestion coughing up whitish-yellow phlegm   patient here for follow-up on blood work  He is hard of hearing  Overall feeling much better  Leg swelling is not there he is using compression stockings  He had 2D echo done     Patient is here for follow-up on blood work and ultrasound kidneys  He is feeling much better  He is not taking spironolactone  Taking losartan only every other day  His legs are not swelling up  He has more energy and not too lethargic lethargic     Patient here for follow-up on blood work   He urinates a lot goes frequently  He has history of prostate cancer s/p surgery  He does not drink water but drinks iced tea     last visit when I spoke to the daughter she took him to [place]. He was hospitalized for hyponatremia for few days then he was sent to [facility] rehab. There he had syncopal episode and transferred to [facility] emergency room  Patient was found to have reduced ejection fraction from 60 to 30%, severe aortic valve stenosis. He was transferred to [facility] where he had TAVR. Patient is doing much better since the procedure leg swelling has gone down his more alert but still remains quite withdrawn. The daughter thinks that he is still very depressed  He is also hard of hearing and his hearing aids are not working well  He is also not able to see very well because of the cataracts         Patient is here for follow-up on lab  Daughter is still concerned as patient is very lethargic, not taking the medications as prescribed     here for flollow up   Patient is not taking some medications which made him feel bad  He quit taking Synthroid  He quit taking Cardizem  He is not taking Coreg  he feels better without those medications.   But daughter says that he is more tired he is sleeping all the time  He says he feels dizzy  He sleeps in a chair  His swelling in the legs is getting worse  He does not take Lasix every day  He is getting more short of breath recently        Patient is here for follow-up  He saw Dr. Mcdonald  He did proBNP which came back 1111  He had his Lasix 20 mg and potassium 10 mEq he is  He did blood work  He has colon polyps positive for tubular adenoma  He has history of GI bleed and required blood transfusions  For last few weeks he is noticing dark stools off and on and concern about GI bleed again        past recap  To establish PCP  Follow-up on hypertension high cholesterol coronary artery disease  Needs refills on medication  He was hospitalized in  sodium was 116 his hydrochlorothiazide was discontinued he was very weak and confused and he went to the nursing home at [facility] now he is home  Now he is having a lot of swelling in the legs  He is sleeping and it recliner and not keeping his legs elevated     Past medical history: A. 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        Objective   /54   Ht 1.753 m (5' 9\")   Wt 92.5 kg (204 lb)   BMI 30.13 kg/m²     Physical Exam  Vitals reviewed.   Constitutional:       Appearance: Normal appearance.   HENT:      Head: Normocephalic and atraumatic.      Right Ear: Tympanic membrane, ear canal and external ear normal.      Left Ear: Tympanic membrane, ear canal and external ear normal.      Nose:      Comments: Deviated nasal septum to the left     Mouth/Throat:      Pharynx: Oropharynx is clear.   Eyes:      Extraocular Movements: Extraocular movements intact.      Conjunctiva/sclera: Conjunctivae normal.      Pupils: Pupils are equal, round, and reactive to light.   Cardiovascular:      Rate and Rhythm: Normal rate and regular rhythm.      Pulses: Normal pulses.      Heart sounds: Normal heart sounds.   Pulmonary:      Effort: Pulmonary effort is normal.      Breath sounds: Rhonchi present.   Abdominal:      General: Abdomen is flat. Bowel sounds are normal.      Palpations: Abdomen is soft.   Musculoskeletal:      Cervical back: Normal range of motion and neck supple.   Skin:     General: Skin is warm and dry.   Neurological:      General: No focal deficit present.      Mental Status: He is alert and oriented to person, place, and time.   Psychiatric:         Mood and Affect: Mood normal.         Assessment/Plan   Problem List Items Addressed This Visit          Cardiac and Vasculature    Chronic atrial fibrillation (Multi)    Relevant Medications    furosemide (Lasix) 20 mg tablet    warfarin (Coumadin) 5 mg tablet    Dyslipidemia    Relevant Medications    rosuvastatin (Crestor) 5 mg tablet    Other Relevant Orders    CBC    Comprehensive Metabolic Panel    Lipid Panel    Thyroid Stimulating Hormone       Endocrine/Metabolic    Hypothyroidism    Relevant Medications    levothyroxine (Synthroid, Levoxyl) 50 mcg tablet    Other Relevant Orders    CBC    Comprehensive Metabolic Panel    Lipid Panel    Thyroid " Stimulating Hormone     Other Visit Diagnoses       Heart failure with reduced ejection fraction (Multi)        Relevant Medications    furosemide (Lasix) 20 mg tablet    Acute cough        Relevant Medications    albuterol (ProAir HFA) 90 mcg/actuation inhaler    Chronic obstructive pulmonary disease, unspecified COPD type (Multi)        Relevant Medications    albuterol 0.63 mg/3 mL nebulizer solution 0.63 mg (Completed)    Other Relevant Orders    Incentive spirometry Instruct          10/7/22  Patient's hospital chart reviewed  Blood pressure stable  CBC CMP fasting with TSH ordered before next follow-up in a month  Syringing curettage done in right ear with BX removal with some improvement in hearing  Treat with neomycin eardrops  Advised to use mineral oil  Get ears cleaned  Advised to see the ENT to get new hearing aids  Encourage patient to go for cataract surgery  Poor vision poor hearing could be contributing to his anxiety and depression  Had a long discussion with her wife but and what kind of liquids he can have  Patient has a follow-up with cardiologist later today  May need echocardiogram done sooner  Clinically is looking better  We will follow-up in a month        12/3./22  Patient clinically looks much better  He has acute bronchitis  Treat with Augmentin  Venous insufficiency doing much better  Blood work reviewed  Sodium normal  Anemia improving  Follow-up blood work in 3 months  Echocardiogram shows improved ejection fraction from 30 to 35% to 50 to 55%     1/12/23  Had a long discussion with the family and the patient regarding his anxiety and depression and choice of medication  Explained all SSRIs are risk for causing hyponatremia  Family does not want anything to make urine sodium go down as he gets very confused with low sodium  Patient is hard of hearing and has poor vision he does not move around much  May be he needs to change his lifestyle which will make him more happy and less  anxious  Discussed BuSpar dizziness most likely related to his ear  Try meclizine as needed  Family does not want to use buspirone at this time as there is a small chance of hyponatremia related to it  That when I work with lifestyle modification to see if patient feels better if not then we will follow-up  Routine follow-up      3/17/23  INR 2.5 PTT 30.2  Continue current Coumadin  We will start Zoloft 25 mg a day  Blood pressure stable  Cholesterol very well controlled  Blood work looks pretty good  Congestive heart failure compensated no swelling of the leg  Hyponatremia resolved  He got new hearing aids  Follow-up blood work in 3 months    7/15/2023  Advised weight call Dr. Mcdonald office to make sure they are monitoring the Coumadin  Continue Zoloft patient is tolerating  Sodium is in range  Kidney function stable  Cholesterol well controlled  CHF compensated  Follow-up blood work in 6 months    1/13/2024  Blood work reviewed  TSH 6.34  Increase levothyroxine 75 mcg  Blood sugars elevated  Discussed diet exercise and risk for diabetes  Mild anemia still persists most likely related to epistaxis and patient is on Coumadin  Take iron supplement  Use saline nasal spray  Blood pressure stable  Cholesterol very well-controlled  Follow-up in 3 months    2/13/2024  Patient has recovered from COVID completely   patient appears quite compensated with CHF on Lasix  Will repeat CBC BMP in 1 week  Will check overnight pulse oximetry to see if hypoxia could be making him tired blood pressure stable  Patient is going for the battery change and upgrade the coming week  Follow-up after the battery update/pacemaker change    4/11/2024  Chest x-ray ordered to rule out congestive heart failure  Treat with Zithromax for 10 days  Breathing treatment given  Albuterol inhaler  Follow-up if not better    4/29/2024  Will reduce Lasix 20 mg a day  Blood pressure stable  CHF compensated  Encouraged to do overnight pulse oximetry it is  not sleep study which patient thinks it is  Cholesterol very well-controlled  Mild anemia persists but stable  Patient is on blood thinners  Sodium slightly low could be from dehydration will monitor off with reduced dose of Lasix  Follow-up blood work in 3 months    7/22/2024  Breathing treatment given  Albuterol inhaler  Incentive spirometer  Z-Kane  Blood pressure stable  CHF stable  Routine blood work ordered  Follow-up in a month

## 2024-08-12 ENCOUNTER — ANTICOAGULATION - WARFARIN VISIT (OUTPATIENT)
Dept: CARDIOLOGY | Facility: CLINIC | Age: 89
End: 2024-08-12
Payer: MEDICARE

## 2024-08-12 DIAGNOSIS — Z95.2 S/P TAVR (TRANSCATHETER AORTIC VALVE REPLACEMENT): ICD-10-CM

## 2024-08-12 DIAGNOSIS — I48.20 CHRONIC ATRIAL FIBRILLATION (MULTI): Primary | ICD-10-CM

## 2024-08-12 LAB
INR IN PPP BY COAGULATION ASSAY EXTERNAL: 2.4
PROTHROMBIN TIME (PT) IN PPP BY COAGULATION ASSAY EXTERNAL: NORMAL

## 2024-08-12 NOTE — PROGRESS NOTES
Patient identification verified with 2 identifiers.    Location: [facility] Patient Self-Testing Program [phone]    Referring Physician: MARIANNE BUCKLEY  Enrollment/ Re-enrollment date: 2025   INR Goal: 2.0-3.0  INR monitoring is per [facility] protocol.  Anticoagulation Medication: warfarin  Indication: Atrial Fibrillation/Atrial Flutter    Subjective   Bleeding signs/symptoms: No    Bruising: No   Major bleeding event: No  Thrombosis signs/symptoms: No  Thromboembolic event: No  Missed doses: No  Extra doses: No  Medication changes: No  Dietary changes: No  Change in health: No  Change in activity: No  Alcohol: No  Other concerns: No    Upcoming Procedures:  Does the Patient Have any upcoming procedures that require interruption in anticoagulation therapy? no  Does the patient require bridging? no      Anticoagulation Summary  As of 2024      INR goal:  2.0-3.0   TTR:  90.2% (1.9 mo)   INR used for dosin.40 (2024)   Weekly warfarin total:  35 mg               Assessment/Plan   Therapeutic     1. New dose: no change    2. Next INR: 2 weeks      Education provided to patient during the visit:  Patient instructed to call in interim with questions, concerns and changes.   Patient educated on compliance with dosing, follow up appointments, and prescribed plan of care.

## 2024-08-22 DIAGNOSIS — R53.1 GENERALIZED WEAKNESS: ICD-10-CM

## 2024-08-22 DIAGNOSIS — E78.5 DYSLIPIDEMIA: ICD-10-CM

## 2024-08-22 RX ORDER — ROSUVASTATIN CALCIUM 5 MG/1
5 TABLET, COATED ORAL NIGHTLY
Qty: 90 TABLET | Refills: 0 | Status: SHIPPED | OUTPATIENT
Start: 2024-08-22

## 2024-08-22 RX ORDER — POTASSIUM CHLORIDE 1.5 G/1.58G
20 POWDER, FOR SOLUTION ORAL DAILY
Qty: 90 PACKET | Refills: 0 | Status: SHIPPED | OUTPATIENT
Start: 2024-08-22 | End: 2024-11-20

## 2024-08-26 ENCOUNTER — ANTICOAGULATION - WARFARIN VISIT (OUTPATIENT)
Dept: CARDIOLOGY | Facility: CLINIC | Age: 89
End: 2024-08-26
Payer: MEDICARE

## 2024-08-26 DIAGNOSIS — Z95.2 S/P TAVR (TRANSCATHETER AORTIC VALVE REPLACEMENT): ICD-10-CM

## 2024-08-26 DIAGNOSIS — I48.20 CHRONIC ATRIAL FIBRILLATION (MULTI): Primary | ICD-10-CM

## 2024-08-26 LAB
INR IN PPP BY COAGULATION ASSAY EXTERNAL: 2.5
PROTHROMBIN TIME (PT) IN PPP BY COAGULATION ASSAY EXTERNAL: NORMAL

## 2024-08-26 NOTE — PROGRESS NOTES
Patient identification verified with 2 identifiers.    Location: [facility] Patient Self-Testing Program [phone]    Referring Physician: DR.THOMAS BUCKLEY  Enrollment/ Re-enrollment date: 2025   INR Goal: 2.0-3.0  INR monitoring is per [facility] protocol.  Anticoagulation Medication: warfarin  Indication: Atrial Fibrillation/Atrial Flutter    Subjective   Bleeding signs/symptoms: No    Bruising: No   Major bleeding event: No  Thrombosis signs/symptoms: No  Thromboembolic event: No  Missed doses: No  Extra doses: No  Medication changes: No  Dietary changes: No  Change in health: No  Change in activity: No  Alcohol: No  Other concerns: No    Upcoming Procedures:  Does the Patient Have any upcoming procedures that require interruption in anticoagulation therapy? no  Does the patient require bridging? no      Anticoagulation Summary  As of 2024      INR goal:  2.0-3.0   TTR:  92.1% (2.4 mo)   INR used for dosin.50 (2024)   Weekly warfarin total:  35 mg               Assessment/Plan   Therapeutic     1. New dose: no change  SPOKE TO WIFE CORINNE, CONFIRMED CURRENT DOSING INSTRUCTIONS. CORINNE VERBALIZED CORRECTLY  2. Next INR: 2 weeks      Education provided to patient during the visit:  Patient instructed to call in interim with questions, concerns and changes.   Patient educated on compliance with dosing, follow up appointments, and prescribed plan of care.

## 2024-08-28 ENCOUNTER — HOSPITAL ENCOUNTER (OUTPATIENT)
Dept: CARDIOLOGY | Facility: CLINIC | Age: 89
Discharge: HOME | End: 2024-08-28
Payer: MEDICARE

## 2024-08-28 DIAGNOSIS — I48.0 PAF (PAROXYSMAL ATRIAL FIBRILLATION) (MULTI): ICD-10-CM

## 2024-08-28 DIAGNOSIS — I42.8 NONISCHEMIC CARDIOMYOPATHY (MULTI): ICD-10-CM

## 2024-08-28 DIAGNOSIS — I44.2 CHB (COMPLETE HEART BLOCK) (MULTI): ICD-10-CM

## 2024-08-28 DIAGNOSIS — Z95.810 IMPLANTABLE CARDIOVERTER-DEFIBRILLATOR (ICD) IN SITU: ICD-10-CM

## 2024-09-05 ENCOUNTER — APPOINTMENT (OUTPATIENT)
Dept: RADIOLOGY | Facility: HOSPITAL | Age: 89
End: 2024-09-05
Payer: MEDICARE

## 2024-09-05 ENCOUNTER — HOSPITAL ENCOUNTER (EMERGENCY)
Facility: HOSPITAL | Age: 89
Discharge: HOME | End: 2024-09-05
Attending: STUDENT IN AN ORGANIZED HEALTH CARE EDUCATION/TRAINING PROGRAM
Payer: MEDICARE

## 2024-09-05 ENCOUNTER — APPOINTMENT (OUTPATIENT)
Dept: CARDIOLOGY | Facility: HOSPITAL | Age: 89
End: 2024-09-05
Payer: MEDICARE

## 2024-09-05 VITALS
WEIGHT: 210.76 LBS | HEART RATE: 72 BPM | BODY MASS INDEX: 31.12 KG/M2 | OXYGEN SATURATION: 98 % | DIASTOLIC BLOOD PRESSURE: 79 MMHG | SYSTOLIC BLOOD PRESSURE: 175 MMHG | RESPIRATION RATE: 14 BRPM | TEMPERATURE: 98.4 F

## 2024-09-05 DIAGNOSIS — E86.0 DEHYDRATION: ICD-10-CM

## 2024-09-05 DIAGNOSIS — R53.1 GENERALIZED WEAKNESS: Primary | ICD-10-CM

## 2024-09-05 LAB
ALBUMIN SERPL-MCNC: 3.8 G/DL (ref 3.5–5)
ALP BLD-CCNC: 107 U/L (ref 35–125)
ALT SERPL-CCNC: 12 U/L (ref 5–40)
ANION GAP SERPL CALC-SCNC: 10 MMOL/L
AST SERPL-CCNC: 23 U/L (ref 5–40)
ATRIAL RATE: 50 BPM
BASOPHILS # BLD AUTO: 0.11 X10*3/UL (ref 0–0.1)
BASOPHILS NFR BLD AUTO: 1.9 %
BILIRUB SERPL-MCNC: 0.5 MG/DL (ref 0.1–1.2)
BUN SERPL-MCNC: 33 MG/DL (ref 8–25)
CALCIUM SERPL-MCNC: 8.9 MG/DL (ref 8.5–10.4)
CHLORIDE SERPL-SCNC: 95 MMOL/L (ref 97–107)
CO2 SERPL-SCNC: 23 MMOL/L (ref 24–31)
CREAT SERPL-MCNC: 1.2 MG/DL (ref 0.4–1.6)
EGFRCR SERPLBLD CKD-EPI 2021: 59 ML/MIN/1.73M*2
EOSINOPHIL # BLD AUTO: 0.41 X10*3/UL (ref 0–0.4)
EOSINOPHIL NFR BLD AUTO: 6.9 %
ERYTHROCYTE [DISTWIDTH] IN BLOOD BY AUTOMATED COUNT: 14.4 % (ref 11.5–14.5)
FLUAV RNA RESP QL NAA+PROBE: NOT DETECTED
FLUBV RNA RESP QL NAA+PROBE: NOT DETECTED
GLUCOSE SERPL-MCNC: 117 MG/DL (ref 65–99)
HCT VFR BLD AUTO: 36.4 % (ref 41–52)
HGB BLD-MCNC: 11.8 G/DL (ref 13.5–17.5)
IMM GRANULOCYTES # BLD AUTO: 0.02 X10*3/UL (ref 0–0.5)
IMM GRANULOCYTES NFR BLD AUTO: 0.3 % (ref 0–0.9)
INR PPP: 2.6 (ref 0.9–1.2)
LYMPHOCYTES # BLD AUTO: 0.92 X10*3/UL (ref 0.8–3)
LYMPHOCYTES NFR BLD AUTO: 15.5 %
MCH RBC QN AUTO: 30.6 PG (ref 26–34)
MCHC RBC AUTO-ENTMCNC: 32.4 G/DL (ref 32–36)
MCV RBC AUTO: 94 FL (ref 80–100)
MONOCYTES # BLD AUTO: 0.65 X10*3/UL (ref 0.05–0.8)
MONOCYTES NFR BLD AUTO: 11 %
NEUTROPHILS # BLD AUTO: 3.82 X10*3/UL (ref 1.6–5.5)
NEUTROPHILS NFR BLD AUTO: 64.4 %
NRBC BLD-RTO: 0 /100 WBCS (ref 0–0)
NT-PROBNP SERPL-MCNC: 1584 PG/ML (ref 0–852)
PLATELET # BLD AUTO: 187 X10*3/UL (ref 150–450)
POTASSIUM SERPL-SCNC: 4.2 MMOL/L (ref 3.4–5.1)
PROT SERPL-MCNC: 7 G/DL (ref 5.9–7.9)
PROTHROMBIN TIME: 24.9 SECONDS (ref 9.3–12.7)
Q ONSET: 198 MS
QRS COUNT: 12 BEATS
QRS DURATION: 160 MS
QT INTERVAL: 462 MS
QTC CALCULATION(BAZETT): 498 MS
QTC FREDERICIA: 486 MS
R AXIS: 256 DEGREES
RBC # BLD AUTO: 3.86 X10*6/UL (ref 4.5–5.9)
SARS-COV-2 RNA RESP QL NAA+PROBE: NOT DETECTED
SODIUM SERPL-SCNC: 128 MMOL/L (ref 133–145)
T AXIS: 52 DEGREES
T OFFSET: 429 MS
TROPONIN T SERPL-MCNC: 15 NG/L
VENTRICULAR RATE: 70 BPM
WBC # BLD AUTO: 5.9 X10*3/UL (ref 4.4–11.3)

## 2024-09-05 PROCEDURE — 36415 COLL VENOUS BLD VENIPUNCTURE: CPT | Performed by: STUDENT IN AN ORGANIZED HEALTH CARE EDUCATION/TRAINING PROGRAM

## 2024-09-05 PROCEDURE — 93005 ELECTROCARDIOGRAM TRACING: CPT

## 2024-09-05 PROCEDURE — 99284 EMERGENCY DEPT VISIT MOD MDM: CPT

## 2024-09-05 PROCEDURE — 71045 X-RAY EXAM CHEST 1 VIEW: CPT | Performed by: RADIOLOGY

## 2024-09-05 PROCEDURE — 84484 ASSAY OF TROPONIN QUANT: CPT | Performed by: STUDENT IN AN ORGANIZED HEALTH CARE EDUCATION/TRAINING PROGRAM

## 2024-09-05 PROCEDURE — 80053 COMPREHEN METABOLIC PANEL: CPT | Performed by: STUDENT IN AN ORGANIZED HEALTH CARE EDUCATION/TRAINING PROGRAM

## 2024-09-05 PROCEDURE — 2500000004 HC RX 250 GENERAL PHARMACY W/ HCPCS (ALT 636 FOR OP/ED): Performed by: STUDENT IN AN ORGANIZED HEALTH CARE EDUCATION/TRAINING PROGRAM

## 2024-09-05 PROCEDURE — 96361 HYDRATE IV INFUSION ADD-ON: CPT

## 2024-09-05 PROCEDURE — 71045 X-RAY EXAM CHEST 1 VIEW: CPT

## 2024-09-05 PROCEDURE — 99283 EMERGENCY DEPT VISIT LOW MDM: CPT | Mod: 25

## 2024-09-05 PROCEDURE — 85610 PROTHROMBIN TIME: CPT | Performed by: STUDENT IN AN ORGANIZED HEALTH CARE EDUCATION/TRAINING PROGRAM

## 2024-09-05 PROCEDURE — 85025 COMPLETE CBC W/AUTO DIFF WBC: CPT | Performed by: STUDENT IN AN ORGANIZED HEALTH CARE EDUCATION/TRAINING PROGRAM

## 2024-09-05 PROCEDURE — 87636 SARSCOV2 & INF A&B AMP PRB: CPT | Performed by: STUDENT IN AN ORGANIZED HEALTH CARE EDUCATION/TRAINING PROGRAM

## 2024-09-05 PROCEDURE — 96360 HYDRATION IV INFUSION INIT: CPT

## 2024-09-05 PROCEDURE — 83880 ASSAY OF NATRIURETIC PEPTIDE: CPT | Performed by: STUDENT IN AN ORGANIZED HEALTH CARE EDUCATION/TRAINING PROGRAM

## 2024-09-05 RX ADMIN — SODIUM CHLORIDE 1000 ML: 9 INJECTION, SOLUTION INTRAVENOUS at 03:06

## 2024-09-05 ASSESSMENT — PAIN - FUNCTIONAL ASSESSMENT: PAIN_FUNCTIONAL_ASSESSMENT: 0-10

## 2024-09-05 ASSESSMENT — PAIN SCALES - GENERAL: PAINLEVEL_OUTOF10: 4

## 2024-09-05 ASSESSMENT — PAIN DESCRIPTION - PAIN TYPE: TYPE: ACUTE PAIN

## 2024-09-05 ASSESSMENT — PAIN DESCRIPTION - LOCATION: LOCATION: OTHER (COMMENT)

## 2024-09-05 NOTE — DISCHARGE INSTRUCTIONS
Follow-up with your primary care physician by calling the office today to schedule close follow-up appointment.  If symptoms worsen or change he can return at any time for further evaluation and treatment.

## 2024-09-05 NOTE — ED PROVIDER NOTES
HPI   Chief Complaint   Patient presents with    Dehydration    Weakness, Gen       Patient is an 88-year-old male that presents emergency room for evaluation of generalized weakness, dehydration.  Patient states that for the last several days he has been having increasing weakness and fatigue.  He has been not eating and drinking as well and feels as though he is very dehydrated.  He states that is made it difficult to try and get around recently.  He denies headache, chest pain, shortness of breath, abdominal pain, nausea or vomiting.  He denies any cough or congestion and denies any recent sick contacts.      History provided by:  Patient          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 (Multi)     Coronary artery disease with unstable angina pectoris, unspecified vessel or lesion type, unspecified whether native or transplanted heart    Cancer (Multi)     CHF (congestive heart failure) (Multi)     COPD (chronic obstructive pulmonary disease) (Multi)     Essential (primary) hypertension 11/05/2022    Hypertension, unspecified type    Personal history of diseases of the blood and blood-forming organs and certain disorders involving the immune mechanism     History of bleeding disorder    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 Cardiac Cath Lab;  Service: Electrophysiology;  Laterality: Left;  NO AUTH NEEDED     CARDIAC ELECTROPHYSIOLOGY PROCEDURE Left 2024    Procedure: ICD BIV Implant;  Surgeon: Walker Mcdonald MD;  Location: [facility] Cardiac Cath Lab;  Service: Electrophysiology;  Laterality: Left;  upgrade dual PPM to CRT-D, Cap current RV lead. CASE# [id number]    CARDIAC ELECTROPHYSIOLOGY PROCEDURE Left 2024    Procedure: ICD Upgrade Biventricular;  Surgeon: Walker Mcdonald MD;  Location: [facility] Cardiac Cath Lab;  Service: Electrophysiology;  Laterality: Left;  upgrade dual PPM to CRT-D (LV lead implant), cap RV lead, CASE#[id number]    OTHER SURGICAL HISTORY  2021    Colonoscopy    OTHER SURGICAL HISTORY  2021    Colon surgery     OTHER SURGICAL HISTORY  2021    Pacemaker insertion    OTHER SURGICAL HISTORY  2021    Coronary artery bypass graft     OTHER SURGICAL HISTORY  2023    Throat surgery     Family History   Problem Relation Name Age of Onset    Heart attack Mother      Heart disease Mother      Other (Car accident) Father       Social History     Tobacco Use    Smoking status: Former     Current packs/day: 0.00     Types: Cigarettes     Quit date: 1984     Years since quittin.2    Smokeless tobacco: Never   Vaping Use    Vaping status: Never Used   Substance Use Topics    Alcohol use: Never    Drug use: Never       Physical Exam   ED Triage Vitals [24 0213]   Temperature Heart Rate Respirations BP   36.9 °C (98.4 °F) 70 (!) 22 (!) 192/89      Pulse Ox Temp Source Heart Rate Source Patient Position   98 % Oral Monitor --      BP Location FiO2 (%)     -- --       Physical Exam  Vitals and nursing note reviewed.   Constitutional:       Appearance: Normal appearance.   HENT:      Head: Normocephalic.      Mouth/Throat:      Mouth: Mucous membranes are dry.   Eyes:      Extraocular Movements: Extraocular movements intact.      Pupils: Pupils are equal, round, and reactive to light.   Cardiovascular:      Rate and Rhythm: Normal rate and regular  rhythm.      Pulses: Normal pulses.   Pulmonary:      Effort: Pulmonary effort is normal. No respiratory distress.      Breath sounds: No stridor. No wheezing or rhonchi.   Abdominal:      General: Abdomen is flat.      Tenderness: There is no abdominal tenderness. There is no guarding or rebound.   Musculoskeletal:         General: Swelling (Trace peripheral pitting edema bilaterally in the lower extremities) present.   Skin:     General: Skin is warm and dry.   Neurological:      General: No focal deficit present.      Mental Status: He is alert and oriented to person, place, and time.       Recent Results (from the past 24 hour(s))   CBC and Auto Differential    Collection Time: 09/05/24  2:22 AM   Result Value Ref Range    WBC 5.9 4.4 - 11.3 x10*3/uL    nRBC 0.0 0.0 - 0.0 /100 WBCs    RBC 3.86 (L) 4.50 - 5.90 x10*6/uL    Hemoglobin 11.8 (L) 13.5 - 17.5 g/dL    Hematocrit 36.4 (L) 41.0 - 52.0 %    MCV 94 80 - 100 fL    MCH 30.6 26.0 - 34.0 pg    MCHC 32.4 32.0 - 36.0 g/dL    RDW 14.4 11.5 - 14.5 %    Platelets 187 150 - 450 x10*3/uL    Neutrophils % 64.4 40.0 - 80.0 %    Immature Granulocytes %, Automated 0.3 0.0 - 0.9 %    Lymphocytes % 15.5 13.0 - 44.0 %    Monocytes % 11.0 2.0 - 10.0 %    Eosinophils % 6.9 0.0 - 6.0 %    Basophils % 1.9 0.0 - 2.0 %    Neutrophils Absolute 3.82 1.60 - 5.50 x10*3/uL    Immature Granulocytes Absolute, Automated 0.02 0.00 - 0.50 x10*3/uL    Lymphocytes Absolute 0.92 0.80 - 3.00 x10*3/uL    Monocytes Absolute 0.65 0.05 - 0.80 x10*3/uL    Eosinophils Absolute 0.41 (H) 0.00 - 0.40 x10*3/uL    Basophils Absolute 0.11 (H) 0.00 - 0.10 x10*3/uL   Comprehensive metabolic panel    Collection Time: 09/05/24  2:22 AM   Result Value Ref Range    Glucose 117 (H) 65 - 99 mg/dL    Sodium 128 (L) 133 - 145 mmol/L    Potassium 4.2 3.4 - 5.1 mmol/L    Chloride 95 (L) 97 - 107 mmol/L    Bicarbonate 23 (L) 24 - 31 mmol/L    Urea Nitrogen 33 (H) 8 - 25 mg/dL    Creatinine 1.20 0.40 - 1.60 mg/dL     eGFR 59 (L) >60 mL/min/1.73m*2    Calcium 8.9 8.5 - 10.4 mg/dL    Albumin 3.8 3.5 - 5.0 g/dL    Alkaline Phosphatase 107 35 - 125 U/L    Total Protein 7.0 5.9 - 7.9 g/dL    AST 23 5 - 40 U/L    Bilirubin, Total 0.5 0.1 - 1.2 mg/dL    ALT 12 5 - 40 U/L    Anion Gap 10 <=19 mmol/L   NT Pro-BNP    Collection Time: 09/05/24  2:22 AM   Result Value Ref Range    PROBNP 1,584 (H) 0 - 852 pg/mL   Serial Troponin, Initial ([facility])    Collection Time: 09/05/24  2:22 AM   Result Value Ref Range    Troponin T, High Sensitivity 15 (HH) <=14 ng/L   Protime-INR    Collection Time: 09/05/24  3:35 AM   Result Value Ref Range    Protime 24.9 (H) 9.3 - 12.7 seconds    INR 2.6 (H) 0.9 - 1.2   Sars-CoV-2 PCR    Collection Time: 09/05/24  3:53 AM   Result Value Ref Range    Coronavirus 2019, PCR Not Detected Not Detected   Influenza A, and B PCR    Collection Time: 09/05/24  3:53 AM   Result Value Ref Range    Flu A Result Not Detected Not Detected    Flu B Result Not Detected Not Detected         ED Course & MDM   ED Course as of 09/05/24 0653   Thu Sep 05, 2024   0319 EKG Time:0314  EKG Interpretation time:0319  EKG Interpretation: EKG shows ventricular paced rhythm with a rate of 70 bpm, right axis deviation, QTc 498, no evidence of STEMI    EKG was interpreted by myself independently [JL]      ED Course User Index  [JL] Maik Resendiz DO         Diagnoses as of 09/05/24 0653   Generalized weakness   Dehydration                 No data recorded     Florentino Coma Scale Score: 15 (09/05/24 0215 : Helen Lawrence RN)       NIH Stroke Scale: 0 (09/05/24 0230 : Maik Resendiz DO)                   Medical Decision Making  Patient is an 88-year-old male that presents emergency room for evaluation of dehydration and generalized weakness.  Patient uncomfortable appearing but in no obvious distress.  He does have dry mucous membranes and will be given IV fluids at this time out of concern for possible dehydration given his poor oral intake.   Blood work ordered including CBC, CMP, troponin, BNP, testing for COVID-19, influenza.  EKG shows ventricular paced rhythm but no evidence of STEMI or active ischemia.  He did test negative for COVID-19 and influenza.  He did have mildly elevated BNP of 1500 however patient does have a known history of congestive heart failure.  Chest x-ray shows no evidence of pneumonia, pneumothorax, pulmonary edema and patient's oxygen saturation remained stable on room air I have low suspicion of cardiac source for patient's generalized weakness.  He does have mildly elevated creatinine of 1.2 as well as mild hyponatremia with a sodium 128 consistent with dehydration.  He was given IV fluids with improvement of symptoms.  He was able to ambulate in the emergency department without difficulty and is felt to be safe for discharge home at this time.  He will follow-up with his primary care physician as an outpatient.  Return precautions were discussed with patient.        Procedure  Procedures     Maik Resendiz, DO  09/05/24 0657

## 2024-09-09 ENCOUNTER — ANTICOAGULATION - WARFARIN VISIT (OUTPATIENT)
Dept: CARDIOLOGY | Facility: CLINIC | Age: 89
End: 2024-09-09
Payer: MEDICARE

## 2024-09-09 DIAGNOSIS — Z95.2 S/P TAVR (TRANSCATHETER AORTIC VALVE REPLACEMENT): ICD-10-CM

## 2024-09-09 DIAGNOSIS — I48.20 CHRONIC ATRIAL FIBRILLATION (MULTI): Primary | ICD-10-CM

## 2024-09-09 LAB
INR IN PPP BY COAGULATION ASSAY EXTERNAL: 2.2
PROTHROMBIN TIME (PT) IN PPP BY COAGULATION ASSAY EXTERNAL: NORMAL

## 2024-09-09 NOTE — PROGRESS NOTES
Patient identification verified with 2 identifiers.    Location: [facility] Patient Self-Testing Program [phone]    Referring Physician: DR. MARIANNE BUCKLEY  Enrollment/ Re-enrollment date: 2025   INR Goal: 2.0-3.0  INR monitoring is per [facility] protocol.  Anticoagulation Medication: warfarin  Indication: Atrial Fibrillation/Atrial Flutter    Subjective   Bleeding signs/symptoms: No    Bruising: No   Major bleeding event: No  Thrombosis signs/symptoms: No  Thromboembolic event: No  Missed doses: No  Extra doses: No  Medication changes: Yes  LASIX WAS STOPPED 4-5 DAYS AGO  Dietary changes: No  Change in health: No  Change in activity: No  Alcohol: No  Other concerns: No    Upcoming Procedures:  Does the Patient Have any upcoming procedures that require interruption in anticoagulation therapy? no  Does the patient require bridging? no      Anticoagulation Summary  As of 2024      INR goal:  2.0-3.0   TTR:  93.4% (2.8 mo)   INR used for dosin.20 (2024)   Weekly warfarin total:  35 mg               Assessment/Plan   Therapeutic     1. New dose: no change  SPOKE TO CORINNE. CONFIRMED CURRENT DOSING INSTRUCTIONS AND VERBALIZED CORRECTLY  2. Next INR: 2 weeks      Education provided to patient during the visit:  Patient instructed to call in interim with questions, concerns and changes.   Patient educated on compliance with dosing, follow up appointments, and prescribed plan of care.

## 2024-09-12 ENCOUNTER — APPOINTMENT (OUTPATIENT)
Dept: PRIMARY CARE | Facility: CLINIC | Age: 89
End: 2024-09-12
Payer: MEDICARE

## 2024-09-12 VITALS
HEIGHT: 69 IN | SYSTOLIC BLOOD PRESSURE: 146 MMHG | BODY MASS INDEX: 30.81 KG/M2 | WEIGHT: 208 LBS | DIASTOLIC BLOOD PRESSURE: 66 MMHG

## 2024-09-12 DIAGNOSIS — R39.89 URINARY PROBLEM: ICD-10-CM

## 2024-09-12 DIAGNOSIS — E03.9 HYPOTHYROIDISM, UNSPECIFIED TYPE: ICD-10-CM

## 2024-09-12 DIAGNOSIS — I10 BENIGN ESSENTIAL HTN: ICD-10-CM

## 2024-09-12 DIAGNOSIS — E87.1 HYPONATREMIA: Primary | ICD-10-CM

## 2024-09-12 DIAGNOSIS — E78.5 DYSLIPIDEMIA: ICD-10-CM

## 2024-09-12 PROCEDURE — 99214 OFFICE O/P EST MOD 30 MIN: CPT | Performed by: INTERNAL MEDICINE

## 2024-09-12 PROCEDURE — 1157F ADVNC CARE PLAN IN RCRD: CPT | Performed by: INTERNAL MEDICINE

## 2024-09-12 PROCEDURE — 1159F MED LIST DOCD IN RCRD: CPT | Performed by: INTERNAL MEDICINE

## 2024-09-12 PROCEDURE — 3077F SYST BP >= 140 MM HG: CPT | Performed by: INTERNAL MEDICINE

## 2024-09-12 PROCEDURE — 3078F DIAST BP <80 MM HG: CPT | Performed by: INTERNAL MEDICINE

## 2024-09-12 RX ORDER — TAMSULOSIN HYDROCHLORIDE 0.4 MG/1
0.4 CAPSULE ORAL DAILY
Qty: 90 CAPSULE | Refills: 0 | Status: SHIPPED | OUTPATIENT
Start: 2024-09-12 | End: 2025-09-12

## 2024-09-12 ASSESSMENT — ENCOUNTER SYMPTOMS: COUGH: 1

## 2024-09-12 NOTE — PROGRESS NOTES
Subjective   Patient ID: Gorge Larsen is a 88 y.o. male who presents for Follow-up.    Patient is here for follow-up on ER visit  Feels dehydrated and weak.  He was given fluids  He is feeling better  Follow-up on hypertension CHF  Having increased urinary frequency at night    Past recap  patient is here for hospital follow-up.  Was hospitalized for shortness of breath constipation and blood per rectum.  No active GI bleed.  Blood per rectum from hemorrhoids  Patient still gets short of breath easily  Follow-up on hypertension high cholesterol CHF anemia  For last few days having more chest congestion cough and yellow phlegm  Patient mostly sits a lot    Patient is here for follow-up  Leg swelling is doing much better  Gets dizzy wondering if he can cut down the dose of Lasix  His legs were swelling up in the pacemaker was not working he got the new pacemaker and it is working well  Refusing overnight pulse oximetry  Follow-up on hypertension high cholesterol hypothyroidism    Patient is here for hospital follow-up.  He was first admitted for COVID.  He had generalized malaise.  He was found to be in decompensated CHF and volume overload and hyponatremia.  Ejection fraction on the echo showed 30% which has gone down from his previous ejection fraction.  Cardiology recommended EPS consult.  This saw the patient and recommended upgrading to biventricular pacemaker as patient is already due for battery change.  Patient is taking Lasix which is keeping the leg swelling down  History feels very tired and fatigued     Past recap  patient is here for follow-up  Did blood work  Having a lot of nosebleeds recently.  He is on Coumadin    patient is accompanied with the daughter and wife  He is doing much better on Zoloft  Did blood work  Patient checks Coumadin at home but does not know who is monitoring his Coumadin  He has gained some weight  Follow-up on hypertension high cholesterol congestive heart failure      patient is here accompanied with daughter.  Daughter is concerned that patient is very anxious.  Anxiety was the cause for his drinking heavily.  He was taken off the Zoloft because of low sodium.  His sodium is now normal which probably was related to his heart condition.  She wants to give it a try again.  She wants his Coumadin checked.  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  He sits a lot and does not do much around     past recap  Patient complaining of chest congestion coughing up whitish-yellow phlegm   patient here for follow-up on blood work  He is hard of hearing  Overall feeling much better  Leg swelling is not there he is using compression stockings  He had 2D echo done     Patient is here for follow-up on blood work and ultrasound kidneys  He is feeling much better  He is not taking spironolactone  Taking losartan only every other day  His legs are not swelling up  He has more energy and not too lethargic lethargic     Patient here for follow-up on blood work   He urinates a lot goes frequently  He has history of prostate cancer s/p surgery  He does not drink water but drinks iced tea     last visit when I spoke to the daughter she took him to [facility]. He was hospitalized for hyponatremia for few days then he was sent to [facility] rehab. There he had syncopal episode and transferred to [facility] emergency room  Patient was found to have reduced ejection fraction from 60 to 30%, severe aortic valve stenosis. He was transferred to [facility] where he had TAVR. Patient is doing much better since the procedure leg swelling has gone down his more alert but still remains quite withdrawn. The daughter thinks  that he is still very depressed  He is also hard of hearing and his hearing aids are not working well  He is also not able to see very well because of the cataracts        Patient is here for follow-up on lab  Daughter is still concerned as patient is very lethargic, not taking the medications as prescribed     here for flollow up   Patient is not taking some medications which made him feel bad  He quit taking Synthroid  He quit taking Cardizem  He is not taking Coreg  he feels better without those medications.   But daughter says that he is more tired he is sleeping all the time  He says he feels dizzy  He sleeps in a chair  His swelling in the legs is getting worse  He does not take Lasix every day  He is getting more short of breath recently        Patient is here for follow-up  He saw Dr. Mcdonald  He did proBNP which came back 1111  He had his Lasix 20 mg and potassium 10 mEq he is  He did blood work  He has colon polyps positive for tubular adenoma  He has history of GI bleed and required blood transfusions  For last few weeks he is noticing dark stools off and on and concern about GI bleed again        past recap  To establish PCP  Follow-up on hypertension high cholesterol coronary artery disease  Needs refills on medication  He was hospitalized in December 2 sodium was 116 his hydrochlorothiazide was discontinued he was very weak and confused and he went to the nursing home at [facility] now he is home  Now he is having a lot of swelling in the legs  He is sleeping and it recliner and not keeping his legs elevated     Past medical history: A. 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        Objective   /66   Ht 1.753 m (5' 9\")   Wt 94.3 kg (208 lb)   BMI 30.72 kg/m²     Physical Exam  Vitals reviewed.   Constitutional:       Appearance: Normal appearance.   HENT:      Head: Normocephalic and atraumatic.      Right Ear: Tympanic membrane, ear canal and external ear normal.      Left Ear: Tympanic membrane, ear canal and external ear normal.      Nose:      Comments: Deviated nasal septum to the left     Mouth/Throat:      Pharynx: Oropharynx is clear.   Eyes:      Extraocular Movements: Extraocular movements intact.      Conjunctiva/sclera: Conjunctivae normal.      Pupils: Pupils are equal, round, and reactive to light.   Cardiovascular:      Rate and Rhythm: Normal rate and regular rhythm.      Pulses: Normal pulses.      Heart sounds: Normal heart sounds.   Pulmonary:      Effort: Pulmonary effort is normal.      Breath sounds: Rhonchi present.   Abdominal:      General: Abdomen is flat. Bowel sounds are normal.      Palpations: Abdomen is soft.   Musculoskeletal:      Cervical back: Normal range of motion and neck supple.   Skin:     General: Skin is warm and dry.   Neurological:      General: No focal deficit present.      Mental Status: He is alert and oriented to person, place, and time.   Psychiatric:         Mood and Affect: Mood normal.       Assessment/Plan   Problem List Items Addressed This Visit    None      10/7/22  Patient's hospital chart reviewed  Blood pressure stable  CBC CMP fasting with TSH ordered before next follow-up in a month  Syringing curettage done in right ear with BX removal with some improvement in hearing  Treat with neomycin eardrops  Advised to use mineral oil  Get ears cleaned  Advised to see the ENT to get new hearing aids  Encourage patient to go for cataract " surgery  Poor vision poor hearing could be contributing to his anxiety and depression  Had a long discussion with her wife but and what kind of liquids he can have  Patient has a follow-up with cardiologist later today  May need echocardiogram done sooner  Clinically is looking better  We will follow-up in a month        12/3./22  Patient clinically looks much better  He has acute bronchitis  Treat with Augmentin  Venous insufficiency doing much better  Blood work reviewed  Sodium normal  Anemia improving  Follow-up blood work in 3 months  Echocardiogram shows improved ejection fraction from 30 to 35% to 50 to 55%     1/12/23  Had a long discussion with the family and the patient regarding his anxiety and depression and choice of medication  Explained all SSRIs are risk for causing hyponatremia  Family does not want anything to make urine sodium go down as he gets very confused with low sodium  Patient is hard of hearing and has poor vision he does not move around much  May be he needs to change his lifestyle which will make him more happy and less anxious  Discussed BuSpar dizziness most likely related to his ear  Try meclizine as needed  Family does not want to use buspirone at this time as there is a small chance of hyponatremia related to it  That when I work with lifestyle modification to see if patient feels better if not then we will follow-up  Routine follow-up      3/17/23  INR 2.5 PTT 30.2  Continue current Coumadin  We will start Zoloft 25 mg a day  Blood pressure stable  Cholesterol very well controlled  Blood work looks pretty good  Congestive heart failure compensated no swelling of the leg  Hyponatremia resolved  He got new hearing aids  Follow-up blood work in 3 months    7/15/2023  Advised weight call Dr. Mcdonald office to make sure they are monitoring the Coumadin  Continue Zoloft patient is tolerating  Sodium is in range  Kidney function stable  Cholesterol well controlled  CHF  compensated  Follow-up blood work in 6 months    1/13/2024  Blood work reviewed  TSH 6.34  Increase levothyroxine 75 mcg  Blood sugars elevated  Discussed diet exercise and risk for diabetes  Mild anemia still persists most likely related to epistaxis and patient is on Coumadin  Take iron supplement  Use saline nasal spray  Blood pressure stable  Cholesterol very well-controlled  Follow-up in 3 months    2/13/2024  Patient has recovered from COVID completely   patient appears quite compensated with CHF on Lasix  Will repeat CBC BMP in 1 week  Will check overnight pulse oximetry to see if hypoxia could be making him tired blood pressure stable  Patient is going for the battery change and upgrade the coming week  Follow-up after the battery update/pacemaker change    4/11/2024  Chest x-ray ordered to rule out congestive heart failure  Treat with Zithromax for 10 days  Breathing treatment given  Albuterol inhaler  Follow-up if not better    4/29/2024  Will reduce Lasix 20 mg a day  Blood pressure stable  CHF compensated  Encouraged to do overnight pulse oximetry it is not sleep study which patient thinks it is  Cholesterol very well-controlled  Mild anemia persists but stable  Patient is on blood thinners  Sodium slightly low could be from dehydration will monitor off with reduced dose of Lasix  Follow-up blood work in 3 months    7/22/2024  Breathing treatment given  Albuterol inhaler  Incentive spirometer  Z-Kane  Blood pressure stable  CHF stable  Routine blood work ordered  Follow-up in a month    9/12/2024  Blood work in ER reports reviewed  Low sodium at 128  Will check ultrasound kidney and bladder  Flomax 0.4 to make sure patient urinates well  Wondering if SSRI again causing low sodium         HPI

## 2024-09-16 ENCOUNTER — APPOINTMENT (OUTPATIENT)
Dept: RADIOLOGY | Facility: HOSPITAL | Age: 89
End: 2024-09-16
Payer: MEDICARE

## 2024-09-17 ENCOUNTER — HOSPITAL ENCOUNTER (OUTPATIENT)
Dept: RADIOLOGY | Facility: CLINIC | Age: 89
Discharge: HOME | End: 2024-09-17
Payer: MEDICARE

## 2024-09-17 ENCOUNTER — LAB (OUTPATIENT)
Dept: LAB | Facility: LAB | Age: 89
End: 2024-09-17
Payer: MEDICARE

## 2024-09-17 DIAGNOSIS — E03.9 HYPOTHYROIDISM, UNSPECIFIED TYPE: ICD-10-CM

## 2024-09-17 DIAGNOSIS — E78.5 DYSLIPIDEMIA: ICD-10-CM

## 2024-09-17 DIAGNOSIS — R39.89 URINARY PROBLEM: ICD-10-CM

## 2024-09-17 DIAGNOSIS — I10 BENIGN ESSENTIAL HTN: ICD-10-CM

## 2024-09-17 LAB
ERYTHROCYTE [DISTWIDTH] IN BLOOD BY AUTOMATED COUNT: 14.6 % (ref 11.5–14.5)
HCT VFR BLD AUTO: 36.8 % (ref 41–52)
HGB BLD-MCNC: 11.9 G/DL (ref 13.5–17.5)
MCH RBC QN AUTO: 30.7 PG (ref 26–34)
MCHC RBC AUTO-ENTMCNC: 32.3 G/DL (ref 32–36)
MCV RBC AUTO: 95 FL (ref 80–100)
NRBC BLD-RTO: 0 /100 WBCS (ref 0–0)
PLATELET # BLD AUTO: 209 X10*3/UL (ref 150–450)
RBC # BLD AUTO: 3.88 X10*6/UL (ref 4.5–5.9)
WBC # BLD AUTO: 5.5 X10*3/UL (ref 4.4–11.3)

## 2024-09-17 PROCEDURE — 80061 LIPID PANEL: CPT

## 2024-09-17 PROCEDURE — 85027 COMPLETE CBC AUTOMATED: CPT

## 2024-09-17 PROCEDURE — 76770 US EXAM ABDO BACK WALL COMP: CPT

## 2024-09-17 PROCEDURE — 36415 COLL VENOUS BLD VENIPUNCTURE: CPT

## 2024-09-17 PROCEDURE — 84443 ASSAY THYROID STIM HORMONE: CPT

## 2024-09-17 PROCEDURE — 80053 COMPREHEN METABOLIC PANEL: CPT

## 2024-09-17 PROCEDURE — 76770 US EXAM ABDO BACK WALL COMP: CPT | Performed by: STUDENT IN AN ORGANIZED HEALTH CARE EDUCATION/TRAINING PROGRAM

## 2024-09-18 LAB
ALBUMIN SERPL BCP-MCNC: 4.4 G/DL (ref 3.4–5)
ALP SERPL-CCNC: 93 U/L (ref 33–136)
ALT SERPL W P-5'-P-CCNC: 13 U/L (ref 10–52)
ANION GAP SERPL CALC-SCNC: 15 MMOL/L (ref 10–20)
AST SERPL W P-5'-P-CCNC: 21 U/L (ref 9–39)
BILIRUB SERPL-MCNC: 0.8 MG/DL (ref 0–1.2)
BUN SERPL-MCNC: 20 MG/DL (ref 6–23)
CALCIUM SERPL-MCNC: 9.1 MG/DL (ref 8.6–10.6)
CHLORIDE SERPL-SCNC: 100 MMOL/L (ref 98–107)
CHOLEST SERPL-MCNC: 110 MG/DL (ref 0–199)
CHOLESTEROL/HDL RATIO: 2.4
CO2 SERPL-SCNC: 26 MMOL/L (ref 21–32)
CREAT SERPL-MCNC: 1.04 MG/DL (ref 0.5–1.3)
EGFRCR SERPLBLD CKD-EPI 2021: 70 ML/MIN/1.73M*2
GLUCOSE SERPL-MCNC: 89 MG/DL (ref 74–99)
HDLC SERPL-MCNC: 44.9 MG/DL
LDLC SERPL CALC-MCNC: 51 MG/DL
NON HDL CHOLESTEROL: 65 MG/DL (ref 0–149)
POTASSIUM SERPL-SCNC: 4.5 MMOL/L (ref 3.5–5.3)
PROT SERPL-MCNC: 7.2 G/DL (ref 6.4–8.2)
SODIUM SERPL-SCNC: 136 MMOL/L (ref 136–145)
TRIGL SERPL-MCNC: 73 MG/DL (ref 0–149)
TSH SERPL-ACNC: 2.66 MIU/L (ref 0.44–3.98)
VLDL: 15 MG/DL (ref 0–40)

## 2024-09-23 ENCOUNTER — ANTICOAGULATION - WARFARIN VISIT (OUTPATIENT)
Dept: CARDIOLOGY | Facility: CLINIC | Age: 89
End: 2024-09-23
Payer: MEDICARE

## 2024-09-23 DIAGNOSIS — I48.20 CHRONIC ATRIAL FIBRILLATION (MULTI): Primary | ICD-10-CM

## 2024-09-23 DIAGNOSIS — Z95.2 S/P TAVR (TRANSCATHETER AORTIC VALVE REPLACEMENT): ICD-10-CM

## 2024-09-23 NOTE — PROGRESS NOTES
Patient identification verified with 2 identifiers.    Location: [facility] Patient Self-Testing Program [phone]    Referring Physician: DR. MARIANNE BUCKLEY  Enrollment/ Re-enrollment date: 2025   INR Goal: 2.0-3.0  INR monitoring is per [facility] protocol.  Anticoagulation Medication: warfarin  Indication: Atrial Fibrillation/Atrial Flutter    Subjective   Bleeding signs/symptoms: No    Bruising: No   Major bleeding event: No  Thrombosis signs/symptoms: No  Thromboembolic event: No  Missed doses: No  Extra doses: No  Medication changes: No  Dietary changes: No  Change in health: No  Change in activity: No  Alcohol: No  Other concerns: No    Upcoming Procedures:  Does the Patient Have any upcoming procedures that require interruption in anticoagulation therapy? no  Does the patient require bridging? no      Anticoagulation Summary  As of 2024      INR goal:  2.0-3.0   TTR:  94.4% (3.3 mo)   INR used for dosin.20 (2024)   Weekly warfarin total:  35 mg               Assessment/Plan   Therapeutic     1. New dose: no change  SPOKE TO CORINNE. CONFIRMED CURRENT DOSING INSTRUCTIONS AND VERBALIZED CORRECTLY  2. Next INR: 2 weeks      Education provided to patient during the visit:  Patient instructed to call in interim with questions, concerns and changes.   Patient educated on compliance with dosing, follow up appointments, and prescribed plan of care.

## 2024-09-24 ENCOUNTER — APPOINTMENT (OUTPATIENT)
Dept: PRIMARY CARE | Facility: CLINIC | Age: 89
End: 2024-09-24
Payer: MEDICARE

## 2024-09-24 VITALS
BODY MASS INDEX: 30.81 KG/M2 | HEIGHT: 69 IN | SYSTOLIC BLOOD PRESSURE: 134 MMHG | WEIGHT: 208 LBS | DIASTOLIC BLOOD PRESSURE: 42 MMHG

## 2024-09-24 DIAGNOSIS — F41.9 ANXIETY: ICD-10-CM

## 2024-09-24 DIAGNOSIS — E78.5 HYPERLIPIDEMIA, ACQUIRED: ICD-10-CM

## 2024-09-24 DIAGNOSIS — I48.20 CHRONIC ATRIAL FIBRILLATION (MULTI): ICD-10-CM

## 2024-09-24 DIAGNOSIS — I50.22 CHRONIC SYSTOLIC CONGESTIVE HEART FAILURE: ICD-10-CM

## 2024-09-24 DIAGNOSIS — I10 BENIGN ESSENTIAL HTN: Primary | ICD-10-CM

## 2024-09-24 PROCEDURE — 3075F SYST BP GE 130 - 139MM HG: CPT | Performed by: INTERNAL MEDICINE

## 2024-09-24 PROCEDURE — 99214 OFFICE O/P EST MOD 30 MIN: CPT | Performed by: INTERNAL MEDICINE

## 2024-09-24 PROCEDURE — 1157F ADVNC CARE PLAN IN RCRD: CPT | Performed by: INTERNAL MEDICINE

## 2024-09-24 PROCEDURE — 1159F MED LIST DOCD IN RCRD: CPT | Performed by: INTERNAL MEDICINE

## 2024-09-24 PROCEDURE — 3078F DIAST BP <80 MM HG: CPT | Performed by: INTERNAL MEDICINE

## 2024-09-24 RX ORDER — BUSPIRONE HYDROCHLORIDE 7.5 MG/1
7.5 TABLET ORAL DAILY
Qty: 30 TABLET | Refills: 0 | Status: SHIPPED | OUTPATIENT
Start: 2024-09-24 | End: 2025-09-24

## 2024-09-27 ENCOUNTER — APPOINTMENT (OUTPATIENT)
Dept: CARDIOLOGY | Facility: CLINIC | Age: 89
End: 2024-09-27
Payer: MEDICARE

## 2024-10-02 ENCOUNTER — APPOINTMENT (OUTPATIENT)
Dept: RADIOLOGY | Facility: HOSPITAL | Age: 89
End: 2024-10-02
Payer: MEDICARE

## 2024-10-02 ENCOUNTER — HOSPITAL ENCOUNTER (EMERGENCY)
Facility: HOSPITAL | Age: 89
Discharge: HOME | End: 2024-10-02
Attending: EMERGENCY MEDICINE
Payer: MEDICARE

## 2024-10-02 ENCOUNTER — APPOINTMENT (OUTPATIENT)
Dept: CARDIOLOGY | Facility: CLINIC | Age: 89
End: 2024-10-02
Payer: MEDICARE

## 2024-10-02 VITALS
HEIGHT: 69 IN | HEART RATE: 71 BPM | WEIGHT: 205 LBS | OXYGEN SATURATION: 97 % | BODY MASS INDEX: 30.36 KG/M2 | SYSTOLIC BLOOD PRESSURE: 184 MMHG | DIASTOLIC BLOOD PRESSURE: 79 MMHG | TEMPERATURE: 97.9 F | RESPIRATION RATE: 17 BRPM

## 2024-10-02 DIAGNOSIS — I42.8 NONISCHEMIC CARDIOMYOPATHY (MULTI): ICD-10-CM

## 2024-10-02 DIAGNOSIS — E87.1 HYPONATREMIA: ICD-10-CM

## 2024-10-02 DIAGNOSIS — N40.1 BENIGN PROSTATIC HYPERPLASIA WITH URINARY HESITANCY: ICD-10-CM

## 2024-10-02 DIAGNOSIS — I48.0 PAF (PAROXYSMAL ATRIAL FIBRILLATION) (MULTI): ICD-10-CM

## 2024-10-02 DIAGNOSIS — Z95.810 IMPLANTABLE CARDIOVERTER-DEFIBRILLATOR (ICD) IN SITU: Primary | ICD-10-CM

## 2024-10-02 DIAGNOSIS — R39.11 BENIGN PROSTATIC HYPERPLASIA WITH URINARY HESITANCY: ICD-10-CM

## 2024-10-02 DIAGNOSIS — I44.2 CHB (COMPLETE HEART BLOCK): ICD-10-CM

## 2024-10-02 LAB
ALBUMIN SERPL BCP-MCNC: 3.7 G/DL (ref 3.4–5)
ALP SERPL-CCNC: 82 U/L (ref 33–136)
ALT SERPL W P-5'-P-CCNC: 10 U/L (ref 10–52)
ANION GAP SERPL CALCULATED.3IONS-SCNC: 9 MMOL/L (ref 10–20)
APPEARANCE UR: CLEAR
AST SERPL W P-5'-P-CCNC: 18 U/L (ref 9–39)
BASOPHILS # BLD AUTO: 0.09 X10*3/UL (ref 0–0.1)
BASOPHILS NFR BLD AUTO: 1.5 %
BILIRUB SERPL-MCNC: 0.8 MG/DL (ref 0–1.2)
BILIRUB UR STRIP.AUTO-MCNC: NEGATIVE MG/DL
BODY SURFACE AREA: 2.13 M2
BUN SERPL-MCNC: 16 MG/DL (ref 6–23)
CALCIUM SERPL-MCNC: 7.7 MG/DL (ref 8.6–10.3)
CHLORIDE SERPL-SCNC: 105 MMOL/L (ref 98–107)
CO2 SERPL-SCNC: 22 MMOL/L (ref 21–32)
COLOR UR: COLORLESS
CREAT SERPL-MCNC: 0.77 MG/DL (ref 0.5–1.3)
EGFRCR SERPLBLD CKD-EPI 2021: 87 ML/MIN/1.73M*2
EOSINOPHIL # BLD AUTO: 0.2 X10*3/UL (ref 0–0.4)
EOSINOPHIL NFR BLD AUTO: 3.3 %
ERYTHROCYTE [DISTWIDTH] IN BLOOD BY AUTOMATED COUNT: 14.2 % (ref 11.5–14.5)
GLUCOSE SERPL-MCNC: 90 MG/DL (ref 74–99)
GLUCOSE UR STRIP.AUTO-MCNC: NORMAL MG/DL
HCT VFR BLD AUTO: 39.5 % (ref 41–52)
HGB BLD-MCNC: 13.1 G/DL (ref 13.5–17.5)
IMM GRANULOCYTES # BLD AUTO: 0.03 X10*3/UL (ref 0–0.5)
IMM GRANULOCYTES NFR BLD AUTO: 0.5 % (ref 0–0.9)
KETONES UR STRIP.AUTO-MCNC: NEGATIVE MG/DL
LEUKOCYTE ESTERASE UR QL STRIP.AUTO: NEGATIVE
LIPASE SERPL-CCNC: 28 U/L (ref 9–82)
LYMPHOCYTES # BLD AUTO: 0.65 X10*3/UL (ref 0.8–3)
LYMPHOCYTES NFR BLD AUTO: 10.6 %
MCH RBC QN AUTO: 30.8 PG (ref 26–34)
MCHC RBC AUTO-ENTMCNC: 33.2 G/DL (ref 32–36)
MCV RBC AUTO: 93 FL (ref 80–100)
MONOCYTES # BLD AUTO: 0.51 X10*3/UL (ref 0.05–0.8)
MONOCYTES NFR BLD AUTO: 8.3 %
NEUTROPHILS # BLD AUTO: 4.65 X10*3/UL (ref 1.6–5.5)
NEUTROPHILS NFR BLD AUTO: 75.8 %
NITRITE UR QL STRIP.AUTO: NEGATIVE
NRBC BLD-RTO: 0 /100 WBCS (ref 0–0)
PH UR STRIP.AUTO: 6.5 [PH]
PLATELET # BLD AUTO: 213 X10*3/UL (ref 150–450)
POTASSIUM SERPL-SCNC: 3.6 MMOL/L (ref 3.5–5.3)
PROT SERPL-MCNC: 6.3 G/DL (ref 6.4–8.2)
PROT UR STRIP.AUTO-MCNC: ABNORMAL MG/DL
RBC # BLD AUTO: 4.25 X10*6/UL (ref 4.5–5.9)
RBC # UR STRIP.AUTO: NEGATIVE /UL
RBC #/AREA URNS AUTO: NORMAL /HPF
SODIUM SERPL-SCNC: 132 MMOL/L (ref 136–145)
SP GR UR STRIP.AUTO: 1.01
UROBILINOGEN UR STRIP.AUTO-MCNC: NORMAL MG/DL
WBC # BLD AUTO: 6.1 X10*3/UL (ref 4.4–11.3)
WBC #/AREA URNS AUTO: NORMAL /HPF

## 2024-10-02 PROCEDURE — 2500000004 HC RX 250 GENERAL PHARMACY W/ HCPCS (ALT 636 FOR OP/ED): Performed by: EMERGENCY MEDICINE

## 2024-10-02 PROCEDURE — 96360 HYDRATION IV INFUSION INIT: CPT

## 2024-10-02 PROCEDURE — 93296 REM INTERROG EVL PM/IDS: CPT

## 2024-10-02 PROCEDURE — 81001 URINALYSIS AUTO W/SCOPE: CPT | Performed by: EMERGENCY MEDICINE

## 2024-10-02 PROCEDURE — 36415 COLL VENOUS BLD VENIPUNCTURE: CPT | Performed by: EMERGENCY MEDICINE

## 2024-10-02 PROCEDURE — 83690 ASSAY OF LIPASE: CPT | Performed by: EMERGENCY MEDICINE

## 2024-10-02 PROCEDURE — 51798 US URINE CAPACITY MEASURE: CPT

## 2024-10-02 PROCEDURE — 84075 ASSAY ALKALINE PHOSPHATASE: CPT | Performed by: EMERGENCY MEDICINE

## 2024-10-02 PROCEDURE — 99284 EMERGENCY DEPT VISIT MOD MDM: CPT | Mod: 25

## 2024-10-02 PROCEDURE — 85025 COMPLETE CBC W/AUTO DIFF WBC: CPT | Performed by: EMERGENCY MEDICINE

## 2024-10-02 PROCEDURE — 74176 CT ABD & PELVIS W/O CONTRAST: CPT | Performed by: RADIOLOGY

## 2024-10-02 PROCEDURE — 74176 CT ABD & PELVIS W/O CONTRAST: CPT

## 2024-10-02 ASSESSMENT — PAIN SCALES - GENERAL: PAINLEVEL_OUTOF10: 4

## 2024-10-02 ASSESSMENT — LIFESTYLE VARIABLES
TOTAL SCORE: 0
HAVE YOU EVER FELT YOU SHOULD CUT DOWN ON YOUR DRINKING: NO
HAVE PEOPLE ANNOYED YOU BY CRITICIZING YOUR DRINKING: NO
EVER HAD A DRINK FIRST THING IN THE MORNING TO STEADY YOUR NERVES TO GET RID OF A HANGOVER: NO
EVER FELT BAD OR GUILTY ABOUT YOUR DRINKING: NO

## 2024-10-02 ASSESSMENT — PAIN DESCRIPTION - PROGRESSION: CLINICAL_PROGRESSION: GRADUALLY WORSENING

## 2024-10-02 ASSESSMENT — PAIN DESCRIPTION - FREQUENCY: FREQUENCY: CONSTANT/CONTINUOUS

## 2024-10-02 ASSESSMENT — PAIN DESCRIPTION - LOCATION: LOCATION: ABDOMEN

## 2024-10-02 ASSESSMENT — PAIN DESCRIPTION - DESCRIPTORS: DESCRIPTORS: PRESSURE

## 2024-10-02 ASSESSMENT — PAIN - FUNCTIONAL ASSESSMENT: PAIN_FUNCTIONAL_ASSESSMENT: 0-10

## 2024-10-02 ASSESSMENT — PAIN DESCRIPTION - PAIN TYPE: TYPE: ACUTE PAIN

## 2024-10-02 ASSESSMENT — PAIN DESCRIPTION - ONSET: ONSET: ONGOING

## 2024-10-02 ASSESSMENT — PAIN DESCRIPTION - ORIENTATION: ORIENTATION: LOWER

## 2024-10-02 NOTE — DISCHARGE INSTRUCTIONS
The cause of your symptoms is unclear but may be related to benign prostate hyperplasia which is very common for men as the age.  Recommend continuing the tamsulosin as prescribed by Dr. Antoine and follow-up with a urologist.    You were found to have mild hyponatremia today increase electrolytes by drinking things like Pedialyte or Gatorade occasionally instead of plain water or increase sodium intake and follow-up with your primary care doctor to have this rechecked.

## 2024-10-02 NOTE — ED PROVIDER NOTES
HPI   Chief Complaint   Patient presents with    Urinary Frequency     Frequent urination, weakness, pain in abdomen, fatigue, nausea started Monday       88-year-old male presents for evaluation of urinary frequency, hesitancy and at times weak stream.  No jerica abdominal or flank pain.  No dysuria or fever, however daughter is concerned could have infection or problem with his kidneys.  He does take Lasix which was decreased to every other day that he still seems to urinate frequency.  Family is concerned he could be dehydrated as well.  Patient denies any other specific complaints.  No straining for urination.  He was prescribed Flomax by his PCP approximately 2 weeks ago took it once and then never took it again.  Daughter feels there may be an anxiety component as well.      History provided by:  Medical records, patient and relative  History limited by:  Dementia          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 (Multi)     Coronary artery disease with unstable angina pectoris, unspecified vessel or lesion type, unspecified whether native or transplanted heart    Cancer (Multi)     CHF (congestive heart failure) (Multi)     COPD (chronic obstructive pulmonary disease) (Multi)     Essential (primary) hypertension 11/05/2022    Hypertension, unspecified type    Personal history of diseases of the blood and blood-forming organs and certain disorders involving the immune mechanism     History of bleeding disorder    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, oral cavity, and pharynx     History of malignant neoplasm of tonsil    Personal history of malignant neoplasm of prostate     History of malignant neoplasm of prostate    Personal history of other malignant neoplasm of large intestine     History of malignant neoplasm of colon     Past Surgical History:   Procedure  Laterality Date    CARDIAC ELECTROPHYSIOLOGY PROCEDURE Left 2024    Procedure: PPM Generator Explant;  Surgeon: Walker Mcdonald MD;  Location: [facility] Cardiac Cath Lab;  Service: Electrophysiology;  Laterality: Left;  NO AUTH NEEDED    CARDIAC ELECTROPHYSIOLOGY PROCEDURE Left 2024    Procedure: ICD BIV Implant;  Surgeon: Walker Mcdonald MD;  Location: [facility] Cardiac Cath Lab;  Service: Electrophysiology;  Laterality: Left;  upgrade dual PPM to CRT-D, Cap current RV lead. CASE# [id number]    CARDIAC ELECTROPHYSIOLOGY PROCEDURE Left 2024    Procedure: ICD Upgrade Biventricular;  Surgeon: Walker Mcdonald MD;  Location: [facility] Cardiac Cath Lab;  Service: Electrophysiology;  Laterality: Left;  upgrade dual PPM to CRT-D (LV lead implant), cap RV lead, CASE#[id number]    OTHER SURGICAL HISTORY  2021    Colonoscopy    OTHER SURGICAL HISTORY  2021    Colon surgery     OTHER SURGICAL HISTORY  2021    Pacemaker insertion    OTHER SURGICAL HISTORY  2021    Coronary artery bypass graft     OTHER SURGICAL HISTORY  2023    Throat surgery     Family History   Problem Relation Name Age of Onset    Heart attack Mother      Heart disease Mother      Other (Car accident) Father       Social History     Tobacco Use    Smoking status: Former     Current packs/day: 0.00     Types: Cigarettes     Quit date: 1984     Years since quittin.2    Smokeless tobacco: Never   Vaping Use    Vaping status: Never Used   Substance Use Topics    Alcohol use: Never    Drug use: Never       Physical Exam   ED Triage Vitals   Temperature Heart Rate Respirations BP   10/02/24 0925 10/02/24 0935 10/02/24 0925 10/02/24 0935   36 °C (96.8 °F) 71 20 170/82      Pulse Ox Temp Source Heart Rate Source Patient Position   10/02/24 0935 10/02/24 0925 10/02/24 0925 10/02/24 0925   100 % Tympanic Monitor Sitting      BP Location FiO2 (%)     10/02/24 0925 --     Left arm        Physical Exam  Vitals  and nursing note reviewed.     General: Vitals reviewed. Awake, alert, well-developed, well-nourished, NAD  HEENT: NC/AT, PERRL, MMM  Neck: Supple, trachea midline  Respiratory: No respiratory distress, lungs clear to auscultation bilaterally, no wheezes, rhonchi, or rales  CV: Regular rate and regular rhythm, no murmur/gallop/rubs  Abdomen/GI: Soft, non-tender, non-distended, no rebound, guarding, or rigidity, normal bowel sounds, no CVA tenderness  Extremities: Moving all extremities, no deformities  Neuro: A/O, normal speech  Skin: Warm, dry. No rashes identified      ED Course & MDM   Diagnoses as of 10/02/24 1239   Benign prostatic hyperplasia with urinary hesitancy   Hyponatremia                 No data recorded     Florentino Coma Scale Score: 15 (10/02/24 0934 : Joelne Maynard, LAKISHA)                           Medical Decision Making  88-year-old male presents for urinary frequency and hesitancy.  Given his age consider BPH as he was started on Flomax by PCP had outpatient workup that was nondiagnostic but he is not taking the Flomax.  Also consider UTI, kidney stone, renal dysfunction, urinary retention among others.  Patient is able to urinate without difficulty in the emergency department.  Labs obtained without significant abnormality side for minimal hyponatremia likely not related and appropriate for outpatient PCP follow-up did recommend increased electrolyte intake.  Urinalysis negative for UTI.  CT abdomen pelvis with no acute process.  Etiology remains unclear do suspect component of BPH and recommended resuming Flomax and will refer to urology for follow-up.  Return precautions discussed.    Amount and/or Complexity of Data Reviewed  Independent Historian: caregiver  External Data Reviewed: notes.     Details: 9/24/2024 outpatient PCP note reviewed showing patient started on medication for anxiety  Labs: ordered. Decision-making details documented in ED Course.  Radiology: ordered and independent  interpretation performed. Decision-making details documented in ED Course.        Procedure  Procedures     Kerri Norwood MD  10/02/24 1239       Kerri Norwood MD  10/02/24 1234

## 2024-10-03 ENCOUNTER — HOSPITAL ENCOUNTER (OUTPATIENT)
Dept: CARDIOLOGY | Facility: CLINIC | Age: 89
Discharge: HOME | End: 2024-10-03
Payer: MEDICARE

## 2024-10-03 DIAGNOSIS — I42.8 NONISCHEMIC CARDIOMYOPATHY (MULTI): ICD-10-CM

## 2024-10-03 DIAGNOSIS — Z95.810 IMPLANTABLE CARDIOVERTER-DEFIBRILLATOR (ICD) IN SITU: ICD-10-CM

## 2024-10-03 DIAGNOSIS — I44.2 CHB (COMPLETE HEART BLOCK): ICD-10-CM

## 2024-10-03 DIAGNOSIS — I48.0 PAF (PAROXYSMAL ATRIAL FIBRILLATION) (MULTI): ICD-10-CM

## 2024-10-03 LAB — HOLD SPECIMEN: NORMAL

## 2024-10-03 NOTE — PROGRESS NOTES
Subjective   Patient ID: Gorge Larsen is a 88 y.o. male who presents for Follow-up and Med Refill.    Patient is here for follow-up  Having a lot of anxiety issues  Cannot take SSRIs because of low sodium  History of throat cancer and radiation treatment    Past recap  Patient is here for follow-up on ER visit  Feels dehydrated and weak.  He was given fluids  He is feeling better  Follow-up on hypertension CHF  Having increased urinary frequency at night    patient is here for hospital follow-up.  Was hospitalized for shortness of breath constipation and blood per rectum.  No active GI bleed.  Blood per rectum from hemorrhoids  Patient still gets short of breath easily  Follow-up on hypertension high cholesterol CHF anemia  For last few days having more chest congestion cough and yellow phlegm  Patient mostly sits a lot    Patient is here for follow-up  Leg swelling is doing much better  Gets dizzy wondering if he can cut down the dose of Lasix  His legs were swelling up in the pacemaker was not working he got the new pacemaker and it is working well  Refusing overnight pulse oximetry  Follow-up on hypertension high cholesterol hypothyroidism    Patient is here for hospital follow-up.  He was first admitted for COVID.  He had generalized malaise.  He was found to be in decompensated CHF and volume overload and hyponatremia.  Ejection fraction on the echo showed 30% which has gone down from his previous ejection fraction.  Cardiology recommended EPS consult.  This saw the patient and recommended upgrading to biventricular pacemaker as patient is already due for battery change.  Patient is taking Lasix which is keeping the leg swelling down  History feels very tired and fatigued     Past recap  patient is here for follow-up  Did blood work  Having a lot of nosebleeds recently.  He is on Coumadin    patient is accompanied with the daughter and wife  He is doing much better on Zoloft  Did blood work  Patient checks  Coumadin at home but does not know who is monitoring his Coumadin  He has gained some weight  Follow-up on hypertension high cholesterol congestive heart failure     patient is here accompanied with daughter.  Daughter is concerned that patient is very anxious.  Anxiety was the cause for his drinking heavily.  He was taken off the Zoloft because of low sodium.  His sodium is now normal which probably was related to his heart condition.  She wants to give it a try again.  She wants his Coumadin checked.  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  He sits a lot and does not do much around     past recap  Patient complaining of chest congestion coughing up whitish-yellow phlegm   patient here for follow-up on blood work  He is hard of hearing  Overall feeling much better  Leg swelling is not there he is using compression stockings  He had 2D echo done     Patient is here for follow-up on blood work and ultrasound kidneys  He is feeling much better  He is not taking spironolactone  Taking losartan only every other day  His legs are not swelling up  He has more energy and not too lethargic lethargic     Patient here for follow-up on blood work   He urinates a lot goes frequently  He has history of prostate cancer s/p surgery  He does not drink water but drinks iced tea     last visit when I spoke to the daughter she took him to [place]. He was hospitalized for hyponatremia for few days then he was sent to [facility] rehab. There he had syncopal episode and transferred to [facility] emergency room  Patient was found to have reduced ejection fraction from 60 to 30%, severe aortic valve stenosis. He was transferred to [facility]  where he had TAVR. Patient is doing much better since the procedure leg swelling has gone down his more alert but still remains quite withdrawn. The daughter thinks that he is still very depressed  He is also hard of hearing and his hearing aids are not working well  He is also not able to see very well because of the cataracts        Patient is here for follow-up on lab  Daughter is still concerned as patient is very lethargic, not taking the medications as prescribed     here for flollow up   Patient is not taking some medications which made him feel bad  He quit taking Synthroid  He quit taking Cardizem  He is not taking Coreg  he feels better without those medications.   But daughter says that he is more tired he is sleeping all the time  He says he feels dizzy  He sleeps in a chair  His swelling in the legs is getting worse  He does not take Lasix every day  He is getting more short of breath recently        Patient is here for follow-up  He saw Dr. Mcdonald  He did proBNP which came back 1111  He had his Lasix 20 mg and potassium 10 mEq he is  He did blood work  He has colon polyps positive for tubular adenoma  He has history of GI bleed and required blood transfusions  For last few weeks he is noticing dark stools off and on and concern about GI bleed again        past recap  To establish PCP  Follow-up on hypertension high cholesterol coronary artery disease  Needs refills on medication  He was hospitalized in December 2 sodium was 116 his hydrochlorothiazide was discontinued he was very weak and confused and he went to the nursing home at [facility] now he is home  Now he is having a lot of swelling in the legs  He is sleeping and it recliner and not keeping his legs elevated     Past medical history: A. 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   Constitutional:       Appearance: Normal appearance.   HENT:      Head: Normocephalic and atraumatic.      Right Ear: Tympanic membrane, ear canal and external ear normal.      Left Ear: Tympanic membrane, ear canal and external ear normal.      Nose:      Comments: Deviated nasal septum to the left     Mouth/Throat:      Pharynx: Oropharynx is clear.   Eyes:      Extraocular Movements: Extraocular movements intact.      Conjunctiva/sclera: Conjunctivae normal.      Pupils: Pupils are equal, round, and reactive to light.   Cardiovascular:      Rate and Rhythm: Normal rate and regular rhythm.      Pulses: Normal pulses.      Heart sounds: Normal heart sounds.   Pulmonary:      Effort: Pulmonary effort is normal.   Abdominal:      General: Abdomen is flat. Bowel sounds are normal.      Palpations: Abdomen is soft.   Musculoskeletal:      Cervical back: Normal range of motion and neck supple.   Skin:     General: Skin is warm and dry.   Neurological:      General: No focal deficit present.      Mental Status: He is alert and oriented to person, place, and time.   Psychiatric:         Mood and Affect: Mood normal.         Assessment/Plan   Problem List Items Addressed This Visit          Cardiac and Vasculature    Benign essential HTN - Primary    Chronic atrial fibrillation (Multi)    Congestive heart failure    Hyperlipidemia, acquired       Mental Health    Anxiety    Relevant Medications    busPIRone (Buspar) 7.5 mg tablet       10/7/22  Patient's hospital chart reviewed  Blood pressure " stable  CBC CMP fasting with TSH ordered before next follow-up in a month  Syringing curettage done in right ear with BX removal with some improvement in hearing  Treat with neomycin eardrops  Advised to use mineral oil  Get ears cleaned  Advised to see the ENT to get new hearing aids  Encourage patient to go for cataract surgery  Poor vision poor hearing could be contributing to his anxiety and depression  Had a long discussion with her wife but and what kind of liquids he can have  Patient has a follow-up with cardiologist later today  May need echocardiogram done sooner  Clinically is looking better  We will follow-up in a month        12/3./22  Patient clinically looks much better  He has acute bronchitis  Treat with Augmentin  Venous insufficiency doing much better  Blood work reviewed  Sodium normal  Anemia improving  Follow-up blood work in 3 months  Echocardiogram shows improved ejection fraction from 30 to 35% to 50 to 55%     1/12/23  Had a long discussion with the family and the patient regarding his anxiety and depression and choice of medication  Explained all SSRIs are risk for causing hyponatremia  Family does not want anything to make urine sodium go down as he gets very confused with low sodium  Patient is hard of hearing and has poor vision he does not move around much  May be he needs to change his lifestyle which will make him more happy and less anxious  Discussed BuSpar dizziness most likely related to his ear  Try meclizine as needed  Family does not want to use buspirone at this time as there is a small chance of hyponatremia related to it  That when I work with lifestyle modification to see if patient feels better if not then we will follow-up  Routine follow-up      3/17/23  INR 2.5 PTT 30.2  Continue current Coumadin  We will start Zoloft 25 mg a day  Blood pressure stable  Cholesterol very well controlled  Blood work looks pretty good  Congestive heart failure compensated no swelling of  the leg  Hyponatremia resolved  He got new hearing aids  Follow-up blood work in 3 months    7/15/2023  Advised weight call Dr. Mcdonald office to make sure they are monitoring the Coumadin  Continue Zoloft patient is tolerating  Sodium is in range  Kidney function stable  Cholesterol well controlled  CHF compensated  Follow-up blood work in 6 months    1/13/2024  Blood work reviewed  TSH 6.34  Increase levothyroxine 75 mcg  Blood sugars elevated  Discussed diet exercise and risk for diabetes  Mild anemia still persists most likely related to epistaxis and patient is on Coumadin  Take iron supplement  Use saline nasal spray  Blood pressure stable  Cholesterol very well-controlled  Follow-up in 3 months    2/13/2024  Patient has recovered from COVID completely   patient appears quite compensated with CHF on Lasix  Will repeat CBC BMP in 1 week  Will check overnight pulse oximetry to see if hypoxia could be making him tired blood pressure stable  Patient is going for the battery change and upgrade the coming week  Follow-up after the battery update/pacemaker change    4/11/2024  Chest x-ray ordered to rule out congestive heart failure  Treat with Zithromax for 10 days  Breathing treatment given  Albuterol inhaler  Follow-up if not better    4/29/2024  Will reduce Lasix 20 mg a day  Blood pressure stable  CHF compensated  Encouraged to do overnight pulse oximetry it is not sleep study which patient thinks it is  Cholesterol very well-controlled  Mild anemia persists but stable  Patient is on blood thinners  Sodium slightly low could be from dehydration will monitor off with reduced dose of Lasix  Follow-up blood work in 3 months    7/22/2024  Breathing treatment given  Albuterol inhaler  Incentive spirometer  Z-Kane  Blood pressure stable  CHF stable  Routine blood work ordered  Follow-up in a month    9/12/2024  Blood work in ER reports reviewed  Low sodium at 128  Will check ultrasound kidney and bladder  Flomax 0.4  to make sure patient urinates well  Wondering if SSRI again causing low sodium    9/24/2024  Repeat blood work his BNP normal  Sodium normal  Will try BuSpar to see if that helps with the anxiety  Blood work reviewed cholesterol well-controlled  Anemia is stable and improving  Kidney function is doing better  CHF compensated

## 2024-10-07 ENCOUNTER — ANTICOAGULATION - WARFARIN VISIT (OUTPATIENT)
Dept: CARDIOLOGY | Facility: CLINIC | Age: 89
End: 2024-10-07
Payer: MEDICARE

## 2024-10-07 DIAGNOSIS — Z95.2 S/P TAVR (TRANSCATHETER AORTIC VALVE REPLACEMENT): ICD-10-CM

## 2024-10-07 DIAGNOSIS — I48.20 CHRONIC ATRIAL FIBRILLATION (MULTI): Primary | ICD-10-CM

## 2024-10-07 LAB
INR IN PPP BY COAGULATION ASSAY EXTERNAL: 1.6
PROTHROMBIN TIME (PT) IN PPP BY COAGULATION ASSAY EXTERNAL: NORMAL

## 2024-10-07 NOTE — PROGRESS NOTES
Patient identification verified with 2 identifiers.    Location: [facility] Patient Self-Testing Program [phone]    Referring Physician: PATT Clarke MD  Enrollment/ Re-enrollment date: 25   INR Goal: 2.0-3.0  INR monitoring is per [facility] protocol.  Anticoagulation Medication: warfarin  Indication: Atrial Fibrillation/Atrial Flutter and Aortic Valve Replacement    Subjective   Bleeding signs/symptoms: No    Bruising: No   Major bleeding event: No  Thrombosis signs/symptoms: No  Thromboembolic event: No  Missed doses: No  Extra doses: No  Medication changes: No  Dietary changes: No  Change in health: No  Change in activity: No  Alcohol: No  Other concerns: No    Upcoming Procedures:  Does the Patient Have any upcoming procedures that require interruption in anticoagulation therapy? no  Does the patient require bridging? no      Anticoagulation Summary  As of 10/7/2024      INR goal:  2.0-3.0   TTR:  86.8% (3.8 mo)   INR used for dosin.60 (10/7/2024)   Weekly warfarin total:  35 mg               Assessment/Plan   Subtherapeutic     1. New dose: no change    2. Next INR:  10/11/24      Education provided to patient during the visit:  Patient instructed to call in interim with questions, concerns and changes.

## 2024-10-11 ENCOUNTER — ANTICOAGULATION - WARFARIN VISIT (OUTPATIENT)
Dept: CARDIOLOGY | Facility: CLINIC | Age: 89
End: 2024-10-11
Payer: MEDICARE

## 2024-10-11 DIAGNOSIS — Z95.2 S/P TAVR (TRANSCATHETER AORTIC VALVE REPLACEMENT): ICD-10-CM

## 2024-10-11 DIAGNOSIS — I48.20 CHRONIC ATRIAL FIBRILLATION (MULTI): Primary | ICD-10-CM

## 2024-10-11 LAB
INR IN PPP BY COAGULATION ASSAY EXTERNAL: 2
POC INR: 2
POC PROTHROMBIN TIME: NORMAL
PROTHROMBIN TIME (PT) IN PPP BY COAGULATION ASSAY EXTERNAL: NORMAL

## 2024-10-17 ENCOUNTER — APPOINTMENT (OUTPATIENT)
Dept: CARDIOLOGY | Facility: CLINIC | Age: 89
End: 2024-10-17
Payer: MEDICARE

## 2024-10-18 ENCOUNTER — OFFICE VISIT (OUTPATIENT)
Dept: PRIMARY CARE | Facility: CLINIC | Age: 89
End: 2024-10-18
Payer: MEDICARE

## 2024-10-18 ENCOUNTER — HOSPITAL ENCOUNTER (OUTPATIENT)
Dept: RADIOLOGY | Facility: CLINIC | Age: 89
Discharge: HOME | End: 2024-10-18
Payer: MEDICARE

## 2024-10-18 ENCOUNTER — ANTICOAGULATION - WARFARIN VISIT (OUTPATIENT)
Dept: CARDIOLOGY | Facility: CLINIC | Age: 89
End: 2024-10-18

## 2024-10-18 VITALS
BODY MASS INDEX: 30.51 KG/M2 | SYSTOLIC BLOOD PRESSURE: 124 MMHG | HEIGHT: 69 IN | DIASTOLIC BLOOD PRESSURE: 68 MMHG | WEIGHT: 206 LBS

## 2024-10-18 DIAGNOSIS — I48.20 CHRONIC ATRIAL FIBRILLATION (MULTI): Primary | ICD-10-CM

## 2024-10-18 DIAGNOSIS — R06.2 WHEEZING: ICD-10-CM

## 2024-10-18 DIAGNOSIS — R05.9 COUGH, UNSPECIFIED TYPE: ICD-10-CM

## 2024-10-18 DIAGNOSIS — Z95.2 S/P TAVR (TRANSCATHETER AORTIC VALVE REPLACEMENT): ICD-10-CM

## 2024-10-18 LAB
INR IN PPP BY COAGULATION ASSAY EXTERNAL: 2
PROTHROMBIN TIME (PT) IN PPP BY COAGULATION ASSAY EXTERNAL: NORMAL

## 2024-10-18 PROCEDURE — 71046 X-RAY EXAM CHEST 2 VIEWS: CPT

## 2024-10-18 RX ORDER — ALBUTEROL SULFATE 0.63 MG/3ML
0.63 SOLUTION RESPIRATORY (INHALATION) ONCE
Status: COMPLETED | OUTPATIENT
Start: 2024-10-18 | End: 2024-10-18

## 2024-10-18 RX ORDER — AMOXICILLIN AND CLAVULANATE POTASSIUM 875; 125 MG/1; MG/1
875 TABLET, FILM COATED ORAL 2 TIMES DAILY
Qty: 20 TABLET | Refills: 0 | Status: SHIPPED | OUTPATIENT
Start: 2024-10-18 | End: 2024-10-28

## 2024-10-18 RX ORDER — PEN NEEDLE, DIABETIC 31 GX5/16"
NEEDLE, DISPOSABLE MISCELLANEOUS
Qty: 10 EACH | Refills: 0 | Status: SHIPPED | OUTPATIENT
Start: 2024-10-18

## 2024-10-18 RX ORDER — IPRATROPIUM BROMIDE AND ALBUTEROL SULFATE 2.5; .5 MG/3ML; MG/3ML
3 SOLUTION RESPIRATORY (INHALATION) 4 TIMES DAILY PRN
Qty: 360 ML | Refills: 1 | Status: SHIPPED | OUTPATIENT
Start: 2024-10-18 | End: 2025-10-18

## 2024-10-18 NOTE — PROGRESS NOTES
Subjective   Patient ID: Gorge Larsen is a 88 y.o. male who presents for Illness and Cough.    Patient is here with complaints of having chest congestion cough wheezing.   Feeling sick for past 2 3 days   patient sits a lot    Past recap   patient is here for follow-up  Having a lot of anxiety issues  Cannot take SSRIs because of low sodium  History of throat cancer and radiation treatment    Past recap  Patient is here for follow-up on ER visit  Feels dehydrated and weak.  He was given fluids  He is feeling better  Follow-up on hypertension CHF  Having increased urinary frequency at night    patient is here for hospital follow-up.  Was hospitalized for shortness of breath constipation and blood per rectum.  No active GI bleed.  Blood per rectum from hemorrhoids  Patient still gets short of breath easily  Follow-up on hypertension high cholesterol CHF anemia  For last few days having more chest congestion cough and yellow phlegm  Patient mostly sits a lot    Patient is here for follow-up  Leg swelling is doing much better  Gets dizzy wondering if he can cut down the dose of Lasix  His legs were swelling up in the pacemaker was not working he got the new pacemaker and it is working well  Refusing overnight pulse oximetry  Follow-up on hypertension high cholesterol hypothyroidism    Patient is here for hospital follow-up.  He was first admitted for COVID.  He had generalized malaise.  He was found to be in decompensated CHF and volume overload and hyponatremia.  Ejection fraction on the echo showed 30% which has gone down from his previous ejection fraction.  Cardiology recommended EPS consult.  This saw the patient and recommended upgrading to biventricular pacemaker as patient is already due for battery change.  Patient is taking Lasix which is keeping the leg swelling down  History feels very tired and fatigued     Past recap  patient is here for follow-up  Did blood work  Having a lot of nosebleeds recently.   He is on Coumadin    patient is accompanied with the daughter and wife  He is doing much better on Zoloft  Did blood work  Patient checks Coumadin at home but does not know who is monitoring his Coumadin  He has gained some weight  Follow-up on hypertension high cholesterol congestive heart failure     patient is here accompanied with daughter.  Daughter is concerned that patient is very anxious.  Anxiety was the cause for his drinking heavily.  He was taken off the Zoloft because of low sodium.  His sodium is now normal which probably was related to his heart condition.  She wants to give it a try again.  She wants his Coumadin checked.  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  He sits a lot and does not do much around     past recap  Patient complaining of chest congestion coughing up whitish-yellow phlegm   patient here for follow-up on blood work  He is hard of hearing  Overall feeling much better  Leg swelling is not there he is using compression stockings  He had 2D echo done     Patient is here for follow-up on blood work and ultrasound kidneys  He is feeling much better  He is not taking spironolactone  Taking losartan only every other day  His legs are not swelling up  He has more energy and not too lethargic lethargic     Patient here for follow-up on blood work   He urinates a lot goes frequently  He has history of prostate cancer s/p surgery  He does not drink water but drinks iced tea     last visit when I spoke to the daughter she took him to [facility]. He was hospitalized for hyponatremia for few days then he was sent to [facility] rehab. There he had syncopal episode and transferred to [facility] emergency  room  Patient was found to have reduced ejection fraction from 60 to 30%, severe aortic valve stenosis. He was transferred to [facility] where he had TAVR. Patient is doing much better since the procedure leg swelling has gone down his more alert but still remains quite withdrawn. The daughter thinks that he is still very depressed  He is also hard of hearing and his hearing aids are not working well  He is also not able to see very well because of the cataracts        Patient is here for follow-up on lab  Daughter is still concerned as patient is very lethargic, not taking the medications as prescribed     here for flollow up   Patient is not taking some medications which made him feel bad  He quit taking Synthroid  He quit taking Cardizem  He is not taking Coreg  he feels better without those medications.   But daughter says that he is more tired he is sleeping all the time  He says he feels dizzy  He sleeps in a chair  His swelling in the legs is getting worse  He does not take Lasix every day  He is getting more short of breath recently        Patient is here for follow-up  He saw Dr. Mcdonald  He did proBNP which came back 1111  He had his Lasix 20 mg and potassium 10 mEq he is  He did blood work  He has colon polyps positive for tubular adenoma  He has history of GI bleed and required blood transfusions  For last few weeks he is noticing dark stools off and on and concern about GI bleed again        past recap  To establish PCP  Follow-up on hypertension high cholesterol coronary artery disease  Needs refills on medication  He was hospitalized in December 2 sodium was 116 his hydrochlorothiazide was discontinued he was very weak and confused and he went to the nursing home at [facility] now he is home  Now he is having a lot of swelling in the legs  He is sleeping and it recliner and not keeping his legs elevated     Past medical history: A. 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   Constitutional:       Appearance: Normal appearance.   HENT:      Head: Normocephalic and atraumatic.      Right Ear: Tympanic membrane, ear canal and external ear normal.      Left Ear: Tympanic membrane, ear canal and external ear normal.      Nose:      Comments: Deviated nasal septum to the left     Mouth/Throat:      Pharynx: Oropharynx is clear.   Eyes:      Extraocular Movements: Extraocular movements intact.      Conjunctiva/sclera: Conjunctivae normal.      Pupils: Pupils are equal, round, and reactive to light.   Cardiovascular:      Rate and Rhythm: Normal rate and regular rhythm.      Pulses: Normal pulses.      Heart sounds: Normal heart sounds.   Pulmonary:      Effort: Pulmonary effort is normal.      Breath sounds: Rhonchi present.   Abdominal:      General: Abdomen is flat. Bowel sounds are normal.      Palpations: Abdomen is soft.   Musculoskeletal:      Cervical back: Normal range of motion and neck supple.   Skin:     General: Skin is warm and dry.   Neurological:      General: No focal deficit present.      Mental Status: He is alert and oriented to person, place, and time.   Psychiatric:         Mood and Affect: Mood normal.         Assessment/Plan   Problem List Items Addressed This Visit    None        10/7/22  Patient's hospital chart reviewed  Blood pressure stable  CBC CMP fasting with TSH ordered before next follow-up in a month  Syringing " curettage done in right ear with BX removal with some improvement in hearing  Treat with neomycin eardrops  Advised to use mineral oil  Get ears cleaned  Advised to see the ENT to get new hearing aids  Encourage patient to go for cataract surgery  Poor vision poor hearing could be contributing to his anxiety and depression  Had a long discussion with her wife but and what kind of liquids he can have  Patient has a follow-up with cardiologist later today  May need echocardiogram done sooner  Clinically is looking better  We will follow-up in a month        12/3./22  Patient clinically looks much better  He has acute bronchitis  Treat with Augmentin  Venous insufficiency doing much better  Blood work reviewed  Sodium normal  Anemia improving  Follow-up blood work in 3 months  Echocardiogram shows improved ejection fraction from 30 to 35% to 50 to 55%     1/12/23  Had a long discussion with the family and the patient regarding his anxiety and depression and choice of medication  Explained all SSRIs are risk for causing hyponatremia  Family does not want anything to make urine sodium go down as he gets very confused with low sodium  Patient is hard of hearing and has poor vision he does not move around much  May be he needs to change his lifestyle which will make him more happy and less anxious  Discussed BuSpar dizziness most likely related to his ear  Try meclizine as needed  Family does not want to use buspirone at this time as there is a small chance of hyponatremia related to it  That when I work with lifestyle modification to see if patient feels better if not then we will follow-up  Routine follow-up      3/17/23  INR 2.5 PTT 30.2  Continue current Coumadin  We will start Zoloft 25 mg a day  Blood pressure stable  Cholesterol very well controlled  Blood work looks pretty good  Congestive heart failure compensated no swelling of the leg  Hyponatremia resolved  He got new hearing aids  Follow-up blood work in 3  months    7/15/2023  Advised weight call Dr. Mcdonald office to make sure they are monitoring the Coumadin  Continue Zoloft patient is tolerating  Sodium is in range  Kidney function stable  Cholesterol well controlled  CHF compensated  Follow-up blood work in 6 months    1/13/2024  Blood work reviewed  TSH 6.34  Increase levothyroxine 75 mcg  Blood sugars elevated  Discussed diet exercise and risk for diabetes  Mild anemia still persists most likely related to epistaxis and patient is on Coumadin  Take iron supplement  Use saline nasal spray  Blood pressure stable  Cholesterol very well-controlled  Follow-up in 3 months    2/13/2024  Patient has recovered from COVID completely   patient appears quite compensated with CHF on Lasix  Will repeat CBC BMP in 1 week  Will check overnight pulse oximetry to see if hypoxia could be making him tired blood pressure stable  Patient is going for the battery change and upgrade the coming week  Follow-up after the battery update/pacemaker change    4/11/2024  Chest x-ray ordered to rule out congestive heart failure  Treat with Zithromax for 10 days  Breathing treatment given  Albuterol inhaler  Follow-up if not better    4/29/2024  Will reduce Lasix 20 mg a day  Blood pressure stable  CHF compensated  Encouraged to do overnight pulse oximetry it is not sleep study which patient thinks it is  Cholesterol very well-controlled  Mild anemia persists but stable  Patient is on blood thinners  Sodium slightly low could be from dehydration will monitor off with reduced dose of Lasix  Follow-up blood work in 3 months    7/22/2024  Breathing treatment given  Albuterol inhaler  Incentive spirometer  Z-Kane  Blood pressure stable  CHF stable  Routine blood work ordered  Follow-up in a month    9/12/2024  Blood work in ER reports reviewed  Low sodium at 128  Will check ultrasound kidney and bladder  Flomax 0.4 to make sure patient urinates well  Wondering if SSRI again causing low  sodium    9/24/2024  Repeat blood work his BNP normal  Sodium normal  Will try BuSpar to see if that helps with the anxiety  Blood work reviewed cholesterol well-controlled  Anemia is stable and improving  Kidney function is doing better  CHF compensated    10/18/2024  Will order chest x-ray  Patient appears to have pneumonia  No leg swelling CHF seems to be compensated  Treat with Augmentin 875 twice a day  Breathing treatment given  Nebulizer machine ordered  Follow-up if not better

## 2024-10-21 NOTE — PROGRESS NOTES
Patient identification verified with 2 identifiers.    Location: [facility] Patient Self-Testing Program [phone]    Referring Physician: DR. MARIANNE BUCKLEY  Enrollment/ Re-enrollment date: 2025   INR Goal: 2.0-3.0  INR monitoring is per [facility] protocol.  Anticoagulation Medication: warfarin  Indication: Atrial Fibrillation/Atrial Flutter    Subjective   Bleeding signs/symptoms:      Bruising:     Major bleeding event:    Thrombosis signs/symptoms:    Thromboembolic event:    Missed doses:    Extra doses:    Medication changes:    Dietary changes:    Change in health:    Change in activity:    Alcohol:    Other concerns:      Upcoming Procedures:  Does the Patient Have any upcoming procedures that require interruption in anticoagulation therapy?   Does the patient require bridging?       Anticoagulation Summary  As of 10/18/2024      INR goal:  2.0-3.0   TTR:  83.8% (3.9 mo)   INR used for dosin.00 (10/18/2024)   Weekly warfarin total:  35 mg               Assessment/Plan   Therapeutic     1. New dose: no change CALLED PT'S WIFE THERON SUN WITH CURRENT DOSING INSTRUCTIONS AND TO RETEST IN 1 WEEK AND CALL [phone] WITH ANY QUESTIONS/CONCERNS   2. Next INR: 1 week      Education provided to patient during the visit:  Patient instructed to call in interim with questions, concerns and changes.   Patient educated on compliance with dosing, follow up appointments, and prescribed plan of care.

## 2024-10-24 ENCOUNTER — OFFICE VISIT (OUTPATIENT)
Dept: CARDIOLOGY | Facility: CLINIC | Age: 89
End: 2024-10-24
Payer: MEDICARE

## 2024-10-24 VITALS
HEIGHT: 69 IN | RESPIRATION RATE: 18 BRPM | HEART RATE: 74 BPM | DIASTOLIC BLOOD PRESSURE: 68 MMHG | WEIGHT: 208 LBS | OXYGEN SATURATION: 97 % | SYSTOLIC BLOOD PRESSURE: 148 MMHG | BODY MASS INDEX: 30.81 KG/M2

## 2024-10-24 DIAGNOSIS — Q24.5 CORONARY ARTERY ABNORMALITY (HHS-HCC): Primary | ICD-10-CM

## 2024-10-24 PROCEDURE — 1157F ADVNC CARE PLAN IN RCRD: CPT | Performed by: INTERNAL MEDICINE

## 2024-10-24 PROCEDURE — 99214 OFFICE O/P EST MOD 30 MIN: CPT | Performed by: INTERNAL MEDICINE

## 2024-10-24 PROCEDURE — 3078F DIAST BP <80 MM HG: CPT | Performed by: INTERNAL MEDICINE

## 2024-10-24 PROCEDURE — 3077F SYST BP >= 140 MM HG: CPT | Performed by: INTERNAL MEDICINE

## 2024-10-24 PROCEDURE — 1159F MED LIST DOCD IN RCRD: CPT | Performed by: INTERNAL MEDICINE

## 2024-10-25 ENCOUNTER — ANTICOAGULATION - WARFARIN VISIT (OUTPATIENT)
Dept: CARDIOLOGY | Facility: CLINIC | Age: 89
End: 2024-10-25
Payer: MEDICARE

## 2024-10-25 DIAGNOSIS — Z95.2 S/P TAVR (TRANSCATHETER AORTIC VALVE REPLACEMENT): ICD-10-CM

## 2024-10-25 DIAGNOSIS — I48.20 CHRONIC ATRIAL FIBRILLATION (MULTI): Primary | ICD-10-CM

## 2024-10-25 LAB
INR IN PPP BY COAGULATION ASSAY EXTERNAL: 1.8
PROTHROMBIN TIME (PT) IN PPP BY COAGULATION ASSAY EXTERNAL: NORMAL

## 2024-10-28 ENCOUNTER — ANTICOAGULATION - WARFARIN VISIT (OUTPATIENT)
Dept: CARDIOLOGY | Facility: CLINIC | Age: 89
End: 2024-10-28
Payer: MEDICARE

## 2024-10-28 DIAGNOSIS — I48.20 CHRONIC ATRIAL FIBRILLATION (MULTI): Primary | ICD-10-CM

## 2024-10-28 DIAGNOSIS — Z95.2 S/P TAVR (TRANSCATHETER AORTIC VALVE REPLACEMENT): ICD-10-CM

## 2024-10-29 ENCOUNTER — HOSPITAL ENCOUNTER (OUTPATIENT)
Dept: CARDIOLOGY | Facility: CLINIC | Age: 89
Discharge: HOME | End: 2024-10-29
Payer: MEDICARE

## 2024-10-29 DIAGNOSIS — Z95.810 IMPLANTABLE CARDIOVERTER-DEFIBRILLATOR (ICD) IN SITU: ICD-10-CM

## 2024-10-29 DIAGNOSIS — I42.8 NONISCHEMIC CARDIOMYOPATHY (MULTI): ICD-10-CM

## 2024-10-29 DIAGNOSIS — I48.0 PAF (PAROXYSMAL ATRIAL FIBRILLATION) (MULTI): ICD-10-CM

## 2024-10-29 DIAGNOSIS — I44.2 CHB (COMPLETE HEART BLOCK): ICD-10-CM

## 2024-10-29 PROCEDURE — 93284 PRGRMG EVAL IMPLANTABLE DFB: CPT

## 2024-10-29 PROCEDURE — 93284 PRGRMG EVAL IMPLANTABLE DFB: CPT | Performed by: INTERNAL MEDICINE

## 2024-10-30 ENCOUNTER — ANTICOAGULATION - WARFARIN VISIT (OUTPATIENT)
Dept: CARDIOLOGY | Facility: CLINIC | Age: 89
End: 2024-10-30
Payer: MEDICARE

## 2024-10-30 DIAGNOSIS — I48.20 CHRONIC ATRIAL FIBRILLATION (MULTI): Primary | ICD-10-CM

## 2024-10-30 DIAGNOSIS — Z95.2 S/P TAVR (TRANSCATHETER AORTIC VALVE REPLACEMENT): ICD-10-CM

## 2024-10-30 LAB
INR IN PPP BY COAGULATION ASSAY EXTERNAL: 1.9
PROTHROMBIN TIME (PT) IN PPP BY COAGULATION ASSAY EXTERNAL: NORMAL

## 2024-11-01 ENCOUNTER — ANTICOAGULATION - WARFARIN VISIT (OUTPATIENT)
Dept: CARDIOLOGY | Facility: HOSPITAL | Age: 89
End: 2024-11-01
Payer: MEDICARE

## 2024-11-01 DIAGNOSIS — I48.20 CHRONIC ATRIAL FIBRILLATION (MULTI): Primary | ICD-10-CM

## 2024-11-01 DIAGNOSIS — Z95.2 S/P TAVR (TRANSCATHETER AORTIC VALVE REPLACEMENT): ICD-10-CM

## 2024-11-04 DIAGNOSIS — J44.9 CHRONIC OBSTRUCTIVE PULMONARY DISEASE, UNSPECIFIED COPD TYPE (MULTI): ICD-10-CM

## 2024-11-04 RX ORDER — IPRATROPIUM BROMIDE AND ALBUTEROL SULFATE 2.5; .5 MG/3ML; MG/3ML
3 SOLUTION RESPIRATORY (INHALATION) 4 TIMES DAILY PRN
Qty: 360 ML | Refills: 1 | Status: SHIPPED | OUTPATIENT
Start: 2024-11-04 | End: 2025-11-04

## 2024-11-04 RX ORDER — PEN NEEDLE, DIABETIC 31 GX5/16"
NEEDLE, DISPOSABLE MISCELLANEOUS
Qty: 10 EACH | Refills: 0 | Status: SHIPPED | OUTPATIENT
Start: 2024-11-04

## 2024-11-06 ENCOUNTER — HOSPITAL ENCOUNTER (OUTPATIENT)
Dept: CARDIOLOGY | Facility: CLINIC | Age: 89
Discharge: HOME | End: 2024-11-06
Payer: MEDICARE

## 2024-11-06 DIAGNOSIS — I48.0 PAF (PAROXYSMAL ATRIAL FIBRILLATION) (MULTI): ICD-10-CM

## 2024-11-06 DIAGNOSIS — I44.2 CHB (COMPLETE HEART BLOCK): ICD-10-CM

## 2024-11-06 DIAGNOSIS — I42.8 NONISCHEMIC CARDIOMYOPATHY (MULTI): ICD-10-CM

## 2024-11-06 DIAGNOSIS — Z95.810 IMPLANTABLE CARDIOVERTER-DEFIBRILLATOR (ICD) IN SITU: ICD-10-CM

## 2024-11-07 ENCOUNTER — APPOINTMENT (OUTPATIENT)
Dept: PRIMARY CARE | Facility: CLINIC | Age: 89
End: 2024-11-07
Payer: MEDICARE

## 2024-11-08 ENCOUNTER — OFFICE VISIT (OUTPATIENT)
Dept: PRIMARY CARE | Facility: CLINIC | Age: 89
End: 2024-11-08
Payer: MEDICARE

## 2024-11-08 ENCOUNTER — ANTICOAGULATION - WARFARIN VISIT (OUTPATIENT)
Dept: CARDIOLOGY | Facility: CLINIC | Age: 89
End: 2024-11-08

## 2024-11-08 DIAGNOSIS — R06.02 SOB (SHORTNESS OF BREATH) ON EXERTION: ICD-10-CM

## 2024-11-08 DIAGNOSIS — R05.9 COUGH, UNSPECIFIED TYPE: Primary | ICD-10-CM

## 2024-11-08 DIAGNOSIS — I48.20 CHRONIC ATRIAL FIBRILLATION (MULTI): Primary | ICD-10-CM

## 2024-11-08 DIAGNOSIS — Z95.2 S/P TAVR (TRANSCATHETER AORTIC VALVE REPLACEMENT): ICD-10-CM

## 2024-11-08 LAB
INR IN PPP BY COAGULATION ASSAY EXTERNAL: 2.2 (ref 2–3)
PROTHROMBIN TIME (PT) IN PPP BY COAGULATION ASSAY EXTERNAL: NORMAL

## 2024-11-08 PROCEDURE — 99213 OFFICE O/P EST LOW 20 MIN: CPT | Performed by: INTERNAL MEDICINE

## 2024-11-08 PROCEDURE — 1157F ADVNC CARE PLAN IN RCRD: CPT | Performed by: INTERNAL MEDICINE

## 2024-11-08 NOTE — PROGRESS NOTES
Subjective   Patient ID: Gorge Larsen is a 88 y.o. male who presents for Follow-up and Cough.    Patient is here for follow-up.  Finished antibiotic but still having cough  Nebulizer treatments does help  Patient has history of smoking but quit in the 90s    Past recap   patient is here with complaints of having chest congestion cough wheezing.   Feeling sick for past 2 3 days   patient sits a lot    Past recap   patient is here for follow-up  Having a lot of anxiety issues  Cannot take SSRIs because of low sodium  History of throat cancer and radiation treatment    Past recap  Patient is here for follow-up on ER visit  Feels dehydrated and weak.  He was given fluids  He is feeling better  Follow-up on hypertension CHF  Having increased urinary frequency at night    patient is here for hospital follow-up.  Was hospitalized for shortness of breath constipation and blood per rectum.  No active GI bleed.  Blood per rectum from hemorrhoids  Patient still gets short of breath easily  Follow-up on hypertension high cholesterol CHF anemia  For last few days having more chest congestion cough and yellow phlegm  Patient mostly sits a lot    Patient is here for follow-up  Leg swelling is doing much better  Gets dizzy wondering if he can cut down the dose of Lasix  His legs were swelling up in the pacemaker was not working he got the new pacemaker and it is working well  Refusing overnight pulse oximetry  Follow-up on hypertension high cholesterol hypothyroidism    Patient is here for hospital follow-up.  He was first admitted for COVID.  He had generalized malaise.  He was found to be in decompensated CHF and volume overload and hyponatremia.  Ejection fraction on the echo showed 30% which has gone down from his previous ejection fraction.  Cardiology recommended EPS consult.  This saw the patient and recommended upgrading to biventricular pacemaker as patient is already due for battery change.  Patient is taking Lasix  which is keeping the leg swelling down  History feels very tired and fatigued     Past recap  patient is here for follow-up  Did blood work  Having a lot of nosebleeds recently.  He is on Coumadin    patient is accompanied with the daughter and wife  He is doing much better on Zoloft  Did blood work  Patient checks Coumadin at home but does not know who is monitoring his Coumadin  He has gained some weight  Follow-up on hypertension high cholesterol congestive heart failure     patient is here accompanied with daughter.  Daughter is concerned that patient is very anxious.  Anxiety was the cause for his drinking heavily.  He was taken off the Zoloft because of low sodium.  His sodium is now normal which probably was related to his heart condition.  She wants to give it a try again.  She wants his Coumadin checked.  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  He sits a lot and does not do much around     past recap  Patient complaining of chest congestion coughing up whitish-yellow phlegm   patient here for follow-up on blood work  He is hard of hearing  Overall feeling much better  Leg swelling is not there he is using compression stockings  He had 2D echo done     Patient is here for follow-up on blood work and ultrasound kidneys  He is feeling much better  He is not taking spironolactone  Taking losartan only every other day  His legs are not swelling up  He has more energy and not too lethargic lethargic     Patient here for follow-up on blood work   He urinates a lot goes frequently  He has history of prostate cancer s/p surgery  He does not drink water but drinks iced tea     last visit when I spoke to the daughter she took  him to [place]. He was hospitalized for hyponatremia for few days then he was sent to [facility] rehab. There he had syncopal episode and transferred to [facility] emergency room  Patient was found to have reduced ejection fraction from 60 to 30%, severe aortic valve stenosis. He was transferred to [facility] where he had TAVR. Patient is doing much better since the procedure leg swelling has gone down his more alert but still remains quite withdrawn. The daughter thinks that he is still very depressed  He is also hard of hearing and his hearing aids are not working well  He is also not able to see very well because of the cataracts        Patient is here for follow-up on lab  Daughter is still concerned as patient is very lethargic, not taking the medications as prescribed     here for flollow up   Patient is not taking some medications which made him feel bad  He quit taking Synthroid  He quit taking Cardizem  He is not taking Coreg  he feels better without those medications.   But daughter says that he is more tired he is sleeping all the time  He says he feels dizzy  He sleeps in a chair  His swelling in the legs is getting worse  He does not take Lasix every day  He is getting more short of breath recently        Patient is here for follow-up  He saw Dr. Mcdonald  He did proBNP which came back 1111  He had his Lasix 20 mg and potassium 10 mEq he is  He did blood work  He has colon polyps positive for tubular adenoma  He has history of GI bleed and required blood transfusions  For last few weeks he is noticing dark stools off and on and concern about GI bleed again        past recap  To establish PCP  Follow-up on hypertension high cholesterol coronary artery disease  Needs refills on medication  He was hospitalized in December 2 sodium was 116 his hydrochlorothiazide was discontinued he was very weak and confused and he went to the nursing home at [facility] now he is home  Now he is having a lot of  swelling in the legs  He is sleeping and it recliner and not keeping his legs elevated     Past medical history: A. 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    Physical Exam  Vitals reviewed.   Constitutional:       Appearance: Normal appearance.   HENT:      Head: Normocephalic and atraumatic.      Right Ear: Tympanic membrane, ear canal and external ear normal.      Left Ear: Tympanic membrane, ear canal and external ear normal.      Nose:      Comments: Deviated nasal septum to the left     Mouth/Throat:      Pharynx: Oropharynx is clear.   Eyes:      Extraocular Movements: Extraocular movements intact.      Conjunctiva/sclera: Conjunctivae normal.      Pupils: Pupils are equal, round, and reactive to light.   Cardiovascular:      Rate and Rhythm: Normal rate and regular rhythm.      Pulses: Normal pulses.      Heart sounds: Normal heart sounds.   Pulmonary:      Effort: Pulmonary effort is normal.      Breath sounds: Rhonchi present.   Abdominal:      General: Abdomen is flat. Bowel sounds are normal.      Palpations: Abdomen is soft.   Musculoskeletal:      Cervical back: Normal range of motion and neck supple.   Skin:     General: Skin is warm and dry.   Neurological:      General: No focal deficit present.      Mental Status: He is alert and oriented to person, place, and time.   Psychiatric:         Mood and Affect: Mood normal.       Assessment/Plan   Problem List Items Addressed This Visit     None        10/7/22  Patient's hospital chart reviewed  Blood pressure stable  CBC CMP fasting with TSH ordered before next follow-up in a month  Syringing curettage done in right ear with BX removal with some improvement in hearing  Treat with neomycin eardrops  Advised to use mineral oil  Get ears cleaned  Advised to see the ENT to get new hearing aids  Encourage patient to go for cataract surgery  Poor vision poor hearing could be contributing to his anxiety and depression  Had a long discussion with her wife but and what kind of liquids he can have  Patient has a follow-up with cardiologist later today  May need echocardiogram done sooner  Clinically is looking better  We will follow-up in a month        12/3./22  Patient clinically looks much better  He has acute bronchitis  Treat with Augmentin  Venous insufficiency doing much better  Blood work reviewed  Sodium normal  Anemia improving  Follow-up blood work in 3 months  Echocardiogram shows improved ejection fraction from 30 to 35% to 50 to 55%     1/12/23  Had a long discussion with the family and the patient regarding his anxiety and depression and choice of medication  Explained all SSRIs are risk for causing hyponatremia  Family does not want anything to make urine sodium go down as he gets very confused with low sodium  Patient is hard of hearing and has poor vision he does not move around much  May be he needs to change his lifestyle which will make him more happy and less anxious  Discussed BuSpar dizziness most likely related to his ear  Try meclizine as needed  Family does not want to use buspirone at this time as there is a small chance of hyponatremia related to it  That when I work with lifestyle modification to see if patient feels better if not then we will follow-up  Routine follow-up      3/17/23  INR 2.5 PTT 30.2  Continue current Coumadin  We will start Zoloft 25 mg a day  Blood pressure stable  Cholesterol very well controlled  Blood work  looks pretty good  Congestive heart failure compensated no swelling of the leg  Hyponatremia resolved  He got new hearing aids  Follow-up blood work in 3 months    7/15/2023  Advised weight call Dr. Mcdonald office to make sure they are monitoring the Coumadin  Continue Zoloft patient is tolerating  Sodium is in range  Kidney function stable  Cholesterol well controlled  CHF compensated  Follow-up blood work in 6 months    1/13/2024  Blood work reviewed  TSH 6.34  Increase levothyroxine 75 mcg  Blood sugars elevated  Discussed diet exercise and risk for diabetes  Mild anemia still persists most likely related to epistaxis and patient is on Coumadin  Take iron supplement  Use saline nasal spray  Blood pressure stable  Cholesterol very well-controlled  Follow-up in 3 months    2/13/2024  Patient has recovered from COVID completely   patient appears quite compensated with CHF on Lasix  Will repeat CBC BMP in 1 week  Will check overnight pulse oximetry to see if hypoxia could be making him tired blood pressure stable  Patient is going for the battery change and upgrade the coming week  Follow-up after the battery update/pacemaker change    4/11/2024  Chest x-ray ordered to rule out congestive heart failure  Treat with Zithromax for 10 days  Breathing treatment given  Albuterol inhaler  Follow-up if not better    4/29/2024  Will reduce Lasix 20 mg a day  Blood pressure stable  CHF compensated  Encouraged to do overnight pulse oximetry it is not sleep study which patient thinks it is  Cholesterol very well-controlled  Mild anemia persists but stable  Patient is on blood thinners  Sodium slightly low could be from dehydration will monitor off with reduced dose of Lasix  Follow-up blood work in 3 months    7/22/2024  Breathing treatment given  Albuterol inhaler  Incentive spirometer  Z-Kane  Blood pressure stable  CHF stable  Routine blood work ordered  Follow-up in a month    9/12/2024  Blood work in ER reports  reviewed  Low sodium at 128  Will check ultrasound kidney and bladder  Flomax 0.4 to make sure patient urinates well  Wondering if SSRI again causing low sodium    9/24/2024  Repeat blood work his BNP normal  Sodium normal  Will try BuSpar to see if that helps with the anxiety  Blood work reviewed cholesterol well-controlled  Anemia is stable and improving  Kidney function is doing better  CHF compensated    10/18/2024  Will order chest x-ray  Patient appears to have pneumonia  No leg swelling CHF seems to be compensated  Treat with Augmentin 875 twice a day  Breathing treatment given  Nebulizer machine ordered  Follow-up if not better`    11/8/2024  Patient congestion sounds better but patient still complaining of shortness of breath  Will get CT lungs without contrast  Nebulizer machine  Albuterol solution  Follow-up after CAT scan

## 2024-11-08 NOTE — PROGRESS NOTES
Patient identification verified with 2 identifiers.    Location: [facility] Patient Self-Testing Program [phone]    Referring Physician: DR. MARIANNE BUCKLEY  Enrollment/ Re-enrollment date: 2025   INR Goal: 2.0-3.0  INR monitoring is per [facility] protocol.  Anticoagulation Medication: warfarin  Indication: Atrial Fibrillation/Atrial Flutter    Subjective   Bleeding signs/symptoms: No    Bruising: No   Major bleeding event: No  Thrombosis signs/symptoms: No  Thromboembolic event: No  Missed doses: No  Extra doses: No  Medication changes: No  Dietary changes: No  Change in health: No  Change in activity: No  Alcohol: No  Other concerns: No    Upcoming Procedures:  Does the Patient Have any upcoming procedures that require interruption in anticoagulation therapy? no  Does the patient require bridging? no      Anticoagulation Summary  As of 2024      INR goal:  2.0-3.0   TTR:  75.7% (4.8 mo)   INR used for dosin.20 (2024)   Weekly warfarin total:  40 mg               Assessment/Plan   Therapeutic     1. New dose: no change    2. Next INR: 1 week      Education provided to patient during the visit:  Patient instructed to call in interim with questions, concerns and changes.   Patient educated on compliance with dosing, follow up appointments, and prescribed plan of care.

## 2024-11-11 ENCOUNTER — APPOINTMENT (OUTPATIENT)
Dept: CARDIOLOGY | Facility: HOSPITAL | Age: 89
End: 2024-11-11
Payer: MEDICARE

## 2024-11-11 ENCOUNTER — APPOINTMENT (OUTPATIENT)
Dept: RADIOLOGY | Facility: HOSPITAL | Age: 89
End: 2024-11-11
Payer: MEDICARE

## 2024-11-11 ENCOUNTER — HOSPITAL ENCOUNTER (EMERGENCY)
Facility: HOSPITAL | Age: 89
Discharge: HOME | End: 2024-11-11
Attending: STUDENT IN AN ORGANIZED HEALTH CARE EDUCATION/TRAINING PROGRAM
Payer: MEDICARE

## 2024-11-11 VITALS
TEMPERATURE: 98.1 F | WEIGHT: 209.44 LBS | BODY MASS INDEX: 31.02 KG/M2 | RESPIRATION RATE: 23 BRPM | SYSTOLIC BLOOD PRESSURE: 164 MMHG | HEART RATE: 71 BPM | OXYGEN SATURATION: 98 % | HEIGHT: 69 IN | DIASTOLIC BLOOD PRESSURE: 67 MMHG

## 2024-11-11 DIAGNOSIS — K92.1 BLOOD IN STOOL: ICD-10-CM

## 2024-11-11 DIAGNOSIS — R10.32 LEFT LOWER QUADRANT ABDOMINAL PAIN: Primary | ICD-10-CM

## 2024-11-11 LAB
ALBUMIN SERPL BCP-MCNC: 4.4 G/DL (ref 3.4–5)
ALP SERPL-CCNC: 109 U/L (ref 33–136)
ALT SERPL W P-5'-P-CCNC: 19 U/L (ref 10–52)
ANION GAP SERPL CALCULATED.3IONS-SCNC: 11 MMOL/L (ref 10–20)
APPEARANCE UR: CLEAR
APTT PPP: 40.4 SECONDS (ref 22–32.5)
AST SERPL W P-5'-P-CCNC: 24 U/L (ref 9–39)
BASOPHILS # BLD AUTO: 0.07 X10*3/UL (ref 0–0.1)
BASOPHILS NFR BLD AUTO: 0.9 %
BILIRUB SERPL-MCNC: 1 MG/DL (ref 0–1.2)
BILIRUB UR STRIP.AUTO-MCNC: NEGATIVE MG/DL
BUN SERPL-MCNC: 21 MG/DL (ref 6–23)
CALCIUM SERPL-MCNC: 9.3 MG/DL (ref 8.6–10.3)
CHLORIDE SERPL-SCNC: 99 MMOL/L (ref 98–107)
CO2 SERPL-SCNC: 27 MMOL/L (ref 21–32)
COLOR UR: COLORLESS
CREAT SERPL-MCNC: 1.03 MG/DL (ref 0.5–1.3)
EGFRCR SERPLBLD CKD-EPI 2021: 71 ML/MIN/1.73M*2
EOSINOPHIL # BLD AUTO: 0.24 X10*3/UL (ref 0–0.4)
EOSINOPHIL NFR BLD AUTO: 3.1 %
ERYTHROCYTE [DISTWIDTH] IN BLOOD BY AUTOMATED COUNT: 14.2 % (ref 11.5–14.5)
GLUCOSE SERPL-MCNC: 113 MG/DL (ref 74–99)
GLUCOSE UR STRIP.AUTO-MCNC: NORMAL MG/DL
HCT VFR BLD AUTO: 39.9 % (ref 41–52)
HGB BLD-MCNC: 13.1 G/DL (ref 13.5–17.5)
IMM GRANULOCYTES # BLD AUTO: 0.03 X10*3/UL (ref 0–0.5)
IMM GRANULOCYTES NFR BLD AUTO: 0.4 % (ref 0–0.9)
KETONES UR STRIP.AUTO-MCNC: NEGATIVE MG/DL
LACTATE SERPL-SCNC: 0.9 MMOL/L (ref 0.4–2)
LEUKOCYTE ESTERASE UR QL STRIP.AUTO: NEGATIVE
LYMPHOCYTES # BLD AUTO: 0.55 X10*3/UL (ref 0.8–3)
LYMPHOCYTES NFR BLD AUTO: 7 %
MCH RBC QN AUTO: 30.5 PG (ref 26–34)
MCHC RBC AUTO-ENTMCNC: 32.8 G/DL (ref 32–36)
MCV RBC AUTO: 93 FL (ref 80–100)
MONOCYTES # BLD AUTO: 0.78 X10*3/UL (ref 0.05–0.8)
MONOCYTES NFR BLD AUTO: 9.9 %
MUCOUS THREADS #/AREA URNS AUTO: NORMAL /LPF
NEUTROPHILS # BLD AUTO: 6.19 X10*3/UL (ref 1.6–5.5)
NEUTROPHILS NFR BLD AUTO: 78.7 %
NITRITE UR QL STRIP.AUTO: NEGATIVE
NRBC BLD-RTO: 0 /100 WBCS (ref 0–0)
PH UR STRIP.AUTO: 6 [PH]
PLATELET # BLD AUTO: 181 X10*3/UL (ref 150–450)
POTASSIUM SERPL-SCNC: 4.2 MMOL/L (ref 3.5–5.3)
PROT SERPL-MCNC: 7.8 G/DL (ref 6.4–8.2)
PROT UR STRIP.AUTO-MCNC: ABNORMAL MG/DL
RBC # BLD AUTO: 4.29 X10*6/UL (ref 4.5–5.9)
RBC # UR STRIP.AUTO: NEGATIVE /UL
RBC #/AREA URNS AUTO: NORMAL /HPF
SODIUM SERPL-SCNC: 133 MMOL/L (ref 136–145)
SP GR UR STRIP.AUTO: 1.01
UROBILINOGEN UR STRIP.AUTO-MCNC: NORMAL MG/DL
WBC # BLD AUTO: 7.9 X10*3/UL (ref 4.4–11.3)
WBC #/AREA URNS AUTO: NORMAL /HPF

## 2024-11-11 PROCEDURE — 85730 THROMBOPLASTIN TIME PARTIAL: CPT | Performed by: STUDENT IN AN ORGANIZED HEALTH CARE EDUCATION/TRAINING PROGRAM

## 2024-11-11 PROCEDURE — 83605 ASSAY OF LACTIC ACID: CPT | Performed by: STUDENT IN AN ORGANIZED HEALTH CARE EDUCATION/TRAINING PROGRAM

## 2024-11-11 PROCEDURE — 2500000005 HC RX 250 GENERAL PHARMACY W/O HCPCS: Performed by: STUDENT IN AN ORGANIZED HEALTH CARE EDUCATION/TRAINING PROGRAM

## 2024-11-11 PROCEDURE — 2500000001 HC RX 250 WO HCPCS SELF ADMINISTERED DRUGS (ALT 637 FOR MEDICARE OP): Performed by: STUDENT IN AN ORGANIZED HEALTH CARE EDUCATION/TRAINING PROGRAM

## 2024-11-11 PROCEDURE — 74177 CT ABD & PELVIS W/CONTRAST: CPT

## 2024-11-11 PROCEDURE — 81001 URINALYSIS AUTO W/SCOPE: CPT | Performed by: STUDENT IN AN ORGANIZED HEALTH CARE EDUCATION/TRAINING PROGRAM

## 2024-11-11 PROCEDURE — 80053 COMPREHEN METABOLIC PANEL: CPT | Performed by: STUDENT IN AN ORGANIZED HEALTH CARE EDUCATION/TRAINING PROGRAM

## 2024-11-11 PROCEDURE — 85025 COMPLETE CBC W/AUTO DIFF WBC: CPT | Performed by: STUDENT IN AN ORGANIZED HEALTH CARE EDUCATION/TRAINING PROGRAM

## 2024-11-11 PROCEDURE — 36415 COLL VENOUS BLD VENIPUNCTURE: CPT | Performed by: STUDENT IN AN ORGANIZED HEALTH CARE EDUCATION/TRAINING PROGRAM

## 2024-11-11 PROCEDURE — 99285 EMERGENCY DEPT VISIT HI MDM: CPT | Mod: 25

## 2024-11-11 PROCEDURE — 93005 ELECTROCARDIOGRAM TRACING: CPT

## 2024-11-11 PROCEDURE — 2550000001 HC RX 255 CONTRASTS: Performed by: STUDENT IN AN ORGANIZED HEALTH CARE EDUCATION/TRAINING PROGRAM

## 2024-11-11 RX ORDER — FAMOTIDINE 20 MG/1
20 TABLET, FILM COATED ORAL ONCE
Status: COMPLETED | OUTPATIENT
Start: 2024-11-11 | End: 2024-11-11

## 2024-11-11 RX ORDER — ALUMINUM HYDROXIDE, MAGNESIUM HYDROXIDE, AND SIMETHICONE 1200; 120; 1200 MG/30ML; MG/30ML; MG/30ML
30 SUSPENSION ORAL ONCE
Status: COMPLETED | OUTPATIENT
Start: 2024-11-11 | End: 2024-11-11

## 2024-11-11 RX ORDER — LIDOCAINE HYDROCHLORIDE 20 MG/ML
15 SOLUTION OROPHARYNGEAL ONCE
Status: COMPLETED | OUTPATIENT
Start: 2024-11-11 | End: 2024-11-11

## 2024-11-11 ASSESSMENT — PAIN SCALES - GENERAL: PAINLEVEL_OUTOF10: 8

## 2024-11-11 ASSESSMENT — LIFESTYLE VARIABLES
EVER FELT BAD OR GUILTY ABOUT YOUR DRINKING: NO
TOTAL SCORE: 0
HAVE PEOPLE ANNOYED YOU BY CRITICIZING YOUR DRINKING: NO
HAVE YOU EVER FELT YOU SHOULD CUT DOWN ON YOUR DRINKING: NO
EVER HAD A DRINK FIRST THING IN THE MORNING TO STEADY YOUR NERVES TO GET RID OF A HANGOVER: NO

## 2024-11-11 ASSESSMENT — PAIN - FUNCTIONAL ASSESSMENT: PAIN_FUNCTIONAL_ASSESSMENT: 0-10

## 2024-11-11 ASSESSMENT — PAIN DESCRIPTION - LOCATION: LOCATION: ABDOMEN

## 2024-11-11 ASSESSMENT — PAIN DESCRIPTION - ONSET: ONSET: GRADUAL

## 2024-11-11 ASSESSMENT — PAIN DESCRIPTION - ORIENTATION: ORIENTATION: LOWER

## 2024-11-11 ASSESSMENT — PAIN DESCRIPTION - PAIN TYPE: TYPE: ACUTE PAIN

## 2024-11-11 ASSESSMENT — PAIN DESCRIPTION - PROGRESSION: CLINICAL_PROGRESSION: NOT CHANGED

## 2024-11-11 ASSESSMENT — PAIN DESCRIPTION - FREQUENCY: FREQUENCY: CONSTANT/CONTINUOUS

## 2024-11-11 NOTE — ED PROVIDER NOTES
I assumed care of this patient at shift change.  Patient was pending CT scan, CT report listed below, patient will be discharged home, advised follow-up with his primary care provider, return precautions were discussed the patient and his family ember, they are agreeable this discharge plan.    Diagnosis: Abdominal pain, blood in stool  CT abdomen pelvis w IV contrast   Final Result   No finding is noted which would explain the patient's abdominal pain.             MACRO:   None.        Signed by: Wanda Masters 11/11/2024 3:38 PM   Dictation workstation:   [id number]        Results for orders placed or performed during the hospital encounter of 11/11/24   CBC with Differential    Collection Time: 11/11/24 11:42 AM   Result Value Ref Range    WBC 7.9 4.4 - 11.3 x10*3/uL    nRBC 0.0 0.0 - 0.0 /100 WBCs    RBC 4.29 (L) 4.50 - 5.90 x10*6/uL    Hemoglobin 13.1 (L) 13.5 - 17.5 g/dL    Hematocrit 39.9 (L) 41.0 - 52.0 %    MCV 93 80 - 100 fL    MCH 30.5 26.0 - 34.0 pg    MCHC 32.8 32.0 - 36.0 g/dL    RDW 14.2 11.5 - 14.5 %    Platelets 181 150 - 450 x10*3/uL    Neutrophils % 78.7 40.0 - 80.0 %    Immature Granulocytes %, Automated 0.4 0.0 - 0.9 %    Lymphocytes % 7.0 13.0 - 44.0 %    Monocytes % 9.9 2.0 - 10.0 %    Eosinophils % 3.1 0.0 - 6.0 %    Basophils % 0.9 0.0 - 2.0 %    Neutrophils Absolute 6.19 (H) 1.60 - 5.50 x10*3/uL    Immature Granulocytes Absolute, Automated 0.03 0.00 - 0.50 x10*3/uL    Lymphocytes Absolute 0.55 (L) 0.80 - 3.00 x10*3/uL    Monocytes Absolute 0.78 0.05 - 0.80 x10*3/uL    Eosinophils Absolute 0.24 0.00 - 0.40 x10*3/uL    Basophils Absolute 0.07 0.00 - 0.10 x10*3/uL   Comprehensive Metabolic Panel    Collection Time: 11/11/24 11:42 AM   Result Value Ref Range    Glucose 113 (H) 74 - 99 mg/dL    Sodium 133 (L) 136 - 145 mmol/L    Potassium 4.2 3.5 - 5.3 mmol/L    Chloride 99 98 - 107 mmol/L    Bicarbonate 27 21 - 32 mmol/L    Anion Gap 11 10 - 20 mmol/L    Urea Nitrogen 21 6 - 23 mg/dL     Creatinine 1.03 0.50 - 1.30 mg/dL    eGFR 71 >60 mL/min/1.73m*2    Calcium 9.3 8.6 - 10.3 mg/dL    Albumin 4.4 3.4 - 5.0 g/dL    Alkaline Phosphatase 109 33 - 136 U/L    Total Protein 7.8 6.4 - 8.2 g/dL    AST 24 9 - 39 U/L    Bilirubin, Total 1.0 0.0 - 1.2 mg/dL    ALT 19 10 - 52 U/L   APTT    Collection Time: 11/11/24 11:42 AM   Result Value Ref Range    aPTT 40.4 (H) 22.0 - 32.5 seconds   Urinalysis with Reflex Culture and Microscopic    Collection Time: 11/11/24  1:27 PM   Result Value Ref Range    Color, Urine Colorless (N) Light-Yellow, Yellow, Dark-Yellow    Appearance, Urine Clear Clear    Specific Gravity, Urine 1.010 1.005 - 1.035    pH, Urine 6.0 5.0, 5.5, 6.0, 6.5, 7.0, 7.5, 8.0    Protein, Urine 20 (TRACE) NEGATIVE, 10 (TRACE), 20 (TRACE) mg/dL    Glucose, Urine Normal Normal mg/dL    Blood, Urine NEGATIVE NEGATIVE    Ketones, Urine NEGATIVE NEGATIVE mg/dL    Bilirubin, Urine NEGATIVE NEGATIVE    Urobilinogen, Urine Normal Normal mg/dL    Nitrite, Urine NEGATIVE NEGATIVE    Leukocyte Esterase, Urine NEGATIVE NEGATIVE   Urinalysis Microscopic    Collection Time: 11/11/24  1:27 PM   Result Value Ref Range    WBC, Urine NONE 1-5, NONE /HPF    RBC, Urine 1-2 NONE, 1-2, 3-5 /HPF    Mucus, Urine FEW Reference range not established. /LPF   Lactate    Collection Time: 11/11/24  1:28 PM   Result Value Ref Range    Lactate 0.9 0.4 - 2.0 mmol/L     *Note: Due to a large number of results and/or encounters for the requested time period, some results have not been displayed. A complete set of results can be found in Results Review.       Sections of this report were created using voice-to-text technology and may contain errors in translation    Travis Anton DO  Emergency Medicine         Travis Anton DO  11/11/24 [id number]

## 2024-11-11 NOTE — DISCHARGE INSTRUCTIONS
You are seen today for abdominal pain and bloody stool, please follow-up with your primary care provider, your lab work and imaging are reassuring, this most likely related to hemorrhoids, please return to the ER for any worsening symptoms.    Thank you for choosing [facility] Emergency Department. It was my pleasure to be involved in your care today.         As of today's visit, based on reasonable likelihood, that it is safe for you to be discharged back to your residence to follow-up as an outpatient for ongoing management of your medical problem. You should follow-up with any referrals / primary provider as soon as possible. The contacts (number, addresses) are listed below.         Important:  Even though we think it is safe for you to go home, there is always a small chance that we are missing something that could require hospitalization.  Therefore it is very important that if you get worse or develops any new symptoms that you return here as soon as possible to be re-evaluated.  This includes return of symptoms that have resolved such as fainting, chest pain, or symptoms that could be warning signs for stroke important:  Even though we think it is safe for you to go home, there is always a small chance that we are missing something that could require hospitalization.  Therefore it is very important that if you get worse or develops any new symptoms that you return here as soon as possible to be re-evaluated.  This includes return of symptoms that have resolved such as fainting, chest pain, or symptoms that could be warning signs for stroke         Make sure your pharmacy and primary doctor is aware of any new medications prescribed today.          It is your responsibility to contact as soon as possible, and follow through with, any referrals you were given today. We do recommend you inform them you are a [facility] follow-up patient, as often they can better accommodate your need to be seen, provided their  schedules allow. We will, and have, made every effort to ensure you have access to adequate follow-up specialists available.          All problems may not be able to be fixed in one ER visit. This is why timely ongoing care is important, and this is a responsibility you share in. Further, you are free to follow up with any provider you choose, and this is not limited to our suggestion.          If cultures were obtained today, you will be contacted should anything result that would require further treatment. Please contact the ED at the number provided with questions.          Having trouble affording medications? Try [email or web address]! (This is not a hospital endorsed website, merely a recommendation based on my own personal experiences with [email or web address])

## 2024-11-11 NOTE — ED PROVIDER NOTES
HPI   Chief Complaint   Patient presents with    Abdominal Pain     Pt presents to ed via self for 3 day complaint of lower abd pain. States that as of this morning he noticed bloody stools. Denies any chest pain sob, fevers, chills.       Patient presents to ED for abdominal pain for last 3 to 5 days.  Patient is a poor historian unable to quantify the pain but did report bloody bowel movement described as bright red.  Also endorses associate abdominal distention.  Does not endorse any radiation of pain to his back and does not describe his tearing/ripping in nature.  Does not endorse any BLE numbness/tingling sensation.  Does not appreciate any lightheadedness/dizziness, pallor appearance, cardiopulmonary symptoms.  Unable to recall any abdominal surgical history.  Denies any appreciable fever/chills.  Denies any  symptoms.  Denies any other significant systemic symptoms or complaints.              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, unspecified vessel or lesion type, unspecified whether native or transplanted heart    Cancer (Multi)     CHF (congestive heart failure)     COPD (chronic obstructive pulmonary disease) (Multi)     Essential (primary) hypertension 11/05/2022    Hypertension, unspecified type    Personal history of diseases of the blood and blood-forming organs and certain disorders involving the immune mechanism     History of bleeding disorder    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 Cardiac Cath Lab;  Service: Electrophysiology;  Laterality: Left;  NO AUTH NEEDED    CARDIAC ELECTROPHYSIOLOGY PROCEDURE Left 2024    Procedure: ICD BIV Implant;  Surgeon: Walker Mcdonald MD;  Location: [facility] Cardiac Cath [facility];  Service: Electrophysiology;  Laterality: Left;  upgrade dual PPM to CRT-D, Cap current RV lead. CASE# [id number]    CARDIAC ELECTROPHYSIOLOGY PROCEDURE Left 2024    Procedure: ICD Upgrade Biventricular;  Surgeon: Walker Mcdonald MD;  Location: [facility] Cardiac Cath [facility];  Service: Electrophysiology;  Laterality: Left;  upgrade dual PPM to CRT-D (LV lead implant), cap RV lead, CASE#[id number]    OTHER SURGICAL HISTORY  2021    Colonoscopy    OTHER SURGICAL HISTORY  2021    Colon surgery     OTHER SURGICAL HISTORY  2021    Pacemaker insertion    OTHER SURGICAL HISTORY  2021    Coronary artery bypass graft     OTHER SURGICAL HISTORY  2023    Throat surgery     Family History   Problem Relation Name Age of Onset    Heart attack Mother      Heart disease Mother      Other (Car accident) Father       Social History     Tobacco Use    Smoking status: Former     Current packs/day: 0.00     Types: Cigarettes     Quit date: 1984     Years since quittin.3    Smokeless tobacco: Never   Vaping Use    Vaping status: Never Used   Substance Use Topics    Alcohol use: Never    Drug use: Never       Physical Exam   ED Triage Vitals [24 1138]   Temperature Heart Rate Respirations BP   36.7 °C (98.1 °F) 70 16 (!) 185/80      Pulse Ox Temp src Heart Rate Source Patient Position   97 % -- Monitor Sitting      BP Location FiO2 (%)     Left arm --       Physical Exam  Vitals and nursing note reviewed.   Constitutional:       General: He is not in acute distress.     Appearance: Normal appearance.  He is not ill-appearing.   HENT:      Mouth/Throat:      Mouth: Mucous membranes are moist.      Pharynx: Oropharynx is clear.   Eyes:      General: No scleral icterus.     Conjunctiva/sclera: Conjunctivae normal.   Cardiovascular:      Rate and Rhythm: Normal rate.      Pulses:           Posterior tibial pulses are 1+ on the right side and 1+ on the left side.   Pulmonary:      Effort: Pulmonary effort is normal.   Abdominal:      General: Abdomen is protuberant. There is no distension.      Palpations: Abdomen is soft. There is no shifting dullness, fluid wave or pulsatile mass.      Tenderness: There is abdominal tenderness in the right lower quadrant, periumbilical area and suprapubic area. There is no right CVA tenderness, left CVA tenderness or guarding.      Hernia: There is no hernia in the ventral area.      Comments: Periumbilical/suprapubic/RLQ tenderness to palpation, no appreciable pain out of proportion, no abdominal wall rigidity or voluntary guarding and not endorsing associated rebound tenderness, protuberant abdomen although no appreciable distention or increased tympany, no evidence of fluid wave, no appreciable pulsatile mass and no appreciable suprapubic/RLQ fullness/mass   Musculoskeletal:      Right lower leg: No edema.      Left lower leg: No edema.   Skin:     General: Skin is warm and dry.      Coloration: Skin is not jaundiced or pale.   Neurological:      General: No focal deficit present.      Mental Status: He is alert and oriented to person, place, and time.           ED Course & MDM   ED Course as of 11/11/24 1622   Mon Nov 11, 2024   1245 VS notable for moderately hypertensive on presentation in setting of reported abdominal pain and BRBPR, remaining VSS [BC]   1332 I personally reviewed and interpreted the EKG @ 1136: Patient spikes on monitor/NSR 70, no appreciable ischemia, RAD, RBBB, prolonged Qtc 524 ms, prior EKG on 9/5/2024 reviewed without any appreciable  "specific/identifiable changes, and no ischemia based on modified Sgarbossa criteria [BC]   1427 Lactate  WNL, no immediate concern for acute mesenteric ischemia [BC]   1427 Comprehensive Metabolic Panel(!)  Unremarkable and noncontributory to Patient condition/symptoms [BC]   1427 CBC with Differential(!)  Baseline normocytic anemia, no concern for significant GIB, otherwise unremarkable and noncontributory to Patient condition/symptoms [BC]      ED Course User Index  [BC] Isaiah Fields MD         Diagnoses as of 11/11/24 1622   Left lower quadrant abdominal pain   Blood in stool                 No data recorded     Carson Coma Scale Score: 15 (11/11/24 1513 : Lily Diaz, LAKISHA)                           Medical Decision Making  Patient presented to the ED for dental pain for the last 3 to 5 days with 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  I personally reviewed and interpreted VS, labs, and EKG which are as stated above in the ED course.    Assessment/evaluation consistent with likely GERD exacerbation versus constipation pending imaging complicated by likely internal hemorrhoids versus AVM, lower suspicion for diverticulitis pending imaging.  No concerning history, clinical evidence/work-up, or exam findings for the considered differentials of acute mesenteric ischemia, low sufficient for bowel perforation/pneumoperitoneum, SBO, UTI/cystitis, pancreatitis.  These conditions have been thoroughly evaluated and determined to be sufficiently unlikely to be the etiology of patient's presenting symptoms.    Patient signed out to oncoming provider, Dr. Travis Anton, at 4:18 PM in stable condition.    /67   Pulse 71   Temp 36.7 °C (98.1 °F)   Resp (!) 23   Ht 1.753 m (5' 9\")   Wt 95 kg (209 lb 7 oz)   SpO2 98%   BMI 30.93 kg/m²     Remaining workup:  Images CT A/P, Reassessment, and Discharge pending imaging    Patient disposition Discharge Home and alternative disposition Medicine " Admission.      Per Chart Review: IM office visit on 11/8/2024 for reported cough and shortness of breath, visit summary significant for admission for COVID and found to be in decompensated CHF with volume overload and hyponatremia, echo obtained demonstrated LVEF 30%, cards EP consulted who recommended biventricular pacemaker, exam notable for rhonchi otherwise unremarkable/noncontributory, plan for updated blood work, recommended ENT follow-up and ensure follow-up with cardiology same day, RTO 1 month.      Parts of this chart have been completed using voice-to-text recognition software. Please excuse any errors of transcription that were missed for editing/correcting.    Amount and/or Complexity of Data Reviewed  External Data Reviewed: notes.     Details: See MDM  Labs: ordered. Decision-making details documented in ED Course.  ECG/medicine tests: ordered and independent interpretation performed. Decision-making details documented in ED Course.        Procedure  Procedures     Isaiah Fields MD  11/11/24 7093

## 2024-11-12 LAB
ATRIAL RATE: 70 BPM
HOLD SPECIMEN: NORMAL
Q ONSET: 190 MS
QRS COUNT: 11 BEATS
QRS DURATION: 192 MS
QT INTERVAL: 486 MS
QTC CALCULATION(BAZETT): 524 MS
QTC FREDERICIA: 511 MS
R AXIS: 246 DEGREES
T AXIS: 68 DEGREES
T OFFSET: 433 MS
VENTRICULAR RATE: 70 BPM

## 2024-11-13 DIAGNOSIS — R53.1 GENERALIZED WEAKNESS: ICD-10-CM

## 2024-11-13 DIAGNOSIS — R05.1 ACUTE COUGH: ICD-10-CM

## 2024-11-13 DIAGNOSIS — E78.5 DYSLIPIDEMIA: ICD-10-CM

## 2024-11-13 RX ORDER — ROSUVASTATIN CALCIUM 5 MG/1
5 TABLET, COATED ORAL NIGHTLY
Qty: 90 TABLET | Refills: 0 | Status: SHIPPED | OUTPATIENT
Start: 2024-11-13

## 2024-11-13 RX ORDER — ALBUTEROL SULFATE 90 UG/1
2 INHALANT RESPIRATORY (INHALATION) EVERY 4 HOURS PRN
Qty: 8.5 G | Refills: 0 | Status: SHIPPED | OUTPATIENT
Start: 2024-11-13 | End: 2025-11-13

## 2024-11-15 ENCOUNTER — ANTICOAGULATION - WARFARIN VISIT (OUTPATIENT)
Dept: CARDIOLOGY | Facility: CLINIC | Age: 89
End: 2024-11-15
Payer: MEDICARE

## 2024-11-15 DIAGNOSIS — I48.20 CHRONIC ATRIAL FIBRILLATION (MULTI): Primary | ICD-10-CM

## 2024-11-15 DIAGNOSIS — Z95.2 S/P TAVR (TRANSCATHETER AORTIC VALVE REPLACEMENT): ICD-10-CM

## 2024-11-15 LAB
INR IN PPP BY COAGULATION ASSAY EXTERNAL: 2.6
PROTHROMBIN TIME (PT) IN PPP BY COAGULATION ASSAY EXTERNAL: NORMAL

## 2024-11-15 NOTE — PROGRESS NOTES
Patient identification verified with 2 identifiers.    [place] [facility] [place] [phone]    Referring Physician: DR. MARIANNE BUCKLEY  Enrollment/ Re-enrollment date: 2025   INR Goal: 2.0-3.0  INR monitoring is per [facility] protocol.  Anticoagulation Medication: warfarin  Indication: Atrial Fibrillation/Atrial Flutter    Subjective   Bleeding signs/symptoms: No    Bruising: No   Major bleeding event: No  Thrombosis signs/symptoms: No  Thromboembolic event: No  Missed doses: No  Extra doses: No  Medication changes: No  Dietary changes: No  Change in health: No  Change in activity: No  Alcohol: No  Other concerns: No    Upcoming Procedures:  Does the Patient Have any upcoming procedures that require interruption in anticoagulation therapy? no  Does the patient require bridging? no      Anticoagulation Summary  As of 11/15/2024      INR goal:  2.0-3.0   TTR:  76.8% (5.1 mo)   INR used for dosin.60 (11/15/2024)   Weekly warfarin total:  40 mg               Assessment/Plan   Therapeutic     1. Catrina reports that patient missed his dose yesterday because he was sick.  Will maintain dose and patient will retest in 1 week.    2. Next INR: 1 week      Education provided to patient during the visit:  Patient instructed to call in interim with questions, concerns and changes.

## 2024-11-20 RX ORDER — POTASSIUM CHLORIDE 1.5 G/1.58G
20 POWDER, FOR SOLUTION ORAL DAILY
Qty: 90 PACKET | Refills: 0 | Status: SHIPPED | OUTPATIENT
Start: 2024-11-20 | End: 2025-02-18

## 2024-11-22 ENCOUNTER — ANTICOAGULATION - WARFARIN VISIT (OUTPATIENT)
Dept: CARDIOLOGY | Facility: CLINIC | Age: 89
End: 2024-11-22
Payer: MEDICARE

## 2024-11-22 DIAGNOSIS — I48.20 CHRONIC ATRIAL FIBRILLATION (MULTI): Primary | ICD-10-CM

## 2024-11-22 DIAGNOSIS — Z95.2 S/P TAVR (TRANSCATHETER AORTIC VALVE REPLACEMENT): ICD-10-CM

## 2024-11-22 LAB
INR IN PPP BY COAGULATION ASSAY EXTERNAL: 2.6 (ref 2–3)
PROTHROMBIN TIME (PT) IN PPP BY COAGULATION ASSAY EXTERNAL: NORMAL

## 2024-11-22 NOTE — PROGRESS NOTES
Patient identification verified with 2 identifiers.    Location: [facility] Patient Self-Testing Program [phone]    Referring Physician: DR. MARIANNE BUCKLEY  Enrollment/ Re-enrollment date: 2025   INR Goal: 2.0-3.0  INR monitoring is per [facility] protocol.  Anticoagulation Medication: warfarin  Indication: Atrial Fibrillation/Atrial Flutter    Subjective   Bleeding signs/symptoms: No    Bruising: No   Major bleeding event: No  Thrombosis signs/symptoms: No  Thromboembolic event: No  Missed doses: No  Extra doses: No  Medication changes: No  Dietary changes: No  Change in health: No  Change in activity: No  Alcohol: No  Other concerns: No    Upcoming Procedures:  Does the Patient Have any upcoming procedures that require interruption in anticoagulation therapy? no  Does the patient require bridging? no      Anticoagulation Summary  As of 2024      INR goal:  2.0-3.0   TTR:  77.8% (5.3 mo)   INR used for dosin.60 (2024)   Weekly warfarin total:  40 mg               Assessment/Plan   Therapeutic     1. New dose: no change  Spoke with Catrina, she states understanding of dosing schedule.  2. Next INR: 2 weeks      Education provided to patient during the visit:  Patient instructed to call in interim with questions, concerns and changes.   Patient educated on compliance with dosing, follow up appointments, and prescribed plan of care.

## 2024-12-06 ENCOUNTER — ANTICOAGULATION - WARFARIN VISIT (OUTPATIENT)
Dept: CARDIOLOGY | Facility: CLINIC | Age: 89
End: 2024-12-06
Payer: MEDICARE

## 2024-12-06 DIAGNOSIS — I48.20 CHRONIC ATRIAL FIBRILLATION (MULTI): Primary | ICD-10-CM

## 2024-12-06 DIAGNOSIS — Z95.2 S/P TAVR (TRANSCATHETER AORTIC VALVE REPLACEMENT): ICD-10-CM

## 2024-12-06 NOTE — PROGRESS NOTES
Patient identification verified with 2 identifiers.    Location: [facility] Patient Self-Testing Program [phone]    Referring Physician: DR. MARIANNE BUCKLEY  Enrollment/ Re-enrollment date: 2025   INR Goal: 2.0-3.0  INR monitoring is per [facility] protocol.  Anticoagulation Medication: warfarin  Indication: Atrial Fibrillation/Atrial Flutter    Subjective   Bleeding signs/symptoms: No    Bruising: No   Major bleeding event: No  Thrombosis signs/symptoms: No  Thromboembolic event: No  Missed doses: No  Extra doses: No  Medication changes: No  Dietary changes: No  Change in health: No  Change in activity: No  Alcohol: No  Other concerns: No    Upcoming Procedures:  Does the Patient Have any upcoming procedures that require interruption in anticoagulation therapy? no  Does the patient require bridging? no      Anticoagulation Summary  As of 2024      INR goal:  2.0-3.0   TTR:  79.6% (5.8 mo)   INR used for dosin.60 (2024)   Weekly warfarin total:  40 mg             Received faxed INR self-test results and called patient.  VM left for pt to report any complications related to ACT therapy, changes in diet, medications, social habits or general health. Current dose schedule also left as part of VM with instructions to return call if different from what is currently being taken.  Pt instructed to call PST voicemail at [phone] in interim with questions, concerns or changes.  HALEY Espino RN    Assessment/Plan   Therapeutic     1. New dose: no change    2. Next INR: 2 weeks      Education provided to patient during the visit:  Patient instructed to call in interim with questions, concerns and changes.

## 2024-12-09 ENCOUNTER — HOSPITAL ENCOUNTER (OUTPATIENT)
Dept: CARDIOLOGY | Facility: CLINIC | Age: 89
Discharge: HOME | End: 2024-12-09
Payer: MEDICARE

## 2024-12-09 DIAGNOSIS — Z95.810 IMPLANTABLE CARDIOVERTER-DEFIBRILLATOR (ICD) IN SITU: ICD-10-CM

## 2024-12-09 DIAGNOSIS — I48.0 PAF (PAROXYSMAL ATRIAL FIBRILLATION) (MULTI): ICD-10-CM

## 2024-12-09 DIAGNOSIS — I44.2 CHB (COMPLETE HEART BLOCK): ICD-10-CM

## 2024-12-09 DIAGNOSIS — I42.8 NONISCHEMIC CARDIOMYOPATHY (MULTI): ICD-10-CM

## 2024-12-16 DIAGNOSIS — E03.9 HYPOTHYROIDISM, UNSPECIFIED TYPE: ICD-10-CM

## 2024-12-16 RX ORDER — LEVOTHYROXINE SODIUM 50 UG/1
75 TABLET ORAL DAILY
Qty: 135 TABLET | Refills: 0 | Status: SHIPPED | OUTPATIENT
Start: 2024-12-16 | End: 2025-03-16

## 2024-12-17 ENCOUNTER — APPOINTMENT (OUTPATIENT)
Dept: RADIOLOGY | Facility: CLINIC | Age: 89
End: 2024-12-17
Payer: MEDICARE

## 2024-12-20 ENCOUNTER — ANTICOAGULATION - WARFARIN VISIT (OUTPATIENT)
Dept: CARDIOLOGY | Facility: HOSPITAL | Age: 89
End: 2024-12-20
Payer: MEDICARE

## 2024-12-20 DIAGNOSIS — Z95.2 S/P TAVR (TRANSCATHETER AORTIC VALVE REPLACEMENT): ICD-10-CM

## 2024-12-20 DIAGNOSIS — I48.20 CHRONIC ATRIAL FIBRILLATION (MULTI): Primary | ICD-10-CM

## 2024-12-20 LAB
INR IN PPP BY COAGULATION ASSAY EXTERNAL: 3.7
PROTHROMBIN TIME (PT) IN PPP BY COAGULATION ASSAY EXTERNAL: NORMAL

## 2024-12-20 NOTE — PROGRESS NOTES
Patient identification verified with 2 identifiers.    Location: [facility] Patient Self-Testing Program [phone]    Referring Physician: DR. MARIANNE BUCKLEY  Enrollment/ Re-enrollment date: 6/13/2025   INR Goal: 2.0-3.0  INR monitoring is per [facility] protocol.  Anticoagulation Medication: warfarin  Indication: Atrial Fibrillation/Atrial Flutter    Subjective   Bleeding signs/symptoms: No    Bruising: No   Major bleeding event: No  Thrombosis signs/symptoms: No  Thromboembolic event: No  Missed doses: No  Extra doses: No  Medication changes: Yes  Patient is doing an albuterol treatment every other day.  Dietary changes: No  Change in health: No  Change in activity: No  Alcohol: No  Other concerns: No    Upcoming Procedures:  Does the Patient Have any upcoming procedures that require interruption in anticoagulation therapy? no  Does the patient require bridging? no      Anticoagulation Summary  As of 12/20/2024      INR goal:  2.0-3.0   TTR:  76.4% (6.2 mo)   INR used for dosing:  3.70 (12/20/2024)   Weekly warfarin total:  40 mg               Assessment/Plan   Supratherapeutic     1. New dose:  Will hold one day and maintain weekly dose.      2. Next INR: 1 week      Education provided to patient during the visit:  Patient instructed to call in interim with questions, concerns and changes.   Patient educated on compliance with dosing, follow up appointments, and prescribed plan of care.

## 2024-12-22 DIAGNOSIS — R39.89 URINARY PROBLEM: ICD-10-CM

## 2024-12-25 RX ORDER — TAMSULOSIN HYDROCHLORIDE 0.4 MG/1
CAPSULE ORAL
Qty: 90 CAPSULE | Refills: 0 | Status: SHIPPED | OUTPATIENT
Start: 2024-12-25

## 2024-12-27 ENCOUNTER — ANTICOAGULATION - WARFARIN VISIT (OUTPATIENT)
Dept: CARDIOLOGY | Facility: CLINIC | Age: 89
End: 2024-12-27
Payer: MEDICARE

## 2024-12-27 DIAGNOSIS — I48.20 CHRONIC ATRIAL FIBRILLATION (MULTI): Primary | ICD-10-CM

## 2024-12-27 DIAGNOSIS — Z95.2 S/P TAVR (TRANSCATHETER AORTIC VALVE REPLACEMENT): ICD-10-CM

## 2024-12-27 LAB
INR IN PPP BY COAGULATION ASSAY EXTERNAL: 2.7
PROTHROMBIN TIME (PT) IN PPP BY COAGULATION ASSAY EXTERNAL: NORMAL

## 2024-12-27 NOTE — PROGRESS NOTES
Patient identification verified with 2 identifiers.    Location: [facility] Patient Self-Testing Program [phone]    Referring Physician: Dr. Jeremy Clarke  Enrollment/ Re-enrollment date: 25   INR Goal: 2.0-3.0  INR monitoring is per [facility] protocol.  Anticoagulation Medication: warfarin  Indication: Atrial Fibrillation/Atrial Flutter    Subjective   Bleeding signs/symptoms: No    Bruising: No   Major bleeding event: No  Thrombosis signs/symptoms: No  Thromboembolic event: No  Missed doses: No  Extra doses: No  Medication changes: No  Dietary changes: No  Change in health: No  Change in activity: No  Alcohol: No  Other concerns: No    Upcoming Procedures:  Does the Patient Have any upcoming procedures that require interruption in anticoagulation therapy? no  Does the patient require bridging? no      Anticoagulation Summary  As of 2024      INR goal:  2.0-3.0   TTR:  74.7% (6.5 mo)   INR used for dosin.70 (2024)   Weekly warfarin total:  40 mg               Assessment/Plan   Therapeutic     1. New dose: no change    2. Next INR: 1 week      Education provided to patient during the visit:  Patient instructed to call in interim with questions, concerns and changes.   Patient educated on interactions between medications and warfarin.   Patient educated on dietary consistency in vitamin k consumption.   Patient educated on affects of alcohol consumption while taking warfarin.   Patient educated on signs of bleeding/clotting.

## 2025-01-03 ENCOUNTER — ANTICOAGULATION - WARFARIN VISIT (OUTPATIENT)
Dept: CARDIOLOGY | Facility: CLINIC | Age: 89
End: 2025-01-03
Payer: MEDICARE

## 2025-01-03 DIAGNOSIS — Z95.2 S/P TAVR (TRANSCATHETER AORTIC VALVE REPLACEMENT): ICD-10-CM

## 2025-01-03 DIAGNOSIS — I48.20 CHRONIC ATRIAL FIBRILLATION (MULTI): Primary | ICD-10-CM

## 2025-01-03 LAB
INR IN PPP BY COAGULATION ASSAY EXTERNAL: 3.2
PROTHROMBIN TIME (PT) IN PPP BY COAGULATION ASSAY EXTERNAL: NORMAL

## 2025-01-03 NOTE — PROGRESS NOTES
Patient identification verified with 2 identifiers.    Location: [facility] Patient Self-Testing Program [phone]    Referring Physician: Dr. Jeremy Clarke  Enrollment/ Re-enrollment date: 6/13/25   INR Goal: 2.0-3.0  INR monitoring is per [facility] protocol.  Anticoagulation Medication: warfarin  Indication: Atrial Fibrillation/Atrial Flutter    Subjective   Bleeding signs/symptoms: No    Bruising: No   Major bleeding event: No  Thrombosis signs/symptoms: No  Thromboembolic event: No  Missed doses: No  Extra doses: No  Medication changes: No  Dietary changes: Yes  Patient has no eaten any Vitamin K the past week  Change in health: No  Change in activity: No  Alcohol: No  Other concerns: No    Upcoming Procedures:  Does the Patient Have any upcoming procedures that require interruption in anticoagulation therapy? no  Does the patient require bridging? no      Anticoagulation Summary  As of 1/3/2025      INR goal:  2.0-3.0   TTR:  74.2% (6.7 mo)   INR used for dosing:  3.20 (1/3/2025)   Weekly warfarin total:  40 mg               Assessment/Plan   Supratherapeutic     1. New dose: no change    2. Next INR: 1 week      Education provided to patient during the visit:  Patient instructed to call in interim with questions, concerns and changes.   Patient educated on compliance with dosing, follow up appointments, and prescribed plan of care.

## 2025-01-09 ENCOUNTER — ANTICOAGULATION - WARFARIN VISIT (OUTPATIENT)
Dept: CARDIOLOGY | Facility: CLINIC | Age: 89
End: 2025-01-09
Payer: MEDICARE

## 2025-01-09 DIAGNOSIS — I48.20 CHRONIC ATRIAL FIBRILLATION (MULTI): Primary | ICD-10-CM

## 2025-01-09 DIAGNOSIS — Z95.2 S/P TAVR (TRANSCATHETER AORTIC VALVE REPLACEMENT): ICD-10-CM

## 2025-01-09 LAB
INR IN PPP BY COAGULATION ASSAY EXTERNAL: 3.1
PROTHROMBIN TIME (PT) IN PPP BY COAGULATION ASSAY EXTERNAL: NORMAL

## 2025-01-10 NOTE — PROGRESS NOTES
Patient identification verified with 2 identifiers.    Location: [facility] Patient Self-Testing Program [phone]    Referring Physician: Dr. Jeremy Clarke  Enrollment/ Re-enrollment date: 6/13/25   INR Goal: 2.0-3.0  INR monitoring is per [facility] protocol.  Anticoagulation Medication: warfarin  Indication: Atrial Fibrillation/Atrial Flutter    Subjective   Bleeding signs/symptoms: No    Bruising: No   Major bleeding event: No  Thrombosis signs/symptoms: No  Thromboembolic event: No  Missed doses: No  Extra doses: No  Denies  Medication changes: No  Denies  Dietary changes: No  Denies  Change in health: No  Change in activity: No  Alcohol: No  Other concerns: No    Upcoming Procedures:  Does the Patient Have any upcoming procedures that require interruption in anticoagulation therapy? no  Does the patient require bridging? no      Anticoagulation Summary  As of 1/9/2025      INR goal:  2.0-3.0   TTR:  72.1% (6.9 mo)   INR used for dosing:  3.10 (1/9/2025)   Weekly warfarin total:  37.5 mg               Assessment/Plan   Supratherapeutic     1. New dose: Spoke to Catrina with dosing instructions and next testing date.    2. Next INR: 1 week      Education provided to patient during the visit:  Patient instructed to call in interim with questions, concerns and changes.

## 2025-01-11 DIAGNOSIS — E03.9 HYPOTHYROIDISM, UNSPECIFIED TYPE: ICD-10-CM

## 2025-01-11 DIAGNOSIS — R39.89 URINARY PROBLEM: ICD-10-CM

## 2025-01-11 RX ORDER — LEVOTHYROXINE SODIUM 50 UG/1
TABLET ORAL
Qty: 135 TABLET | Refills: 0 | Status: SHIPPED | OUTPATIENT
Start: 2025-01-11

## 2025-01-11 RX ORDER — TAMSULOSIN HYDROCHLORIDE 0.4 MG/1
CAPSULE ORAL
Qty: 90 CAPSULE | Refills: 0 | Status: SHIPPED | OUTPATIENT
Start: 2025-01-11

## 2025-01-13 ENCOUNTER — ANTICOAGULATION - WARFARIN VISIT (OUTPATIENT)
Dept: CARDIOLOGY | Facility: CLINIC | Age: 89
End: 2025-01-13
Payer: MEDICARE

## 2025-01-13 DIAGNOSIS — I48.20 CHRONIC ATRIAL FIBRILLATION (MULTI): Primary | ICD-10-CM

## 2025-01-13 DIAGNOSIS — Z95.2 S/P TAVR (TRANSCATHETER AORTIC VALVE REPLACEMENT): ICD-10-CM

## 2025-01-13 LAB
INR IN PPP BY COAGULATION ASSAY EXTERNAL: 3 (ref 2–3)
PROTHROMBIN TIME (PT) IN PPP BY COAGULATION ASSAY EXTERNAL: NORMAL

## 2025-01-13 NOTE — PROGRESS NOTES
Patient identification verified with 2 identifiers.    Location: [facility] Patient Self-Testing Program [phone]    Referring Physician: Dr. Jeremy Clarke  Enrollment/ Re-enrollment date: 6/13/25   INR Goal: 2.0-3.0  INR monitoring is per [facility] protocol.  Anticoagulation Medication: warfarin  Indication: Atrial Fibrillation/Atrial Flutter    Subjective   Bleeding signs/symptoms: No    Bruising: No   Major bleeding event: No  Thrombosis signs/symptoms: No  Thromboembolic event: No  Missed doses: No  Extra doses: No  Medication changes: No  Dietary changes: No  Change in health: No  Change in activity: No  Alcohol: No  Other concerns: No    Upcoming Procedures:  Does the Patient Have any upcoming procedures that require interruption in anticoagulation therapy? no  Does the patient require bridging? no      Anticoagulation Summary  As of 1/13/2025      INR goal:  2.0-3.0   TTR:  70.7% (7 mo)   INR used for dosing:  3.00 (1/13/2025)   Weekly warfarin total:  37.5 mg               Assessment/Plan   Therapeutic     1. New dose: no change  Left VM confirming dosing schedule and next testing date.  2. Next INR: 1 week      Education provided to patient during the visit:  Patient instructed to call in interim with questions, concerns and changes.   Patient educated on compliance with dosing, follow up appointments, and prescribed plan of care.

## 2025-01-17 ENCOUNTER — ANTICOAGULATION - WARFARIN VISIT (OUTPATIENT)
Dept: CARDIOLOGY | Facility: CLINIC | Age: 89
End: 2025-01-17
Payer: MEDICARE

## 2025-01-17 DIAGNOSIS — Z95.2 S/P TAVR (TRANSCATHETER AORTIC VALVE REPLACEMENT): ICD-10-CM

## 2025-01-17 DIAGNOSIS — I48.20 CHRONIC ATRIAL FIBRILLATION (MULTI): Primary | ICD-10-CM

## 2025-01-17 NOTE — PROGRESS NOTES
Patient identification verified with 2 identifiers.    Location: [facility] Patient Self-Testing Program [phone]    Referring Physician: Dr. Jeremy Clarke  Enrollment/ Re-enrollment date: 25   INR Goal: 2.0-3.0  INR monitoring is per [facility] protocol.  Anticoagulation Medication: warfarin  Indication: Atrial Fibrillation/Atrial Flutter    Subjective   Bleeding signs/symptoms: No    Bruising: No   Major bleeding event: No  Thrombosis signs/symptoms: No  Thromboembolic event: No  Missed doses: Yes  Patients wife Catrina states that the patient may have missed a pill but she is not sure which day  Extra doses: No  Medication changes: Yes  Patient started taking Tylenol once daily.  Dietary changes: No  Change in health: No  Change in activity: No  Alcohol: No  Other concerns: No    Upcoming Procedures:  Does the Patient Have any upcoming procedures that require interruption in anticoagulation therapy? no  Does the patient require bridging? no      Anticoagulation Summary  As of 2025      INR goal:  2.0-3.0   TTR:  71.1% (7.2 mo)   INR used for dosin.90 (2025)   Weekly warfarin total:  37.5 mg               Assessment/Plan   Subtherapeutic     1. New dose: no change    2. Next INR: 1 week      Education provided to patient during the visit:  Patient instructed to call in interim with questions, concerns and changes.   Patient educated on compliance with dosing, follow up appointments, and prescribed plan of care.

## 2025-01-23 ENCOUNTER — LAB (OUTPATIENT)
Dept: LAB | Facility: LAB | Age: 89
End: 2025-01-23
Payer: MEDICARE

## 2025-01-23 ENCOUNTER — TELEPHONE (OUTPATIENT)
Dept: PRIMARY CARE | Facility: CLINIC | Age: 89
End: 2025-01-23

## 2025-01-23 ENCOUNTER — APPOINTMENT (OUTPATIENT)
Dept: PRIMARY CARE | Facility: CLINIC | Age: 89
End: 2025-01-23
Payer: MEDICARE

## 2025-01-23 VITALS
SYSTOLIC BLOOD PRESSURE: 136 MMHG | WEIGHT: 205 LBS | HEIGHT: 69 IN | HEART RATE: 70 BPM | BODY MASS INDEX: 30.36 KG/M2 | DIASTOLIC BLOOD PRESSURE: 72 MMHG | TEMPERATURE: 98 F | OXYGEN SATURATION: 100 %

## 2025-01-23 DIAGNOSIS — Z95.2 S/P TAVR (TRANSCATHETER AORTIC VALVE REPLACEMENT): ICD-10-CM

## 2025-01-23 DIAGNOSIS — I10 BENIGN ESSENTIAL HTN: ICD-10-CM

## 2025-01-23 DIAGNOSIS — D64.9 ANEMIA, UNSPECIFIED TYPE: ICD-10-CM

## 2025-01-23 DIAGNOSIS — E03.9 HYPOTHYROIDISM, UNSPECIFIED TYPE: ICD-10-CM

## 2025-01-23 DIAGNOSIS — I35.0 NONRHEUMATIC AORTIC VALVE STENOSIS: Primary | ICD-10-CM

## 2025-01-23 DIAGNOSIS — I10 HYPERTENSION, UNSPECIFIED TYPE: ICD-10-CM

## 2025-01-23 DIAGNOSIS — J43.8 OTHER EMPHYSEMA (MULTI): ICD-10-CM

## 2025-01-23 PROCEDURE — 3075F SYST BP GE 130 - 139MM HG: CPT | Performed by: PHYSICIAN ASSISTANT

## 2025-01-23 PROCEDURE — 1126F AMNT PAIN NOTED NONE PRSNT: CPT | Performed by: PHYSICIAN ASSISTANT

## 2025-01-23 PROCEDURE — 1158F ADVNC CARE PLAN TLK DOCD: CPT | Performed by: PHYSICIAN ASSISTANT

## 2025-01-23 PROCEDURE — 1157F ADVNC CARE PLAN IN RCRD: CPT | Performed by: PHYSICIAN ASSISTANT

## 2025-01-23 PROCEDURE — 80053 COMPREHEN METABOLIC PANEL: CPT

## 2025-01-23 PROCEDURE — 3078F DIAST BP <80 MM HG: CPT | Performed by: PHYSICIAN ASSISTANT

## 2025-01-23 PROCEDURE — 1123F ACP DISCUSS/DSCN MKR DOCD: CPT | Performed by: PHYSICIAN ASSISTANT

## 2025-01-23 PROCEDURE — 1159F MED LIST DOCD IN RCRD: CPT | Performed by: PHYSICIAN ASSISTANT

## 2025-01-23 PROCEDURE — 99203 OFFICE O/P NEW LOW 30 MIN: CPT | Performed by: PHYSICIAN ASSISTANT

## 2025-01-23 PROCEDURE — G2211 COMPLEX E/M VISIT ADD ON: HCPCS | Performed by: PHYSICIAN ASSISTANT

## 2025-01-23 PROCEDURE — 1160F RVW MEDS BY RX/DR IN RCRD: CPT | Performed by: PHYSICIAN ASSISTANT

## 2025-01-23 ASSESSMENT — PAIN SCALES - GENERAL: PAINLEVEL_OUTOF10: 0-NO PAIN

## 2025-01-23 ASSESSMENT — ENCOUNTER SYMPTOMS
NUMBNESS: 0
LIGHT-HEADEDNESS: 0
ACTIVITY CHANGE: 0
PALPITATIONS: 0
APPETITE CHANGE: 0
DIZZINESS: 0
SHORTNESS OF BREATH: 0
CHEST TIGHTNESS: 0

## 2025-01-23 ASSESSMENT — PATIENT HEALTH QUESTIONNAIRE - PHQ9
SUM OF ALL RESPONSES TO PHQ9 QUESTIONS 1 AND 2: 1
2. FEELING DOWN, DEPRESSED OR HOPELESS: NOT AT ALL
10. IF YOU CHECKED OFF ANY PROBLEMS, HOW DIFFICULT HAVE THESE PROBLEMS MADE IT FOR YOU TO DO YOUR WORK, TAKE CARE OF THINGS AT HOME, OR GET ALONG WITH OTHER PEOPLE: NOT DIFFICULT AT ALL
1. LITTLE INTEREST OR PLEASURE IN DOING THINGS: SEVERAL DAYS

## 2025-01-23 NOTE — PROGRESS NOTES
"Subjective   Patient ID: Gorge Larsen is a 89 y.o. male who presents for New Patient Visit.    HPI   Used to see Dr. Antoine.  Here for establishing care.  Has 2 daughters that check on him and his ex live together.   Has been taking 10mg of Lasix every other day. For at least 6 months.  When taking it daily he would get low sodium and end up in the hospital.  Stopped ETOH about 7-10 years ago  Does home INR and is reported to Dr. Clarke.  Has a history of a heart valve replacement.  And emphysema.  Review of Systems   Constitutional:  Negative for activity change and appetite change.   HENT:  Positive for hearing loss.    Respiratory:  Negative for cough, choking, chest tightness, shortness of breath and wheezing.    Cardiovascular:  Negative for chest pain, palpitations and leg swelling.   Gastrointestinal:  Negative for nausea and vomiting.   Neurological:  Positive for weakness. Negative for dizziness, light-headedness and numbness.   Psychiatric/Behavioral:  Negative for confusion.        Objective   /72   Pulse 70   Temp 36.7 °C (98 °F)   Ht 1.753 m (5' 9\")   Wt 93 kg (205 lb)   SpO2 100%   BMI 30.27 kg/m²     Physical Exam  Constitutional:       Appearance: Normal appearance.   Eyes:      Extraocular Movements: Extraocular movements intact.      Pupils: Pupils are equal, round, and reactive to light.   Cardiovascular:      Rate and Rhythm: Normal rate and regular rhythm.      Chest Wall: No thrill.      Pulses: Normal pulses.      Heart sounds: Murmur heard.   Pulmonary:      Effort: Pulmonary effort is normal.   Musculoskeletal:      Cervical back: Neck supple.      Right lower leg: Edema present.      Left lower leg: Edema present.   Neurological:      General: No focal deficit present.      Mental Status: He is alert. Mental status is at baseline.   Psychiatric:         Mood and Affect: Mood normal.         Thought Content: Thought content normal.         Judgment: Judgment normal. "         Assessment/Plan   Diagnoses and all orders for this visit:  Nonrheumatic aortic valve stenosis  S/P TAVR (transcatheter aortic valve replacement)  Hypothyroidism, unspecified type  Other emphysema (Multi)  Anemia, unspecified type  Benign essential HTN  -     Comprehensive Metabolic Panel; Future  Other orders  -     Follow Up In Primary Care - Established; Future  Reviewed labs he had done previously.  Will add a CMP since he has not had that done a little bit.  He stays up to par with his Coumadin levels.  Daughters keep an eye on him as well.  Will follow-up in 3 months.

## 2025-01-24 ENCOUNTER — ANTICOAGULATION - WARFARIN VISIT (OUTPATIENT)
Dept: CARDIOLOGY | Facility: CLINIC | Age: 89
End: 2025-01-24
Payer: MEDICARE

## 2025-01-24 DIAGNOSIS — I48.20 CHRONIC ATRIAL FIBRILLATION (MULTI): Primary | ICD-10-CM

## 2025-01-24 DIAGNOSIS — Z95.2 S/P TAVR (TRANSCATHETER AORTIC VALVE REPLACEMENT): ICD-10-CM

## 2025-01-24 LAB
ALBUMIN SERPL BCP-MCNC: 4.2 G/DL (ref 3.4–5)
ALP SERPL-CCNC: 85 U/L (ref 33–136)
ALT SERPL W P-5'-P-CCNC: 13 U/L (ref 10–52)
ANION GAP SERPL CALCULATED.3IONS-SCNC: 14 MMOL/L (ref 10–20)
AST SERPL W P-5'-P-CCNC: 20 U/L (ref 9–39)
BILIRUB SERPL-MCNC: 0.5 MG/DL (ref 0–1.2)
BUN SERPL-MCNC: 34 MG/DL (ref 6–23)
CALCIUM SERPL-MCNC: 9.1 MG/DL (ref 8.6–10.3)
CHLORIDE SERPL-SCNC: 104 MMOL/L (ref 98–107)
CO2 SERPL-SCNC: 27 MMOL/L (ref 21–32)
CREAT SERPL-MCNC: 1.14 MG/DL (ref 0.5–1.3)
EGFRCR SERPLBLD CKD-EPI 2021: 62 ML/MIN/1.73M*2
GLUCOSE SERPL-MCNC: 91 MG/DL (ref 74–99)
INR IN PPP BY COAGULATION ASSAY EXTERNAL: 2.6
POTASSIUM SERPL-SCNC: 4.7 MMOL/L (ref 3.5–5.3)
PROT SERPL-MCNC: 6.7 G/DL (ref 6.4–8.2)
PROTHROMBIN TIME (PT) IN PPP BY COAGULATION ASSAY EXTERNAL: NORMAL
SODIUM SERPL-SCNC: 140 MMOL/L (ref 136–145)

## 2025-01-24 NOTE — PROGRESS NOTES
Patient identification verified with 2 identifiers.    Location: [facility] Patient Self-Testing Program [phone]    Referring Physician: Dr. Jeremy Clarke  Enrollment/ Re-enrollment date: 25   INR Goal: 2.0-3.0  [facility]  Anticoagulation Medication: warfarin  Indication: Atrial Fibrillation/Atrial Flutter    Subjective   Bleeding signs/symptoms: No    Bruising: No   Major bleeding event: No  Thrombosis signs/symptoms: No  Thromboembolic event: No  Missed doses: No  Extra doses: No  Medication changes: No  Dietary changes: No  Change in health: No  Change in activity: No  Alcohol: No  Other concerns: No    Upcoming Procedures:  Does the Patient Have any upcoming procedures that require interruption in anticoagulation therapy? no  Does the patient require bridging? no      Anticoagulation Summary  As of 2025      INR goal:  2.0-3.0   TTR:  71.5% (7.4 mo)   INR used for dosin.60 (2025)   Weekly warfarin total:  37.5 mg               Assessment/Plan   Therapeutic     1. New dose: Left VM message with dosing instructions and next testing date.    2. Next INR: 2 weeks      Education provided to patient during the visit:  Patient instructed to call in interim with questions, concerns and changes.

## 2025-01-24 NOTE — PROGRESS NOTES
Patient identification verified with 2 identifiers.    Location: [facility] Patient Self-Testing Program [phone]    Referring Physician: PATT Clarke MD  Enrollment/ Re-enrollment date: 25   INR Goal: 2.0-3.0  INR monitoring is per [facility] protocol.  Anticoagulation Medication: warfarin  Indication: Atrial Fibrillation/Atrial Flutter and Aortic Valve Replacement    Subjective   Bleeding signs/symptoms: No    Bruising: No   Major bleeding event: No  Thrombosis signs/symptoms: No  Thromboembolic event: No  Missed doses: No  Extra doses: No  Medication changes: No  Dietary changes: No  Change in health: No  Change in activity: No  Alcohol: No  Other concerns: No    Upcoming Procedures:  Does the Patient Have any upcoming procedures that require interruption in anticoagulation therapy? no  Does the patient require bridging? no      Anticoagulation Summary  As of 10/11/2024      INR goal:  2.0-3.0   TTR:  83.8% (3.9 mo)   INR used for dosin.00 (10/11/2024)   Weekly warfarin total:  35 mg               Assessment/Plan   THERAPEUTIC    1. New dose: no change    2. Next INR:  10/18/24      Education provided to patient during the visit:  Patient instructed to call in interim with questions, concerns and changes.         Area Suspicious For Tumor Histology Text: Area suspicious for tumor.

## 2025-01-27 ASSESSMENT — ENCOUNTER SYMPTOMS
NAUSEA: 0
CONFUSION: 0
CHOKING: 0
WEAKNESS: 1
WHEEZING: 0
VOMITING: 0
COUGH: 0

## 2025-02-07 ENCOUNTER — ANTICOAGULATION - WARFARIN VISIT (OUTPATIENT)
Dept: CARDIOLOGY | Facility: CLINIC | Age: 89
End: 2025-02-07
Payer: MEDICARE

## 2025-02-07 DIAGNOSIS — Z95.2 S/P TAVR (TRANSCATHETER AORTIC VALVE REPLACEMENT): ICD-10-CM

## 2025-02-07 DIAGNOSIS — I48.20 CHRONIC ATRIAL FIBRILLATION (MULTI): Primary | ICD-10-CM

## 2025-02-07 NOTE — PROGRESS NOTES
Patient identification verified with 2 identifiers.    Location: [facility] Patient Self-Testing Program [phone]    Referring Physician: DR. BUCKLEY  Enrollment/ Re-enrollment date: 25   INR Goal: 2.0-3.0  INR monitoring is per [facility] protocol.  Anticoagulation Medication: warfarin  Indication: Atrial Fibrillation/Atrial Flutter    Subjective I LEFT MESSAGE FOR PT'S WIFE TO CALL [phone] TO REPORT ANY PT FINDINGS.  Bleeding signs/symptoms:      Bruising:     Major bleeding event:    Thrombosis signs/symptoms:    Thromboembolic event:   Missed doses:    Extra doses:    Medication changes:    Dietary changes:    Change in health:    Change in activity:    Alcohol:    Other concerns:      Upcoming Procedures:  Does the Patient Have any upcoming procedures that require interruption in anticoagulation therapy?   Does the patient require bridging?       Anticoagulation Summary  As of 2025      INR goal:  2.0-3.0   TTR:  73.2% (7.9 mo)   INR used for dosin.60 (2025)   Weekly warfarin total:  37.5 mg               Assessment/Plan   Therapeutic     1. New dose: no change    2. Next INR: 2 weeks    I LEFT MESSAGE WITH CURRENT WARFARIN DOSE SCHEDULE AND ASKED PT'S WIFE  TO CALL [phone] IF TAKING DIFFERENTLY.   INSTRUCTED PT'S WIFE TO MAINTAIN CURRENT WARFARIN DOSING.  Education provided to patient during the visit:  Patient instructed to call in interim with questions, concerns and changes.   Patient educated on signs of bleeding/clotting.

## 2025-02-14 ENCOUNTER — TELEPHONE (OUTPATIENT)
Dept: PRIMARY CARE | Facility: CLINIC | Age: 89
End: 2025-02-14
Payer: MEDICARE

## 2025-02-14 DIAGNOSIS — I50.20 HEART FAILURE WITH REDUCED EJECTION FRACTION: ICD-10-CM

## 2025-02-14 DIAGNOSIS — E78.5 DYSLIPIDEMIA: ICD-10-CM

## 2025-02-14 DIAGNOSIS — I48.20 CHRONIC ATRIAL FIBRILLATION (MULTI): ICD-10-CM

## 2025-02-14 RX ORDER — WARFARIN SODIUM 5 MG/1
5 TABLET ORAL
Qty: 90 TABLET | Refills: 0 | Status: SHIPPED | OUTPATIENT
Start: 2025-02-15 | End: 2025-12-27

## 2025-02-14 RX ORDER — ROSUVASTATIN CALCIUM 5 MG/1
5 TABLET, COATED ORAL NIGHTLY
Qty: 90 TABLET | Refills: 0 | Status: SHIPPED | OUTPATIENT
Start: 2025-02-14

## 2025-02-14 RX ORDER — FUROSEMIDE 20 MG/1
20 TABLET ORAL DAILY
Qty: 90 TABLET | Refills: 0 | Status: SHIPPED | OUTPATIENT
Start: 2025-02-14

## 2025-02-14 NOTE — TELEPHONE ENCOUNTER
Med refill on Warfarin, Rosuvastatin, and Furosemide.  Sent to [facility] [place] and [place]anna  Last seen on 01/23/2025

## 2025-02-14 NOTE — TELEPHONE ENCOUNTER
Patient is calling in for refills on Warfarin 5 mg tabs, Rosuvastatin 5 mg tabs and Furosemide 20 mg tabs.  Last seen 01/23/25, please send to Jerri Jimenezor.

## 2025-02-17 ENCOUNTER — TELEPHONE (OUTPATIENT)
Dept: PRIMARY CARE | Facility: CLINIC | Age: 89
End: 2025-02-17
Payer: MEDICARE

## 2025-02-17 NOTE — TELEPHONE ENCOUNTER
Patient's Daughter Bharati called and stated Patient is having pain while trying to past stool, he thinks his Prostate is enlarged,  Only a little stool is coming out at a time he's tried miralax and stool softener, for a week and he's taking Prune juice. He's afraid to eat since he's not passing stool and the stool is black in color. Please advise. Call was triaged to [facility] she advised sounds like he needs to go to the ER. he will need testing to help evaluate that. Bharati was told [facility] advised Patient to go to ER. Bharati stated she will take Patient to the ER.

## 2025-02-20 ENCOUNTER — APPOINTMENT (OUTPATIENT)
Dept: CARDIOLOGY | Facility: CLINIC | Age: 89
End: 2025-02-20
Payer: MEDICARE

## 2025-02-21 ENCOUNTER — HOSPITAL ENCOUNTER (OUTPATIENT)
Facility: HOSPITAL | Age: 89
Setting detail: OBSERVATION
Discharge: HOME | End: 2025-02-23
Attending: STUDENT IN AN ORGANIZED HEALTH CARE EDUCATION/TRAINING PROGRAM | Admitting: STUDENT IN AN ORGANIZED HEALTH CARE EDUCATION/TRAINING PROGRAM
Payer: MEDICARE

## 2025-02-21 ENCOUNTER — ANTICOAGULATION - WARFARIN VISIT (OUTPATIENT)
Dept: CARDIOLOGY | Facility: CLINIC | Age: 89
End: 2025-02-21
Payer: MEDICARE

## 2025-02-21 ENCOUNTER — APPOINTMENT (OUTPATIENT)
Dept: RADIOLOGY | Facility: HOSPITAL | Age: 89
End: 2025-02-21
Payer: MEDICARE

## 2025-02-21 DIAGNOSIS — I48.20 CHRONIC ATRIAL FIBRILLATION (MULTI): ICD-10-CM

## 2025-02-21 DIAGNOSIS — K92.2 GASTROINTESTINAL HEMORRHAGE, UNSPECIFIED GASTROINTESTINAL HEMORRHAGE TYPE: Primary | ICD-10-CM

## 2025-02-21 DIAGNOSIS — K59.04 CHRONIC IDIOPATHIC CONSTIPATION: ICD-10-CM

## 2025-02-21 DIAGNOSIS — I50.20 HEART FAILURE WITH REDUCED EJECTION FRACTION: ICD-10-CM

## 2025-02-21 DIAGNOSIS — Z95.2 S/P TAVR (TRANSCATHETER AORTIC VALVE REPLACEMENT): ICD-10-CM

## 2025-02-21 DIAGNOSIS — I48.20 CHRONIC ATRIAL FIBRILLATION (MULTI): Primary | ICD-10-CM

## 2025-02-21 PROBLEM — K62.5 RECTAL BLEEDING: Status: ACTIVE | Noted: 2025-02-21

## 2025-02-21 LAB
ALBUMIN SERPL BCP-MCNC: 4.2 G/DL (ref 3.4–5)
ALP SERPL-CCNC: 83 U/L (ref 33–136)
ALT SERPL W P-5'-P-CCNC: 13 U/L (ref 10–52)
ANION GAP SERPL CALCULATED.3IONS-SCNC: 14 MMOL/L (ref 10–20)
APTT PPP: 33 SECONDS (ref 22–32.5)
AST SERPL W P-5'-P-CCNC: 22 U/L (ref 9–39)
BASOPHILS # BLD AUTO: 0.06 X10*3/UL (ref 0–0.1)
BASOPHILS NFR BLD AUTO: 1 %
BILIRUB SERPL-MCNC: 0.7 MG/DL (ref 0–1.2)
BUN SERPL-MCNC: 27 MG/DL (ref 6–23)
CALCIUM SERPL-MCNC: 9.2 MG/DL (ref 8.6–10.3)
CHLORIDE SERPL-SCNC: 101 MMOL/L (ref 98–107)
CO2 SERPL-SCNC: 26 MMOL/L (ref 21–32)
CREAT SERPL-MCNC: 1.12 MG/DL (ref 0.5–1.3)
EGFRCR SERPLBLD CKD-EPI 2021: 64 ML/MIN/1.73M*2
EOSINOPHIL # BLD AUTO: 0.47 X10*3/UL (ref 0–0.4)
EOSINOPHIL NFR BLD AUTO: 7.9 %
ERYTHROCYTE [DISTWIDTH] IN BLOOD BY AUTOMATED COUNT: 15.2 % (ref 11.5–14.5)
GLUCOSE SERPL-MCNC: 109 MG/DL (ref 74–99)
HCT VFR BLD AUTO: 38.3 % (ref 41–52)
HEMOCCULT SP1 STL QL: POSITIVE
HGB BLD-MCNC: 12.5 G/DL (ref 13.5–17.5)
IMM GRANULOCYTES # BLD AUTO: 0.02 X10*3/UL (ref 0–0.5)
IMM GRANULOCYTES NFR BLD AUTO: 0.3 % (ref 0–0.9)
INR IN PPP BY COAGULATION ASSAY EXTERNAL: 2.1
INR PPP: 1.9 (ref 0.9–1.2)
LYMPHOCYTES # BLD AUTO: 0.78 X10*3/UL (ref 0.8–3)
LYMPHOCYTES NFR BLD AUTO: 13.1 %
MCH RBC QN AUTO: 29.8 PG (ref 26–34)
MCHC RBC AUTO-ENTMCNC: 32.6 G/DL (ref 32–36)
MCV RBC AUTO: 91 FL (ref 80–100)
MONOCYTES # BLD AUTO: 0.56 X10*3/UL (ref 0.05–0.8)
MONOCYTES NFR BLD AUTO: 9.4 %
NEUTROPHILS # BLD AUTO: 4.06 X10*3/UL (ref 1.6–5.5)
NEUTROPHILS NFR BLD AUTO: 68.3 %
NRBC BLD-RTO: ABNORMAL /100{WBCS}
PLATELET # BLD AUTO: 176 X10*3/UL (ref 150–450)
POTASSIUM SERPL-SCNC: 4.2 MMOL/L (ref 3.5–5.3)
PROT SERPL-MCNC: 7.6 G/DL (ref 6.4–8.2)
PROTHROMBIN TIME (PT) IN PPP BY COAGULATION ASSAY EXTERNAL: NORMAL
PROTHROMBIN TIME: 19.8 SECONDS (ref 9.3–12.7)
RBC # BLD AUTO: 4.2 X10*6/UL (ref 4.5–5.9)
SODIUM SERPL-SCNC: 137 MMOL/L (ref 136–145)
WBC # BLD AUTO: 6 X10*3/UL (ref 4.4–11.3)

## 2025-02-21 PROCEDURE — 2550000001 HC RX 255 CONTRASTS: Performed by: STUDENT IN AN ORGANIZED HEALTH CARE EDUCATION/TRAINING PROGRAM

## 2025-02-21 PROCEDURE — 36415 COLL VENOUS BLD VENIPUNCTURE: CPT | Performed by: PHYSICIAN ASSISTANT

## 2025-02-21 PROCEDURE — 2500000002 HC RX 250 W HCPCS SELF ADMINISTERED DRUGS (ALT 637 FOR MEDICARE OP, ALT 636 FOR OP/ED): Mod: MUE | Performed by: NURSE PRACTITIONER

## 2025-02-21 PROCEDURE — 2500000004 HC RX 250 GENERAL PHARMACY W/ HCPCS (ALT 636 FOR OP/ED): Performed by: PHYSICIAN ASSISTANT

## 2025-02-21 PROCEDURE — 85730 THROMBOPLASTIN TIME PARTIAL: CPT | Performed by: PHYSICIAN ASSISTANT

## 2025-02-21 PROCEDURE — 2500000001 HC RX 250 WO HCPCS SELF ADMINISTERED DRUGS (ALT 637 FOR MEDICARE OP): Performed by: NURSE PRACTITIONER

## 2025-02-21 PROCEDURE — 99285 EMERGENCY DEPT VISIT HI MDM: CPT | Mod: 25 | Performed by: STUDENT IN AN ORGANIZED HEALTH CARE EDUCATION/TRAINING PROGRAM

## 2025-02-21 PROCEDURE — 96374 THER/PROPH/DIAG INJ IV PUSH: CPT | Mod: 59

## 2025-02-21 PROCEDURE — 85610 PROTHROMBIN TIME: CPT | Performed by: PHYSICIAN ASSISTANT

## 2025-02-21 PROCEDURE — 96361 HYDRATE IV INFUSION ADD-ON: CPT

## 2025-02-21 PROCEDURE — 85025 COMPLETE CBC W/AUTO DIFF WBC: CPT | Performed by: PHYSICIAN ASSISTANT

## 2025-02-21 PROCEDURE — 82270 OCCULT BLOOD FECES: CPT | Performed by: PHYSICIAN ASSISTANT

## 2025-02-21 PROCEDURE — 80053 COMPREHEN METABOLIC PANEL: CPT | Performed by: PHYSICIAN ASSISTANT

## 2025-02-21 PROCEDURE — 99223 1ST HOSP IP/OBS HIGH 75: CPT | Performed by: NURSE PRACTITIONER

## 2025-02-21 PROCEDURE — 74177 CT ABD & PELVIS W/CONTRAST: CPT | Performed by: RADIOLOGY

## 2025-02-21 PROCEDURE — G0378 HOSPITAL OBSERVATION PER HR: HCPCS

## 2025-02-21 PROCEDURE — 74177 CT ABD & PELVIS W/CONTRAST: CPT

## 2025-02-21 RX ORDER — LEVOTHYROXINE SODIUM 75 UG/1
75 TABLET ORAL DAILY
Status: DISCONTINUED | OUTPATIENT
Start: 2025-02-22 | End: 2025-02-23 | Stop reason: HOSPADM

## 2025-02-21 RX ORDER — LOSARTAN POTASSIUM 50 MG/1
50 TABLET ORAL DAILY
Status: DISCONTINUED | OUTPATIENT
Start: 2025-02-21 | End: 2025-02-23 | Stop reason: HOSPADM

## 2025-02-21 RX ORDER — ACETAMINOPHEN 500 MG
5 TABLET ORAL NIGHTLY PRN
Status: DISCONTINUED | OUTPATIENT
Start: 2025-02-21 | End: 2025-02-23 | Stop reason: HOSPADM

## 2025-02-21 RX ORDER — TAMSULOSIN HYDROCHLORIDE 0.4 MG/1
0.4 CAPSULE ORAL DAILY
Status: DISCONTINUED | OUTPATIENT
Start: 2025-02-22 | End: 2025-02-23 | Stop reason: HOSPADM

## 2025-02-21 RX ORDER — HYDRALAZINE HYDROCHLORIDE 20 MG/ML
10 INJECTION INTRAMUSCULAR; INTRAVENOUS EVERY 4 HOURS PRN
Status: DISCONTINUED | OUTPATIENT
Start: 2025-02-21 | End: 2025-02-23 | Stop reason: HOSPADM

## 2025-02-21 RX ORDER — PANTOPRAZOLE SODIUM 40 MG/10ML
40 INJECTION, POWDER, LYOPHILIZED, FOR SOLUTION INTRAVENOUS 2 TIMES DAILY
Status: DISCONTINUED | OUTPATIENT
Start: 2025-02-21 | End: 2025-02-23 | Stop reason: HOSPADM

## 2025-02-21 RX ORDER — GUAIFENESIN/DEXTROMETHORPHAN 100-10MG/5
5 SYRUP ORAL EVERY 4 HOURS PRN
Status: DISCONTINUED | OUTPATIENT
Start: 2025-02-21 | End: 2025-02-23 | Stop reason: HOSPADM

## 2025-02-21 RX ORDER — BISACODYL 5 MG
10 TABLET, DELAYED RELEASE (ENTERIC COATED) ORAL DAILY PRN
Status: DISCONTINUED | OUTPATIENT
Start: 2025-02-21 | End: 2025-02-23 | Stop reason: HOSPADM

## 2025-02-21 RX ORDER — ROSUVASTATIN CALCIUM 10 MG/1
5 TABLET, COATED ORAL NIGHTLY
Status: DISCONTINUED | OUTPATIENT
Start: 2025-02-21 | End: 2025-02-23 | Stop reason: HOSPADM

## 2025-02-21 RX ORDER — POLYETHYLENE GLYCOL 3350 17 G/17G
17 POWDER, FOR SOLUTION ORAL 2 TIMES DAILY
Status: DISCONTINUED | OUTPATIENT
Start: 2025-02-21 | End: 2025-02-23 | Stop reason: HOSPADM

## 2025-02-21 RX ORDER — ACETAMINOPHEN 325 MG/1
650 TABLET ORAL EVERY 4 HOURS PRN
Status: DISCONTINUED | OUTPATIENT
Start: 2025-02-21 | End: 2025-02-23 | Stop reason: HOSPADM

## 2025-02-21 RX ORDER — ALBUTEROL SULFATE 0.83 MG/ML
2.5 SOLUTION RESPIRATORY (INHALATION) EVERY 6 HOURS PRN
Status: DISCONTINUED | OUTPATIENT
Start: 2025-02-21 | End: 2025-02-23 | Stop reason: HOSPADM

## 2025-02-21 RX ORDER — ONDANSETRON 4 MG/1
4 TABLET, ORALLY DISINTEGRATING ORAL EVERY 8 HOURS PRN
Status: DISCONTINUED | OUTPATIENT
Start: 2025-02-21 | End: 2025-02-23 | Stop reason: HOSPADM

## 2025-02-21 RX ORDER — ACETAMINOPHEN 160 MG/5ML
650 SOLUTION ORAL EVERY 4 HOURS PRN
Status: DISCONTINUED | OUTPATIENT
Start: 2025-02-21 | End: 2025-02-23 | Stop reason: HOSPADM

## 2025-02-21 RX ORDER — ONDANSETRON HYDROCHLORIDE 2 MG/ML
4 INJECTION, SOLUTION INTRAVENOUS EVERY 8 HOURS PRN
Status: DISCONTINUED | OUTPATIENT
Start: 2025-02-21 | End: 2025-02-23 | Stop reason: HOSPADM

## 2025-02-21 RX ORDER — PANTOPRAZOLE SODIUM 40 MG/10ML
40 INJECTION, POWDER, LYOPHILIZED, FOR SOLUTION INTRAVENOUS ONCE
Status: COMPLETED | OUTPATIENT
Start: 2025-02-21 | End: 2025-02-21

## 2025-02-21 RX ORDER — CARVEDILOL 12.5 MG/1
12.5 TABLET ORAL
Status: DISCONTINUED | OUTPATIENT
Start: 2025-02-22 | End: 2025-02-23 | Stop reason: HOSPADM

## 2025-02-21 RX ORDER — PANTOPRAZOLE SODIUM 40 MG/1
40 TABLET, DELAYED RELEASE ORAL
Status: DISCONTINUED | OUTPATIENT
Start: 2025-02-24 | End: 2025-02-23 | Stop reason: HOSPADM

## 2025-02-21 RX ORDER — ACETAMINOPHEN 650 MG/1
650 SUPPOSITORY RECTAL EVERY 4 HOURS PRN
Status: DISCONTINUED | OUTPATIENT
Start: 2025-02-21 | End: 2025-02-23 | Stop reason: HOSPADM

## 2025-02-21 RX ADMIN — SODIUM CHLORIDE 500 ML: 9 INJECTION, SOLUTION INTRAVENOUS at 17:34

## 2025-02-21 RX ADMIN — ROSUVASTATIN CALCIUM 5 MG: 10 TABLET, FILM COATED ORAL at 21:37

## 2025-02-21 RX ADMIN — LOSARTAN POTASSIUM 50 MG: 50 TABLET, FILM COATED ORAL at 21:37

## 2025-02-21 RX ADMIN — PANTOPRAZOLE SODIUM 40 MG: 40 INJECTION, POWDER, FOR SOLUTION INTRAVENOUS at 18:41

## 2025-02-21 RX ADMIN — IOHEXOL 75 ML: 350 INJECTION, SOLUTION INTRAVENOUS at 18:29

## 2025-02-21 SDOH — HEALTH STABILITY: PHYSICAL HEALTH: ON AVERAGE, HOW MANY DAYS PER WEEK DO YOU ENGAGE IN MODERATE TO STRENUOUS EXERCISE (LIKE A BRISK WALK)?: 0 DAYS

## 2025-02-21 SDOH — ECONOMIC STABILITY: INCOME INSECURITY: IN THE PAST 12 MONTHS HAS THE ELECTRIC, GAS, OIL, OR WATER COMPANY THREATENED TO SHUT OFF SERVICES IN YOUR HOME?: NO

## 2025-02-21 SDOH — SOCIAL STABILITY: SOCIAL INSECURITY
WITHIN THE LAST YEAR, HAVE YOU BEEN RAPED OR FORCED TO HAVE ANY KIND OF SEXUAL ACTIVITY BY YOUR PARTNER OR EX-PARTNER?: NO

## 2025-02-21 SDOH — SOCIAL STABILITY: SOCIAL INSECURITY: WERE YOU ABLE TO COMPLETE ALL THE BEHAVIORAL HEALTH SCREENINGS?: YES

## 2025-02-21 SDOH — SOCIAL STABILITY: SOCIAL INSECURITY: ARE YOU MARRIED, WIDOWED, DIVORCED, SEPARATED, NEVER MARRIED, OR LIVING WITH A PARTNER?: DIVORCED

## 2025-02-21 SDOH — SOCIAL STABILITY: SOCIAL INSECURITY: DO YOU FEEL ANYONE HAS EXPLOITED OR TAKEN ADVANTAGE OF YOU FINANCIALLY OR OF YOUR PERSONAL PROPERTY?: NO

## 2025-02-21 SDOH — SOCIAL STABILITY: SOCIAL INSECURITY: WITHIN THE LAST YEAR, HAVE YOU BEEN HUMILIATED OR EMOTIONALLY ABUSED IN OTHER WAYS BY YOUR PARTNER OR EX-PARTNER?: NO

## 2025-02-21 SDOH — SOCIAL STABILITY: SOCIAL NETWORK
DO YOU BELONG TO ANY CLUBS OR ORGANIZATIONS SUCH AS CHURCH GROUPS, UNIONS, FRATERNAL OR ATHLETIC GROUPS, OR SCHOOL GROUPS?: NO

## 2025-02-21 SDOH — SOCIAL STABILITY: SOCIAL NETWORK: HOW OFTEN DO YOU ATTEND CHURCH OR RELIGIOUS SERVICES?: NEVER

## 2025-02-21 SDOH — HEALTH STABILITY: MENTAL HEALTH
DO YOU FEEL STRESS - TENSE, RESTLESS, NERVOUS, OR ANXIOUS, OR UNABLE TO SLEEP AT NIGHT BECAUSE YOUR MIND IS TROUBLED ALL THE TIME - THESE DAYS?: NOT AT ALL

## 2025-02-21 SDOH — ECONOMIC STABILITY: FOOD INSECURITY: WITHIN THE PAST 12 MONTHS, THE FOOD YOU BOUGHT JUST DIDN'T LAST AND YOU DIDN'T HAVE MONEY TO GET MORE.: NEVER TRUE

## 2025-02-21 SDOH — SOCIAL STABILITY: SOCIAL INSECURITY: WITHIN THE LAST YEAR, HAVE YOU BEEN AFRAID OF YOUR PARTNER OR EX-PARTNER?: NO

## 2025-02-21 SDOH — HEALTH STABILITY: PHYSICAL HEALTH: ON AVERAGE, HOW MANY MINUTES DO YOU ENGAGE IN EXERCISE AT THIS LEVEL?: 0 MIN

## 2025-02-21 SDOH — SOCIAL STABILITY: SOCIAL INSECURITY: DO YOU FEEL UNSAFE GOING BACK TO THE PLACE WHERE YOU ARE LIVING?: NO

## 2025-02-21 SDOH — SOCIAL STABILITY: SOCIAL NETWORK: HOW OFTEN DO YOU ATTEND MEETINGS OF THE CLUBS OR ORGANIZATIONS YOU BELONG TO?: NEVER

## 2025-02-21 SDOH — SOCIAL STABILITY: SOCIAL INSECURITY: ABUSE: ADULT

## 2025-02-21 SDOH — SOCIAL STABILITY: SOCIAL INSECURITY: ARE YOU OR HAVE YOU BEEN THREATENED OR ABUSED PHYSICALLY, EMOTIONALLY, OR SEXUALLY BY ANYONE?: NO

## 2025-02-21 SDOH — SOCIAL STABILITY: SOCIAL NETWORK: IN A TYPICAL WEEK, HOW MANY TIMES DO YOU TALK ON THE PHONE WITH FAMILY, FRIENDS, OR NEIGHBORS?: ONCE A WEEK

## 2025-02-21 SDOH — SOCIAL STABILITY: SOCIAL INSECURITY: HAVE YOU HAD ANY THOUGHTS OF HARMING ANYONE ELSE?: NO

## 2025-02-21 SDOH — ECONOMIC STABILITY: FOOD INSECURITY: WITHIN THE PAST 12 MONTHS, YOU WORRIED THAT YOUR FOOD WOULD RUN OUT BEFORE YOU GOT THE MONEY TO BUY MORE.: NEVER TRUE

## 2025-02-21 SDOH — SOCIAL STABILITY: SOCIAL INSECURITY: DOES ANYONE TRY TO KEEP YOU FROM HAVING/CONTACTING OTHER FRIENDS OR DOING THINGS OUTSIDE YOUR HOME?: NO

## 2025-02-21 SDOH — SOCIAL STABILITY: SOCIAL INSECURITY: HAVE YOU HAD THOUGHTS OF HARMING ANYONE ELSE?: NO

## 2025-02-21 SDOH — SOCIAL STABILITY: SOCIAL INSECURITY: ARE THERE ANY APPARENT SIGNS OF INJURIES/BEHAVIORS THAT COULD BE RELATED TO ABUSE/NEGLECT?: NO

## 2025-02-21 SDOH — SOCIAL STABILITY: SOCIAL NETWORK: HOW OFTEN DO YOU GET TOGETHER WITH FRIENDS OR RELATIVES?: ONCE A WEEK

## 2025-02-21 SDOH — SOCIAL STABILITY: SOCIAL INSECURITY: HAS ANYONE EVER THREATENED TO HURT YOUR FAMILY OR YOUR PETS?: NO

## 2025-02-21 ASSESSMENT — ACTIVITIES OF DAILY LIVING (ADL)
DRESSING YOURSELF: INDEPENDENT
TOILETING: INDEPENDENT
FEEDING YOURSELF: INDEPENDENT
ADEQUATE_TO_COMPLETE_ADL: YES
GROOMING: INDEPENDENT
PATIENT'S MEMORY ADEQUATE TO SAFELY COMPLETE DAILY ACTIVITIES?: YES
BATHING: INDEPENDENT
HEARING - RIGHT EAR: HEARING AID
HEARING - LEFT EAR: HEARING AID
LACK_OF_TRANSPORTATION: NO
JUDGMENT_ADEQUATE_SAFELY_COMPLETE_DAILY_ACTIVITIES: YES
WALKS IN HOME: INDEPENDENT

## 2025-02-21 ASSESSMENT — COGNITIVE AND FUNCTIONAL STATUS - GENERAL
CLIMB 3 TO 5 STEPS WITH RAILING: A LOT
DAILY ACTIVITIY SCORE: 24
MOBILITY SCORE: 21
PATIENT BASELINE BEDBOUND: NO
WALKING IN HOSPITAL ROOM: A LITTLE

## 2025-02-21 ASSESSMENT — LIFESTYLE VARIABLES
AUDIT-C TOTAL SCORE: 0
PRESCIPTION_ABUSE_PAST_12_MONTHS: NO
HOW OFTEN DO YOU HAVE A DRINK CONTAINING ALCOHOL: NEVER
HOW MANY STANDARD DRINKS CONTAINING ALCOHOL DO YOU HAVE ON A TYPICAL DAY: PATIENT DOES NOT DRINK
SUBSTANCE_ABUSE_PAST_12_MONTHS: NO
HOW OFTEN DO YOU HAVE 6 OR MORE DRINKS ON ONE OCCASION: NEVER
AUDIT-C TOTAL SCORE: 0
SKIP TO QUESTIONS 9-10: 1

## 2025-02-21 ASSESSMENT — PATIENT HEALTH QUESTIONNAIRE - PHQ9
1. LITTLE INTEREST OR PLEASURE IN DOING THINGS: NOT AT ALL
2. FEELING DOWN, DEPRESSED OR HOPELESS: NOT AT ALL
SUM OF ALL RESPONSES TO PHQ9 QUESTIONS 1 & 2: 0

## 2025-02-21 ASSESSMENT — PAIN - FUNCTIONAL ASSESSMENT
PAIN_FUNCTIONAL_ASSESSMENT: 0-10
PAIN_FUNCTIONAL_ASSESSMENT: 0-10

## 2025-02-21 ASSESSMENT — PAIN SCALES - GENERAL
PAINLEVEL_OUTOF10: 0 - NO PAIN
PAINLEVEL_OUTOF10: 0 - NO PAIN

## 2025-02-21 NOTE — PROGRESS NOTES
Pharmacy Medication History Review    Gorge Larsen is a 89 y.o. male admitted for rectal bleeding. Pharmacy reviewed the patient's [id number] medications and allergies for accuracy.    Medications ADDED:  N/A  Medications CHANGED:  Warfarin   Lasix   Medications REMOVED:   Albuterol Inhaler      The list below reflects the updated PTA list.   Prior to Admission Medications   Prescriptions Last Dose Informant Patient Reported? Taking?   Klor-Con 20 mEq packet 2/21/2025 Morning Spouse/Significant Other No Yes   Sig: MIX CONTENTS OF 1 PACKET IN LIQUID AS DIRECTED AND DRINK ONCE EVERY DAY   albuterol (ProAir HFA) 90 mcg/actuation inhaler  Spouse/Significant Other No Yes   Sig: Inhale 2 puffs every 4 hours if needed for wheezing or shortness of breath.   aspirin 81 mg EC tablet 2/21/2025 Morning Self, Spouse/Significant Other Yes Yes   Sig: Take 1 tablet (81 mg) by mouth once daily.   carvedilol (Coreg) 12.5 mg tablet 2/21/2025 Morning Self, Spouse/Significant Other No Yes   Sig: Take 1 tablet (12.5 mg) by mouth 2 times a day with meals.   compressor, for nebulizer device  Spouse/Significant Other No No   Sig: Q4H TO Q5H PRN FOR WHEEZING   furosemide (Lasix) 20 mg tablet 2/21/2025 Morning Spouse/Significant Other No Yes   Sig: Take 1 tablet (20 mg) by mouth once daily.   Patient taking differently: Take 0.5 tablets (10 mg) by mouth every other day.   ipratropium-albuteroL (Duo-Neb) 0.5-2.5 mg/3 mL nebulizer solution More than a month Spouse/Significant Other No No   Sig: Take 3 mL by nebulization 4 times a day as needed for wheezing or shortness of breath.   Patient not taking: Reported on 2/21/2025   levothyroxine (Synthroid, Levoxyl) 50 mcg tablet 2/21/2025 Morning Spouse/Significant Other No Yes   Sig: TAKE 1 AND 1/2 TABLETS(75 MCG) BY MOUTH DAILY   losartan (Cozaar) 50 mg tablet 2/20/2025 Evening Self, Spouse/Significant Other No Yes   Sig: TAKE 1 TABLET(50 MG) BY MOUTH DAILY   nebulizer accessories [facility]   Spouse/Significant Other No No   Sig: USE DIRECTED   nebulizers misc  Spouse/Significant Other No No   Sig: USE DIRECTED   rosuvastatin (Crestor) 5 mg tablet 2/20/2025 Bedtime Spouse/Significant Other No Yes   Sig: Take 1 tablet (5 mg) by mouth once daily at bedtime.   tamsulosin (Flomax) 0.4 mg 24 hr capsule 2/21/2025 Morning Spouse/Significant Other No Yes   Sig: TAKE 1 CAPSULE(0.4 MG) BY MOUTH DAILY   warfarin (Coumadin) 5 mg tablet 2/20/2025 Evening Spouse/Significant Other No Yes   Sig: Take 1 tablet (5 mg) by mouth 5 times a week. Monday, Wednesday, Friday,Saturday and Sunday   Patient taking differently: Take 1 tablet (5 mg) by mouth 6 times a week. Monday, Wednesday, Thursday, Friday,Saturday and Sunday and 7.5 mg on Tuesday if needed depending on  INR levels      Facility-Administered Medications: None        The list below reflects the updated allergy list. Please review each documented allergy for additional clarification and justification.  Allergies  Reviewed by Terri Wynne on 2/21/2025        Severity Reactions Comments    Codeine Medium Nausea/vomiting             Patient was unable to be assessed for M2B at discharge.     Sources:   Pharmacy dispense history  Patient interview Unable to provide any details  Spouse, Moderate historian  Chart Review  Care Everywhere     Additional Comments:  Patient is currently on Warfarin and takes a low dose aspirin daily       Terri Wynne  Pharmacy Technician  02/21/25     Secure Chat preferred

## 2025-02-21 NOTE — PROGRESS NOTES
Patient identification verified with 2 identifiers.    Location: [facility] Patient Self-Testing Program [phone]    Referring Physician: DR. MARIANNE BUCKLEY  Enrollment/ Re-enrollment date: 25   INR Goal: 2.0-3.0  INR monitoring is per [facility] protocol.  Anticoagulation Medication: warfarin  Indication: Atrial Fibrillation/Atrial Flutter    Subjective   Bleeding signs/symptoms: No    Bruising: No   Major bleeding event: No  Thrombosis signs/symptoms: No  Thromboembolic event: No  Missed doses: No  Extra doses: No  Medication changes: No  Dietary changes: No  Change in health: No  Change in activity: No  Alcohol: No  Other concerns: No    Upcoming Procedures:  Does the Patient Have any upcoming procedures that require interruption in anticoagulation therapy? no  Does the patient require bridging? no      Anticoagulation Summary  As of 2025      INR goal:  2.0-3.0   TTR:  74.7% (8.3 mo)   INR used for dosin.10 (2025)   Weekly warfarin total:  37.5 mg               Assessment/Plan   Therapeutic     1. New dose: no change    2. Next INR: 1 week      Education provided to patient during the visit:  Patient instructed to call in interim with questions, concerns and changes.   Patient educated on compliance with dosing, follow up appointments, and prescribed plan of care.

## 2025-02-22 LAB
ANION GAP SERPL CALCULATED.3IONS-SCNC: 10 MMOL/L (ref 10–20)
BUN SERPL-MCNC: 22 MG/DL (ref 6–23)
CALCIUM SERPL-MCNC: 8.8 MG/DL (ref 8.6–10.3)
CHLORIDE SERPL-SCNC: 104 MMOL/L (ref 98–107)
CO2 SERPL-SCNC: 25 MMOL/L (ref 21–32)
CREAT SERPL-MCNC: 0.97 MG/DL (ref 0.5–1.3)
EGFRCR SERPLBLD CKD-EPI 2021: 76 ML/MIN/1.73M*2
ERYTHROCYTE [DISTWIDTH] IN BLOOD BY AUTOMATED COUNT: 15.1 % (ref 11.5–14.5)
GLUCOSE SERPL-MCNC: 102 MG/DL (ref 74–99)
HCT VFR BLD AUTO: 35.6 % (ref 41–52)
HGB BLD-MCNC: 11.4 G/DL (ref 13.5–17.5)
INR PPP: 1.8 (ref 0.9–1.2)
MCH RBC QN AUTO: 29.8 PG (ref 26–34)
MCHC RBC AUTO-ENTMCNC: 32 G/DL (ref 32–36)
MCV RBC AUTO: 93 FL (ref 80–100)
NRBC BLD-RTO: 0 /100 WBCS (ref 0–0)
PLATELET # BLD AUTO: 175 X10*3/UL (ref 150–450)
POTASSIUM SERPL-SCNC: 3.9 MMOL/L (ref 3.5–5.3)
PROTHROMBIN TIME: 18.8 SECONDS (ref 9.3–12.7)
RBC # BLD AUTO: 3.82 X10*6/UL (ref 4.5–5.9)
SODIUM SERPL-SCNC: 135 MMOL/L (ref 136–145)
WBC # BLD AUTO: 5.5 X10*3/UL (ref 4.4–11.3)

## 2025-02-22 PROCEDURE — 2500000004 HC RX 250 GENERAL PHARMACY W/ HCPCS (ALT 636 FOR OP/ED): Performed by: NURSE PRACTITIONER

## 2025-02-22 PROCEDURE — 2500000001 HC RX 250 WO HCPCS SELF ADMINISTERED DRUGS (ALT 637 FOR MEDICARE OP): Performed by: NURSE PRACTITIONER

## 2025-02-22 PROCEDURE — 2500000002 HC RX 250 W HCPCS SELF ADMINISTERED DRUGS (ALT 637 FOR MEDICARE OP, ALT 636 FOR OP/ED): Mod: MUE | Performed by: NURSE PRACTITIONER

## 2025-02-22 PROCEDURE — 2500000004 HC RX 250 GENERAL PHARMACY W/ HCPCS (ALT 636 FOR OP/ED)

## 2025-02-22 PROCEDURE — 36415 COLL VENOUS BLD VENIPUNCTURE: CPT | Performed by: NURSE PRACTITIONER

## 2025-02-22 PROCEDURE — 85027 COMPLETE CBC AUTOMATED: CPT | Performed by: NURSE PRACTITIONER

## 2025-02-22 PROCEDURE — 2500000002 HC RX 250 W HCPCS SELF ADMINISTERED DRUGS (ALT 637 FOR MEDICARE OP, ALT 636 FOR OP/ED): Mod: MUE | Performed by: INTERNAL MEDICINE

## 2025-02-22 PROCEDURE — 96376 TX/PRO/DX INJ SAME DRUG ADON: CPT

## 2025-02-22 PROCEDURE — 96375 TX/PRO/DX INJ NEW DRUG ADDON: CPT

## 2025-02-22 PROCEDURE — 2500000001 HC RX 250 WO HCPCS SELF ADMINISTERED DRUGS (ALT 637 FOR MEDICARE OP)

## 2025-02-22 PROCEDURE — G0378 HOSPITAL OBSERVATION PER HR: HCPCS

## 2025-02-22 PROCEDURE — 85610 PROTHROMBIN TIME: CPT | Performed by: NURSE PRACTITIONER

## 2025-02-22 PROCEDURE — 80048 BASIC METABOLIC PNL TOTAL CA: CPT | Performed by: NURSE PRACTITIONER

## 2025-02-22 RX ORDER — WARFARIN SODIUM 5 MG/1
5 TABLET ORAL DAILY
Status: DISCONTINUED | OUTPATIENT
Start: 2025-02-22 | End: 2025-02-23 | Stop reason: HOSPADM

## 2025-02-22 RX ORDER — DOCUSATE SODIUM 100 MG/1
100 CAPSULE, LIQUID FILLED ORAL 2 TIMES DAILY
Status: DISCONTINUED | OUTPATIENT
Start: 2025-02-22 | End: 2025-02-23 | Stop reason: HOSPADM

## 2025-02-22 RX ADMIN — WARFARIN SODIUM 5 MG: 5 TABLET ORAL at 17:24

## 2025-02-22 RX ADMIN — CARVEDILOL 12.5 MG: 12.5 TABLET, FILM COATED ORAL at 08:16

## 2025-02-22 RX ADMIN — PSYLLIUM HUSK 1 PACKET: 3.4 POWDER ORAL at 21:41

## 2025-02-22 RX ADMIN — PSYLLIUM HUSK 1 PACKET: 3.4 POWDER ORAL at 09:11

## 2025-02-22 RX ADMIN — ROSUVASTATIN CALCIUM 5 MG: 10 TABLET, FILM COATED ORAL at 21:40

## 2025-02-22 RX ADMIN — DOCUSATE SODIUM 100 MG: 100 CAPSULE, LIQUID FILLED ORAL at 21:40

## 2025-02-22 RX ADMIN — LEVOTHYROXINE SODIUM 75 MCG: 0.07 TABLET ORAL at 06:13

## 2025-02-22 RX ADMIN — PANTOPRAZOLE SODIUM 40 MG: 40 INJECTION, POWDER, FOR SOLUTION INTRAVENOUS at 21:41

## 2025-02-22 RX ADMIN — PANTOPRAZOLE SODIUM 40 MG: 40 INJECTION, POWDER, FOR SOLUTION INTRAVENOUS at 08:10

## 2025-02-22 RX ADMIN — POLYETHYLENE GLYCOL 3350 17 G: 17 POWDER, FOR SOLUTION ORAL at 21:41

## 2025-02-22 RX ADMIN — HYDRALAZINE HYDROCHLORIDE 10 MG: 20 INJECTION INTRAMUSCULAR; INTRAVENOUS at 23:51

## 2025-02-22 RX ADMIN — PSYLLIUM HUSK 1 PACKET: 3.4 POWDER ORAL at 15:46

## 2025-02-22 RX ADMIN — HYDRALAZINE HYDROCHLORIDE 10 MG: 20 INJECTION INTRAMUSCULAR; INTRAVENOUS at 10:08

## 2025-02-22 RX ADMIN — CARVEDILOL 12.5 MG: 12.5 TABLET, FILM COATED ORAL at 17:24

## 2025-02-22 RX ADMIN — TAMSULOSIN HYDROCHLORIDE 0.4 MG: 0.4 CAPSULE ORAL at 08:16

## 2025-02-22 RX ADMIN — LOSARTAN POTASSIUM 50 MG: 50 TABLET, FILM COATED ORAL at 08:16

## 2025-02-22 RX ADMIN — DOCUSATE SODIUM 100 MG: 100 CAPSULE, LIQUID FILLED ORAL at 09:11

## 2025-02-22 ASSESSMENT — ENCOUNTER SYMPTOMS
PSYCHIATRIC NEGATIVE: 1
CARDIOVASCULAR NEGATIVE: 1
RESPIRATORY NEGATIVE: 1
EYES NEGATIVE: 1
CONSTITUTIONAL NEGATIVE: 1
ALLERGIC/IMMUNOLOGIC NEGATIVE: 1
NEUROLOGICAL NEGATIVE: 1
HEMATOLOGIC/LYMPHATIC NEGATIVE: 1
GASTROINTESTINAL NEGATIVE: 1
ENDOCRINE NEGATIVE: 1

## 2025-02-22 ASSESSMENT — COGNITIVE AND FUNCTIONAL STATUS - GENERAL
TURNING FROM BACK TO SIDE WHILE IN FLAT BAD: A LITTLE
MOBILITY SCORE: 23
DAILY ACTIVITIY SCORE: 24

## 2025-02-22 ASSESSMENT — PAIN SCALES - GENERAL: PAINLEVEL_OUTOF10: 0 - NO PAIN

## 2025-02-22 NOTE — H&P
History Of Present Illness  Gorge Larsen is a 89 y.o. male presenting with rectal bleeding.  Patient states that this morning he had 3 episodes of bloody stools and got very concerned and decided to come to the emergency room for further evaluation.  He denies any chest pain or shortness of breath.  CT of the abdomen and pelvis done in the emergency room showed no acute process.  In the ER his electrolytes were stable and his hemoglobin was stable at 12.5 he is on Coumadin for atrial fibrillation and his INR was 1.9 in the ER.  He denies headache, dizziness, nausea/vomiting.     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     Coronary artery disease with unstable angina pectoris, unspecified vessel or lesion type, unspecified whether native or transplanted heart    Cancer (Multi)     CHF (congestive heart failure)     COPD (chronic obstructive pulmonary disease) (Multi)     Essential (primary) hypertension 11/05/2022    Hypertension, unspecified type    Personal history of diseases of the blood and blood-forming organs and certain disorders involving the immune mechanism     History of bleeding disorder    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 Cardiac Cath Lab;  Service: Electrophysiology;  Laterality: Left;  NO AUTH NEEDED    CARDIAC ELECTROPHYSIOLOGY PROCEDURE  "Left 02/19/2024    Procedure: ICD BIV Implant;  Surgeon: Walker Mcdonald MD;  Location: [facility] Cardiac Cath Lab;  Service: Electrophysiology;  Laterality: Left;  upgrade dual PPM to CRT-D, Cap current RV lead. CASE# [id number]    CARDIAC ELECTROPHYSIOLOGY PROCEDURE Left 02/19/2024    Procedure: ICD Upgrade Biventricular;  Surgeon: Walker Mcdonald MD;  Location: [facility] Cardiac Cath Lab;  Service: Electrophysiology;  Laterality: Left;  upgrade dual PPM to CRT-D (LV lead implant), cap RV lead, CASE#[id number]    OTHER SURGICAL HISTORY  03/22/2021    Colonoscopy    OTHER SURGICAL HISTORY  03/22/2021    Colon surgery 2000    OTHER SURGICAL HISTORY  03/22/2021    Pacemaker insertion    OTHER SURGICAL HISTORY  03/22/2021    Coronary artery bypass graft 2002    OTHER SURGICAL HISTORY  01/05/2023    Throat surgery        Social History  He reports that he quit smoking about 40 years ago. His smoking use included cigarettes. He has never used smokeless tobacco. He reports current alcohol use. He reports that he does not use drugs.    Family History  Family History   Problem Relation Name Age of Onset    Heart attack Mother      Heart disease Mother      Other (Car accident) Father          Allergies  Codeine    Review of Systems  All systems reviewed and negative except as noted in HPI  Physical Exam  Constitutional: No acute distress, calm, cooperative  HEENT: PERRL, normocephalic, atraumatic, mucous membranes moist  Cardiovascular: Regular rhythm and rate, murmur  Respiratory: Lungs clear to auscultation,   Gastrointestinal: Bowel sounds positive x 4, soft, nontender  Neurologic: Alert and oriented x 3 equal strength bilaterally  Musculoskeletal: Able to move all extremities, no edema  Skin: Warm, dry and intact  Last Recorded Vitals  Blood pressure (!) 189/83, pulse 68, temperature 36.4 °C (97.5 °F), resp. rate 18, height 1.753 m (5' 9\"), weight 90.7 kg (200 lb), SpO2 98%.     Assessment/Plan   Assessment & Plan  Rectal " bleeding  May be due to hemorrhoids  Hold Coumadin for now  Protonix  Check CBC and INR in the morning  Consult gastroenterology  Hypothyroid  Continue levothyroxine  Hyperlipidemia  Continue rosuvastatin  Paroxysmal atrial fibrillation  Hold warfarin for now  Hypertension  Continue home carvedilol and losartan  Hydralazine as needed for SBP greater than 160  DVT prophylaxis  Sequential compression  CODE STATUS  Full code           I spent 75 minutes in the professional and overall care of this patient.      Jg Moran, APRN-CNP

## 2025-02-22 NOTE — NURSING NOTE
Administered soap suds enema for pt, was only able to hold some of the enema for 5 minutes, no BM produced.

## 2025-02-22 NOTE — ED PROVIDER NOTES
Patient was seen by both myself and advanced practitioner.  I personally saw the patient and made/approved the management plan and take responsibility for the patient management     Patient is an 89-year-old male who presents emergency department for evaluation of rectal bleeding.  Patient reports that he has had some intermittent rectal bleeding in the past however today has had 3 episodes of dark red blood with bowel movements.  He does feel tired as well as some intermittent dizziness.  He does note that he takes Coumadin for chronic atrial fibrillation.  He does also note that he has a history of hemorrhoids however this seemed to be much more blood than normal.  He denies any rectal pain, abdominal pain, chest pain, shortness of breath.    On exam patient uncomfortable appearing but in no obvious distress.  He is awake, alert and oriented.  Vital signs are stable on arrival and he is hypertensive with a blood pressure of 169/82.  Abdomen soft, nontender, nondistended.  There is a small hemorrhoid present however no active bleeding at this time and there is some darker blood on rectal exam performed by advance practitioner.  Blood work ordered including CBC, CMP, PT/INR.  INR is mildly subtherapeutic but elevated at 1.9.  He does also have anemia with a hemoglobin of 12.5 however this is similar to prior blood work.  CT scan of the ab pelvis shows no acute intra-abdominal process however some mild findings of focal wall thickening in the gastric antrum which may be gastritis.  There is no evidence of bowel obstruction, perforation or abscess.  Patient has remained hemodynamically stable however given the fact he has had multiple episodes of bleeding today and is on warfarin patient will be brought in for observation and trending of hemoglobin level.  Case was discussed with hospitalist who accepted patient for observation.    I independently interpreted the following study(s) CT scan of the abdomen pelvis which  show no acute abdominal or pelvic process but does show some findings concerning for possible hepatic steatosis as well as possible findings of gastritis but no evidence of bowel obstruction, perforation, abscess, diverticulitis.       Maik Resendiz DO  02/21/25 2017

## 2025-02-22 NOTE — ASSESSMENT & PLAN NOTE
May be due to hemorrhoids  Hold Coumadin for now  Protonix  Check CBC and INR in the morning  Consult gastroenterology  Hypothyroid  Continue levothyroxine  Hyperlipidemia  Continue rosuvastatin  Paroxysmal atrial fibrillation  Hold warfarin for now  Hypertension  Continue home carvedilol and losartan  Hydralazine as needed for SBP greater than 160  DVT prophylaxis  Sequential compression  CODE STATUS  Full code

## 2025-02-22 NOTE — PROGRESS NOTES
ADMISSION DATE: 2/21/2025  HOSPITAL DAY: 0    SUBJECTIVE:  Patient was seen at bedside.  Uneventful night.  Patient is not a good historian.  He thinks he has rectal cancer in the background history of colon cancer in the past.  He is somewhat anxious.      OBJECTIVE:  Vitals:    02/22/25 0900 02/22/25 0930 02/22/25 1001 02/22/25 1035   BP: 170/82 163/72 172/70 155/70   BP Location: Left arm Left arm Left arm Left arm   Patient Position: Lying Lying Lying Lying   Pulse: 72      Resp: 18      Temp: 36.6 °C (97.9 °F)      TempSrc: Temporal      SpO2: 98%      Weight:       Height:            Intake/Output Summary (Last 24 hours) at 2/22/2025 1241  Last data filed at 2/22/2025 0435  Gross per 24 hour   Intake --   Output 875 ml   Net -875 ml      Wt Readings from Last 10 Encounters:   02/21/25 92.9 kg (204 lb 11.2 oz)   01/23/25 93 kg (205 lb)   11/11/24 95 kg (209 lb 7 oz)   10/24/24 94.3 kg (208 lb)   10/18/24 93.4 kg (206 lb)   10/02/24 93 kg (205 lb)   09/24/24 94.3 kg (208 lb)   09/12/24 94.3 kg (208 lb)   09/05/24 95.6 kg (210 lb 12.2 oz)   07/22/24 92.5 kg (204 lb)       PHYSICAL EXAM:  Gen: Alert, awake, Oriented X 3. Not in any acute distress   HEENT:  Atraumatic, PERRL.  Conjunctivae clear.   Moist nasal mucous membranes. oropharynx non erythematous,   Neck:  Supple without thyromegaly or lymphadenopathy.  Lungs:  Clear to auscultation without rales, rhonchi, or rub.  Heart:  RRR, S1, S2, without M.  Abdomen:  Soft, non tender, no organ enlargement, bruit. Bowel sounds present . No CVA tenderness.  Extremities:  No edema. No calf swelling or tenderness.    Skin:  No rash, ecchymosis or erythema.    CURRENT ACTIVE MEDS:  carvedilol, 12.5 mg, oral, BID  docusate sodium, 100 mg, oral, BID  levothyroxine, 75 mcg, oral, Daily  losartan, 50 mg, oral, Daily  pantoprazole, 40 mg, intravenous, BID   Followed by  [START ON 2/24/2025] pantoprazole, 40 mg, oral, BID AC  polyethylene glycol, 17 g, oral, BID  psyllium, 1  packet, oral, TID  rosuvastatin, 5 mg, oral, Nightly  tamsulosin, 0.4 mg, oral, Daily      LAB RESULTS:   CBC:   Results from last 7 days   Lab Units 02/22/25  0519 02/21/25  1631   WBC AUTO x10*3/uL 5.5 6.0   RBC AUTO x10*6/uL 3.82* 4.20*   HEMOGLOBIN g/dL 11.4* 12.5*   HEMATOCRIT % 35.6* 38.3*   MCV fL 93 91   MCH pg 29.8 29.8   MCHC g/dL 32.0 32.6   RDW % 15.1* 15.2*   PLATELETS AUTO x10*3/uL 175 176     CMP:    Results from last 7 days   Lab Units 02/22/25  0519 02/21/25  1631   SODIUM mmol/L 135* 137   POTASSIUM mmol/L 3.9 4.2   CHLORIDE mmol/L 104 101   CO2 mmol/L 25 26   BUN mg/dL 22 27*   CREATININE mg/dL 0.97 1.12   GLUCOSE mg/dL 102* 109*   PROTEIN TOTAL g/dL  --  7.6   CALCIUM mg/dL 8.8 9.2   BILIRUBIN TOTAL mg/dL  --  0.7   ALK PHOS U/L  --  83   AST U/L  --  22   ALT U/L  --  13     BMP:    Results from last 7 days   Lab Units 02/22/25  0519 02/21/25  1631   SODIUM mmol/L 135* 137   POTASSIUM mmol/L 3.9 4.2   CHLORIDE mmol/L 104 101   CO2 mmol/L 25 26   BUN mg/dL 22 27*   CREATININE mg/dL 0.97 1.12   CALCIUM mg/dL 8.8 9.2   GLUCOSE mg/dL 102* 109*          Lab Results   Component Value Date    LDLCALC 51 09/17/2024     Lab Results   Component Value Date    HGBA1C 5.3 12/11/2020    HGBA1C 5.9 07/15/2019     Lab Results   Component Value Date    LDLCALC 51 09/17/2024    CREATININE 0.97 02/22/2025       Lab Results   Component Value Date    TSH 2.66 09/17/2024      Lab Results   Component Value Date    INR 1.8 (H) 02/22/2025    INR 1.9 (H) 02/21/2025    INR 2.10 02/21/2025    PROTIME 18.8 (H) 02/22/2025    PROTIME 19.8 (H) 02/21/2025    PROTIME 24.9 (H) 09/05/2024     CULTURES & SUSCEPTIBILITIES:   No results found for the last 90 days.      IMAGING STUDIES:  I have reviewed following imaging studies.  I agree with the impression and incorporated those results into my MDM     === 10/18/24 ===  XR CHEST 2 VIEWS  1.  Equivocal left infrahilar airspace opacity. Short-term  radiographic follow up to document  resolution recommended. CT can  also be obtained for additional characterization.    === 02/21/25 ===  CT ABDOMEN PELVIS W IV CONTRAST  No acute abdominal or pelvic process.  Slight heterogeneous hepatic parenchyma with ill-defined areas of  hypoattenuation. Findings may relate to phase of imaging and/or  component of hepatic steatosis.  Focal wall thickening in the region of the gastric antrum may relate  to under distention or sequela of gastritis. Correlate with  symptomatology for the need for further evaluation with direct  visualization. Postsurgical changes of partial sigmoid colectomy and anastomosis. No  evidence for bowel obstruction. Scattered colonic diverticula without evidence for acute  diverticulitis.      === 09/17/24 ===  US RENAL COMPLETE  1. Increased bilateral cortical echogenicity could be related to  underlying medical kidney disease.  2. Mild over filling of the bilateral pelvicalyceal system but no  significant hydronephrosis.  3. Prostate gland measuring 25 mL      LAST EKG  Encounter Date: 11/11/24   ECG 12 lead   Result Value    Ventricular Rate 70    Atrial Rate 70    QRS Duration 192    QT Interval 486    QTC Calculation(Bazett) 524    R Axis 246    T Axis 68    QRS Count 11    Q Onset 190    T Offset 433    QTC Fredericia 511       LAST ECHO: 10/19/2023     1. Left ventricular systolic function is moderately to severely decreased with a 35% estimated ejection fraction.  2. Abnormal septal motion consistent with RV pacemaker.  3. There is moderate hypokinesis and dyssynchrony of the anteroseptal wall.  4. The left atrium is moderately dilated.  5. The right atrium is moderately dilated.  6. Moderate mitral valve regurgitation.  7. Moderate to severe tricuspid regurgitation visualized.  8. Severely elevated right ventricular systolic pressure.  9. Aortic valve appears abnormal.  10. There is a transcatheter aortic valve replacement.  11. The estimated PASP is 65 mmHg.  12. The peak  instantaneous gradient of the aortic valve is 12.1 mmHg.  13. There is global hypokinesis of the left ventricle with minor regional variations.    PROBLEMS ON ADMISSION:  Rectal bleeding [K62.5]  Gastrointestinal hemorrhage, unspecified gastrointestinal hemorrhage type [K92.2]    HOSPITAL PROBLEM LIST     Anxiety    Benign essential HTN    Coronary artery abnormality (HHS-HCC)    Cardiac pacemaker in situ    Chronic atrial fibrillation (Multi)    Congestive heart failure    Hypothyroidism    Carcinoma of colon (Multi)    Stage 3 chronic kidney disease (Multi)    Atrioventricular block, complete (Multi)    ASSESSMENT AND PLAN FOR 2/22/2025  Patient had rectal bleed in the emergency department.  His H&H is stable.  He was evaluated by gastroenterologist.  There is no contraindication for him to continue with his anticoagulant.  Patient does have paroxysmal atrial fibrillation, congestive heart failure as well as complete AV block.  He has pacemaker in situ.  Patient is otherwise constipated he is getting bowel prep.  I shall put him back on anticoagulant for today.  Will monitor his H&H and electrolytes.  If he remains stable possible discharge to home tomorrow.  Rest of the medical therapy be continued as per admission orders.        P.S: This note was completed using Dragon voice recognition technology and may include unintended errors with respect to translation of words, typographical errors or grammar errors which may not have been identified while finalizing the chart.

## 2025-02-22 NOTE — CONSULTS
Inpatient consult to gastroenterology  Consult performed by: NINA Muhammad  Consult ordered by: NINA Bermeo          Reason For Consult  Rectal Bleeding    History Of Present Illness  Gorge Larsen is a 89 y.o. male presenting with rectal bleeding. Patient reports that he has had some intermittent rectal bleeding in the past however reports he had 3 episodes of dark red blood with bowel movements yesterday.  No further bleeding overnight and this morning. Patient reports he has history of chronic constipation and often has to strain with Bms. He denies any pain with bowel movements.  He currently denies any abdominal pain, nausea, vomiting. He takes Coumadin for chronic atrial fibrillation. He has hx colon cancer with resection in 2000. Had colonoscopy in 2020 with 4 small polyps and a few AVMs (treated with APC). He had an EGD in 2021 with duodenal polyp as well. Hgb stable at 11.4, INR 1.8, Normal BUN. CT scan of the abd/pelvis shows no acute intra-abdominal process however some mild findings of focal wall thickening in the gastric antrum which may be gastritis. There is no evidence of bowel obstruction, perforation or abscess. Patient denies any alcohol use, NSAIDs.     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    Surgical History  He has a past surgical history that includes Other  surgical history (03/22/2021); Other surgical history (03/22/2021); Other surgical history (03/22/2021); Other surgical history (03/22/2021); Other surgical history (01/05/2023); Cardiac electrophysiology procedure (Left, 02/19/2024); Cardiac electrophysiology procedure (Left, 02/19/2024); and Cardiac electrophysiology procedure (Left, 02/19/2024).     Social History  He reports that he quit smoking about 40 years ago. His smoking use included cigarettes. He has never used smokeless tobacco. He reports current alcohol use. He reports that he does not use drugs.    Family History  Family History   Problem Relation Name Age of Onset    Heart attack Mother      Heart disease Mother      Other (Car accident) Father          Allergies  Codeine    Review of Systems   Constitutional: Negative.    HENT: Negative.     Eyes: Negative.    Respiratory: Negative.     Cardiovascular: Negative.    Gastrointestinal: Negative.    Endocrine: Negative.    Genitourinary: Negative.    Skin: Negative.    Allergic/Immunologic: Negative.    Neurological: Negative.    Hematological: Negative.    Psychiatric/Behavioral: Negative.          Physical Exam  HENT:      Head: Normocephalic.      Right Ear: Tympanic membrane normal.      Nose: Nose normal.      Mouth/Throat:      Mouth: Mucous membranes are moist.   Eyes:      Pupils: Pupils are equal, round, and reactive to light.   Cardiovascular:      Rate and Rhythm: Normal rate.      Pulses: Normal pulses.   Pulmonary:      Effort: Pulmonary effort is normal.   Abdominal:      Palpations: Abdomen is soft.   Musculoskeletal:         General: Normal range of motion.      Cervical back: Normal range of motion.   Skin:     General: Skin is warm.   Neurological:      General: No focal deficit present.      Mental Status: He is alert.   Psychiatric:         Mood and Affect: Mood normal.          Last Recorded Vitals  Blood pressure 146/60, pulse 72, temperature 36.4 °C (97.5 °F), temperature source  "Temporal, resp. rate 16, height 1.753 m (5' 9.02\"), weight 92.9 kg (204 lb 11.2 oz), SpO2 97%.    Relevant Results  CT abdomen pelvis w IV contrast    Result Date: 2/21/2025  Interpreted By:  Barrington Mitchell, STUDY: CT ABDOMEN PELVIS W IV CONTRAST;  2/21/2025 6:36 pm   INDICATION: Signs/Symptoms:rectal bleeding.   COMPARISON: CT scan of the abdomen pelvis 11/11/2024.   ACCESSION NUMBER(S): [id number]   ORDERING CLINICIAN: NIKOLAI SINGH   TECHNIQUE: Axial CT images of the abdomen and pelvis with coronal and sagittal reconstructed images obtained after intravenous administration of 75 mL of Omnipaque 350.   FINDINGS: LOWER CHEST: Bibasilar atelectasis. Cardiomegaly with postsurgical changes of prior CABG. Triple lead pacer is present. Aortic root, mitral annular and coronary artery calcifications noted.   ABDOMEN:   LIVER: Normal morphology. Slight heterogeneous hepatic parenchyma with ill-defined areas of hypoattenuation. BILE DUCTS: Normal caliber. GALLBLADDER: No calcified gallstones. No wall thickening. PANCREAS: Mild fatty atrophy of the pancreas. SPLEEN: Within normal limits.  Punctate calcified granulomas noted. ADRENALS: Within normal limits. KIDNEYS and URETERS: Symmetric renal enhancement. No hydronephrosis or hydroureter. Mild bilateral perinephric stranding is nonspecific and could be age related. 1.5 cm exophytic hypodense lesion in the left kidney demonstrates fluid attenuation compatible with a cyst. Additional subcentimeter hypodense foci bilaterally are too small to characterize.   VESSELS:  Calcific atherosclerosis of the aortoiliac and branch vessels. No aortic aneurysm. RETROPERITONEUM: No pathologically enlarged retroperitoneal lymph nodes.   PELVIS:   REPRODUCTIVE ORGANS: Prostate gland is not enlarged protruding into the base of the bladder. BLADDER: Within normal limits.   BOWEL: Small hiatal hernia. Stomach is underdistended with focal wall thickening in the region of the gastric antrum which " may relate to under distention. Visualized loops of bowel are without evidence for obstruction. Postsurgical changes of partial sigmoid colectomy and anastomosis. Moderate stool burden. Scattered colonic diverticula without evidence for acute diverticulitis. No pneumatosis or portal venous gas. Normal appendix. PERITONEUM: No ascites or free air, no fluid collection.   ABDOMINAL WALL: Small umbilical hernia contains fat. BONES: Generalized diffuse osteopenia. Multilevel degenerative changes of the spine.       No acute abdominal or pelvic process.   Slight heterogeneous hepatic parenchyma with ill-defined areas of hypoattenuation. Findings may relate to phase of imaging and/or component of hepatic steatosis.   Focal wall thickening in the region of the gastric antrum may relate to under distention or sequela of gastritis. Correlate with symptomatology for the need for further evaluation with direct visualization.   Postsurgical changes of partial sigmoid colectomy and anastomosis. No evidence for bowel obstruction.   Scattered colonic diverticula without evidence for acute diverticulitis.   Additional findings as described above.   MACRO: None   Signed by: Barrington Mitchell 2/21/2025 7:05 PM Dictation workstation:   [id number]      Scheduled medications  carvedilol, 12.5 mg, oral, BID  levothyroxine, 75 mcg, oral, Daily  losartan, 50 mg, oral, Daily  pantoprazole, 40 mg, intravenous, BID   Followed by  [START ON 2/24/2025] pantoprazole, 40 mg, oral, BID AC  polyethylene glycol, 17 g, oral, BID  rosuvastatin, 5 mg, oral, Nightly  tamsulosin, 0.4 mg, oral, Daily      Continuous medications     PRN medications  PRN medications: acetaminophen **OR** acetaminophen **OR** acetaminophen, albuterol, benzocaine-menthol, bisacodyl, dextromethorphan-guaifenesin, hydrALAZINE, melatonin, ondansetron ODT **OR** ondansetron  Results for orders placed or performed during the hospital encounter of 02/21/25 (from the past 24 hours)    Occult Blood, Stool    Specimen: Stool   Result Value Ref Range    Occult Blood, Stool X1 Positive (A) Negative   CBC and Auto Differential   Result Value Ref Range    WBC 6.0 4.4 - 11.3 x10*3/uL    nRBC      RBC 4.20 (L) 4.50 - 5.90 x10*6/uL    Hemoglobin 12.5 (L) 13.5 - 17.5 g/dL    Hematocrit 38.3 (L) 41.0 - 52.0 %    MCV 91 80 - 100 fL    MCH 29.8 26.0 - 34.0 pg    MCHC 32.6 32.0 - 36.0 g/dL    RDW 15.2 (H) 11.5 - 14.5 %    Platelets 176 150 - 450 x10*3/uL    Neutrophils % 68.3 40.0 - 80.0 %    Immature Granulocytes %, Automated 0.3 0.0 - 0.9 %    Lymphocytes % 13.1 13.0 - 44.0 %    Monocytes % 9.4 2.0 - 10.0 %    Eosinophils % 7.9 0.0 - 6.0 %    Basophils % 1.0 0.0 - 2.0 %    Neutrophils Absolute 4.06 1.60 - 5.50 x10*3/uL    Immature Granulocytes Absolute, Automated 0.02 0.00 - 0.50 x10*3/uL    Lymphocytes Absolute 0.78 (L) 0.80 - 3.00 x10*3/uL    Monocytes Absolute 0.56 0.05 - 0.80 x10*3/uL    Eosinophils Absolute 0.47 (H) 0.00 - 0.40 x10*3/uL    Basophils Absolute 0.06 0.00 - 0.10 x10*3/uL   Comprehensive metabolic panel   Result Value Ref Range    Glucose 109 (H) 74 - 99 mg/dL    Sodium 137 136 - 145 mmol/L    Potassium 4.2 3.5 - 5.3 mmol/L    Chloride 101 98 - 107 mmol/L    Bicarbonate 26 21 - 32 mmol/L    Anion Gap 14 10 - 20 mmol/L    Urea Nitrogen 27 (H) 6 - 23 mg/dL    Creatinine 1.12 0.50 - 1.30 mg/dL    eGFR 64 >60 mL/min/1.73m*2    Calcium 9.2 8.6 - 10.3 mg/dL    Albumin 4.2 3.4 - 5.0 g/dL    Alkaline Phosphatase 83 33 - 136 U/L    Total Protein 7.6 6.4 - 8.2 g/dL    AST 22 9 - 39 U/L    Bilirubin, Total 0.7 0.0 - 1.2 mg/dL    ALT 13 10 - 52 U/L   Protime-INR   Result Value Ref Range    Protime 19.8 (H) 9.3 - 12.7 seconds    INR 1.9 (H) 0.9 - 1.2   APTT   Result Value Ref Range    aPTT 33.0 (H) 22.0 - 32.5 seconds   CBC   Result Value Ref Range    WBC 5.5 4.4 - 11.3 x10*3/uL    nRBC 0.0 0.0 - 0.0 /100 WBCs    RBC 3.82 (L) 4.50 - 5.90 x10*6/uL    Hemoglobin 11.4 (L) 13.5 - 17.5 g/dL    Hematocrit  35.6 (L) 41.0 - 52.0 %    MCV 93 80 - 100 fL    MCH 29.8 26.0 - 34.0 pg    MCHC 32.0 32.0 - 36.0 g/dL    RDW 15.1 (H) 11.5 - 14.5 %    Platelets 175 150 - 450 x10*3/uL   Basic metabolic panel   Result Value Ref Range    Glucose 102 (H) 74 - 99 mg/dL    Sodium 135 (L) 136 - 145 mmol/L    Potassium 3.9 3.5 - 5.3 mmol/L    Chloride 104 98 - 107 mmol/L    Bicarbonate 25 21 - 32 mmol/L    Anion Gap 10 10 - 20 mmol/L    Urea Nitrogen 22 6 - 23 mg/dL    Creatinine 0.97 0.50 - 1.30 mg/dL    eGFR 76 >60 mL/min/1.73m*2    Calcium 8.8 8.6 - 10.3 mg/dL   Protime-INR   Result Value Ref Range    Protime 18.8 (H) 9.3 - 12.7 seconds    INR 1.8 (H) 0.9 - 1.2     *Note: Due to a large number of results and/or encounters for the requested time period, some results have not been displayed. A complete set of results can be found in Results Review.        Assessment/Plan   Rectal bleeding/Anticoagulation (Coumadin)  Patient presents with painless red blood per rectum concerning for lower GI bleed. DDx includes anatomic (diverticulosis), vascular (angiodysplasia, hemorrhoids, ischemic. CT noted focal wall thickening in the region of the gastric antrum may relate to under distention or sequela of gastritis. Moderate stool burden.  Possibly stercoral ulcer due to his chronic constipation. Patient remains hemodynamically stable and no further bleeding noted this morning. No urgent indication for scopes at this time.   - H/H stable   - Monitor  CBC, CMP, INR,  - Transfuse to keep hgb >7  - Protonix 40 mg IV BID  - Bowel regimen ordered  - No absolute contraindication to resume anticoagulation     Rebekah Gerardo, NIKOLAY-CNP

## 2025-02-22 NOTE — CARE PLAN
Problem: Pain - Adult  Goal: Verbalizes/displays adequate comfort level or baseline comfort level  Outcome: Progressing     Problem: Safety - Adult  Goal: Free from fall injury  Outcome: Progressing     Problem: Fall/Injury  Goal: Not fall by end of shift  Outcome: Progressing  Goal: Be free from injury by end of the shift  Outcome: Progressing   The patient's goals for the shift include no bleeding    The clinical goals for the shift include monitor for bleeding    Over the shift, the patient did not make progress toward the above goals.

## 2025-02-23 VITALS
BODY MASS INDEX: 30.32 KG/M2 | SYSTOLIC BLOOD PRESSURE: 170 MMHG | HEIGHT: 69 IN | TEMPERATURE: 98.1 F | OXYGEN SATURATION: 97 % | DIASTOLIC BLOOD PRESSURE: 67 MMHG | HEART RATE: 71 BPM | WEIGHT: 204.7 LBS | RESPIRATION RATE: 20 BRPM

## 2025-02-23 LAB
ALBUMIN SERPL BCP-MCNC: 3.8 G/DL (ref 3.4–5)
ALP SERPL-CCNC: 72 U/L (ref 33–136)
ALT SERPL W P-5'-P-CCNC: 8 U/L (ref 10–52)
ANION GAP SERPL CALCULATED.3IONS-SCNC: 13 MMOL/L (ref 10–20)
AST SERPL W P-5'-P-CCNC: 18 U/L (ref 9–39)
BASOPHILS # BLD AUTO: 0.08 X10*3/UL (ref 0–0.1)
BASOPHILS NFR BLD AUTO: 1.3 %
BILIRUB SERPL-MCNC: 1.1 MG/DL (ref 0–1.2)
BUN SERPL-MCNC: 24 MG/DL (ref 6–23)
CALCIUM SERPL-MCNC: 8.8 MG/DL (ref 8.6–10.3)
CHLORIDE SERPL-SCNC: 102 MMOL/L (ref 98–107)
CO2 SERPL-SCNC: 23 MMOL/L (ref 21–32)
CREAT SERPL-MCNC: 1.08 MG/DL (ref 0.5–1.3)
EGFRCR SERPLBLD CKD-EPI 2021: 66 ML/MIN/1.73M*2
EOSINOPHIL # BLD AUTO: 0.19 X10*3/UL (ref 0–0.4)
EOSINOPHIL NFR BLD AUTO: 3.1 %
ERYTHROCYTE [DISTWIDTH] IN BLOOD BY AUTOMATED COUNT: 14.9 % (ref 11.5–14.5)
GLUCOSE SERPL-MCNC: 112 MG/DL (ref 74–99)
HCT VFR BLD AUTO: 35 % (ref 41–52)
HGB BLD-MCNC: 11.2 G/DL (ref 13.5–17.5)
HOLD SPECIMEN: NORMAL
IMM GRANULOCYTES # BLD AUTO: 0.02 X10*3/UL (ref 0–0.5)
IMM GRANULOCYTES NFR BLD AUTO: 0.3 % (ref 0–0.9)
INR PPP: 1.7 (ref 0.9–1.2)
LYMPHOCYTES # BLD AUTO: 0.54 X10*3/UL (ref 0.8–3)
LYMPHOCYTES NFR BLD AUTO: 8.9 %
MAGNESIUM SERPL-MCNC: 2 MG/DL (ref 1.6–2.4)
MCH RBC QN AUTO: 29.6 PG (ref 26–34)
MCHC RBC AUTO-ENTMCNC: 32 G/DL (ref 32–36)
MCV RBC AUTO: 92 FL (ref 80–100)
MONOCYTES # BLD AUTO: 0.66 X10*3/UL (ref 0.05–0.8)
MONOCYTES NFR BLD AUTO: 10.8 %
NEUTROPHILS # BLD AUTO: 4.61 X10*3/UL (ref 1.6–5.5)
NEUTROPHILS NFR BLD AUTO: 75.6 %
NRBC BLD-RTO: 0 /100 WBCS (ref 0–0)
PLATELET # BLD AUTO: 169 X10*3/UL (ref 150–450)
POTASSIUM SERPL-SCNC: 3.7 MMOL/L (ref 3.5–5.3)
PROT SERPL-MCNC: 6.6 G/DL (ref 6.4–8.2)
PROTHROMBIN TIME: 17.2 SECONDS (ref 9.3–12.7)
RBC # BLD AUTO: 3.79 X10*6/UL (ref 4.5–5.9)
SODIUM SERPL-SCNC: 134 MMOL/L (ref 136–145)
T3FREE SERPL-MCNC: 3 PG/ML (ref 2.3–4.2)
T4 FREE SERPL-MCNC: 1.16 NG/DL (ref 0.61–1.12)
TSH SERPL-ACNC: 0.74 MIU/L (ref 0.44–3.98)
WBC # BLD AUTO: 6.1 X10*3/UL (ref 4.4–11.3)

## 2025-02-23 PROCEDURE — 83735 ASSAY OF MAGNESIUM: CPT | Performed by: INTERNAL MEDICINE

## 2025-02-23 PROCEDURE — 85610 PROTHROMBIN TIME: CPT | Performed by: INTERNAL MEDICINE

## 2025-02-23 PROCEDURE — 84439 ASSAY OF FREE THYROXINE: CPT | Performed by: INTERNAL MEDICINE

## 2025-02-23 PROCEDURE — G0378 HOSPITAL OBSERVATION PER HR: HCPCS

## 2025-02-23 PROCEDURE — 99238 HOSP IP/OBS DSCHRG MGMT 30/<: CPT | Performed by: INTERNAL MEDICINE

## 2025-02-23 PROCEDURE — 36415 COLL VENOUS BLD VENIPUNCTURE: CPT | Performed by: INTERNAL MEDICINE

## 2025-02-23 PROCEDURE — 84443 ASSAY THYROID STIM HORMONE: CPT | Performed by: INTERNAL MEDICINE

## 2025-02-23 PROCEDURE — 84481 FREE ASSAY (FT-3): CPT | Mod: TRILAB | Performed by: INTERNAL MEDICINE

## 2025-02-23 PROCEDURE — 85025 COMPLETE CBC W/AUTO DIFF WBC: CPT | Performed by: INTERNAL MEDICINE

## 2025-02-23 PROCEDURE — 80053 COMPREHEN METABOLIC PANEL: CPT | Performed by: INTERNAL MEDICINE

## 2025-02-23 RX ORDER — POLYETHYLENE GLYCOL 3350 17 G/17G
17 POWDER, FOR SOLUTION ORAL 2 TIMES DAILY
Qty: 60 PACKET | Refills: 2 | Status: SHIPPED | OUTPATIENT
Start: 2025-02-23 | End: 2026-02-23

## 2025-02-23 RX ORDER — FUROSEMIDE 20 MG/1
10 TABLET ORAL EVERY OTHER DAY
Start: 2025-02-23

## 2025-02-23 RX ORDER — BISACODYL 5 MG
10 TABLET, DELAYED RELEASE (ENTERIC COATED) ORAL DAILY PRN
Qty: 30 TABLET | Refills: 2 | Status: SHIPPED | OUTPATIENT
Start: 2025-02-23

## 2025-02-23 RX ORDER — WARFARIN SODIUM 5 MG/1
5 TABLET ORAL SEE ADMIN INSTRUCTIONS
Start: 2025-02-23

## 2025-02-23 ASSESSMENT — COGNITIVE AND FUNCTIONAL STATUS - GENERAL
WALKING IN HOSPITAL ROOM: A LITTLE
STANDING UP FROM CHAIR USING ARMS: A LITTLE
MOBILITY SCORE: 19
DAILY ACTIVITIY SCORE: 24
TURNING FROM BACK TO SIDE WHILE IN FLAT BAD: TOTAL
CLIMB 3 TO 5 STEPS WITH RAILING: A LITTLE
TURNING FROM BACK TO SIDE WHILE IN FLAT BAD: A LITTLE
MOBILITY SCORE: 21
DAILY ACTIVITIY SCORE: 24
MOVING TO AND FROM BED TO CHAIR: A LITTLE

## 2025-02-23 ASSESSMENT — PAIN SCALES - GENERAL
PAINLEVEL_OUTOF10: 0 - NO PAIN
PAINLEVEL_OUTOF10: 0 - NO PAIN

## 2025-02-23 NOTE — CARE PLAN
The patient's goals for the shift include no bleeding    The clinical goals for the shift include maintain safety    Over the shift, the patient did not make progress toward the following goals. Barriers to progression include .. Recommendations to address these barriers include ..

## 2025-02-23 NOTE — PROGRESS NOTES
"Gorge Larsen is a 89 y.o. male on day 0 of admission presenting with Rectal bleeding.    Subjective   No further bleeding noted.  Per nursing patient refused all his morning medication.  He reports he wants to go home.  Patient denies any abdominal pain, nausea, vomiting.        Objective     Physical Exam  HENT:      Head: Normocephalic.      Right Ear: Tympanic membrane normal.      Nose: Nose normal.      Mouth/Throat:      Mouth: Mucous membranes are moist.   Eyes:      Pupils: Pupils are equal, round, and reactive to light.   Cardiovascular:      Rate and Rhythm: Normal rate.   Pulmonary:      Effort: Pulmonary effort is normal.   Abdominal:      Palpations: Abdomen is soft.   Musculoskeletal:         General: Normal range of motion.      Cervical back: Normal range of motion.   Skin:     General: Skin is warm.   Neurological:      General: No focal deficit present.      Mental Status: He is alert.         Last Recorded Vitals  Blood pressure 170/67, pulse 71, temperature 36.7 °C (98.1 °F), temperature source Temporal, resp. rate 20, height 1.753 m (5' 9.02\"), weight 92.9 kg (204 lb 11.2 oz), SpO2 97%.  Intake/Output last 3 Shifts:  I/O last 3 completed shifts:  In: - (0 mL/kg)   Out: 1375 (14.8 mL/kg) [Urine:1375 (0.4 mL/kg/hr)]  Weight: 92.9 kg     Relevant Results      Scheduled medications  carvedilol, 12.5 mg, oral, BID  docusate sodium, 100 mg, oral, BID  levothyroxine, 75 mcg, oral, Daily  losartan, 50 mg, oral, Daily  pantoprazole, 40 mg, intravenous, BID   Followed by  [START ON 2/24/2025] pantoprazole, 40 mg, oral, BID AC  polyethylene glycol, 17 g, oral, BID  psyllium, 1 packet, oral, TID  rosuvastatin, 5 mg, oral, Nightly  tamsulosin, 0.4 mg, oral, Daily  warfarin, 5 mg, oral, Daily      Continuous medications     PRN medications  PRN medications: acetaminophen **OR** acetaminophen **OR** acetaminophen, albuterol, benzocaine-menthol, bisacodyl, dextromethorphan-guaifenesin, hydrALAZINE, " melatonin, ondansetron ODT **OR** ondansetron  Results for orders placed or performed during the hospital encounter of 02/21/25 (from the past 24 hours)   CBC and Auto Differential   Result Value Ref Range    WBC 6.1 4.4 - 11.3 x10*3/uL    nRBC 0.0 0.0 - 0.0 /100 WBCs    RBC 3.79 (L) 4.50 - 5.90 x10*6/uL    Hemoglobin 11.2 (L) 13.5 - 17.5 g/dL    Hematocrit 35.0 (L) 41.0 - 52.0 %    MCV 92 80 - 100 fL    MCH 29.6 26.0 - 34.0 pg    MCHC 32.0 32.0 - 36.0 g/dL    RDW 14.9 (H) 11.5 - 14.5 %    Platelets 169 150 - 450 x10*3/uL    Neutrophils % 75.6 40.0 - 80.0 %    Immature Granulocytes %, Automated 0.3 0.0 - 0.9 %    Lymphocytes % 8.9 13.0 - 44.0 %    Monocytes % 10.8 2.0 - 10.0 %    Eosinophils % 3.1 0.0 - 6.0 %    Basophils % 1.3 0.0 - 2.0 %    Neutrophils Absolute 4.61 1.60 - 5.50 x10*3/uL    Immature Granulocytes Absolute, Automated 0.02 0.00 - 0.50 x10*3/uL    Lymphocytes Absolute 0.54 (L) 0.80 - 3.00 x10*3/uL    Monocytes Absolute 0.66 0.05 - 0.80 x10*3/uL    Eosinophils Absolute 0.19 0.00 - 0.40 x10*3/uL    Basophils Absolute 0.08 0.00 - 0.10 x10*3/uL   Comprehensive Metabolic Panel   Result Value Ref Range    Glucose 112 (H) 74 - 99 mg/dL    Sodium 134 (L) 136 - 145 mmol/L    Potassium 3.7 3.5 - 5.3 mmol/L    Chloride 102 98 - 107 mmol/L    Bicarbonate 23 21 - 32 mmol/L    Anion Gap 13 10 - 20 mmol/L    Urea Nitrogen 24 (H) 6 - 23 mg/dL    Creatinine 1.08 0.50 - 1.30 mg/dL    eGFR 66 >60 mL/min/1.73m*2    Calcium 8.8 8.6 - 10.3 mg/dL    Albumin 3.8 3.4 - 5.0 g/dL    Alkaline Phosphatase 72 33 - 136 U/L    Total Protein 6.6 6.4 - 8.2 g/dL    AST 18 9 - 39 U/L    Bilirubin, Total 1.1 0.0 - 1.2 mg/dL    ALT 8 (L) 10 - 52 U/L   Thyroid Stimulating Hormone   Result Value Ref Range    Thyroid Stimulating Hormone 0.74 0.44 - 3.98 mIU/L   Thyroxine, Free   Result Value Ref Range    Thyroxine, Free 1.16 (H) 0.61 - 1.12 ng/dL   Magnesium   Result Value Ref Range    Magnesium 2.00 1.60 - 2.40 mg/dL   Protime-INR   Result  Value Ref Range    Protime 17.2 (H) 9.3 - 12.7 seconds    INR 1.7 (H) 0.9 - 1.2   PST Top   Result Value Ref Range    Extra Tube Hold for add-ons.      *Note: Due to a large number of results and/or encounters for the requested time period, some results have not been displayed. A complete set of results can be found in Results Review.                            Assessment/Plan   Assessment & Plan  Rectal bleeding    Anxiety    Benign essential HTN    Coronary artery abnormality (HHS-HCC)    Cardiac pacemaker in situ    Chronic atrial fibrillation (Multi)    Congestive heart failure    Hypothyroidism    Carcinoma of colon (Multi)    Stage 3 chronic kidney disease (Multi)    Atrioventricular block, complete (Multi)    Rectal bleeding/Anticoagulation (Coumadin)  Patient presents with painless red blood per rectum concerning for lower GI bleed. DDx includes anatomic (diverticulosis), vascular (angiodysplasia, hemorrhoids, ischemic. CT noted focal wall thickening in the region of the gastric antrum may relate to under distention or sequela of gastritis. Moderate stool burden.  Possibly stercoral ulcer due to his chronic constipation. Patient remains hemodynamically stable and no further bleeding noted this morning. No urgent indication for scopes at this time.   - H/H stable   - Monitor  CBC, CMP, INR,  - Transfuse to keep hgb >7  - Protonix 40 mg IV BID  - Bowel regimen ordered  - No absolute contraindication to resume anticoagulation      2/23  No further bleeding noted.  H&H has remained stable.  He currently is asymptomatic.  Instructed patient to continue bowel regimen with MiraLAX and fiber at home prevent constipation.  Okay to DC from GI standpoint.  Patient may follow-up as an outpatient.     Rebekah Gerardo, APRN-CNP

## 2025-02-23 NOTE — NURSING NOTE
Most of the night awake, anxious stating that he is overmedicated. This nurse redirected patient explained to him that he received only medications that were on schedule. Pt. refused Melatonin PRN. Output 300 ml. Encouraged fluids. This nurse increased rounds and checked on patient throughout shift for safety. Will cont. to monitor.

## 2025-02-23 NOTE — DISCHARGE SUMMARY
Discharge Diagnosis:   Rectal bleeding       Admission Date: 2/21/2025   Discharge Date: 02/23/25       Hospital Course:   Gorge Larsen is a pleasant 89 y.o. male with past medical history significant for colorectal cancer, hypertension, hyperlipidemia, atrial fibrillation currently on Coumadin came to the emergency department with rectal bleed.  He had 3 episodes of rectal bleed prior to arrival at the emergency department.  Upon arrival his vital signs are stable.  Hemoglobin was 12 g.  Patient was admitted for further workup of this lower GI bleed.  He was seen by the gastroenterologist.  They did not think this bleeding bleeding does not to be investigated with endoscopy.  In fact his bleeding stopped upon arriving the floor.  H&H remained stable.  Most likely this was a hemorrhoidal bleed which stopped without any intervention.  GI signed off.  Patient was antsy to go home.    Discharge Physical Exam:    HEENT:  Atraumatic, PERRL. Conjunctivae clear.  Moist nasal mucous membranes.   Neck:  Supple without thyromegaly or lymphadenopathy.  Lungs:  Clear to auscultation without rales, rhonchi, or rub.  Heart:  RRR, S1, S2, without M.  Abdomen:  Soft, non tender, no organ enlargement.  Bowel sounds present . No CVA tenderness.  Extremities:  No edema. No calf swelling or tenderness.    Skin:  No rash, ecchymosis or erythema.    On the day of discharge patient was ambulating well, eating and drinking well. Physical exam was essentially benign and was at baseline. Blood chemistry and other lab testing results were at acceptable for discharge. Patient remained hemodynamically stable and with no further acute concern. Patient verbalized understanding of discharge medications and the expected adverse effects, if any.       The detail of the hospital course  including admission H&P, consult recommendations, biochemical lab results, imaging studies can be found in EHR of [facility]. Total 29 minutes  time was spent on discharge exam, medicine reconciliation, discharge instructions and preparing discharge summary.     Home Medications After Discharge    Scheduled   aspirin 81 mg EC tablet, Take 1 tablet (81 mg) by mouth once daily.   carvedilol (Coreg) 12.5 mg tablet, Take 1 tablet (12.5 mg) by mouth 2 times a day with meals.   furosemide (Lasix) 20 mg tablet, Take 0.5 tablets (10 mg) by mouth every other day.   losartan (Cozaar) 50 mg tablet, TAKE 1 TABLET(50 MG) BY MOUTH DAILY   rosuvastatin (Crestor) 5 mg tablet, Take 1 tablet (5 mg) by mouth once daily at bedtime.   warfarin (Coumadin) 5 mg tablet, Take 1 tablet (5 mg) by mouth see administration instructions. Monday, Wednesday, Thursday, Friday,Saturday and Sunday and 7.5 mg on Tuesday if needed depending on  INR levels    PRN   albuterol (ProAir HFA) 90 mcg/actuation inhaler, Inhale 2 puffs every 4 hours if needed for wheezing or shortness of breath.    No Frequency   compressor, for nebulizer device, Q4H TO Q5H PRN FOR WHEEZING   Klor-Con 20 mEq packet, MIX CONTENTS OF 1 PACKET IN LIQUID AS DIRECTED AND DRINK ONCE EVERY DAY   levothyroxine (Synthroid, Levoxyl) 50 mcg tablet, TAKE 1 AND 1/2 TABLETS(75 MCG) BY MOUTH DAILY   nebulizer accessories misc, USE DIRECTED   nebulizers misc, USE DIRECTED   tamsulosin (Flomax) 0.4 mg 24 hr capsule, TAKE 1 CAPSULE(0.4 MG) BY MOUTH DAILY         Outpatient Follow up:   Future Appointments   Date Time Provider Department Center   4/29/2025 10:45 AM FELIPA [id number] [facility] [id number] [facility]   5/1/2025  2:30 PM Pamela Hood PA-C [id number] [facility]   5/8/2025  1:15 PM Jeremy Clarke MD [id number] [facility]     PCP: Pamela Hood PA-C   The transitional care management team of [facility] will contact patient.    Patient was also advised to call PCP's office for hospital discharge follow-up in 7-10 days from today.          PS: This note was completed using Dragon voice recognition  technology and may include unintended errors with respect to translation of words, typographical or grammar errors which may not have been identified while finalizing the chart.

## 2025-02-23 NOTE — CARE PLAN
The patient's goals for the shift include no bleeding    The clinical goals for the shift include Monitor for bleeding, safety

## 2025-02-23 NOTE — NURSING NOTE
Pt. refused Synthroid 75 mcg stating the the med. will overmedicate him.  Educated patient about the medication and encourage him to take it.   yes

## 2025-02-24 ENCOUNTER — PATIENT OUTREACH (OUTPATIENT)
Dept: PRIMARY CARE | Facility: CLINIC | Age: 89
End: 2025-02-24
Payer: MEDICARE

## 2025-02-24 NOTE — PROGRESS NOTES
Discharge Facility: [facility]     Discharge Diagnosis:    Rectal Bleeding     Admission Date: 2/21/2025   Discharge Date:  2/23/2025     PCP Appointment Date: 2/27/2025     Specialist Appointment Date:     Follow up with Link Coelho MD (Gastroenterology)  DTR lenonx     Hospital Encounter and Summary Linked: Yes    ED to Hosp-Admission (Discharged) with Sky Sosa MD; Maik Resendiz DO; Ludwig Gomes MD (02/21/2025)     See discharge assessment below for further details     Wrap Up  Wrap Up Additional Comments: I spoke with DTR Bharati states Gorge is now haveing Cold Flu type of SSX, awrae of med changes  PCP F/U i jesús for 2/27, aware of GI needs jesús. aware to call providers for any questions concerns or change in . I will F/U (2/24/2025 12:22 PM)    Engagement  Call Start Time: 1222 (2/24/2025 12:22 PM)    Medications  Medications reviewed with patient/caregiver?: Yes (2/24/2025 12:22 PM)  Is the patient having any side effects they believe may be caused by any medication additions or changes?: No (2/24/2025 12:22 PM)  Does the patient have all medications ordered at discharge?: Yes (2/24/2025 12:22 PM)  Care Management Interventions: No intervention needed (2/24/2025 12:22 PM)  Prescription Comments: Bharati ALMONTE aware ,  START taking:  bisacodyl (Dulcolax)   polyethylene glycol (Glycolax, Miralax)    CHANGE how you take:  warfarin (Coumadin)    STOP taking:  ipratropium-albuteroL 0.5-2.5 mg/3 mL nebulizer  solution (Duo-Neb) (2/24/2025 12:22 PM)  Is the patient taking all medications as directed (includes completed medication regime)?: -- (Stattes has all meds) (2/24/2025 12:22 PM)  Care Management Interventions: Provided patient education (2/24/2025 12:22 PM)  Medication Comments: No questions or concerns (2/24/2025 12:22 PM)    Appointments  Does the patient have a primary care provider?: Yes (2/24/2025 12:22 PM)  Care Management Interventions: Verified appointment date/time/provider (2/24/2025 12:22  PM)  Care Management Interventions: Advised to schedule with specialist (2/24/2025 12:22 PM)    Self Management  What is the home health agency?: NA (2/24/2025 12:22 PM)  What Durable Medical Equipment (DME) was ordered?: NA (2/24/2025 12:22 PM)    Patient Teaching  Does the patient have access to their discharge instructions?: Yes (2/24/2025 12:22 PM)  What is the patient's perception of their health status since discharge?: -- (Current Cold Flu SSX per DTR) (2/24/2025 12:22 PM)

## 2025-02-27 ENCOUNTER — OFFICE VISIT (OUTPATIENT)
Dept: PRIMARY CARE | Facility: CLINIC | Age: 89
End: 2025-02-27
Payer: MEDICARE

## 2025-02-27 VITALS
WEIGHT: 203 LBS | BODY MASS INDEX: 29.96 KG/M2 | OXYGEN SATURATION: 96 % | SYSTOLIC BLOOD PRESSURE: 130 MMHG | DIASTOLIC BLOOD PRESSURE: 84 MMHG | HEART RATE: 70 BPM

## 2025-02-27 DIAGNOSIS — I48.20 CHRONIC ATRIAL FIBRILLATION (MULTI): Primary | ICD-10-CM

## 2025-02-27 DIAGNOSIS — K59.00 CONSTIPATION, UNSPECIFIED CONSTIPATION TYPE: ICD-10-CM

## 2025-02-27 DIAGNOSIS — J44.9 CHRONIC OBSTRUCTIVE PULMONARY DISEASE, UNSPECIFIED COPD TYPE (MULTI): ICD-10-CM

## 2025-02-27 PROCEDURE — 1159F MED LIST DOCD IN RCRD: CPT | Performed by: PHYSICIAN ASSISTANT

## 2025-02-27 PROCEDURE — 1126F AMNT PAIN NOTED NONE PRSNT: CPT | Performed by: PHYSICIAN ASSISTANT

## 2025-02-27 PROCEDURE — 3075F SYST BP GE 130 - 139MM HG: CPT | Performed by: PHYSICIAN ASSISTANT

## 2025-02-27 PROCEDURE — 99495 TRANSJ CARE MGMT MOD F2F 14D: CPT | Performed by: PHYSICIAN ASSISTANT

## 2025-02-27 PROCEDURE — 3079F DIAST BP 80-89 MM HG: CPT | Performed by: PHYSICIAN ASSISTANT

## 2025-02-27 PROCEDURE — 1123F ACP DISCUSS/DSCN MKR DOCD: CPT | Performed by: PHYSICIAN ASSISTANT

## 2025-02-27 PROCEDURE — 1157F ADVNC CARE PLAN IN RCRD: CPT | Performed by: PHYSICIAN ASSISTANT

## 2025-02-27 ASSESSMENT — PATIENT HEALTH QUESTIONNAIRE - PHQ9
1. LITTLE INTEREST OR PLEASURE IN DOING THINGS: SEVERAL DAYS
2. FEELING DOWN, DEPRESSED OR HOPELESS: SEVERAL DAYS
SUM OF ALL RESPONSES TO PHQ9 QUESTIONS 1 AND 2: 2

## 2025-02-27 ASSESSMENT — PAIN SCALES - GENERAL: PAINLEVEL_OUTOF10: 0-NO PAIN

## 2025-02-27 NOTE — PROGRESS NOTES
"Patient: Gorge Larsen  : 1935  PCP: Pamela Hood PA-C  MRN: [id number]  Program: Transitional Care Management  Status: Enrolled  Effective Dates: 2025 - present  Responsible Staff: Rohini Dillard  Social Drivers to be Addressed: Physical Activity, Social Connections, Tobacco Use         Gorge Larsen is a 89 y.o. male presenting today for follow-up after being discharged from the hospital 4 days ago. The main problem requiring admission was rectal bleeding. The discharge summary and/or Transitional Care Management documentation was reviewed. Medication reconciliation was performed as indicated via the \"Andrea as Reviewed\" timestamp.   Had some trouble with constipation and was taking ,   Gorge Larsen was contacted by Transitional Care Management services two days after his discharge. This encounter and supporting documentation was reviewed.    Review of Systems    /84 (BP Location: Left arm)   Pulse 70   Wt 92.1 kg (203 lb)   SpO2 96%   BMI 29.96 kg/m²     Physical Exam    The complexity of medical decision making for this patient's transitional care is {DESC; MODERATE/HIGH:21042}.    Assessment/Plan   {Assess/PlanSmartLinks:23444}    " decision making for this patient's transitional care is moderate.    Assessment/Plan   Diagnoses and all orders for this visit:  Chronic atrial fibrillation (Multi)  Chronic obstructive pulmonary disease, unspecified COPD type (Multi)  Constipation, unspecified constipation type  Suggested taking Metamucil on a regular basis increase his water intake along with fiber.  He will follow-up with GI as well for further evaluation of his GI bleed.

## 2025-02-28 ENCOUNTER — ANTICOAGULATION - WARFARIN VISIT (OUTPATIENT)
Dept: CARDIOLOGY | Facility: CLINIC | Age: 89
End: 2025-02-28
Payer: MEDICARE

## 2025-02-28 ENCOUNTER — PATIENT OUTREACH (OUTPATIENT)
Dept: PRIMARY CARE | Facility: CLINIC | Age: 89
End: 2025-02-28
Payer: MEDICARE

## 2025-02-28 DIAGNOSIS — I48.20 CHRONIC ATRIAL FIBRILLATION (MULTI): Primary | ICD-10-CM

## 2025-02-28 DIAGNOSIS — Z95.2 S/P TAVR (TRANSCATHETER AORTIC VALVE REPLACEMENT): ICD-10-CM

## 2025-02-28 NOTE — PROGRESS NOTES
Patient identification verified with 2 identifiers.    Location: [facility] Patient Self-Testing Program [phone]    Referring Physician: DR. MARIANNE BUCKLEY  Enrollment/ Re-enrollment date: 25   INR Goal: 2.0-3.0  INR monitoring is per [facility] protocol.  Anticoagulation Medication: warfarin  Indication: Atrial Fibrillation/Atrial Flutter    Subjective   Bleeding signs/symptoms: No    Bruising: No   Major bleeding event: No  Thrombosis signs/symptoms: No  Thromboembolic event: No  Missed doses: No  Extra doses: No  Medication changes: No  Dietary changes: No  Change in health: No  Change in activity: No  Alcohol: No  Other concerns: No    Upcoming Procedures:  Does the Patient Have any upcoming procedures that require interruption in anticoagulation therapy? no  Does the patient require bridging? no      Anticoagulation Summary  As of 2025      INR goal:  2.0-3.0   TTR:  73.6% (8.6 mo)   INR used for dosin.20 (2025)   Weekly warfarin total:  37.5 mg             VM left with current dosing and next testing date.  Assessment/Plan   Therapeutic     1. New dose: no change    2. Next INR: 1 week      Education provided to patient during the visit:  Patient instructed to call in interim with questions, concerns and changes.   Patient educated on compliance with dosing, follow up appointments, and prescribed plan of care.

## 2025-02-28 NOTE — PROGRESS NOTES
Unable to reach patient for  a F/U call, PCP visit complete 2/27/2025     [facility] with call back number for patient to call if needed   If no voicemail available call attempts x 2 were made to contact the patient to assist with any questions or concerns patient may have.     L/M encouraged to call providers for any questions concerns or change in condition.

## 2025-03-03 ASSESSMENT — ENCOUNTER SYMPTOMS
DIARRHEA: 0
FATIGUE: 1
CHILLS: 0
FEVER: 0
CONSTIPATION: 1

## 2025-03-07 ENCOUNTER — ANTICOAGULATION - WARFARIN VISIT (OUTPATIENT)
Dept: CARDIOLOGY | Facility: CLINIC | Age: 89
End: 2025-03-07
Payer: MEDICARE

## 2025-03-07 DIAGNOSIS — Z95.2 S/P TAVR (TRANSCATHETER AORTIC VALVE REPLACEMENT): ICD-10-CM

## 2025-03-07 DIAGNOSIS — I48.20 CHRONIC ATRIAL FIBRILLATION (MULTI): Primary | ICD-10-CM

## 2025-03-07 LAB
INR IN PPP BY COAGULATION ASSAY EXTERNAL: 3.6
PROTHROMBIN TIME (PT) IN PPP BY COAGULATION ASSAY EXTERNAL: NORMAL

## 2025-03-07 NOTE — PROGRESS NOTES
Patient identification verified with 2 identifiers.    Location: [facility] Patient Self-Testing Program [phone]    Referring Physician: DR. MARIANNE BUCKLEY  Enrollment/ Re-enrollment date: 6/12/25   INR Goal: 2.0-3.0  INR monitoring is per [facility] protocol.  Anticoagulation Medication: warfarin  Indication: Atrial Fibrillation/Atrial Flutter    Subjective   Bleeding signs/symptoms: No    Bruising: No   Major bleeding event: No  Thrombosis signs/symptoms: No  Thromboembolic event: No  Missed doses: No  Extra doses: No  Medication changes: Yes  Pt has been taking more tylenol than usual and is also taking Miralax.  Dietary changes: No  Change in health: No  Change in activity: No  Alcohol: No  Other concerns: No    Upcoming Procedures:  Does the Patient Have any upcoming procedures that require interruption in anticoagulation therapy? no  Does the patient require bridging? no      Anticoagulation Summary  As of 3/7/2025      INR goal:  2.0-3.0   TTR:  73.1% (8.8 mo)   INR used for dosing:  3.60 (3/7/2025)   Weekly warfarin total:  35 mg             Rec'd faxed INR results from [facility] and called pt's wife, Catrina.  I spoke to her and also the pt was talking in the background.  They report that he has been taking more tylenol than usual as he is not taking one dose daily.  Advised that he hold his coumadin dose tonight 3/7/25 only then reduce his weekly dose to 5mg/day (35mg/weekly).  Pt's wife Catrina verbalized understanding of all instructions and agreed to have the pt self-test again in 1 week on 3/14/25.  Assessment/Plan   Supratherapeutic     1. New dose:  Will have pt hold one dose of warfarin today 3/7/25 then reduce dosing by 6.7% from 37.5mg weekly to 35mg warfarin weekly.     2. Next INR: 1 week      Education provided to patient during the visit:  Patient instructed to call in interim with questions, concerns and changes.   Patient educated on compliance with dosing, follow up appointments, and prescribed plan  of care.

## 2025-03-14 ENCOUNTER — ANTICOAGULATION - WARFARIN VISIT (OUTPATIENT)
Dept: CARDIOLOGY | Facility: CLINIC | Age: 89
End: 2025-03-14
Payer: MEDICARE

## 2025-03-14 DIAGNOSIS — Z95.2 S/P TAVR (TRANSCATHETER AORTIC VALVE REPLACEMENT): ICD-10-CM

## 2025-03-14 DIAGNOSIS — I48.20 CHRONIC ATRIAL FIBRILLATION (MULTI): Primary | ICD-10-CM

## 2025-03-14 NOTE — PROGRESS NOTES
Pt was due to perform INR test today and no result received.  I called Pt, no answer so I left  instructing Pt to call on Mon. 3/17/25.

## 2025-03-14 NOTE — PROGRESS NOTES
Patient identification verified with 2 identifiers.    Location: [facility] Patient Self-Testing Program [phone]    Referring Physician: DR. MARIANNE BUCKLEY  Enrollment/ Re-enrollment date: 2025   INR Goal: 2.0-3.0  INR monitoring is per [facility] protocol.  Anticoagulation Medication: warfarin  Indication: Atrial Fibrillation/Atrial Flutter    Subjective   Received faxed INR result from [facility].  I called and left VM for Pt.  Pt identified using two Pt identifiers, name and .  INR result of 2.4 from 3/14 left on her VM, INR result addressed and instructions left for Pt.  Pt was instructed to call us regarding any of the below questions if there is an issue.   Bleeding signs/symptoms: No    Bruising: No   Major bleeding event: No  Thrombosis signs/symptoms: No  Thromboembolic event: No  Missed doses: Yes.  It is assumed that Pt held One dose of warfarin as instructed on 3/7/25.  Extra doses: No  Medication changes: No  Dietary changes: No  Change in health: No  Change in activity: No  Alcohol: No  Other concerns: No    Upcoming Procedures:  Does the Patient Have any upcoming procedures that require interruption in anticoagulation therapy? no  Does the patient require bridging? no    A One day dose hold was done after last self test INR on 3/7/25 along with a Decrease in weekly dose by approx. 7%.  37.5mg of warfarin weekly Decreased to 35mg weekly.  Anticoagulation Summary  As of 3/14/2025      INR goal:  2.0-3.0   TTR:  72.6% (9 mo)   INR used for dosin.40 (3/14/2025)   Weekly warfarin total:  35 mg             Assessment/Plan   Supratherapeutic     1. New dose: no change.  Maintain dose.  35mg weekly'    2. Next INR: 1 week.   Pt instructed on VM to self test INR on 3/21/25.  Can r/s in 2 weeks if therapeutic after next test.    3. Current and new weekly warfarin dosing reviewed and verified and Pt. Asked to review and verify.  VM left and Pt asked to call with discrepancy.         Education provided to  patient during the visit:  Patient instructed to call in interim with questions, concerns and changes.   Patient educated on interactions between medications and warfarin.   Patient educated on dietary consistency in vitamin k consumption.   Patient educated on signs of bleeding/clotting.   Patient educated on compliance with dosing, follow up appointments, and prescribed plan of care.

## 2025-03-18 ENCOUNTER — PATIENT OUTREACH (OUTPATIENT)
Dept: PRIMARY CARE | Facility: CLINIC | Age: 89
End: 2025-03-18
Payer: MEDICARE

## 2025-03-18 NOTE — PROGRESS NOTES
Call regarding  F/U , I spoke to DTR Bharati states Gorge had F/U with GI , recovered from the Cold SSX upon last conversation 2/23/2025 upon hospital MI.     At time of outreach call Bharati  feels as if Gorge's  condition has improved  since last visit.    Encouraged to call providers for any questions concerns or change in condition

## 2025-03-21 ENCOUNTER — ANTICOAGULATION - WARFARIN VISIT (OUTPATIENT)
Dept: CARDIOLOGY | Facility: CLINIC | Age: 89
End: 2025-03-21
Payer: MEDICARE

## 2025-03-21 DIAGNOSIS — Z95.2 S/P TAVR (TRANSCATHETER AORTIC VALVE REPLACEMENT): ICD-10-CM

## 2025-03-21 DIAGNOSIS — I48.20 CHRONIC ATRIAL FIBRILLATION (MULTI): Primary | ICD-10-CM

## 2025-03-24 ENCOUNTER — ANTICOAGULATION - WARFARIN VISIT (OUTPATIENT)
Dept: CARDIOLOGY | Facility: CLINIC | Age: 89
End: 2025-03-24
Payer: MEDICARE

## 2025-03-24 DIAGNOSIS — Z95.2 S/P TAVR (TRANSCATHETER AORTIC VALVE REPLACEMENT): ICD-10-CM

## 2025-03-24 DIAGNOSIS — I48.20 CHRONIC ATRIAL FIBRILLATION (MULTI): Primary | ICD-10-CM

## 2025-03-24 NOTE — PROGRESS NOTES
Patient identification verified with 2 identifiers.    Location: [facility] Patient Self-Testing Program [phone]    Referring Physician: DR. MARIANNE BUCKLEY  Enrollment/ Re-enrollment date: 2025 **CALL PT WIFE CORINNE FOR DOSING [phone]**  INR Goal: 2.0-3.0  INR monitoring is per [facility] protocol.  Anticoagulation Medication: warfarin  Indication: Atrial Fibrillation/Atrial Flutter    Subjective   Bleeding signs/symptoms: No  Bruising: No   Major bleeding event: No  Thrombosis signs/symptoms: No  Thromboembolic event: No  Missed doses: No  Extra doses: No  Medication changes: No  Dietary changes: No  Change in health: No  Change in activity: No  Alcohol: No  Other concerns: No    Upcoming Procedures:  Does the Patient Have any upcoming procedures that require interruption in anticoagulation therapy? no  Does the patient require bridging? no      Anticoagulation Summary  As of 3/24/2025      INR goal:  2.0-3.0   TTR:  73.5% (9.4 mo)   INR used for dosin.70 (3/24/2025)   Weekly warfarin total:  35 mg               Assessment/Plan   Therapeutic     1. New dose: no change    2. Next INR: 2 weeks    Received faxed INR self test result and called patient and spoke with pt wife, Corinne. Pt identification verified with 2 pt identifiers. Previous INR was therapeutic at 2.4. Today, self test reveals INR of 2.7 which is therapeutic for range of 2-3. Current incoming dose schedule reviewed with Corinne and read back correctly to me. Corinne denies complications related to ACT therapy. Corinne denies changes in pt diet, medications, social habits or general health. Will maintain dose and retest in 2 weeks. Outgoing dose schedule reviewed with Corinne and read back correctly to me. Corinne instructed to call in interim with questions, concerns and changes.    Education provided to patient during the visit:  Patient instructed to call in interim with questions, concerns and changes.   Patient educated on interactions  between medications and warfarin.   Patient educated on dietary consistency in vitamin k consumption.   Patient educated on affects of alcohol consumption while taking warfarin.   Patient educated on signs of bleeding/clotting.   Patient educated on compliance with dosing, follow up appointments, and prescribed plan of care.

## 2025-04-07 ENCOUNTER — ANTICOAGULATION - WARFARIN VISIT (OUTPATIENT)
Dept: CARDIOLOGY | Facility: CLINIC | Age: 89
End: 2025-04-07
Payer: MEDICARE

## 2025-04-07 DIAGNOSIS — Z95.2 S/P TAVR (TRANSCATHETER AORTIC VALVE REPLACEMENT): ICD-10-CM

## 2025-04-07 DIAGNOSIS — I48.20 CHRONIC ATRIAL FIBRILLATION (MULTI): Primary | ICD-10-CM

## 2025-04-07 LAB
INR IN PPP BY COAGULATION ASSAY EXTERNAL: 3.5
PROTHROMBIN TIME (PT) IN PPP BY COAGULATION ASSAY EXTERNAL: NORMAL

## 2025-04-07 NOTE — PROGRESS NOTES
Patient identification verified with 2 identifiers.    Location: [facility] Patient Self-Testing Program [phone]    Referring Physician: DR. MARIANNE BUCKLEY  Enrollment/ Re-enrollment date: 6/13/2025   INR Goal: 2.0-3.0  INR monitoring is per [facility] protocol.  Anticoagulation Medication: warfarin  Indication: Atrial Fibrillation/Atrial Flutter    Subjective   Bleeding signs/symptoms: No    Bruising: No   Major bleeding event: No  Thrombosis signs/symptoms: No  Thromboembolic event: No  Missed doses: No  Extra doses: No  Medication changes: Yes  PT STARTED TAKING VIT D 1,000 IU DAILY  Dietary changes: Yes  PT NOT EATING AS MUCH FOOD.  Change in health: No  Change in activity: No  Alcohol: No  Other concerns: No    Upcoming Procedures:  Does the Patient Have any upcoming procedures that require interruption in anticoagulation therapy? no  Does the patient require bridging? no      Anticoagulation Summary  As of 4/7/2025      INR goal:  2.0-3.0   TTR:  71.8% (9.8 mo)   INR used for dosing:  3.50 (4/7/2025)   Weekly warfarin total:  32.5 mg               Assessment/Plan   Supratherapeutic     1. New dose:  SPOKE TO WIFE CORINNE. WILL HOLD TODAYS DOSE AND DECREASE TWD. CONFIRMED NEW DOSING INSTRUCTIONS. CORINNE REPEATED CORRECTLY     2. Next INR: 1 week      Education provided to patient during the visit:  Patient instructed to call in interim with questions, concerns and changes.   Patient educated on interactions between medications and warfarin.   Patient educated on dietary consistency in vitamin k consumption.   Patient educated on compliance with dosing, follow up appointments, and prescribed plan of care.

## 2025-04-08 ENCOUNTER — TELEPHONE (OUTPATIENT)
Dept: PRIMARY CARE | Facility: CLINIC | Age: 89
End: 2025-04-08
Payer: MEDICARE

## 2025-04-08 DIAGNOSIS — R39.89 URINARY PROBLEM: ICD-10-CM

## 2025-04-08 RX ORDER — TAMSULOSIN HYDROCHLORIDE 0.4 MG/1
0.4 CAPSULE ORAL DAILY
Qty: 90 CAPSULE | Refills: 2 | Status: SHIPPED | OUTPATIENT
Start: 2025-04-08 | End: 2025-04-09 | Stop reason: SDUPTHER

## 2025-04-08 NOTE — TELEPHONE ENCOUNTER
Patient called on RX line to request a refill of Tamsulosin 0.4 mg be forwarded to [facility] in  [place]. Patient had a physical on 1/23/2025 and has an upcoming appointment on 5/1/2025.  Please advise.    Patient ph# [phone]

## 2025-04-09 ENCOUNTER — TELEPHONE (OUTPATIENT)
Dept: PRIMARY CARE | Facility: CLINIC | Age: 89
End: 2025-04-09
Payer: MEDICARE

## 2025-04-09 DIAGNOSIS — I48.20 CHRONIC ATRIAL FIBRILLATION (MULTI): ICD-10-CM

## 2025-04-09 DIAGNOSIS — R39.89 URINARY PROBLEM: ICD-10-CM

## 2025-04-09 DIAGNOSIS — Q24.5 CORONARY ARTERY ABNORMALITY (HHS-HCC): ICD-10-CM

## 2025-04-09 DIAGNOSIS — I10 BENIGN ESSENTIAL HTN: ICD-10-CM

## 2025-04-09 RX ORDER — TAMSULOSIN HYDROCHLORIDE 0.4 MG/1
0.4 CAPSULE ORAL DAILY
Qty: 90 CAPSULE | Refills: 2 | Status: SHIPPED | OUTPATIENT
Start: 2025-04-09

## 2025-04-09 RX ORDER — CARVEDILOL 12.5 MG/1
12.5 TABLET ORAL
Qty: 180 TABLET | Refills: 3 | Status: SHIPPED | OUTPATIENT
Start: 2025-04-09

## 2025-04-09 NOTE — TELEPHONE ENCOUNTER
Patient's spouse called on behalf of patient requesting a refill on tamsulosin 0.4 mg going to [facility] on [address] . Prev. Message had wrong pharmacy.    Please Advise: [phone]

## 2025-04-11 DIAGNOSIS — I50.20 HEART FAILURE WITH REDUCED EJECTION FRACTION: ICD-10-CM

## 2025-04-11 DIAGNOSIS — I48.20 CHRONIC ATRIAL FIBRILLATION (MULTI): ICD-10-CM

## 2025-04-11 RX ORDER — FUROSEMIDE 20 MG/1
20 TABLET ORAL DAILY
Qty: 90 TABLET | Refills: 1 | Status: SHIPPED | OUTPATIENT
Start: 2025-04-11

## 2025-04-14 ENCOUNTER — ANTICOAGULATION - WARFARIN VISIT (OUTPATIENT)
Dept: CARDIOLOGY | Facility: CLINIC | Age: 89
End: 2025-04-14
Payer: MEDICARE

## 2025-04-14 DIAGNOSIS — Z95.2 S/P TAVR (TRANSCATHETER AORTIC VALVE REPLACEMENT): ICD-10-CM

## 2025-04-14 DIAGNOSIS — I48.20 CHRONIC ATRIAL FIBRILLATION (MULTI): Primary | ICD-10-CM

## 2025-04-14 DIAGNOSIS — I10 HYPERTENSION: ICD-10-CM

## 2025-04-14 RX ORDER — LOSARTAN POTASSIUM 50 MG/1
50 TABLET ORAL DAILY
Qty: 90 TABLET | Refills: 3 | Status: SHIPPED | OUTPATIENT
Start: 2025-04-14

## 2025-04-14 NOTE — PROGRESS NOTES
Patient identification verified with 2 identifiers.    Location: [facility] Patient Self-Testing Program [phone]    Referring Physician: DR. MARIANNE BUCKLEY  Enrollment/ Re-enrollment date: 2025   INR Goal: 2.0-3.0  INR monitoring is per [facility] protocol.  Anticoagulation Medication: warfarin  Indication: Atrial Fibrillation/Atrial Flutter    Subjective   Bleeding signs/symptoms: No    Bruising: No   Major bleeding event: No  Thrombosis signs/symptoms: No  Thromboembolic event: No  Missed doses: No  Extra doses: No  Medication changes: No  Dietary changes: No  Change in health: No  Change in activity: No  Alcohol: No  Other concerns: No    Upcoming Procedures:  Does the Patient Have any upcoming procedures that require interruption in anticoagulation therapy? no  Does the patient require bridging? no      Anticoagulation Summary  As of 2025      INR goal:  2.0-3.0   TTR:  71.2% (10.1 mo)   INR used for dosin.60 (2025)   Weekly warfarin total:  32.5 mg               Assessment/Plan   Therapeutic     1. New dose: no change  SPOKE TO CORINNE, CONFIRMED CURRENT DOSING INSTRUCTIONS  2. Next INR: 1 week      Education provided to patient during the visit:  Patient instructed to call in interim with questions, concerns and changes.   Patient educated on compliance with dosing, follow up appointments, and prescribed plan of care.

## 2025-04-15 ENCOUNTER — TELEPHONE (OUTPATIENT)
Dept: PRIMARY CARE | Facility: CLINIC | Age: 89
End: 2025-04-15
Payer: MEDICARE

## 2025-04-15 DIAGNOSIS — E03.9 HYPOTHYROIDISM, UNSPECIFIED TYPE: ICD-10-CM

## 2025-04-15 DIAGNOSIS — E78.5 HYPERLIPIDEMIA, ACQUIRED: ICD-10-CM

## 2025-04-15 DIAGNOSIS — I10 HYPERTENSION, UNSPECIFIED TYPE: ICD-10-CM

## 2025-04-15 NOTE — TELEPHONE ENCOUNTER
Patient's daughter called and stated Patient should be coming in for Thyroid/CHOL/HTN Check and there's no labs. Should Patient have labs for his appointment?    Please advise

## 2025-04-16 DIAGNOSIS — E78.5 HYPERLIPIDEMIA, ACQUIRED: ICD-10-CM

## 2025-04-16 DIAGNOSIS — I10 HYPERTENSION, UNSPECIFIED TYPE: ICD-10-CM

## 2025-04-16 DIAGNOSIS — E03.9 HYPOTHYROIDISM, UNSPECIFIED TYPE: ICD-10-CM

## 2025-04-21 ENCOUNTER — ANTICOAGULATION - WARFARIN VISIT (OUTPATIENT)
Dept: CARDIOLOGY | Facility: CLINIC | Age: 89
End: 2025-04-21
Payer: MEDICARE

## 2025-04-21 DIAGNOSIS — Z95.2 S/P TAVR (TRANSCATHETER AORTIC VALVE REPLACEMENT): ICD-10-CM

## 2025-04-21 DIAGNOSIS — I48.20 CHRONIC ATRIAL FIBRILLATION (MULTI): Primary | ICD-10-CM

## 2025-04-21 NOTE — PROGRESS NOTES
Patient identification verified with 2 identifiers.    Location: [facility] Patient Self-Testing Program [phone]    Referring Physician: DR. MARIANNE BUCKLEY  Enrollment/ Re-enrollment date: 6/13/2025   INR Goal: 2.0-3.0  INR monitoring is per [facility] protocol.  Anticoagulation Medication: warfarin  Indication: Atrial Fibrillation/Atrial Flutter    Subjective   Bleeding signs/symptoms: No    Bruising: No   Major bleeding event: No  Thrombosis signs/symptoms: No  Thromboembolic event: No  Missed doses: No  Extra doses: No  Medication changes: No  Dietary changes: Yes  Decreased appetitie  Change in health: No  Change in activity: No  Alcohol: No  Other concerns: No    Upcoming Procedures:  Does the Patient Have any upcoming procedures that require interruption in anticoagulation therapy? no  Does the patient require bridging? no      Anticoagulation Summary  As of 4/21/2025      INR goal:  2.0-3.0   TTR:  71.4% (10.3 mo)   INR used for dosing:  3.10 (4/21/2025)   Weekly warfarin total:  30 mg               Assessment/Plan   Supratherapeutic     1. New dose: no change    2. Next INR: 1 week      Education provided to patient during the visit:  Patient instructed to call in interim with questions, concerns and changes.   Patient educated on compliance with dosing, follow up appointments, and prescribed plan of care.

## 2025-04-26 LAB
ALBUMIN SERPL-MCNC: 4.2 G/DL (ref 3.6–5.1)
ALP SERPL-CCNC: 81 U/L (ref 35–144)
ALT SERPL-CCNC: 10 U/L (ref 9–46)
ANION GAP SERPL CALCULATED.4IONS-SCNC: 8 MMOL/L (CALC) (ref 7–17)
AST SERPL-CCNC: 21 U/L (ref 10–35)
BILIRUB SERPL-MCNC: 0.8 MG/DL (ref 0.2–1.2)
BUN SERPL-MCNC: 18 MG/DL (ref 7–25)
CALCIUM SERPL-MCNC: 8.7 MG/DL (ref 8.6–10.3)
CHLORIDE SERPL-SCNC: 100 MMOL/L (ref 98–110)
CHOLEST SERPL-MCNC: 107 MG/DL
CHOLEST/HDLC SERPL: 2.5 (CALC)
CO2 SERPL-SCNC: 27 MMOL/L (ref 20–32)
CREAT SERPL-MCNC: 1.02 MG/DL (ref 0.7–1.22)
EGFRCR SERPLBLD CKD-EPI 2021: 71 ML/MIN/1.73M2
GLUCOSE SERPL-MCNC: 105 MG/DL (ref 65–99)
HDLC SERPL-MCNC: 42 MG/DL
LDLC SERPL CALC-MCNC: 51 MG/DL (CALC)
NONHDLC SERPL-MCNC: 65 MG/DL (CALC)
POTASSIUM SERPL-SCNC: 4.4 MMOL/L (ref 3.5–5.3)
PROT SERPL-MCNC: 7 G/DL (ref 6.1–8.1)
SODIUM SERPL-SCNC: 135 MMOL/L (ref 135–146)
TRIGL SERPL-MCNC: 62 MG/DL
TSH SERPL-ACNC: 4.25 MIU/L (ref 0.4–4.5)

## 2025-04-28 DIAGNOSIS — I48.0 PAF (PAROXYSMAL ATRIAL FIBRILLATION) (MULTI): ICD-10-CM

## 2025-04-28 DIAGNOSIS — I44.2 CHB (COMPLETE HEART BLOCK): ICD-10-CM

## 2025-04-28 DIAGNOSIS — Z95.810 IMPLANTABLE CARDIOVERTER-DEFIBRILLATOR (ICD) IN SITU: ICD-10-CM

## 2025-04-28 DIAGNOSIS — I48.20 CHRONIC ATRIAL FIBRILLATION (MULTI): Primary | ICD-10-CM

## 2025-04-28 DIAGNOSIS — I42.8 NONISCHEMIC CARDIOMYOPATHY (MULTI): ICD-10-CM

## 2025-04-28 LAB
INR IN PPP BY COAGULATION ASSAY EXTERNAL: 2 (ref 2–3)
PROTHROMBIN TIME (PT) IN PPP BY COAGULATION ASSAY EXTERNAL: NORMAL

## 2025-04-29 ENCOUNTER — ANCILLARY PROCEDURE (OUTPATIENT)
Facility: CLINIC | Age: 89
End: 2025-04-29
Payer: MEDICARE

## 2025-04-29 ENCOUNTER — ANTICOAGULATION - WARFARIN VISIT (OUTPATIENT)
Dept: CARDIOLOGY | Facility: CLINIC | Age: 89
End: 2025-04-29
Payer: MEDICARE

## 2025-04-29 DIAGNOSIS — Z95.2 S/P TAVR (TRANSCATHETER AORTIC VALVE REPLACEMENT): ICD-10-CM

## 2025-04-29 DIAGNOSIS — I48.20 CHRONIC ATRIAL FIBRILLATION (MULTI): Primary | ICD-10-CM

## 2025-04-29 DIAGNOSIS — I42.8 NONISCHEMIC CARDIOMYOPATHY (MULTI): ICD-10-CM

## 2025-04-29 DIAGNOSIS — Z95.810 IMPLANTABLE CARDIOVERTER-DEFIBRILLATOR (ICD) IN SITU: ICD-10-CM

## 2025-04-29 DIAGNOSIS — I44.2 CHB (COMPLETE HEART BLOCK): ICD-10-CM

## 2025-04-29 DIAGNOSIS — I48.20 CHRONIC ATRIAL FIBRILLATION (MULTI): ICD-10-CM

## 2025-04-29 DIAGNOSIS — I48.0 PAF (PAROXYSMAL ATRIAL FIBRILLATION) (MULTI): ICD-10-CM

## 2025-04-29 PROCEDURE — 93284 PRGRMG EVAL IMPLANTABLE DFB: CPT | Performed by: INTERNAL MEDICINE

## 2025-04-29 PROCEDURE — 93284 PRGRMG EVAL IMPLANTABLE DFB: CPT

## 2025-04-29 RX ORDER — WARFARIN SODIUM 5 MG/1
TABLET ORAL
Qty: 90 TABLET | Refills: 1 | Status: SHIPPED | OUTPATIENT
Start: 2025-04-29

## 2025-04-29 NOTE — PROGRESS NOTES
Patient identification verified with 2 identifiers.    Location: [facility] Patient Self-Testing Program [phone]    Referring Physician: DR. MARIANNE BUCKLEY  Enrollment/ Re-enrollment date: 2025   INR Goal: 2.0-3.0  INR monitoring is per [facility] protocol.  Anticoagulation Medication: warfarin  Indication: Atrial Fibrillation/Atrial Flutter    Subjective   Bleeding signs/symptoms: No    Bruising: No   Major bleeding event: No  Thrombosis signs/symptoms: No  Thromboembolic event: No  Missed doses: Yes  Missed dose  or  unsure  Extra doses: No  Medication changes: No  Dietary changes: No  Change in health: No  Change in activity: No  Alcohol: No  Other concerns: No    Upcoming Procedures:  Does the Patient Have any upcoming procedures that require interruption in anticoagulation therapy? no  Does the patient require bridging? no      Anticoagulation Summary  As of 2025      INR goal:  2.0-3.0   TTR:  71.8% (10.5 mo)   INR used for dosin.00 (2025)   Weekly warfarin total:  30 mg               Assessment/Plan   Pt missed a dose either  or .  Wife states pt added it back to TWD through out the week by adding 2.5mg on  and .  Instructed to call in the future with any missed doses.    Therapeutic     1. New dose: no change    2. Next INR: 1 week      Education provided to patient during the visit:  Patient instructed to call in interim with questions, concerns and changes.   Patient educated on compliance with dosing, follow up appointments, and prescribed plan of care.

## 2025-04-30 DIAGNOSIS — E03.9 HYPOTHYROIDISM, UNSPECIFIED TYPE: ICD-10-CM

## 2025-04-30 DIAGNOSIS — N30.00 ACUTE CYSTITIS WITHOUT HEMATURIA: Primary | ICD-10-CM

## 2025-04-30 LAB
ALBUMIN/CREAT UR: 400 MG/G CREAT
APPEARANCE UR: CLEAR
BACTERIA #/AREA URNS HPF: ABNORMAL /HPF
BILIRUB UR QL STRIP: NEGATIVE
COLOR UR: YELLOW
CREAT UR-MCNC: 75 MG/DL (ref 20–320)
GLUCOSE UR QL STRIP: NEGATIVE
HGB UR QL STRIP: ABNORMAL
HYALINE CASTS #/AREA URNS LPF: ABNORMAL /LPF
KETONES UR QL STRIP: NEGATIVE
LEUKOCYTE ESTERASE UR QL STRIP: ABNORMAL
MAGNESIUM SERPL-MCNC: 2.1 MG/DL (ref 1.5–2.5)
MICROALBUMIN UR-MCNC: 30 MG/DL
NITRITE UR QL STRIP: NEGATIVE
PH UR STRIP: 7.5 [PH] (ref 5–8)
PROT UR QL STRIP: ABNORMAL
RBC #/AREA URNS HPF: ABNORMAL /HPF
SERVICE CMNT-IMP: ABNORMAL
SP GR UR STRIP: 1.01 (ref 1–1.03)
SQUAMOUS #/AREA URNS HPF: ABNORMAL /HPF
WBC #/AREA URNS HPF: ABNORMAL /HPF

## 2025-04-30 RX ORDER — LEVOTHYROXINE SODIUM 50 UG/1
TABLET ORAL
Qty: 135 TABLET | Refills: 1 | Status: SHIPPED | OUTPATIENT
Start: 2025-04-30

## 2025-04-30 RX ORDER — NITROFURANTOIN 25; 75 MG/1; MG/1
100 CAPSULE ORAL 2 TIMES DAILY
Qty: 10 CAPSULE | Refills: 0 | Status: SHIPPED | OUTPATIENT
Start: 2025-04-30 | End: 2025-05-05

## 2025-04-30 NOTE — TELEPHONE ENCOUNTER
Patient called Rx line and requested a refill for Levothyroxine 50 mcg and Rosuvastatin 5 mg. Last ov 2/27/2025.  Pharmacy: [facility] - [address]

## 2025-05-01 ENCOUNTER — APPOINTMENT (OUTPATIENT)
Dept: PRIMARY CARE | Facility: CLINIC | Age: 89
End: 2025-05-01
Payer: MEDICARE

## 2025-05-02 ENCOUNTER — APPOINTMENT (OUTPATIENT)
Dept: PRIMARY CARE | Facility: CLINIC | Age: 89
End: 2025-05-02
Payer: MEDICARE

## 2025-05-02 VITALS
HEART RATE: 71 BPM | DIASTOLIC BLOOD PRESSURE: 74 MMHG | SYSTOLIC BLOOD PRESSURE: 112 MMHG | WEIGHT: 199 LBS | OXYGEN SATURATION: 97 % | BODY MASS INDEX: 29.37 KG/M2

## 2025-05-02 DIAGNOSIS — I48.3 TYPICAL ATRIAL FLUTTER (MULTI): ICD-10-CM

## 2025-05-02 DIAGNOSIS — N30.00 ACUTE CYSTITIS WITHOUT HEMATURIA: Primary | ICD-10-CM

## 2025-05-02 DIAGNOSIS — I35.0 NONRHEUMATIC AORTIC VALVE STENOSIS: ICD-10-CM

## 2025-05-02 PROCEDURE — 3078F DIAST BP <80 MM HG: CPT | Performed by: PHYSICIAN ASSISTANT

## 2025-05-02 PROCEDURE — 3074F SYST BP LT 130 MM HG: CPT | Performed by: PHYSICIAN ASSISTANT

## 2025-05-02 PROCEDURE — 1159F MED LIST DOCD IN RCRD: CPT | Performed by: PHYSICIAN ASSISTANT

## 2025-05-02 PROCEDURE — 1036F TOBACCO NON-USER: CPT | Performed by: PHYSICIAN ASSISTANT

## 2025-05-02 PROCEDURE — G2211 COMPLEX E/M VISIT ADD ON: HCPCS | Performed by: PHYSICIAN ASSISTANT

## 2025-05-02 PROCEDURE — 99212 OFFICE O/P EST SF 10 MIN: CPT | Performed by: PHYSICIAN ASSISTANT

## 2025-05-02 PROCEDURE — 1126F AMNT PAIN NOTED NONE PRSNT: CPT | Performed by: PHYSICIAN ASSISTANT

## 2025-05-02 PROCEDURE — 1160F RVW MEDS BY RX/DR IN RCRD: CPT | Performed by: PHYSICIAN ASSISTANT

## 2025-05-02 RX ORDER — CHOLECALCIFEROL (VITAMIN D3) 25 MCG
1000 TABLET ORAL
COMMUNITY

## 2025-05-02 RX ORDER — LACTOBACILLUS COMBINATION NO.4 3B CELL
CAPSULE ORAL
COMMUNITY

## 2025-05-02 RX ORDER — MAGNESIUM GLUCONATE 27 MG(500)
27 TABLET ORAL 2 TIMES DAILY
COMMUNITY

## 2025-05-02 RX ORDER — DESVENLAFAXINE SUCCINATE 25 MG/1
25 TABLET, EXTENDED RELEASE ORAL
COMMUNITY
Start: 2025-04-23 | End: 2025-07-22

## 2025-05-02 RX ORDER — IPRATROPIUM BROMIDE AND ALBUTEROL SULFATE 2.5; .5 MG/3ML; MG/3ML
SOLUTION RESPIRATORY (INHALATION)
COMMUNITY
Start: 2024-12-09

## 2025-05-02 ASSESSMENT — ENCOUNTER SYMPTOMS: HYPERTENSION: 1

## 2025-05-02 ASSESSMENT — PAIN SCALES - GENERAL: PAINLEVEL_OUTOF10: 0-NO PAIN

## 2025-05-02 NOTE — PROGRESS NOTES
Subjective   Patient ID: Gorge Larsen is a 89 y.o. male who presents for Hypertension and Edema.    Hypertension  This is a chronic problem. The current episode started more than 1 year ago. The problem is controlled. Associated symptoms include peripheral edema. Pertinent negatives include no chest pain, palpitations or shortness of breath.    Just started Pristiq from [facility] [employer] for mood.  Will see Dr. Clarke soon. Did better on the Lasix.    Review of Systems   Constitutional:  Negative for activity change and appetite change.   Respiratory:  Negative for chest tightness and shortness of breath.    Cardiovascular:  Negative for chest pain, palpitations and leg swelling.   Neurological:  Negative for dizziness, light-headedness and numbness.       Objective   /74   Pulse 71   Wt 90.3 kg (199 lb)   SpO2 97%   BMI 29.37 kg/m²     Physical Exam  Constitutional:       Appearance: Normal appearance.   Eyes:      Extraocular Movements: Extraocular movements intact.      Pupils: Pupils are equal, round, and reactive to light.   Cardiovascular:      Rate and Rhythm: Normal rate and regular rhythm.      Pulses: Normal pulses.      Heart sounds: Murmur heard.   Pulmonary:      Effort: Pulmonary effort is normal.   Musculoskeletal:      Cervical back: Neck supple.      Right lower leg: Edema present.      Left lower leg: Edema present.   Neurological:      General: No focal deficit present.      Mental Status: He is alert. Mental status is at baseline.   Psychiatric:         Mood and Affect: Mood normal.         Thought Content: Thought content normal.         Judgment: Judgment normal.         Assessment/Plan   Assessment & Plan  Acute cystitis without hematuria  Last urine was abnormal.  Recheck in 2 follow-up for any signs of infection  Orders:    Urinalysis with Reflex Microscopic; Future    Nonrheumatic aortic valve stenosis  Sees cardiology on regular basis       Typical atrial flutter (Multi)

## 2025-05-05 ENCOUNTER — ANTICOAGULATION - WARFARIN VISIT (OUTPATIENT)
Dept: CARDIOLOGY | Facility: HOSPITAL | Age: 89
End: 2025-05-05
Payer: MEDICARE

## 2025-05-05 DIAGNOSIS — Z95.2 S/P TAVR (TRANSCATHETER AORTIC VALVE REPLACEMENT): ICD-10-CM

## 2025-05-05 DIAGNOSIS — I48.20 CHRONIC ATRIAL FIBRILLATION (MULTI): Primary | ICD-10-CM

## 2025-05-05 NOTE — PROGRESS NOTES
Patient identification verified with 2 identifiers.    Location: [facility] Patient Self-Testing Program [phone]    Referring Physician: DR. MARIANNE BUCKLEY  Enrollment/ Re-enrollment date: 2025   INR Goal: 2.0-3.0  INR monitoring is per [facility] protocol.  Anticoagulation Medication: warfarin  Indication: Atrial Fibrillation/Atrial Flutter    Subjective   Bleeding signs/symptoms: No    Bruising: No   Major bleeding event: No  Thrombosis signs/symptoms: No  Thromboembolic event: No  Missed doses: No  Extra doses: No  Medication changes: No  Dietary changes: No  Change in health: No  Change in activity: No  Alcohol: No  Other concerns: No    Upcoming Procedures:  Does the Patient Have any upcoming procedures that require interruption in anticoagulation therapy? no  Does the patient require bridging? no      Anticoagulation Summary  As of 2025      INR goal:  2.0-3.0   TTR:  70.3% (10.8 mo)   INR used for dosin.90 (2025)   Weekly warfarin total:  30 mg               Assessment/Plan       Sub therapeutic    1. New dose: no change    2. Next INR: 1 week      Education provided to patient during the visit:  Patient instructed to call in interim with questions, concerns and changes.   Patient educated on compliance with dosing, follow up appointments, and prescribed plan of care.

## 2025-05-08 ENCOUNTER — OFFICE VISIT (OUTPATIENT)
Dept: CARDIOLOGY | Facility: CLINIC | Age: 89
End: 2025-05-08
Payer: MEDICARE

## 2025-05-08 VITALS
SYSTOLIC BLOOD PRESSURE: 150 MMHG | OXYGEN SATURATION: 97 % | DIASTOLIC BLOOD PRESSURE: 60 MMHG | WEIGHT: 199 LBS | BODY MASS INDEX: 29.47 KG/M2 | HEART RATE: 72 BPM | HEIGHT: 69 IN

## 2025-05-08 DIAGNOSIS — I50.22 CHRONIC SYSTOLIC CONGESTIVE HEART FAILURE: ICD-10-CM

## 2025-05-08 DIAGNOSIS — I10 HYPERTENSION, UNSPECIFIED TYPE: ICD-10-CM

## 2025-05-08 DIAGNOSIS — I10 BENIGN ESSENTIAL HTN: ICD-10-CM

## 2025-05-08 PROCEDURE — 1159F MED LIST DOCD IN RCRD: CPT | Performed by: INTERNAL MEDICINE

## 2025-05-08 PROCEDURE — 99214 OFFICE O/P EST MOD 30 MIN: CPT | Performed by: INTERNAL MEDICINE

## 2025-05-08 PROCEDURE — G2211 COMPLEX E/M VISIT ADD ON: HCPCS | Performed by: INTERNAL MEDICINE

## 2025-05-08 PROCEDURE — 3078F DIAST BP <80 MM HG: CPT | Performed by: INTERNAL MEDICINE

## 2025-05-08 PROCEDURE — 3077F SYST BP >= 140 MM HG: CPT | Performed by: INTERNAL MEDICINE

## 2025-05-08 RX ORDER — LATANOPROST 50 UG/ML
SOLUTION/ DROPS OPHTHALMIC
COMMUNITY
Start: 2024-10-10

## 2025-05-08 ASSESSMENT — ENCOUNTER SYMPTOMS
CHEST TIGHTNESS: 0
SHORTNESS OF BREATH: 0
APPETITE CHANGE: 0
ACTIVITY CHANGE: 0
LIGHT-HEADEDNESS: 0
DIZZINESS: 0
DEPRESSION: 0
PALPITATIONS: 0
NUMBNESS: 0

## 2025-05-08 ASSESSMENT — PATIENT HEALTH QUESTIONNAIRE - PHQ9
1. LITTLE INTEREST OR PLEASURE IN DOING THINGS: NOT AT ALL
2. FEELING DOWN, DEPRESSED OR HOPELESS: NOT AT ALL
SUM OF ALL RESPONSES TO PHQ9 QUESTIONS 1 AND 2: 0

## 2025-05-08 NOTE — PROGRESS NOTES
Subjective   Gorge Larsen is a 89 y.o. male.    Chief Complaint: Coronary artery disease.  GORGE LARSEN is being seen for a six week follow-up. H/O atrial fibrillation, coronary artery disease, heart failure, hypertension and a routine medication evaluation.     HPI  Gorge Larsen is a 88 y.o. male presents for a six week follow up care. He had a pacemaker upgrade in February. Was seen in ER for left arm and pacemaker site swelling and pain. Was in ER again in March after a fall with a head injury- see notes. He denies chest pain, sob, heart palpitations or pedal edema. Reviewed lab work results ans current medications. An Echo was done in October EF decreased to 35%, reviewed results- see report.     Objective   Physical Exam  Patient is an ill-appearing 89 y/o female in no distress.   JVP not elevated. Carotid impulses are 2+ without overlying bruit.   Chest exhibits fair to good air movement with scattered expiratory rhonchi.   The cardiac rhythm is regular with no premature beats.   Normal S1 and S2. No gallop, murmur or rub, or click.   Abdomen is soft and benign without focal tenderness.   Upper left extremity swelling. Lower extremities with 1+ edema. The pedal pulses are intact.  All other systems have been reviewed and are negative for complaints    Lab Review:   Anticoagulation - Warfarin Visit on 05/05/2025   Component Date Value    INR External 05/05/2025 1.90    Orders Only on 04/29/2025   Component Date Value    CREATININE, RANDOM URINE 04/29/2025 75     ALBUMIN, URINE 04/29/2025 30.0     ALBUMIN/CREATININE RATIO* 04/29/2025 400 (H)     COLOR 04/29/2025 YELLOW     APPEARANCE 04/29/2025 CLEAR     SPECIFIC GRAVITY 04/29/2025 1.009     PH 04/29/2025 7.5     GLUCOSE 04/29/2025 NEGATIVE     BILIRUBIN 04/29/2025 NEGATIVE     KETONES 04/29/2025 NEGATIVE     OCCULT BLOOD 04/29/2025 2+ (A)     PROTEIN 04/29/2025 2+ (A)     NITRITE 04/29/2025 NEGATIVE     LEUKOCYTE ESTERASE 04/29/2025 2+ (A)     WBC  04/29/2025 40-60 (A)     RBC 04/29/2025 20-40 (A)     SQUAMOUS EPITHELIAL CELLS 04/29/2025 0-5     BACTERIA 04/29/2025 NONE SEEN     HYALINE CAST 04/29/2025 0-5 (A)     NOTE 04/29/2025     Orders Only on 04/29/2025   Component Date Value    MAGNESIUM 04/29/2025 2.1    Anticoagulation - Warfarin Visit on 04/29/2025   Component Date Value    INR External 04/28/2025 2.00    Anticoagulation - Warfarin Visit on 04/21/2025   Component Date Value    INR External 04/21/2025 3.10    Orders Only on 04/16/2025   Component Date Value    TSH W/REFLEX TO FT4 04/25/2025 4.25     GLUCOSE 04/25/2025 105 (H)     UREA NITROGEN (BUN) 04/25/2025 18     CREATININE 04/25/2025 1.02     EGFR 04/25/2025 71     SODIUM 04/25/2025 135     POTASSIUM 04/25/2025 4.4     CHLORIDE 04/25/2025 100     CARBON DIOXIDE 04/25/2025 27     ELECTROLYTE BALANCE 04/25/2025 8     CALCIUM 04/25/2025 8.7     PROTEIN, TOTAL 04/25/2025 7.0     ALBUMIN 04/25/2025 4.2     BILIRUBIN, TOTAL 04/25/2025 0.8     ALKALINE PHOSPHATASE 04/25/2025 81     AST 04/25/2025 21     ALT 04/25/2025 10     CHOLESTEROL, TOTAL 04/25/2025 107     HDL CHOLESTEROL 04/25/2025 42     TRIGLYCERIDES 04/25/2025 62     LDL-CHOLESTEROL 04/25/2025 51     CHOL/HDLC RATIO 04/25/2025 2.5     NON HDL CHOLESTEROL 04/25/2025 65    Anticoagulation - Warfarin Visit on 04/14/2025   Component Date Value    INR External 04/14/2025 2.60    Anticoagulation - Warfarin Visit on 04/07/2025   Component Date Value    INR External 04/07/2025 3.50    Anticoagulation - Warfarin Visit on 03/24/2025   Component Date Value    INR External 03/24/2025 2.70    Anticoagulation - Warfarin Visit on 03/14/2025   Component Date Value    INR External 03/14/2025 2.40    There may be more visits with results that are not included.     Assessment/Plan   1. 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 Patient admitted to [facility] from [facility] 08/[facility]/2022 with complaints of worsening encephalopathy from SNF  with shortness of breath found to have newly reduced EF, 60 to 30%, of unknown etiology and worsening aortic stenosis. Echo done October 2022 showed an EF of 35%, LAD, mild to moderate MR, moderate TR, TAVR. Patient had repeat echo November 2022 showing an EF of 50 to 55%, ad, RA mild to moderately dilated, mild to moderate MR, mildly elevated RVSP, moderate TR, TAVR, PASP 44 mmHg.  The patient had a more recent echocardiogram on 10/19/2023 which demonstrated a reduction in the LV ejection fraction to 35%.  There was moderate left and right atrial enlargement 2+ mitral valve regurgitation 3+ tricuspid valve regurgitation severe pulmonary hypertension PA systolic pressure 65 mmHg peak systolic pressure gradient of 12 mmHg.  He was [facility]/2024 with COVID along with shortness of breath.  He was started on Lasix 40 mg twice daily subsequently reduced to daily.  He was already on Farxiga 10 mg daily.  Losartan was increased to 25 mg twice daily.  He was switched from metoprolol to carvedilol 6.25 mg twice daily.  Interrogation of his permanent pacemaker indicated a predominant AV paced rhythm 98% of the time and pulse generator replacement was required in 3 months.  Given the above findings it was thought that he should have his dual-chamber pacemaker upgraded to a biventricular ICD which was performed 2/19/2024 as discussed below.  Prior to his hospital discharge the carvedilol was uptitrated to 50 mg twice daily and the carvedilol 12.5 mg twice daily.  Clinically that the patient is improved without any significant shortness of breath.  He does become slightly out of breath with walking and has minimal lower extremity edema.  Of note his dual-chamber permanent pacemaker was upgraded to a biventricular ICD on 2/19/2024.  Following the procedure he developed a hematoma plus minus infection and was given Keflex prophylactically.  Lab work on 3/1/2024 included a CBC with  hematocrit 26.5.  Electrolyte panel included creatinine 1.2.  Liver function tests were normal.  Ultrasound of the left upper extremity on 2/26/2024 was negative for DVT.  Will increase dose of carvedilol from 6.25 mg twice daily to 12.5 mg twice daily recheck patient in 3 months.  Patient feeling fairly well office visit 5/8/2025.  Systolic blood pressure in upper range of normal.  Will increase carvedilol from 12.5 mg twice daily to 25 mg twice daily.  Will change losartan 50 mg daily to Entresto 24/26 mg twice daily.  Patient has noted less fluid retention since his new device and requires much less Lasix taking only 1/2 pill every other day.  Patient uses a walker to ambulate with no recent falls occasional lightheadedness.  An echocardiogram will be repeated at time of next office visit in 9/2025.  Lab work from 4/25/2025 includes a normal CMP with TSH 4.25.  Patient may stop potassium supplement.     2. CAD. S/P CABG 2002.     3. TAVR. Initial reticence patient had TAVR completed September 9, 2022 without complication. Echo done status post TAVR showed EF 30 to 35%.     4. Hypertension.  .     5. Hyperlipidemia.  Lipid panel from 4/25/2025 is satisfactory with cholesterol 107 LDL 51 HDL 42 triglycerides 62.  Patient currently only on rosuvastatin 5 mg daily.     6. Afib. On warfarin due to GI bleeding on Xarelto.  The patient does have his INR testing performed at home but prefers that the results be forwarded to the Coumadin clinic rather than to electrophysiology.  Patient continues with INR home testing.     7. 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  Patient will be continued to be followed by electrophysiology.  As discussed below due to the patient's reduction in LV ejection fraction to 35% and the need for continuous pacing he has dual-chamber permanent pacemaker was upgraded to a biventricular ICD on 2/19/2024.  The right atrial lead was kept in place.  The right ventricular pacing lead  was capped in order to install new RV lead capable of delivering shock therapy.  A left ventricular lead was placed.  Patient developed a hematoma in the left upper extremity.  Prior to discharge the carvedilol was decreased to 6.25 mg twice daily.  Some concern for possible infection but inspection appears to be more hematoma than erythema.  He will nevertheless be given Keflex 500 mg 4 times daily for period of 10 days for prophylaxis.  The patient did have a device interrogation performed on 3/8/2024 showing estimated battery life 8 years with no episodes of atrial fibrillation.  He is pacing both the right and left ventricles 99%.  Device interrogation performed 10/2/2024 estimated battery life 7 years patient has 100% right ventricular and 99% left ventricular pacing.  Patient is being followed by Dr. Mcdonald from electrophysiology.  Patient did have device interrogation 12/9/2024 showing estimated battery life 8 years.  The patient is right ventricular pacing 97% left ventricular pacing 96%.     8. Emphysema.  The patient recently developed a congested cough with a concern for possible pneumonia.  Chest x-ray 10/18/2024 showed an equivocal left infrahilar hilar airspace opacity.  Patient is currently on an antibiotic empirically along with albuterol and was given a prescription for an albuterol nebulizer.     9. Hypothyroid.      10. History of COVID-19 infection.     11. History of CABG x3.    12..  Urinary frequency.  Patient seen at [facility] emergency room 10/2/2024 with urinary frequency possible dehydration.  Lab work from 10-20 24 included CBC hematocrit 39.5 SMA panel sodium 132 creatinine 0.71.

## 2025-05-09 ENCOUNTER — PATIENT OUTREACH (OUTPATIENT)
Dept: PRIMARY CARE | Facility: CLINIC | Age: 89
End: 2025-05-09
Payer: MEDICARE

## 2025-05-09 DIAGNOSIS — N30.00 ACUTE CYSTITIS WITHOUT HEMATURIA: ICD-10-CM

## 2025-05-09 NOTE — PROGRESS NOTES
Patient has met target of no readmission for (90) days post hospital  discharge and is graduated from Transitional Care Management program at this time.     Encouraged  to call providers for any questions concerns or change in condition

## 2025-05-11 RX ORDER — CARVEDILOL 25 MG/1
25 TABLET ORAL
Qty: 180 TABLET | Refills: 3 | Status: SHIPPED | OUTPATIENT
Start: 2025-05-11 | End: 2026-05-11

## 2025-05-12 ENCOUNTER — ANTICOAGULATION - WARFARIN VISIT (OUTPATIENT)
Dept: CARDIOLOGY | Facility: CLINIC | Age: 89
End: 2025-05-12
Payer: MEDICARE

## 2025-05-12 DIAGNOSIS — I48.20 CHRONIC ATRIAL FIBRILLATION (MULTI): Primary | ICD-10-CM

## 2025-05-12 DIAGNOSIS — Z95.2 S/P TAVR (TRANSCATHETER AORTIC VALVE REPLACEMENT): ICD-10-CM

## 2025-05-12 NOTE — PROGRESS NOTES
Patient identification verified with 2 identifiers.    Location: [facility] Patient Self-Testing Program [phone]    Referring Physician: DR. MARIANNE BUCKLEY  Enrollment/ Re-enrollment date: 2025   INR Goal: 2.0-3.0  INR monitoring is per [facility] protocol.  Anticoagulation Medication: warfarin  Indication: Atrial Fibrillation/Atrial Flutter    Subjective   Bleeding signs/symptoms: No    Bruising: No   Major bleeding event: No  Thrombosis signs/symptoms: No  Thromboembolic event: No  Missed doses: No  Extra doses: No  Medication changes: Yes  PT STARTED PRISTIQU (ANTIDEPRESSANT) MAGNESIUM SUPPLEMENT AND A PROBIOTIC  Dietary changes: No  Change in health: No  Change in activity: No  Alcohol: No  Other concerns: No    Upcoming Procedures:  Does the Patient Have any upcoming procedures that require interruption in anticoagulation therapy? no  Does the patient require bridging? no      Anticoagulation Summary  As of 2025      INR goal:  2.0-3.0   TTR:  68.8% (11 mo)   INR used for dosin.80 (2025)   Weekly warfarin total:  32.5 mg               Assessment/Plan   Subtherapeutic     1. New dose: TWD INCREASED. SPOKE TO RAMEZ CONFIRMED NEW DOSING INSTRUCTIONS    2. Next INR: 1 week      Education provided to patient during the visit:  Patient instructed to call in interim with questions, concerns and changes.   Patient educated on interactions between medications and warfarin.   Patient educated on compliance with dosing, follow up appointments, and prescribed plan of care.

## 2025-05-17 LAB
APPEARANCE UR: CLEAR
BACTERIA #/AREA URNS HPF: ABNORMAL /HPF
BILIRUB UR QL STRIP: NEGATIVE
COLOR UR: YELLOW
GLUCOSE UR QL STRIP: NEGATIVE
HGB UR QL STRIP: NEGATIVE
HYALINE CASTS #/AREA URNS LPF: ABNORMAL /LPF
KETONES UR QL STRIP: NEGATIVE
LEUKOCYTE ESTERASE UR QL STRIP: NEGATIVE
NITRITE UR QL STRIP: NEGATIVE
PH UR STRIP: 6.5 [PH] (ref 5–8)
PROT UR QL STRIP: ABNORMAL
RBC #/AREA URNS HPF: ABNORMAL /HPF
SERVICE CMNT-IMP: ABNORMAL
SP GR UR STRIP: 1.02 (ref 1–1.03)
SQUAMOUS #/AREA URNS HPF: ABNORMAL /HPF
WBC #/AREA URNS HPF: ABNORMAL /HPF

## 2025-05-19 ENCOUNTER — ANTICOAGULATION - WARFARIN VISIT (OUTPATIENT)
Dept: CARDIOLOGY | Facility: CLINIC | Age: 89
End: 2025-05-19
Payer: MEDICARE

## 2025-05-19 DIAGNOSIS — Z95.2 S/P TAVR (TRANSCATHETER AORTIC VALVE REPLACEMENT): ICD-10-CM

## 2025-05-19 DIAGNOSIS — I48.20 CHRONIC ATRIAL FIBRILLATION (MULTI): Primary | ICD-10-CM

## 2025-05-19 NOTE — PROGRESS NOTES
Patient identification verified with 2 identifiers.    Location: [facility] Patient Self-Testing Program [phone]    Referring Physician: DR. MARIANNE BUCKLEY  Enrollment/ Re-enrollment date: 2025   INR Goal: 2.0-3.0  INR monitoring is per [facility] protocol.  Anticoagulation Medication: warfarin  Indication: Atrial Fibrillation/Atrial Flutter    Subjective   Bleeding signs/symptoms: No    Bruising: No   Major bleeding event: No  Thrombosis signs/symptoms: No  Thromboembolic event: No  Missed doses: No  Extra doses: No  Medication changes: No  Dietary changes: No  Change in health: No  Change in activity: No  Alcohol: No  Other concerns: No    Upcoming Procedures:  Does the Patient Have any upcoming procedures that require interruption in anticoagulation therapy? no  Does the patient require bridging? no      Anticoagulation Summary  As of 2025      INR goal:  2.0-3.0   TTR:  67.3% (11.2 mo)   INR used for dosin.60 (2025)   Weekly warfarin total:  35 mg               Assessment/Plan   Subtherapeutic     1. New dose: TWD INCREASED. SPOKE TO RAMEZ CONFIRMED NEW DOSING INSTRUCTIONS    2. Next INR: 1 week      Education provided to patient during the visit:  Patient instructed to call in interim with questions, concerns and changes.   Patient educated on interactions between medications and warfarin.   Patient educated on compliance with dosing, follow up appointments, and prescribed plan of care.

## 2025-05-27 ENCOUNTER — ANTICOAGULATION - WARFARIN VISIT (OUTPATIENT)
Dept: CARDIOLOGY | Facility: CLINIC | Age: 89
End: 2025-05-27
Payer: MEDICARE

## 2025-05-27 DIAGNOSIS — Z95.2 S/P TAVR (TRANSCATHETER AORTIC VALVE REPLACEMENT): ICD-10-CM

## 2025-05-27 DIAGNOSIS — I48.20 CHRONIC ATRIAL FIBRILLATION (MULTI): Primary | ICD-10-CM

## 2025-05-27 NOTE — PROGRESS NOTES
Patient identification verified with 2 identifiers.    Location: [facility] Patient Self-Testing Program [phone]    Referring Physician: Dr. Jeremy Clarke  Enrollment/ Re-enrollment date: 6/13/2025   INR Goal: 2.0-3.0  INR monitoring is per [facility] protocol.  Anticoagulation Medication: warfarin  Indication: Atrial Fibrillation/Atrial Flutter    Subjective   Bleeding signs/symptoms: No    Bruising: No   Major bleeding event: No  Thrombosis signs/symptoms: No  Thromboembolic event: No  Missed doses: No  Extra doses: No  Medication changes: No  Dietary changes: No  Change in health: No  Change in activity: No  Alcohol: No  Other concerns: No    Upcoming Procedures:  Does the Patient Have any upcoming procedures that require interruption in anticoagulation therapy? no  Does the patient require bridging? no      Anticoagulation Summary  As of 5/27/2025      INR goal:  2.0-3.0   TTR:  67.3% (11.2 mo)   INR used for dosing:  --               Assessment/Plan   Therapeutic     1. New dose: no change    2. Next INR: 1 week      Education provided to patient during the visit:  Patient instructed to call in interim with questions, concerns and changes.   Patient educated on interactions between medications and warfarin.   Patient educated on dietary consistency in vitamin k consumption.   Patient educated on affects of alcohol consumption while taking warfarin.   Patient educated on signs of bleeding/clotting.   Patient educated on compliance with dosing, follow up appointments, and prescribed plan of care.

## 2025-06-03 ENCOUNTER — ANTICOAGULATION - WARFARIN VISIT (OUTPATIENT)
Dept: CARDIOLOGY | Facility: CLINIC | Age: 89
End: 2025-06-03
Payer: MEDICARE

## 2025-06-03 DIAGNOSIS — I48.20 CHRONIC ATRIAL FIBRILLATION (MULTI): Primary | ICD-10-CM

## 2025-06-03 DIAGNOSIS — Z95.2 S/P TAVR (TRANSCATHETER AORTIC VALVE REPLACEMENT): ICD-10-CM

## 2025-06-03 NOTE — PROGRESS NOTES
Patient identification verified with 2 identifiers.    Location: [facility] Patient Self-Testing Program [phone]    Referring Physician: Dr. Jeremy Clarke  Enrollment/ Re-enrollment date: 2026   INR Goal: 2.0-3.0  INR monitoring is per [facility] protocol.  Anticoagulation Medication: warfarin  Indication: Atrial Fibrillation/Atrial Flutter    Subjective   Bleeding signs/symptoms: No    Bruising: No   Major bleeding event: No  Thrombosis signs/symptoms: No  Thromboembolic event: No  Missed doses: No  Extra doses: No  Medication changes: No  Dietary changes: No  Change in health: No  Change in activity: No  Alcohol: No  Other concerns: No    Upcoming Procedures:  Does the Patient Have any upcoming procedures that require interruption in anticoagulation therapy? no  Does the patient require bridging? no      Anticoagulation Summary  As of 6/3/2025      INR goal:  2.0-3.0   TTR:  64.5% (11.7 mo)   INR used for dosin.80 (6/3/2025)   Weekly warfarin total:  37.5 mg               Assessment/Plan   Subtherapeutic     1. New dose: Increase TWD.    2. Next INR: 1 week      Education provided to patient during the visit:  Patient instructed to call in interim with questions, concerns and changes.   Patient educated on interactions between medications and warfarin.   Patient educated on dietary consistency in vitamin k consumption.   Patient educated on affects of alcohol consumption while taking warfarin.   Patient educated on signs of bleeding/clotting.   Patient educated on compliance with dosing, follow up appointments, and prescribed plan of care.

## 2025-06-10 ENCOUNTER — ANTICOAGULATION - WARFARIN VISIT (OUTPATIENT)
Dept: CARDIOLOGY | Facility: CLINIC | Age: 89
End: 2025-06-10
Payer: MEDICARE

## 2025-06-10 DIAGNOSIS — Z95.2 S/P TAVR (TRANSCATHETER AORTIC VALVE REPLACEMENT): ICD-10-CM

## 2025-06-10 DIAGNOSIS — I48.20 CHRONIC ATRIAL FIBRILLATION (MULTI): Primary | ICD-10-CM

## 2025-06-10 LAB
INR IN PPP BY COAGULATION ASSAY EXTERNAL: 2.1
PROTHROMBIN TIME (PT) IN PPP BY COAGULATION ASSAY EXTERNAL: NORMAL

## 2025-06-10 NOTE — PROGRESS NOTES
Patient identification verified with 2 identifiers.    Location: [facility] Patient Self-Testing Program [phone]    Referring Physician: Dr. Jeremy Clarke  Enrollment/ Re-enrollment date: 2026   INR Goal: 2.0-3.0  INR monitoring is per [facility] protocol.  Anticoagulation Medication: warfarin  Indication: Atrial Fibrillation/Atrial Flutter    Subjective   Bleeding signs/symptoms: No    Bruising: No   Major bleeding event: No  Thrombosis signs/symptoms: No  Thromboembolic event: No  Missed doses: No  Extra doses: No  Medication changes: No  Dietary changes: No  Change in health: No  Change in activity: No  Alcohol: No  Other concerns: No    Upcoming Procedures:  Does the Patient Have any upcoming procedures that require interruption in anticoagulation therapy? no  Does the patient require bridging? no      Anticoagulation Summary  As of 6/10/2025      INR goal:  2.0-3.0   TTR:  63.9% (12 mo)   INR used for dosin.10 (6/10/2025)   Weekly warfarin total:  37.5 mg               Assessment/Plan   Therapeutic     1. New dose: no change    2. Next INR: 1 week      Education provided to patient during the visit:  Patient instructed to call in interim with questions, concerns and changes.   Patient educated on interactions between medications and warfarin.   Patient educated on dietary consistency in vitamin k consumption.   Patient educated on affects of alcohol consumption while taking warfarin.   Patient educated on signs of bleeding/clotting.   Patient educated on compliance with dosing, follow up appointments, and prescribed plan of care.

## 2025-06-17 ENCOUNTER — ANTICOAGULATION - WARFARIN VISIT (OUTPATIENT)
Dept: CARDIOLOGY | Facility: CLINIC | Age: 89
End: 2025-06-17
Payer: MEDICARE

## 2025-06-17 DIAGNOSIS — I48.20 CHRONIC ATRIAL FIBRILLATION (MULTI): Primary | ICD-10-CM

## 2025-06-17 DIAGNOSIS — Z95.2 S/P TAVR (TRANSCATHETER AORTIC VALVE REPLACEMENT): ICD-10-CM

## 2025-06-17 LAB
INR IN PPP BY COAGULATION ASSAY EXTERNAL: 2.3
PROTHROMBIN TIME (PT) IN PPP BY COAGULATION ASSAY EXTERNAL: NORMAL

## 2025-06-17 NOTE — PROGRESS NOTES
Patient identification verified with 2 identifiers.    Location: [facility] Patient Self-Testing Program [phone]    Referring Physician: Dr. Jeremy Clarke  Enrollment/ Re-enrollment date: 5/27/2026   INR Goal: 2.0-3.0  INR monitoring is per [facility] protocol.  Anticoagulation Medication: warfarin  Indication: Atrial Fibrillation/Atrial Flutter    Subjective   Bleeding signs/symptoms: No    Bruising: No   Major bleeding event: No  Thrombosis signs/symptoms: No  Thromboembolic event: No  Missed doses: No  Extra doses: No  Medication changes: No  Dietary changes: No  Change in health: No  Change in activity: No  Alcohol: No  Other concerns: No    Upcoming Procedures:  Does the Patient Have any upcoming procedures that require interruption in anticoagulation therapy? no  Does the patient require bridging? no      Anticoagulation Summary  As of 6/17/2025      INR goal:  2.0-3.0   TTR:  63.9% (12 mo)   INR used for dosing:  --               Assessment/Plan   Therapeutic     1. New dose: no change    2. Next INR: 2 weeks      Education provided to patient during the visit:  Patient instructed to call in interim with questions, concerns and changes.   Patient educated on interactions between medications and warfarin.   Patient educated on dietary consistency in vitamin k consumption.   Patient educated on affects of alcohol consumption while taking warfarin.   Patient educated on signs of bleeding/clotting.   Patient educated on compliance with dosing, follow up appointments, and prescribed plan of care.

## 2025-06-24 ENCOUNTER — ANTICOAGULATION - WARFARIN VISIT (OUTPATIENT)
Dept: CARDIOLOGY | Facility: CLINIC | Age: 89
End: 2025-06-24
Payer: MEDICARE

## 2025-06-24 DIAGNOSIS — I48.20 CHRONIC ATRIAL FIBRILLATION (MULTI): Primary | ICD-10-CM

## 2025-06-24 DIAGNOSIS — Z95.2 S/P TAVR (TRANSCATHETER AORTIC VALVE REPLACEMENT): ICD-10-CM

## 2025-06-24 NOTE — PROGRESS NOTES
Patient identification verified with 2 identifiers.    Location: [facility] Patient Self-Testing Program [phone]    Referring Physician: Dr. Jeremy Clarke  Enrollment/ Re-enrollment date: 26   INR Goal: 2.0-3.0  INR monitoring is per [facility] protocol.  Anticoagulation Medication: warfarin  Indication: Atrial Fibrillation/Atrial Flutter    Subjective   Left message for wife, Catrina, to call with any complications r/t ACT therapy or any changes in medication, diet, social habits, or general health.     Upcoming Procedures:  Does the Patient Have any upcoming procedures that require interruption in anticoagulation therapy? no  Does the patient require bridging? no      Anticoagulation Summary  As of 2025      INR goal:  2.0-3.0   TTR:  65.2% (1 y)   INR used for dosin.40 (2025)   Weekly warfarin total:  37.5 mg               Assessment/Plan   Therapeutic     1. New dose: no change    2. Next INR: 2 weeks      Education provided to patient during the visit:  Patient instructed to call in interim with questions, concerns and changes.   Patient educated on interactions between medications and warfarin.   Patient educated on dietary consistency in vitamin k consumption.   Patient educated on affects of alcohol consumption while taking warfarin.   Patient educated on signs of bleeding/clotting.   Patient educated on compliance with dosing, follow up appointments, and prescribed plan of care.

## 2025-07-08 ENCOUNTER — ANTICOAGULATION - WARFARIN VISIT (OUTPATIENT)
Dept: CARDIOLOGY | Facility: CLINIC | Age: 89
End: 2025-07-08
Payer: MEDICARE

## 2025-07-08 DIAGNOSIS — Z95.2 S/P TAVR (TRANSCATHETER AORTIC VALVE REPLACEMENT): ICD-10-CM

## 2025-07-08 DIAGNOSIS — I48.20 CHRONIC ATRIAL FIBRILLATION (MULTI): Primary | ICD-10-CM

## 2025-07-08 NOTE — PROGRESS NOTES
Patient identification verified with 2 identifiers.    Location: [facility] Patient Self-Testing Program [phone]    Referring Physician: Dr. Jeremy Clarke  Enrollment/ Re-enrollment date: 2026   INR Goal: 2.0-3.0  INR monitoring is per [facility] protocol.  Anticoagulation Medication: warfarin  Indication: Atrial Fibrillation/Atrial Flutter    Subjective   Bleeding signs/symptoms: No    Bruising: No   Major bleeding event: No  Thrombosis signs/symptoms: No  Thromboembolic event: No  Missed doses: No  Extra doses: No  Medication changes: No  Dietary changes: No  Change in health: No  Change in activity: No  Alcohol: No  Other concerns: No    Upcoming Procedures:  Does the Patient Have any upcoming procedures that require interruption in anticoagulation therapy? no  Does the patient require bridging? no      Anticoagulation Summary  As of 2025      INR goal:  2.0-3.0   TTR:  66.5% (1.1 y)   INR used for dosin.60 (2025)   Weekly warfarin total:  37.5 mg               Assessment/Plan   Therapeutic     1. New dose: no change  Spoke with pt's wife.  Dosing scheduled confirmed.  2. Next INR: In person, annual meter review scheduled on 25.      Education provided to patient during the visit:  Patient instructed to call in interim with questions, concerns and changes.   Patient educated on interactions between medications and warfarin.   Patient educated on dietary consistency in vitamin k consumption.   Patient educated on affects of alcohol consumption while taking warfarin.   Patient educated on signs of bleeding/clotting.   Patient educated on compliance with dosing, follow up appointments, and prescribed plan of care.

## 2025-07-11 ENCOUNTER — TELEPHONE (OUTPATIENT)
Dept: CARDIOLOGY | Facility: CLINIC | Age: 89
End: 2025-07-11

## 2025-07-18 ENCOUNTER — APPOINTMENT (OUTPATIENT)
Dept: CARDIOLOGY | Facility: CLINIC | Age: 89
End: 2025-07-18
Payer: MEDICARE

## 2025-07-21 ENCOUNTER — ANTICOAGULATION - WARFARIN VISIT (OUTPATIENT)
Dept: CARDIOLOGY | Facility: CLINIC | Age: 89
End: 2025-07-21
Payer: MEDICARE

## 2025-07-21 DIAGNOSIS — I48.20 CHRONIC ATRIAL FIBRILLATION (MULTI): Primary | ICD-10-CM

## 2025-07-21 DIAGNOSIS — Z95.2 S/P TAVR (TRANSCATHETER AORTIC VALVE REPLACEMENT): ICD-10-CM

## 2025-07-21 LAB
INR IN PPP BY COAGULATION ASSAY EXTERNAL: 3.1
INR IN PPP BY COAGULATION ASSAY EXTERNAL: 3.1
PROTHROMBIN TIME (PT) IN PPP BY COAGULATION ASSAY EXTERNAL: NORMAL
PROTHROMBIN TIME (PT) IN PPP BY COAGULATION ASSAY EXTERNAL: NORMAL

## 2025-07-21 NOTE — PROGRESS NOTES
Patient identification verified with 2 identifiers.    Location: [facility] Patient Self-Testing Program [phone]    Referring Physician: Dr. Jeremy Clarke  Enrollment/ Re-enrollment date: 5/27/2026   INR Goal: 2.0-3.0  INR monitoring is per [facility] protocol.  Anticoagulation Medication: warfarin  Indication: Atrial Fibrillation/Atrial Flutter    Subjective   Bleeding signs/symptoms: No    Bruising: No   Major bleeding event: No  Thrombosis signs/symptoms: No  Thromboembolic event: No  Missed doses: No  Extra doses: No  Medication changes: No  Dietary changes: No  Change in health: No  Change in activity: No  Alcohol: No  Other concerns: No    Upcoming Procedures:  Does the Patient Have any upcoming procedures that require interruption in anticoagulation therapy? no  Does the patient require bridging? no      Anticoagulation Summary  As of 7/21/2025      INR goal:  2.0-3.0   TTR:  66.9% (1.1 y)   INR used for dosing:  3.10 (7/21/2025)   Weekly warfarin total:  37.5 mg               Assessment/Plan   Supra Therapeutic     1. New dose: no change  Spoke with pt's wife.  Dosing scheduled confirmed.  2. Next INR: In person, annual meter review scheduled on 7/28/25 at [place]      Education provided to patient during the visit:  Patient instructed to call in interim with questions, concerns and changes.   Patient educated on interactions between medications and warfarin.   Patient educated on dietary consistency in vitamin k consumption.   Patient educated on affects of alcohol consumption while taking warfarin.   Patient educated on signs of bleeding/clotting.   Patient educated on compliance with dosing, follow up appointments, and prescribed plan of care.

## 2025-07-21 NOTE — PROGRESS NOTES
Patient identification verified with 2 identifiers.    Location: [facility] Patient Self-Testing Program [phone]    Referring Physician: Dr. Jeremy Clarke  Enrollment/ Re-enrollment date: 5/27/2026   INR Goal: 2.0-3.0  INR monitoring is per [facility] protocol.  Anticoagulation Medication: warfarin  Indication: Atrial Fibrillation/Atrial Flutter    Subjective   Bleeding signs/symptoms: No    Bruising: No   Major bleeding event: No  Thrombosis signs/symptoms: No  Thromboembolic event: No  Missed doses: No  Extra doses: No  Medication changes: No  Dietary changes: No  Change in health: No  Change in activity: No  Alcohol: No  Other concerns: No    Upcoming Procedures:  Does the Patient Have any upcoming procedures that require interruption in anticoagulation therapy? no  Does the patient require bridging? no      Anticoagulation Summary  As of 7/21/2025      INR goal:  2.0-3.0   TTR:  66.9% (1.1 y)   INR used for dosing:  3.10 (7/21/2025)   Weekly warfarin total:  37.5 mg               Assessment/Plan   Supra Therapeutic     1. New dose: no change  Spoke with pt's wife.  Dosing scheduled confirmed.  2. Next INR: In person, annual meter review scheduled on 7/28/25.      Education provided to patient during the visit:  Patient instructed to call in interim with questions, concerns and changes.   Patient educated on interactions between medications and warfarin.   Patient educated on dietary consistency in vitamin k consumption.   Patient educated on affects of alcohol consumption while taking warfarin.   Patient educated on signs of bleeding/clotting.   Patient educated on compliance with dosing, follow up appointments, and prescribed plan of care.

## 2025-07-28 ENCOUNTER — ANTICOAGULATION - WARFARIN VISIT (OUTPATIENT)
Dept: CARDIOLOGY | Facility: CLINIC | Age: 89
End: 2025-07-28

## 2025-07-28 ENCOUNTER — ANTICOAGULATION - WARFARIN VISIT (OUTPATIENT)
Dept: CARDIOLOGY | Facility: CLINIC | Age: 89
End: 2025-07-28
Payer: MEDICARE

## 2025-07-28 DIAGNOSIS — I48.20 CHRONIC ATRIAL FIBRILLATION (MULTI): Primary | ICD-10-CM

## 2025-07-28 DIAGNOSIS — Z95.2 S/P TAVR (TRANSCATHETER AORTIC VALVE REPLACEMENT): ICD-10-CM

## 2025-07-28 LAB
POC INR: 3.2 (ref 0.9–1.1)
POC PROTHROMBIN TIME: ABNORMAL (ref 9.3–12.5)

## 2025-07-28 PROCEDURE — 85610 PROTHROMBIN TIME: CPT | Mod: QW | Performed by: INTERNAL MEDICINE

## 2025-07-28 PROCEDURE — 99211 OFF/OP EST MAY X REQ PHY/QHP: CPT

## 2025-07-28 NOTE — PROGRESS NOTES
Patient identification verified with 2 identifiers.    Location: [facility] - [address] [place] [address] [phone]     Referring Physician: Dr. Jeremy Clarke  Enrollment/ Re-enrollment date: 5/27/2026   INR Goal: 2.0-3.0  INR monitoring is per [facility] protocol.  Anticoagulation Medication: warfarin  Indication: Atrial Fibrillation/Atrial Flutter    Subjective   Bleeding signs/symptoms: No    Bruising: No   Major bleeding event: No  Thrombosis signs/symptoms: No  Thromboembolic event: No  Missed doses: No  Extra doses: No  Medication changes: No  Dietary changes: No  Change in health: No  Change in activity: No  Alcohol: No  Other concerns: No    Upcoming Procedures:  Does the Patient Have any upcoming procedures that require interruption in anticoagulation therapy? no  Does the patient require bridging? no      Anticoagulation Summary  As of 7/28/2025      INR goal:  2.0-3.0   TTR:  65.7% (1.1 y)   INR used for dosing:  3.20 (7/28/2025)   Weekly warfarin total:  35 mg               Assessment/Plan   Supratherapeutic     1. New dose: Reduce TWD    2. Next INR: 1 week      Education provided to patient during the visit:  Patient instructed to call in interim with questions, concerns and changes.   Patient educated on interactions between medications and warfarin.   Patient educated on dietary consistency in vitamin k consumption.   Patient educated on affects of alcohol consumption while taking warfarin.   Patient educated on signs of bleeding/clotting.   Patient educated on compliance with dosing, follow up appointments, and prescribed plan of care.

## 2025-07-31 ENCOUNTER — ANCILLARY PROCEDURE (OUTPATIENT)
Facility: CLINIC | Age: 89
End: 2025-07-31
Payer: MEDICARE

## 2025-07-31 DIAGNOSIS — I50.22 CHRONIC SYSTOLIC CONGESTIVE HEART FAILURE: ICD-10-CM

## 2025-07-31 DIAGNOSIS — I10 BENIGN ESSENTIAL HTN: ICD-10-CM

## 2025-07-31 DIAGNOSIS — I48.20 CHRONIC ATRIAL FIBRILLATION (MULTI): ICD-10-CM

## 2025-07-31 DIAGNOSIS — I48.20 CHRONIC ATRIAL FIBRILLATION (MULTI): Primary | ICD-10-CM

## 2025-07-31 DIAGNOSIS — I10 HYPERTENSION, UNSPECIFIED TYPE: ICD-10-CM

## 2025-07-31 PROCEDURE — 93296 REM INTERROG EVL PM/IDS: CPT

## 2025-07-31 PROCEDURE — 93295 DEV INTERROG REMOTE 1/2/MLT: CPT | Performed by: INTERNAL MEDICINE

## 2025-08-05 LAB
INR IN PPP BY COAGULATION ASSAY EXTERNAL: 2.8
PROTHROMBIN TIME (PT) IN PPP BY COAGULATION ASSAY EXTERNAL: NORMAL

## 2025-08-05 NOTE — PROGRESS NOTES
Patient identification verified with 2 identifiers.    Location: [facility] Patient Self-Testing Program [phone]    Referring Physician: DR. MARIANNE BUCKLEY  Enrollment/ Re-enrollment date: 2026   INR Goal: 2.0-3.0  INR monitoring is per [facility] protocol.  Anticoagulation Medication: warfarin  Indication: Atrial Fibrillation/Atrial Flutter    Subjective   Bleeding signs/symptoms: No    Bruising: No   Major bleeding event: No  Thrombosis signs/symptoms: No  Thromboembolic event: No  Missed doses: No  Extra doses: No  Medication changes: No  Dietary changes: No  Change in health: No  Change in activity: No  Alcohol: No  Other concerns: No    Upcoming Procedures:  Does the Patient Have any upcoming procedures that require interruption in anticoagulation therapy? no  Does the patient require bridging? no      Anticoagulation Summary  As of 2025      INR goal:  2.0-3.0   TTR:  65.7% (1.1 y)   INR used for dosin.80 (2025)   Weekly warfarin total:  35 mg               Assessment/Plan   Therapeutic     1. New dose: no change  SPOKE TO PT'S WIFE CORINNE. CONFIRMED CURRENT DOSING INSTRUCTIONS. WIFE VERBALIZED CORRECTLY.  2. Next INR: 1 week      Education provided to patient during the visit:  Patient instructed to call in interim with questions, concerns and changes.   Patient educated on compliance with dosing, follow up appointments, and prescribed plan of care.

## 2025-08-12 ENCOUNTER — ANTICOAGULATION - WARFARIN VISIT (OUTPATIENT)
Dept: CARDIOLOGY | Facility: HOSPITAL | Age: 89
End: 2025-08-12
Payer: MEDICARE

## 2025-08-12 DIAGNOSIS — I48.20 CHRONIC ATRIAL FIBRILLATION (MULTI): ICD-10-CM

## 2025-08-12 DIAGNOSIS — Z95.2 S/P TAVR (TRANSCATHETER AORTIC VALVE REPLACEMENT): ICD-10-CM

## 2025-08-18 DIAGNOSIS — E78.5 DYSLIPIDEMIA: ICD-10-CM

## 2025-08-18 RX ORDER — ROSUVASTATIN CALCIUM 5 MG/1
5 TABLET, COATED ORAL NIGHTLY
Qty: 90 TABLET | Refills: 0 | Status: SHIPPED | OUTPATIENT
Start: 2025-08-18 | End: 2025-08-22 | Stop reason: SDUPTHER

## 2025-08-19 ENCOUNTER — ANTICOAGULATION - WARFARIN VISIT (OUTPATIENT)
Dept: CARDIOLOGY | Facility: CLINIC | Age: 89
End: 2025-08-19
Payer: MEDICARE

## 2025-08-19 DIAGNOSIS — Z95.2 S/P TAVR (TRANSCATHETER AORTIC VALVE REPLACEMENT): ICD-10-CM

## 2025-08-19 DIAGNOSIS — I48.20 CHRONIC ATRIAL FIBRILLATION (MULTI): Primary | ICD-10-CM

## 2025-08-19 LAB
INR IN PPP BY COAGULATION ASSAY EXTERNAL: 2.9
PROTHROMBIN TIME (PT) IN PPP BY COAGULATION ASSAY EXTERNAL: NORMAL

## 2025-08-22 ENCOUNTER — TELEPHONE (OUTPATIENT)
Dept: PRIMARY CARE | Facility: CLINIC | Age: 89
End: 2025-08-22
Payer: MEDICARE

## 2025-08-22 DIAGNOSIS — E78.5 DYSLIPIDEMIA: ICD-10-CM

## 2025-08-22 RX ORDER — ROSUVASTATIN CALCIUM 5 MG/1
5 TABLET, COATED ORAL NIGHTLY
Qty: 90 TABLET | Refills: 0 | Status: SHIPPED | OUTPATIENT
Start: 2025-08-22

## 2025-08-26 ENCOUNTER — ANTICOAGULATION - WARFARIN VISIT (OUTPATIENT)
Dept: CARDIOLOGY | Facility: CLINIC | Age: 89
End: 2025-08-26
Payer: MEDICARE

## 2025-08-26 DIAGNOSIS — Z95.2 S/P TAVR (TRANSCATHETER AORTIC VALVE REPLACEMENT): ICD-10-CM

## 2025-08-26 DIAGNOSIS — I48.20 CHRONIC ATRIAL FIBRILLATION (MULTI): Primary | ICD-10-CM

## 2025-08-26 LAB
INR IN PPP BY COAGULATION ASSAY EXTERNAL: 3
PROTHROMBIN TIME (PT) IN PPP BY COAGULATION ASSAY EXTERNAL: NORMAL

## 2025-10-15 ENCOUNTER — APPOINTMENT (OUTPATIENT)
Dept: OTOLARYNGOLOGY | Facility: CLINIC | Age: 89
End: 2025-10-15
Payer: MEDICARE

## 2025-10-15 ENCOUNTER — APPOINTMENT (OUTPATIENT)
Dept: AUDIOLOGY | Facility: CLINIC | Age: 89
End: 2025-10-15
Payer: MEDICARE

## 2025-11-06 ENCOUNTER — APPOINTMENT (OUTPATIENT)
Dept: PRIMARY CARE | Facility: CLINIC | Age: 89
End: 2025-11-06
Payer: MEDICARE

## 2025-11-11 ENCOUNTER — APPOINTMENT (OUTPATIENT)
Dept: PRIMARY CARE | Facility: CLINIC | Age: 89
End: 2025-11-11
Payer: MEDICARE

## (undated) DEVICE — MICROINTRODUCER KIT, VSI, 4FR X 40CM, STIFFEN

## (undated) DEVICE — GUIDEWIRE, J TIP, 3 MM, 0.035 IN X 150 CM, PTFE

## (undated) DEVICE — Device

## (undated) DEVICE — DRESSING, MEPILEX BORDER, POST-OP AG, 4 X 6 IN

## (undated) DEVICE — GUIDEWIRE, HI-TORQUE WHISPER ES, 0.014IN 3CM X 190CM, J CURVE TIP

## (undated) DEVICE — ENVELOPE, ANTIBACTERIAL, AIGIS RX TYRX, ABSORBABLE, MED

## (undated) DEVICE — CATHETER, THERMODILUTION, SWAN GANZ, 7 FR, 110CM, STANDARD

## (undated) DEVICE — PAD, ELECTRODE DEFIB PADPRO ADULT STRL W/ADAPTER

## (undated) DEVICE — COVER, EQUIPMENT, ZERO GRAVITY

## (undated) DEVICE — INTRODUCER SYSTEM, PRELUDE SNAP, SPITTABLE, 8 FR X 13CM